# Patient Record
Sex: FEMALE | Race: ASIAN | NOT HISPANIC OR LATINO | URBAN - METROPOLITAN AREA
[De-identification: names, ages, dates, MRNs, and addresses within clinical notes are randomized per-mention and may not be internally consistent; named-entity substitution may affect disease eponyms.]

---

## 2022-05-19 ENCOUNTER — INPATIENT (INPATIENT)
Facility: HOSPITAL | Age: 62
LOS: 31 days | Discharge: NURSING, REHAB OR LONG-TERM CARE | DRG: 25 | End: 2022-06-20
Attending: NEUROLOGICAL SURGERY | Admitting: NEUROLOGICAL SURGERY
Payer: MEDICAID

## 2022-05-19 VITALS
HEIGHT: 61 IN | DIASTOLIC BLOOD PRESSURE: 94 MMHG | HEART RATE: 80 BPM | TEMPERATURE: 98 F | RESPIRATION RATE: 18 BRPM | OXYGEN SATURATION: 95 % | SYSTOLIC BLOOD PRESSURE: 142 MMHG | WEIGHT: 119.05 LBS

## 2022-05-19 DIAGNOSIS — Z98.890 OTHER SPECIFIED POSTPROCEDURAL STATES: Chronic | ICD-10-CM

## 2022-05-19 LAB
ALBUMIN SERPL ELPH-MCNC: 4.7 G/DL — SIGNIFICANT CHANGE UP (ref 3.3–5)
ALP SERPL-CCNC: 75 U/L — SIGNIFICANT CHANGE UP (ref 40–120)
ALT FLD-CCNC: 20 U/L — SIGNIFICANT CHANGE UP (ref 10–45)
ANION GAP SERPL CALC-SCNC: 15 MMOL/L — SIGNIFICANT CHANGE UP (ref 5–17)
AST SERPL-CCNC: 21 U/L — SIGNIFICANT CHANGE UP (ref 10–40)
BASE EXCESS BLDV CALC-SCNC: 1.3 MMOL/L — SIGNIFICANT CHANGE UP (ref -2–2)
BILIRUB SERPL-MCNC: 0.9 MG/DL — SIGNIFICANT CHANGE UP (ref 0.2–1.2)
BUN SERPL-MCNC: 12 MG/DL — SIGNIFICANT CHANGE UP (ref 7–23)
CA-I SERPL-SCNC: 1.25 MMOL/L — SIGNIFICANT CHANGE UP (ref 1.15–1.33)
CALCIUM SERPL-MCNC: 9.6 MG/DL — SIGNIFICANT CHANGE UP (ref 8.4–10.5)
CHLORIDE BLDV-SCNC: 100 MMOL/L — SIGNIFICANT CHANGE UP (ref 96–108)
CHLORIDE SERPL-SCNC: 102 MMOL/L — SIGNIFICANT CHANGE UP (ref 96–108)
CO2 BLDV-SCNC: 27 MMOL/L — HIGH (ref 22–26)
CO2 SERPL-SCNC: 19 MMOL/L — LOW (ref 22–31)
CREAT SERPL-MCNC: 0.49 MG/DL — LOW (ref 0.5–1.3)
EGFR: 108 ML/MIN/1.73M2 — SIGNIFICANT CHANGE UP
GAS PNL BLDV: 132 MMOL/L — LOW (ref 136–145)
GAS PNL BLDV: SIGNIFICANT CHANGE UP
GLUCOSE BLDV-MCNC: 96 MG/DL — SIGNIFICANT CHANGE UP (ref 70–99)
GLUCOSE SERPL-MCNC: 96 MG/DL — SIGNIFICANT CHANGE UP (ref 70–99)
HCO3 BLDV-SCNC: 26 MMOL/L — SIGNIFICANT CHANGE UP (ref 22–29)
HCT VFR BLD CALC: 41.2 % — SIGNIFICANT CHANGE UP (ref 34.5–45)
HCT VFR BLDA CALC: 44 % — SIGNIFICANT CHANGE UP (ref 34.5–46.5)
HGB BLD CALC-MCNC: 14.8 G/DL — SIGNIFICANT CHANGE UP (ref 11.7–16.1)
HGB BLD-MCNC: 14.4 G/DL — SIGNIFICANT CHANGE UP (ref 11.5–15.5)
LACTATE BLDV-MCNC: 1.8 MMOL/L — SIGNIFICANT CHANGE UP (ref 0.7–2)
MCHC RBC-ENTMCNC: 31.9 PG — SIGNIFICANT CHANGE UP (ref 27–34)
MCHC RBC-ENTMCNC: 35 GM/DL — SIGNIFICANT CHANGE UP (ref 32–36)
MCV RBC AUTO: 91.4 FL — SIGNIFICANT CHANGE UP (ref 80–100)
NRBC # BLD: 0 /100 WBCS — SIGNIFICANT CHANGE UP (ref 0–0)
PCO2 BLDV: 40 MMHG — SIGNIFICANT CHANGE UP (ref 39–42)
PH BLDV: 7.42 — SIGNIFICANT CHANGE UP (ref 7.32–7.43)
PLATELET # BLD AUTO: 222 K/UL — SIGNIFICANT CHANGE UP (ref 150–400)
PO2 BLDV: 63 MMHG — HIGH (ref 25–45)
POTASSIUM BLDV-SCNC: 3.8 MMOL/L — SIGNIFICANT CHANGE UP (ref 3.5–5.1)
POTASSIUM SERPL-MCNC: 3.9 MMOL/L — SIGNIFICANT CHANGE UP (ref 3.5–5.3)
POTASSIUM SERPL-SCNC: 3.9 MMOL/L — SIGNIFICANT CHANGE UP (ref 3.5–5.3)
PROT SERPL-MCNC: 7.6 G/DL — SIGNIFICANT CHANGE UP (ref 6–8.3)
RBC # BLD: 4.51 M/UL — SIGNIFICANT CHANGE UP (ref 3.8–5.2)
RBC # FLD: 12.1 % — SIGNIFICANT CHANGE UP (ref 10.3–14.5)
SAO2 % BLDV: 90.9 % — HIGH (ref 67–88)
SARS-COV-2 RNA SPEC QL NAA+PROBE: SIGNIFICANT CHANGE UP
SODIUM SERPL-SCNC: 136 MMOL/L — SIGNIFICANT CHANGE UP (ref 135–145)
WBC # BLD: 6.09 K/UL — SIGNIFICANT CHANGE UP (ref 3.8–10.5)
WBC # FLD AUTO: 6.09 K/UL — SIGNIFICANT CHANGE UP (ref 3.8–10.5)

## 2022-05-19 PROCEDURE — 99220: CPT

## 2022-05-19 PROCEDURE — 70450 CT HEAD/BRAIN W/O DYE: CPT | Mod: 26,MA

## 2022-05-19 RX ORDER — MECLIZINE HCL 12.5 MG
25 TABLET ORAL ONCE
Refills: 0 | Status: COMPLETED | OUTPATIENT
Start: 2022-05-19 | End: 2022-05-19

## 2022-05-19 RX ORDER — HYDROCHLOROTHIAZIDE 25 MG
12.5 TABLET ORAL DAILY
Refills: 0 | Status: DISCONTINUED | OUTPATIENT
Start: 2022-05-19 | End: 2022-05-21

## 2022-05-19 RX ORDER — SODIUM CHLORIDE 9 MG/ML
1000 INJECTION INTRAMUSCULAR; INTRAVENOUS; SUBCUTANEOUS ONCE
Refills: 0 | Status: COMPLETED | OUTPATIENT
Start: 2022-05-19 | End: 2022-05-19

## 2022-05-19 RX ORDER — LOSARTAN POTASSIUM 100 MG/1
100 TABLET, FILM COATED ORAL DAILY
Refills: 0 | Status: DISCONTINUED | OUTPATIENT
Start: 2022-05-19 | End: 2022-05-31

## 2022-05-19 RX ORDER — METOCLOPRAMIDE HCL 10 MG
10 TABLET ORAL ONCE
Refills: 0 | Status: COMPLETED | OUTPATIENT
Start: 2022-05-19 | End: 2022-05-19

## 2022-05-19 RX ORDER — MECLIZINE HCL 12.5 MG
25 TABLET ORAL EVERY 6 HOURS
Refills: 0 | Status: DISCONTINUED | OUTPATIENT
Start: 2022-05-19 | End: 2022-06-01

## 2022-05-19 RX ADMIN — Medication 10 MILLIGRAM(S): at 16:02

## 2022-05-19 RX ADMIN — Medication 25 MILLIGRAM(S): at 16:02

## 2022-05-19 RX ADMIN — SODIUM CHLORIDE 1000 MILLILITER(S): 9 INJECTION INTRAMUSCULAR; INTRAVENOUS; SUBCUTANEOUS at 16:03

## 2022-05-19 NOTE — ED CDU PROVIDER INITIAL DAY NOTE - OBJECTIVE STATEMENT
Daughter in law Shaina providing translation  61 year old female presenting with vomiting. Patient has been feeling dizzy since Tuesday. She vomited twice on tuesday. She feels unbalanced on her feet. She vomited once today. Denies bloody emesis, fevers, cp, sob, abdominal pain. Pshx - pt reports c-spine sx for "tumor" removal in [place] done 2/2018. Pt does not know details of dx or surgery.  In ED, labs non-actionable, CT w/ questionable "lesion in the region of the left pontomedullary junction." CT results d/w neurosx rec MRI/A head w/ thin cuts through ethel. CDU for neuro checks, MRI, symptom management. Daughter in law Shaina providing translation  61 year old female presenting with vomiting. Patient has been feeling dizzy since Tuesday. She vomited twice on tuesday. She feels unbalanced on her feet. She vomited once today. Denies bloody emesis, fevers, cp, sob, abdominal pain. Pshx - pt reports c-spine sx for "nerve tumor" removal in [place] done 2/2018. Pt does not know details of dx or surgery.  In ED, labs non-actionable, CT w/ questionable "lesion in the region of the left pontomedullary junction." CT results d/w neurosx rec MRI/A head w/ thin cuts through ethel. CDU for neuro checks, MRI, symptom management.

## 2022-05-19 NOTE — ED ADULT NURSE REASSESSMENT NOTE - NS ED NURSE REASSESS COMMENT FT1
Received pt from DEEPTI Webb, received pt alert and responsive, oriented x4, denies any respiratory distress, SOB, or difficulty breathing. Pt transferred to CDU for vomiting, dizziness. Pt denies, endorses headache, declined pain medication at this time. Neuro exam intact, pending MRI, pt aware of plan. IV in place, patent and free of signs of infiltration, pt denies chest pain or palpitations, V/S stable, pt afebrile,  Pt educated on unit and unit rules, instructed patient to notify RN of any needed assistance, Pt verbalizes understanding, Call bell placed within reach. Safety maintained. Will continue to monitor. Pt mandarin speaking, daughter-in-law at bedside to interpret. Received pt from DEEPTI Webb, received pt alert and responsive, oriented x4, denies any respiratory distress, SOB, or difficulty breathing. Pt transferred to CDU for vomiting, dizziness. Pt denies, endorses headache, declined pain medication at this time. Neuro exam intact, pending MRI, pt aware of plan. IV in place, patent and free of signs of infiltration, pt denies chest pain or palpitations, V/S stable, pt afebrile,  Pt educated on unit and unit rules, instructed patient to notify RN of any needed assistance, Pt verbalizes understanding, Call bell placed within reach. Safety maintained. Will continue to monitor. Pt mandarin speaking, daughter-in-law Shaina at bedside to interpret.

## 2022-05-19 NOTE — ED CDU PROVIDER INITIAL DAY NOTE - NS ED ATTENDING STATEMENT MOD
This was a shared visit with the RIOS. I reviewed and verified the documentation and independently performed the documented:

## 2022-05-19 NOTE — ED PROVIDER NOTE - OBJECTIVE STATEMENT
61 year old female presenting with vomiting. Patient has been feeling dizzy since Tuesday. She vomited twice on tuesday. She feels unbalanced on her feet. She vomited once today. Denies bloody emesis, fevers, cp, sob, abdominal pain.

## 2022-05-19 NOTE — ED ADULT TRIAGE NOTE - TEMPERATURE IN CELSIUS (DEGREES C)
Refill requested from pharmacy for miralax. Refill permitted per protocol. Script eprescribed to pharmacy.     36.4

## 2022-05-19 NOTE — ED CDU PROVIDER INITIAL DAY NOTE - PHYSICAL EXAMINATION
GEN: Pt well appearing, non-toxic in NAD, A&Ox3.  PSYCH: Affect and mood appropriate.  EYES: Sclera white w/o injection, EOMI, PERRLA.   ENT: Head NCAT. Neck supple FROM. Midline upper cervical incisional scar. Airway patent.  RESP: CTA b/l, no wheezes, rales, or rhonchi.   CARDIAC: RRR, clear distinct S1, S2, no appreciable murmurs.  ABD: Abdomen soft, non-tender.  MSK: Moving all extremities.  NEURO: No focal motor or sensory deficits. Strength 5/5 throughout. Pronator drift negative. Rapid and point to point movement intact.  VASC: Radial pulses 2+ b/l. No edema or calf tenderness.  SKIN: No rashes or lesions.

## 2022-05-19 NOTE — ED PROVIDER NOTE - ATTENDING CONTRIBUTION TO CARE
61 year old female used family from translation, doesn't want language line with pmhx htn high chol p/w induced dizziness, improving today, worse with movement with n/v. NEUROLOGICAL: Alert and oriented, no focal deficits, no motor or sensory deficits. CN II-XII intact, 5/5 strength. Gait intact, FTN intact, tandem gait with difficulty. [place] neg. No nystagmus. likely bppv,  labs, ct head, meds, reassess.

## 2022-05-19 NOTE — ED ADULT NURSE NOTE - OBJECTIVE STATEMENT
62 yo female with a PMH of HTN, HLD presents to the ED ambulatory with steady gait complaining of vomiting since yesterday. Daughter in law at bedside translating, states that she started vomiting since yesterday and was dizzy all day yesterday and today. Patient states that she feels "unsteady." Describing dizziness as "room spinning." Patient has no previous stroke history. Patient's neuro intact, +2 strength in all four extremities. PERRL. As per daughter in law, last vomited this morning. Abdomen is soft, non-distended, non-tender. Otherwise well appearing at this time. MD Weber at bedside evaluating patient. Denies vision changes, chest pain, shortness of breath, d/c'd by MAGGIE Krause prior to RN reassessment, RN not present during time of d/c, diarrhea, fevers, chills, dysuria, hematuria, recent illness travel or fall.

## 2022-05-19 NOTE — ED CDU PROVIDER INITIAL DAY NOTE - MEDICAL DECISION MAKING DETAILS
Mary Corral MD - Attending Physician: Pt here with vertigo and vomiting. CT inconclusive for possible posterior/pontine mass. Neuro intact. NSG aware. CDU for MRI and neuro checks

## 2022-05-19 NOTE — ED PROVIDER NOTE - PHYSICAL EXAMINATION
gen: Appears uncomfortable  Mentation: AAO x 3  psych: mood appropriate  HEENT: airway patent, conjunctivae clear bilaterally  Cardio: RRR, no m/r/g  Resp: normal BS b/l  GI: soft/nondistended/nontender  Neuro: sensation and motor function grossly intact, 5/5 muscle strength bilaterally in both UEX and ANYA, no dysmetria, negative romberg   Skin: No evidence of rash  MSK: normal movement of all extremities  Lymph/Vasc: no LE edema

## 2022-05-20 DIAGNOSIS — D49.6 NEOPLASM OF UNSPECIFIED BEHAVIOR OF BRAIN: ICD-10-CM

## 2022-05-20 DIAGNOSIS — R13.10 DYSPHAGIA, UNSPECIFIED: ICD-10-CM

## 2022-05-20 PROCEDURE — 99291 CRITICAL CARE FIRST HOUR: CPT

## 2022-05-20 PROCEDURE — 74177 CT ABD & PELVIS W/CONTRAST: CPT | Mod: 26,MA

## 2022-05-20 PROCEDURE — 99223 1ST HOSP IP/OBS HIGH 75: CPT

## 2022-05-20 PROCEDURE — 99217: CPT

## 2022-05-20 PROCEDURE — 71260 CT THORAX DX C+: CPT | Mod: 26,MA

## 2022-05-20 PROCEDURE — 70546 MR ANGIOGRAPH HEAD W/O&W/DYE: CPT | Mod: 26,MA,59

## 2022-05-20 PROCEDURE — 70553 MRI BRAIN STEM W/O & W/DYE: CPT | Mod: 26,MA

## 2022-05-20 RX ORDER — SODIUM CHLORIDE 9 MG/ML
1000 INJECTION INTRAMUSCULAR; INTRAVENOUS; SUBCUTANEOUS
Refills: 0 | Status: DISCONTINUED | OUTPATIENT
Start: 2022-05-20 | End: 2022-05-21

## 2022-05-20 RX ORDER — ACETAMINOPHEN 500 MG
650 TABLET ORAL EVERY 6 HOURS
Refills: 0 | Status: DISCONTINUED | OUTPATIENT
Start: 2022-05-20 | End: 2022-06-01

## 2022-05-20 RX ORDER — LEVETIRACETAM 250 MG/1
500 TABLET, FILM COATED ORAL EVERY 12 HOURS
Refills: 0 | Status: DISCONTINUED | OUTPATIENT
Start: 2022-05-20 | End: 2022-06-01

## 2022-05-20 RX ORDER — ENOXAPARIN SODIUM 100 MG/ML
40 INJECTION SUBCUTANEOUS
Refills: 0 | Status: DISCONTINUED | OUTPATIENT
Start: 2022-05-20 | End: 2022-05-30

## 2022-05-20 RX ORDER — ONDANSETRON 8 MG/1
4 TABLET, FILM COATED ORAL EVERY 6 HOURS
Refills: 0 | Status: DISCONTINUED | OUTPATIENT
Start: 2022-05-20 | End: 2022-06-01

## 2022-05-20 RX ORDER — CHLORHEXIDINE GLUCONATE 213 G/1000ML
1 SOLUTION TOPICAL DAILY
Refills: 0 | Status: DISCONTINUED | OUTPATIENT
Start: 2022-05-20 | End: 2022-05-21

## 2022-05-20 RX ORDER — PANTOPRAZOLE SODIUM 20 MG/1
40 TABLET, DELAYED RELEASE ORAL
Refills: 0 | Status: DISCONTINUED | OUTPATIENT
Start: 2022-05-20 | End: 2022-05-28

## 2022-05-20 RX ADMIN — CHLORHEXIDINE GLUCONATE 1 APPLICATION(S): 213 SOLUTION TOPICAL at 19:59

## 2022-05-20 RX ADMIN — LEVETIRACETAM 500 MILLIGRAM(S): 250 TABLET, FILM COATED ORAL at 17:02

## 2022-05-20 RX ADMIN — ENOXAPARIN SODIUM 40 MILLIGRAM(S): 100 INJECTION SUBCUTANEOUS at 19:59

## 2022-05-20 NOTE — ED CDU PROVIDER SUBSEQUENT DAY NOTE - PROGRESS NOTE DETAILS
Pt taken to MRI during CDU morning rounds, will eval upon return to CDU. Patient seen and evaluated at bedside with Dr. Finnegan, sitting up and eating, NAD. Patient's daughter-in-law, Sahina, assisting with translation per patient preference. Pt reports dizziness improved. + generalized weakness and feeling unsteady. Also reports change in taste, that her food now tastes bitter. VSS. Pt ambulatory without difficulty. Pending MRI results. Called by radiologist regarding MRI results. Discussed results with patient and Daughter-in-law at bedside. NSG called for consult, recommending CT c/a/p and medicine admission. Discussed admission with unattached medicine attending, Dr. Edwards, reports this patient should be neurosurgical, states "I do not take pt's with brain masses." Pt pending eval by NSG at this time, will await. Discussed admission with unattached medicine attending, Dr. Edwards, reports this patient should be neurosurgical. Pt pending eval by NSG at this time, will await. Pt seen by neurosurgery, plan for admission to NSICU under Dr. Liu. Pt seen by neurosurgery, plan for admission to NSICU under Dr. Liu. D/w Dr. Finnegan.

## 2022-05-20 NOTE — ED CDU PROVIDER DISPOSITION NOTE - CLINICAL COURSE
Daughter in law Shaina providing translation  	61 year old female presenting with vomiting. Patient has been feeling dizzy since Tuesday. She vomited twice on tuesday. She feels unbalanced on her feet. She vomited once today. Denies bloody emesis, fevers, cp, sob, abdominal pain. Pshx - pt reports c-spine sx for "nerve tumor" removal in [place] done 2/2018. Pt does not know details of dx or surgery.  In ED, labs non-actionable, CT w/ questionable "lesion in the region of the left pontomedullary junction." CT results d/w neurosx rec MRI/A head w/ thin cuts through ethel. CDU for neuro checks, MRI, symptom management.  In CDU, Daughter in law Shaina providing translation  	61 year old female presenting with vomiting. Patient has been feeling dizzy since Tuesday. She vomited twice on tuesday. She feels unbalanced on her feet. She vomited once today. Denies bloody emesis, fevers, cp, sob, abdominal pain. Pshx - pt reports c-spine sx for "nerve tumor" removal in [place] done 2/2018. Pt does not know details of dx or surgery.  In ED, labs non-actionable, CT w/ questionable "lesion in the region of the left pontomedullary junction." CT results d/w neurosx rec MRI/A head w/ thin cuts through ethel. CDU for neuro checks, MRI, symptom management.  Patient with improvement of nausea and dizziness in AM. Tolerating PO. Ambulatory but reporting feeling unsteady. Pt with abnormal MRI, seen by neurosurgery recommending admission to NSCU.

## 2022-05-20 NOTE — PATIENT PROFILE ADULT - FALL HARM RISK - HARM RISK INTERVENTIONS

## 2022-05-20 NOTE — H&P ADULT - ASSESSMENT
61F, PMH HTN and C-spine meningioma removal in 2018 in [place]. P/w dizziness, gait instability, and emesis x3d. MRI shows ethel-medullary, Right CPA, and Right temporal enhancing lesions c/f meningioma vs schwannoma, with some hydro. Exam: Mandarin-speaking, AOx3, EOMI no nystagmus, PERRL, no facial/drift, CONRAD 5/5, SILT. Slightly wide-based antalgic gait, positive Romberg’s  -Adm NSCU for hydrowatch  -CT CAP  -MRI neuraxis w/wo

## 2022-05-20 NOTE — ED CDU PROVIDER SUBSEQUENT DAY NOTE - HISTORY
Pt seen at bedside in NAD, resting comfortably w/o new or evolving complaints. VSS. Neuro intact. Plan for MRI/MRV brain in AM.

## 2022-05-20 NOTE — CONSULT NOTE ADULT - SUBJECTIVE AND OBJECTIVE BOX
CC: difficulty swallowing     HPI: 61F, PMH HTN and C-spine meningioma removal in 2018 in [place]. P/w dizziness, gait instability, and emesis x3d. MRI shows ethel-medullary, Right CPA, and Right temporal enhancing lesions c/f meningioma vs schwannoma, with some hydro. ENT consulted as pt c/o dysphagia. NSCU concerned for possible VC paralysis and HL secondary to location of lesion. Patient denies hearing loss at this time. Patient denies SOB     PAST MEDICAL & SURGICAL HISTORY:  HTN (hypertension)      HLD (hyperlipidemia)      H/O cervical spine surgery        Allergies    No Known Allergies    Intolerances      MEDICATIONS  (STANDING):  chlorhexidine 4% Liquid 1 Application(s) Topical daily  enoxaparin Injectable 40 milliGRAM(s) SubCutaneous <User Schedule>  hydrochlorothiazide 12.5 milliGRAM(s) Oral daily  levETIRAcetam 500 milliGRAM(s) Oral every 12 hours  losartan 100 milliGRAM(s) Oral daily  pantoprazole    Tablet 40 milliGRAM(s) Oral before breakfast  sodium chloride 0.9%. 1000 milliLiter(s) (60 mL/Hr) IV Continuous <Continuous>    MEDICATIONS  (PRN):  acetaminophen     Tablet .. 650 milliGRAM(s) Oral every 6 hours PRN Temp greater or equal to 38C (100.4F), Mild Pain (1 - 3)  meclizine 25 milliGRAM(s) Oral every 6 hours PRN Dizziness  ondansetron Injectable 4 milliGRAM(s) IV Push every 6 hours PRN Nausea and/or Vomiting      Social History: current nonsmoker     Family history: no pertinent family hx     ROS:   ENT: all negative except as noted in HPI   CV: denies palpitations  Pulm: denies SOB, cough, hemoptysis  GI: denies change in apetite, indigestion, n/v  : denies pertinent urinary symptoms, urgency  Neuro: denies numbness/tingling, loss of sensation  Psych: denies anxiety  MS: denies muscle weakness, instability  Heme: denies easy bruising or bleeding  Endo: denies heat/cold intolerance, excessive sweating  Vascular: denies LE edema    Vital Signs Last 24 Hrs  T(C): 36.7 (20 May 2022 19:00), Max: 36.9 (20 May 2022 00:00)  T(F): 98 (20 May 2022 19:00), Max: 98.4 (20 May 2022 00:00)  HR: 93 (20 May 2022 20:00) (71 - 93)  BP: 161/94 (20 May 2022 20:00) (114/77 - 161/94)  BP(mean): 112 (20 May 2022 20:00) (108 - 114)  RR: 20 (20 May 2022 20:00) (13 - 20)  SpO2: 98% (20 May 2022 20:00) (96% - 100%)                          14.4   6.09  )-----------( 222      ( 19 May 2022 16:02 )             41.2    05-19    136  |  102  |  12  ----------------------------<  96  3.9   |  19<L>  |  0.49<L>    Ca    9.6      19 May 2022 16:02    TPro  7.6  /  Alb  4.7  /  TBili  0.9  /  DBili  x   /  AST  21  /  ALT  20  /  AlkPhos  75  05-19       PHYSICAL EXAM:  Gen: NAD  Skin: No rashes, bruises, or lesions  Head: Normocephalic, Atraumatic  Face: no edema, erythema, or fluctuance. Parotid glands soft without mass  Eyes: no scleral injection  Nose: Nares bilaterally patent, no discharge  Mouth: No Stridor / Drooling / Trismus.  Mucosa moist, tongue/uvula midline, oropharynx clear  Neck: Flat, supple, no lymphadenopathy, trachea midline, no masses  Lymphatic: No lymphadenopathy  Resp: breathing easily, no stridor  CV: no peripheral edema/cyanosis  GI: nondistended   Peripheral vascular: no JVD or edema  Neuro: facial nerve intact, no facial droop        Fiberoptic Indirect laryngoscopy:  (Scope #2 used)    Reason for Laryngoscopy:    Nasopharynx, oropharynx, and hypopharynx clear, no bleeding. Tongue base, posterior pharyngeal wall, vallecula, epiglottis, and subglottis appear normal. No erythema, edema, pooling of secretions, masses or lesions. Airway patent, no foreign body visualized. No glottic/supraglottic edema. True vocal cords, arytenoids, vestibular folds, ventricles, pyriform sinuses, and aryepiglottic folds appear normal bilaterally. Vocal cords mobile with good contact b/l.

## 2022-05-20 NOTE — H&P ADULT - HISTORY OF PRESENT ILLNESS
61F, PMH HTN and C-spine meningioma removal in 2018 in [place]. P/w dizziness, gait instability, and emesis x3d. MRI shows ethel-medullary, Right CPA, and Right temporal enhancing lesions c/f meningioma vs schwannoma, with some hydro. Exam: Mandarin-speaking, AOx3, EOMI no nystagmus, PERRL, no facial/drift, CONRAD 5/5, SILT. Slightly wide-based antalgic gait, positive Romberg’s

## 2022-05-20 NOTE — ED ADULT NURSE REASSESSMENT NOTE - COMFORT CARE
ambulated to bathroom/darkened lights/plan of care explained/po fluids offered/repositioned/warm blanket provided
plan of care explained/side rails up/wait time explained

## 2022-05-20 NOTE — CONSULT NOTE ADULT - ASSESSMENT
61F, PMH HTN and C-spine meningioma removal in 2018 in [place]. P/w dizziness, gait instability, and emesis x3d. MRI shows ethel-medullary, Right CPA, and Right temporal enhancing lesions c/f meningioma vs schwannoma, with some hydro. ENT consulted as pt c/o dysphagia. NSCU concerned for possible VC paralysis and HL secondary to location of lesion. Patient denies hearing loss at this time. Laryngoscopy done at bedside, normal scope. Bilateral vocal cords mobile and intact.

## 2022-05-20 NOTE — ED CDU PROVIDER SUBSEQUENT DAY NOTE - PHYSICAL EXAMINATION
GEN: Pt well appearing, non-toxic in NAD, alert.  PSYCH: Affect and mood appropriate.  EYES: Sclera white w/o injection, EOMI, PERRLA.   ENT: Neck supple FROM. Midline upper cervical incisional scar. Airway patent.  RESP: CTA b/l, no wheezes, rales, or rhonchi.   CARDIAC: RRR, clear distinct S1, S2, no appreciable murmurs.  ABD: Abdomen soft, non-tender.  MSK: Moving all extremities.  NEURO: No focal motor or sensory deficits. Strength 5/5 throughout.  VASC: Radial pulses 2+ b/l. No edema or calf tenderness.  SKIN: No rashes or lesions.

## 2022-05-20 NOTE — PROGRESS NOTE ADULT - SUBJECTIVE AND OBJECTIVE BOX
HPI:  61F, PMH HTN and C-spine meningioma removal in 2018 in [place]. P/w dizziness, gait instability, and emesis x3d. MRI shows ethel-medullary, Right CPA, and Right temporal enhancing lesions c/f meningioma vs schwannoma, with some hydro. Exam: Mandarin-speaking, AOx3, EOMI no nystagmus, PERRL, no facial/drift, CONRAD 5/5, SILT. Slightly wide-based antalgic gait, positive Romberg’s    Admission scores   GCS 15/15    VITALS:  T(C): , Max: 36.9 (05-19-22 @ 19:05)  HR:  (71 - 86)  BP:  (114/77 - 145/88)  ABP: --  RR:  (13 - 20)  SpO2:  (96% - 99%)  Wt(kg): --      05-20-22 @ 07:01  -  05-20-22 @ 16:01  --------------------------------------------------------  IN: 60 mL / OUT: 400 mL / NET: -340 mL      LABS:  Na: 136 (05-19 @ 16:02)  K: 3.9 (05-19 @ 16:02)  Cl: 102 (05-19 @ 16:02)  CO2: 19 (05-19 @ 16:02)  BUN: 12 (05-19 @ 16:02)  Cr: 0.49 (05-19 @ 16:02)  Glu: 96(05-19 @ 16:02)    Hgb: 14.4 (05-19 @ 16:02)  Hct: 41.2 (05-19 @ 16:02)  WBC: 6.09 (05-19 @ 16:02)  Plt: 222 (05-19 @ 16:02)    IMAGING:   Recent imaging studies were reviewed.    MEDICATIONS:  acetaminophen     Tablet .. 650 milliGRAM(s) Oral every 6 hours PRN  hydrochlorothiazide 12.5 milliGRAM(s) Oral daily  levETIRAcetam 500 milliGRAM(s) Oral every 12 hours  losartan 100 milliGRAM(s) Oral daily  meclizine 25 milliGRAM(s) Oral every 6 hours PRN  ondansetron Injectable 4 milliGRAM(s) IV Push every 6 hours PRN  pantoprazole    Tablet 40 milliGRAM(s) Oral before breakfast  sodium chloride 0.9%. 1000 milliLiter(s) IV Continuous <Continuous>    EXAMINATION:  General:  calm  HEENT:  MMM  Neuro:  awake, alert, oriented x 3, follows commands, moves all extremities  Cards:  RRR  Respiratory:  no respiratory distress  [place]domen:  soft  Extremities:  no edema  Skin:  warm/dry

## 2022-05-20 NOTE — ED CDU PROVIDER DISPOSITION NOTE - NSFOLLOWUPINSTRUCTIONS_ED_ALL_ED_FT
1) Follow-up with your primary care provider in 1-2 days.    ***Neurosx and or neuro recs/fu***    2) Continue to take all medications as prescribed.    3) Rest and drink plenty of fluids. Pain can be managed with Acetaminophen (aka Tylenol) and Ibuprofen (aka Motrin or Advil) over the counter as directed. Take with food.    4) Return to the ER for any new or worsening symptoms.
 used

## 2022-05-20 NOTE — CHART NOTE - NSCHARTNOTEFT_GEN_A_CORE
CAPRINI SCORE [CLOT] Score on Admission for     AGE RELATED RISK FACTORS                                                       MOBILITY RELATED FACTORS  [x] Age 41-60 years                                            (1 Point)                  [ ] Bed rest                                                        (1 Point)  [ ] Age 61-74 years                                           (2 Points)                 [ ] Plaster cast                                                   (2 Points)  [ ] Age > 75 years                                              (3 Points)                 [ ] Bed bound for more than 72 hours         (2 Points)    DISEASE RELATED RISK FACTORS                                               GENDER SPECIFIC FACTORS  [ ] Edema in the lower extremities                       (1 Point)           [ ] Pregnancy                                                          (1 Point)  [ ] Varicose veins                                               (1 Point)                  [ ] Post-partum < 6 weeks                                   (1 Point)             [ ] BMI > 25 Kg/m2                                            (1 Point)                  [ ] Hormonal therapy  or oral contraception   (1 Point)                 [ ] Sepsis (in the previous month)                        (1 Point)            [ ] History of pregnancy complications             (1 point)  [ ] Pneumonia or serious lung disease                                            [ ] Unexplained or recurrent               (1 Point)           (in the previous month)                               (1 Point)  [ ] Abnormal pulmonary function test                     (1 Point)                 SURGERY RELATED RISK FACTORS (include planned surgeries)  [ ] Acute myocardial infarction                              (1 Point)                 [ ]  Section                                             (1 Point)  [ ] Congestive heart failure (in the previous month)  (1 Point)        [ ] Minor surgery                                                  (1 Point)   [ ] Inflammatory bowel disease                             (1 Point)                 [ ] Arthroscopic surgery                                        (2 Points)  [ ] Central venous access                                      (2 Points)  [ ] History / presence of malignancy                   (2 points)   [ ] General surgery lasting more than 45 minutes   (2 Points)       [ ] Stroke (in the previous month)                          (5 Points)               [ ] Elective arthroplasty                                         (5 Points)                                                                                                                                               HEMATOLOGY RELATED FACTORS                                                 TRAUMA RELATED RISK FACTORS  [ ] Prior episodes of VTE                                     (3 Points)                [ ] Fracture of the hip, pelvis, or leg                       (5 Points)  [ ] Positive family history for VTE                         (3 Points)             [ ] Acute spinal cord injury (in the previous month)  (5 Points)  [ ] Prothrombin 58630 A                                     (3 Points)                [ ] Paralysis  (less than 1 month)                             (5 Points)  [ ] Factor V Leiden                                             (3 Points)                   [ ] Multiple Trauma within 1 month                        (5 Points)  [ ] Lupus anticoagulants                                     (3 Points)                                                           [ ] Anticardiolipin antibodies                               (3 Points)                                                       [ ] High homocysteine in the blood                      (3 Points)                                             [ ] Other congenital or acquired thrombophilia      (3 Points)                                                [ ] Heparin induced thrombocytopenia                  (3 Points)                                          Total Score [1]    Risk:  Very low 0   Low 1 to 2   Moderate 3 to 4   High =5       VTE Prophylaxis Recommendations:  [x] mechanical pneumatic compression devices                                      [ ] contraindicated: _____________________  [ ] chemoprophylaxis                                                                                    [ ] contraindicated: _____________________    **** HIGH LIKELIHOOD DVT PRESENT ON ADMISSION  [ ] (please order LE dopplers within 24 hours of admission)

## 2022-05-20 NOTE — ED CDU PROVIDER SUBSEQUENT DAY NOTE - MEDICAL DECISION MAKING DETAILS
Dr. Finnegan (Attending Physician)  Pt. with brain mass on CT scan, nausea, vomiting, unsteady gait, improved today. Brain MRI showed multiple lesions. Will get CT CAP and admit.

## 2022-05-20 NOTE — ED ADULT NURSE REASSESSMENT NOTE - NS ED NURSE REASSESS COMMENT FT1
Mandarin : ID [id number] Delgado hermosillo. [employer] Mandarin : ID [id number] Delgado hermosillo.

## 2022-05-20 NOTE — PATIENT PROFILE ADULT - HOW PATIENT ADDRESSED, PROFILE
Verified Results  CHLAMYDIA / GC BY NUCLEIC ACID AMPLIFICATION [id number] 12:01AM DONTRELL PEREZ   [Sep 21, 2017 4:33PM DONTRELL PEREZ]  DAVIS negative, pnr     Test Name Result Flag Reference   SOURCE URINE     CHLAMYDIA TRACHOMATIS BY NUCLEIC ACID AMPLIFICATION NEGATIVE  NEGATIVE   NEISSERIA GONORRHOEAE BY NUCLEIC ACID AMPLIFICATION NEGATIVE  NEGATIVE       
Yixue

## 2022-05-20 NOTE — PROGRESS NOTE ADULT - SUBJECTIVE AND OBJECTIVE BOX
NEUROCRITICAL CARE ATTENDING EVENING NOTE    BRIEF SUMMARY:  61yFemale presented with Patient is a 61y old  Female who presents with a chief complaint of Multiple meningiomas (20 May 2022 15:58)      OVERNIGHT EVENTS:    VITALS/IMAGING/DATA/IVF FLUIDS/MEDICATIONS: [x] Reviewed    ALLERGIES: Allergies    No Known Allergies    Intolerances        EXAMINATION: daughter at bedside for lito translation  General: No acute distress  HEENT: Anicteric sclerae  Cardiac: [id number]  Lungs: Clear  Abdomen: Soft, non-tender, +BS  Extremities: No c/c/e  Skin/Incision Site: Clean, dry and intact  Neurologic: Awake, alert, fully oriented, follows commands, PERRL, VFFtc, EOMI, face symmetric, tongue midline, no drift, full strength

## 2022-05-20 NOTE — PROGRESS NOTE ADULT - ASSESSMENT
ASSESSMENT: 61F hx HTN, c-spine meningioma s/p removal in [place], admit for ethel-medullary R CPA & R temporal enhancing masses & mild hydrocephalus.     PLAN:  NC q4, VSq4   ENT consulted for vocal cord evaluation & hearing assessment prior to OR.   c/w antiHTN meds losartan, hctx  Feeding: [x] diet [] tube feeds  Analgesia/Sedation: keppra 500mg BID  Seizure prophylaxis: [] not indicated  Thromboprophylaxis: [] SCDs [] chemoprophylaxis lovenox  Head of Bed/Activity: [x] 30 degrees [] mobilize as tolerated [] PT [] OT [] PMNR  Ulcer prophylaxis: [x] not indicated [] PPI [] other:  Glucose Control: Goal -180 [] RISS [] other:

## 2022-05-20 NOTE — H&P ADULT - ATTENDING COMMENTS
Examined and agreed with above. 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

## 2022-05-20 NOTE — ED CDU PROVIDER DISPOSITION NOTE - ATTENDING APP SHARED VISIT CONTRIBUTION OF CARE
Dr. Finnegan (Attending Physician)  I performed a history and physical exam of the patient and discussed their management with the advanced care provider. I reviewed the advanced care provider's note and agree with the documented findings and plan of care. My medical decision making and objective findings are found above.

## 2022-05-20 NOTE — H&P ADULT - NSHPPHYSICALEXAM_GEN_ALL_CORE
Mandarin-speaking, AOx3, EOMI no nystagmus, PERRL, no facial/drift, CONRAD 5/5, SILT. Slightly wide-based antalgic gait, positive Romberg’s

## 2022-05-20 NOTE — PROGRESS NOTE ADULT - ASSESSMENT
Summary:   61F, PMH HTN and C-spine meningioma removal in 2018 in [place]. P/w dizziness, gait instability, and emesis x3d. MRI shows ethel-medullary, Right CPA, and Right temporal enhancing lesions c/f meningioma vs schwannoma, with some hydro. Exam: Mandarin-speaking, AOx3, EOMI no nystagmus, PERRL, no facial/drift, CONRAD 5/5, SILT. Slightly wide-based antalgic gait, positive Romberg’s    NEURO:    q1h neuro checks  Hydrocephalus watch   Tylenol  Keppra 500 mg BID  Meclizine 25 mg Q6 hrs    CARDS:    -160    PULM:    RA  sat > 92%    RENAL:    NS at 60 ml/hr  HCT 12.5 mg daily    GASTRO:    Regular diet  Stress ulcer prophylaxis:  PPI  Ondansetron     HEME:    monitor H/H    DVT prophylaxis: SCDs    ENDO:    euglycemia    ID:   Monitor for fever    Code status:  Full code  Disposition:  ICU

## 2022-05-20 NOTE — H&P ADULT - NSHPLABSRESULTS_GEN_ALL_CORE
MRI shows ethel-medullary, Right CPA, and Right temporal enhancing lesions c/f meningioma vs schwannoma, with some hydro.

## 2022-05-21 PROBLEM — I10 ESSENTIAL (PRIMARY) HYPERTENSION: Chronic | Status: ACTIVE | Noted: 2022-05-19

## 2022-05-21 PROBLEM — E78.5 HYPERLIPIDEMIA, UNSPECIFIED: Chronic | Status: ACTIVE | Noted: 2022-05-19

## 2022-05-21 LAB
ANION GAP SERPL CALC-SCNC: 11 MMOL/L — SIGNIFICANT CHANGE UP (ref 5–17)
APTT BLD: 30.4 SEC — SIGNIFICANT CHANGE UP (ref 27.5–35.5)
BUN SERPL-MCNC: 13 MG/DL — SIGNIFICANT CHANGE UP (ref 7–23)
CALCIUM SERPL-MCNC: 9.2 MG/DL — SIGNIFICANT CHANGE UP (ref 8.4–10.5)
CHLORIDE SERPL-SCNC: 105 MMOL/L — SIGNIFICANT CHANGE UP (ref 96–108)
CHOLEST SERPL-MCNC: 159 MG/DL — SIGNIFICANT CHANGE UP
CO2 SERPL-SCNC: 23 MMOL/L — SIGNIFICANT CHANGE UP (ref 22–31)
CREAT SERPL-MCNC: 0.49 MG/DL — LOW (ref 0.5–1.3)
EGFR: 108 ML/MIN/1.73M2 — SIGNIFICANT CHANGE UP
GLUCOSE SERPL-MCNC: 111 MG/DL — HIGH (ref 70–99)
HDLC SERPL-MCNC: 50 MG/DL — LOW
INR BLD: 1.01 RATIO — SIGNIFICANT CHANGE UP (ref 0.88–1.16)
LIPID PNL WITH DIRECT LDL SERPL: 89 MG/DL — SIGNIFICANT CHANGE UP
MAGNESIUM SERPL-MCNC: 2.3 MG/DL — SIGNIFICANT CHANGE UP (ref 1.6–2.6)
NON HDL CHOLESTEROL: 109 MG/DL — SIGNIFICANT CHANGE UP
PHOSPHATE SERPL-MCNC: 3.5 MG/DL — SIGNIFICANT CHANGE UP (ref 2.5–4.5)
POTASSIUM SERPL-MCNC: 3.4 MMOL/L — LOW (ref 3.5–5.3)
POTASSIUM SERPL-SCNC: 3.4 MMOL/L — LOW (ref 3.5–5.3)
PROTHROM AB SERPL-ACNC: 11.6 SEC — SIGNIFICANT CHANGE UP (ref 10.5–13.4)
SODIUM SERPL-SCNC: 139 MMOL/L — SIGNIFICANT CHANGE UP (ref 135–145)
T3 SERPL-MCNC: 104 NG/DL — SIGNIFICANT CHANGE UP (ref 80–200)
T4 AB SER-ACNC: 8.3 UG/DL — SIGNIFICANT CHANGE UP (ref 4.6–12)
TRIGL SERPL-MCNC: 99 MG/DL — SIGNIFICANT CHANGE UP
TSH SERPL-MCNC: 0.8 UIU/ML — SIGNIFICANT CHANGE UP (ref 0.27–4.2)

## 2022-05-21 PROCEDURE — 72157 MRI CHEST SPINE W/O & W/DYE: CPT | Mod: 26

## 2022-05-21 PROCEDURE — 70450 CT HEAD/BRAIN W/O DYE: CPT | Mod: 26

## 2022-05-21 PROCEDURE — 72156 MRI NECK SPINE W/O & W/DYE: CPT | Mod: 26

## 2022-05-21 PROCEDURE — 99233 SBSQ HOSP IP/OBS HIGH 50: CPT

## 2022-05-21 PROCEDURE — 72158 MRI LUMBAR SPINE W/O & W/DYE: CPT | Mod: 26

## 2022-05-21 RX ORDER — POTASSIUM CHLORIDE 20 MEQ
20 PACKET (EA) ORAL
Refills: 0 | Status: COMPLETED | OUTPATIENT
Start: 2022-05-21 | End: 2022-05-21

## 2022-05-21 RX ORDER — POLYETHYLENE GLYCOL 3350 17 G/17G
17 POWDER, FOR SOLUTION ORAL DAILY
Refills: 0 | Status: DISCONTINUED | OUTPATIENT
Start: 2022-05-21 | End: 2022-06-01

## 2022-05-21 RX ORDER — SENNA PLUS 8.6 MG/1
2 TABLET ORAL AT BEDTIME
Refills: 0 | Status: DISCONTINUED | OUTPATIENT
Start: 2022-05-21 | End: 2022-06-01

## 2022-05-21 RX ADMIN — PANTOPRAZOLE SODIUM 40 MILLIGRAM(S): 20 TABLET, DELAYED RELEASE ORAL at 06:53

## 2022-05-21 RX ADMIN — ONDANSETRON 4 MILLIGRAM(S): 8 TABLET, FILM COATED ORAL at 11:39

## 2022-05-21 RX ADMIN — POLYETHYLENE GLYCOL 3350 17 GRAM(S): 17 POWDER, FOR SOLUTION ORAL at 12:15

## 2022-05-21 RX ADMIN — Medication 20 MILLIEQUIVALENT(S): at 08:34

## 2022-05-21 RX ADMIN — ENOXAPARIN SODIUM 40 MILLIGRAM(S): 100 INJECTION SUBCUTANEOUS at 17:24

## 2022-05-21 RX ADMIN — LOSARTAN POTASSIUM 100 MILLIGRAM(S): 100 TABLET, FILM COATED ORAL at 05:52

## 2022-05-21 RX ADMIN — LEVETIRACETAM 500 MILLIGRAM(S): 250 TABLET, FILM COATED ORAL at 05:51

## 2022-05-21 RX ADMIN — Medication 20 MILLIEQUIVALENT(S): at 10:38

## 2022-05-21 RX ADMIN — LEVETIRACETAM 500 MILLIGRAM(S): 250 TABLET, FILM COATED ORAL at 17:25

## 2022-05-21 RX ADMIN — SENNA PLUS 2 TABLET(S): 8.6 TABLET ORAL at 22:11

## 2022-05-21 RX ADMIN — Medication 12.5 MILLIGRAM(S): at 05:51

## 2022-05-21 RX ADMIN — CHLORHEXIDINE GLUCONATE 1 APPLICATION(S): 213 SOLUTION TOPICAL at 12:14

## 2022-05-21 NOTE — PROGRESS NOTE ADULT - SUBJECTIVE AND OBJECTIVE BOX
HPI:  61F, PMH HTN and C-spine meningioma removal in 2018 in [place]. P/w dizziness, gait instability, and emesis x3d. MRI shows ethel-medullary, Right CPA, and Right temporal enhancing lesions c/f meningioma vs schwannoma, with some hydro. Exam: Mandarin-speaking, AOx3, EOMI no nystagmus, PERRL, no facial/drift, CONRAD 5/5, SILT. Slightly wide-based antalgic gait, positive Romberg’s    OVERNIGHT EVENTS:   No acute events overnight.    VITALS:  T(C): , Max: 36.8 (05-20-22 @ 08:13)  HR:  (78 - 93)  BP:  (112/83 - 161/94)  ABP: --  RR:  (13 - 21)  SpO2:  (96% - 100%)  Wt(kg): --      05-20-22 @ 07:01  -  05-21-22 @ 07:00  --------------------------------------------------------  IN: 1560 mL / OUT: 1600 mL / NET: -40 mL      LABS:  Na: 139 (05-21 @ 06:26), 136 (05-19 @ 16:02)  K: 3.4 (05-21 @ 06:26), 3.9 (05-19 @ 16:02)  Cl: 105 (05-21 @ 06:26), 102 (05-19 @ 16:02)  CO2: 23 (05-21 @ 06:26), 19 (05-19 @ 16:02)  BUN: 13 (05-21 @ 06:26), 12 (05-19 @ 16:02)  Cr: 0.49 (05-21 @ 06:26), 0.49 (05-19 @ 16:02)  Glu: 111(05-21 @ 06:26), 96(05-19 @ 16:02)    Hgb: 14.4 (05-19 @ 16:02)  Hct: 41.2 (05-19 @ 16:02)  WBC: 6.09 (05-19 @ 16:02)  Plt: 222 (05-19 @ 16:02)    INR: 1.01 05-21-22 @ 06:26  PTT: 30.4 05-21-22 @ 06:26    IMAGING:   Recent imaging studies were reviewed.    MEDICATIONS:  acetaminophen     Tablet .. 650 milliGRAM(s) Oral every 6 hours PRN  chlorhexidine 4% Liquid 1 Application(s) Topical daily  enoxaparin Injectable 40 milliGRAM(s) SubCutaneous <User Schedule>  hydrochlorothiazide 12.5 milliGRAM(s) Oral daily  levETIRAcetam 500 milliGRAM(s) Oral every 12 hours  losartan 100 milliGRAM(s) Oral daily  meclizine 25 milliGRAM(s) Oral every 6 hours PRN  ondansetron Injectable 4 milliGRAM(s) IV Push every 6 hours PRN  pantoprazole    Tablet 40 milliGRAM(s) Oral before breakfast  sodium chloride 0.9%. 1000 milliLiter(s) IV Continuous <Continuous>    EXAMINATION:  General:  calm  HEENT:  MMM  Neuro:  awake, alert, oriented x 3, follows commands, moves all extremities  Cards:  RRR  Respiratory:  no respiratory distress  Adomen:  soft  Extremities:  no edema  Skin:  warm/dry

## 2022-05-21 NOTE — PROGRESS NOTE ADULT - ASSESSMENT
ASSESSMENT: 61F hx HTN, c-spine meningioma s/p removal in [place], admit for ethel-medullary R CPA & R temporal enhancing masses & mild hydrocephalus.     PLAN:  NC q4, VSq4   c/w antiHTN meds losartan, hctx  meclizine 25mg q6 PRN   operative planning per nsgy   Feeding: [x] diet [] tube feeds  Analgesia/Sedation: none  Seizure prophylaxis: keppra 500mg bid   Thromboprophylaxis: [] SCDs [] chemoprophylaxis lovenox  Head of Bed/Activity: [x] 30 degrees [] mobilize as tolerated [x] PT [x] OT [] PMNR  Ulcer prophylaxis: [x] not indicated [] PPI [] other:  Glucose Control: Goal -180 [] RISS [] other:

## 2022-05-21 NOTE — PROGRESS NOTE ADULT - SUBJECTIVE AND OBJECTIVE BOX
NEUROCRITICAL CARE ATTENDING EVENING NOTE    BRIEF SUMMARY:  61yFemale presented with Patient is a 61y old  Female who presents with a chief complaint of Multiple meningiomas (20 May 2022 15:58)      VITALS/IMAGING/DATA/IVF FLUIDS/MEDICATIONS: [x] Reviewed    ALLERGIES: Allergies    No Known Allergies    Intolerances        EXAMINATION: daughter at bedside for lito translation  General: No acute distress  HEENT: Anicteric sclerae  Cardiac: [id number]  Lungs: Clear  Abdomen: Soft, non-tender, +BS  Extremities: No c/c/e  Skin/Incision Site: Clean, dry and intact  Neurologic: Awake, alert, fully oriented, follows commands, PERRL, VFFtc, EOMI, face symmetric, tongue midline, no drift, full strength

## 2022-05-21 NOTE — PROGRESS NOTE ADULT - ASSESSMENT
Summary:   61F, PMH HTN and C-spine meningioma removal in 2018 in [place]. P/w dizziness, gait instability, and emesis x3d. MRI shows ethel-medullary, Right CPA, and Right temporal enhancing lesions c/f meningioma vs schwannoma, with some hydro. Exam: Mandarin-speaking, AOx3, EOMI no nystagmus, PERRL, no facial/drift, CONRAD 5/5, SILT. Slightly wide-based antalgic gait, positive Romberg’s    NEURO:    q1h neuro checks  Hydrocephalus watch   Tylenol  Keppra 500 mg BID  Meclizine 25 mg Q6 hrs    CARDS:    -160    PULM:    RA  sat > 92%    RENAL:    NS at 60 ml/hr  HCT 12.5 mg daily    GASTRO:    Regular diet  Stress ulcer prophylaxis:  PPI  Ondansetron     HEME:    monitor H/H    DVT prophylaxis: SCDs    ENDO:    euglycemia    ID:   Monitor for fever    Code status:  Full code  Disposition:  ICU   Summary:   61F, PMH HTN and C-spine meningioma removal in 2018 in [place]. P/w dizziness, gait instability, and emesis x3d. MRI shows ethel-medullary, Right CPA, and Right temporal enhancing lesions c/f meningioma vs schwannoma, with some hydro. Exam: Mandarin-speaking, AOx3, EOMI no nystagmus, PERRL, no facial/drift, CONRAD 5/5, SILT. Slightly wide-based antalgic gait, positive Romberg’s    NEURO:    q4h neuro checks  Hydrocephalus watch   Tylenol  Keppra 500 mg BID  Meclizine 25 mg Q6 hrs    CARDS:    -160    PULM:    RA  sat > 92%    RENAL:    NS at 60 ml/hr  HCT 12.5 mg daily    GASTRO:    Regular diet  Stress ulcer prophylaxis:  PPI  Ondansetron     HEME:    monitor H/H    DVT prophylaxis: SCDs  Lovenox 40 mg daily     ENDO:    euglycemia    ID:   Monitor for fever    Code status:  Full code  Disposition:  Floor

## 2022-05-22 LAB
ANION GAP SERPL CALC-SCNC: 14 MMOL/L — SIGNIFICANT CHANGE UP (ref 5–17)
BUN SERPL-MCNC: 14 MG/DL — SIGNIFICANT CHANGE UP (ref 7–23)
CALCIUM SERPL-MCNC: 9.7 MG/DL — SIGNIFICANT CHANGE UP (ref 8.4–10.5)
CHLORIDE SERPL-SCNC: 102 MMOL/L — SIGNIFICANT CHANGE UP (ref 96–108)
CO2 SERPL-SCNC: 22 MMOL/L — SIGNIFICANT CHANGE UP (ref 22–31)
CREAT SERPL-MCNC: 0.63 MG/DL — SIGNIFICANT CHANGE UP (ref 0.5–1.3)
EGFR: 102 ML/MIN/1.73M2 — SIGNIFICANT CHANGE UP
GLUCOSE SERPL-MCNC: 99 MG/DL — SIGNIFICANT CHANGE UP (ref 70–99)
HCT VFR BLD CALC: 41.3 % — SIGNIFICANT CHANGE UP (ref 34.5–45)
HGB BLD-MCNC: 14.4 G/DL — SIGNIFICANT CHANGE UP (ref 11.5–15.5)
MAGNESIUM SERPL-MCNC: 2.4 MG/DL — SIGNIFICANT CHANGE UP (ref 1.6–2.6)
MCHC RBC-ENTMCNC: 31.6 PG — SIGNIFICANT CHANGE UP (ref 27–34)
MCHC RBC-ENTMCNC: 34.9 GM/DL — SIGNIFICANT CHANGE UP (ref 32–36)
MCV RBC AUTO: 90.8 FL — SIGNIFICANT CHANGE UP (ref 80–100)
NRBC # BLD: 0 /100 WBCS — SIGNIFICANT CHANGE UP (ref 0–0)
PHOSPHATE SERPL-MCNC: 4.5 MG/DL — SIGNIFICANT CHANGE UP (ref 2.5–4.5)
PLATELET # BLD AUTO: 238 K/UL — SIGNIFICANT CHANGE UP (ref 150–400)
POTASSIUM SERPL-MCNC: 3.5 MMOL/L — SIGNIFICANT CHANGE UP (ref 3.5–5.3)
POTASSIUM SERPL-SCNC: 3.5 MMOL/L — SIGNIFICANT CHANGE UP (ref 3.5–5.3)
RBC # BLD: 4.55 M/UL — SIGNIFICANT CHANGE UP (ref 3.8–5.2)
RBC # FLD: 12.1 % — SIGNIFICANT CHANGE UP (ref 10.3–14.5)
SODIUM SERPL-SCNC: 138 MMOL/L — SIGNIFICANT CHANGE UP (ref 135–145)
WBC # BLD: 5.27 K/UL — SIGNIFICANT CHANGE UP (ref 3.8–10.5)
WBC # FLD AUTO: 5.27 K/UL — SIGNIFICANT CHANGE UP (ref 3.8–10.5)

## 2022-05-22 PROCEDURE — 99233 SBSQ HOSP IP/OBS HIGH 50: CPT | Mod: 57

## 2022-05-22 RX ORDER — POTASSIUM CHLORIDE 20 MEQ
20 PACKET (EA) ORAL
Refills: 0 | Status: COMPLETED | OUTPATIENT
Start: 2022-05-22 | End: 2022-05-22

## 2022-05-22 RX ADMIN — ONDANSETRON 4 MILLIGRAM(S): 8 TABLET, FILM COATED ORAL at 11:58

## 2022-05-22 RX ADMIN — Medication 20 MILLIEQUIVALENT(S): at 11:58

## 2022-05-22 RX ADMIN — Medication 650 MILLIGRAM(S): at 22:41

## 2022-05-22 RX ADMIN — LEVETIRACETAM 500 MILLIGRAM(S): 250 TABLET, FILM COATED ORAL at 17:20

## 2022-05-22 RX ADMIN — Medication 20 MILLIEQUIVALENT(S): at 16:07

## 2022-05-22 RX ADMIN — PANTOPRAZOLE SODIUM 40 MILLIGRAM(S): 20 TABLET, DELAYED RELEASE ORAL at 05:55

## 2022-05-22 RX ADMIN — SENNA PLUS 2 TABLET(S): 8.6 TABLET ORAL at 22:04

## 2022-05-22 RX ADMIN — ENOXAPARIN SODIUM 40 MILLIGRAM(S): 100 INJECTION SUBCUTANEOUS at 17:20

## 2022-05-22 RX ADMIN — LEVETIRACETAM 500 MILLIGRAM(S): 250 TABLET, FILM COATED ORAL at 05:54

## 2022-05-22 RX ADMIN — Medication 20 MILLIEQUIVALENT(S): at 17:24

## 2022-05-22 RX ADMIN — LOSARTAN POTASSIUM 100 MILLIGRAM(S): 100 TABLET, FILM COATED ORAL at 05:55

## 2022-05-22 RX ADMIN — POLYETHYLENE GLYCOL 3350 17 GRAM(S): 17 POWDER, FOR SOLUTION ORAL at 11:58

## 2022-05-22 RX ADMIN — Medication 650 MILLIGRAM(S): at 22:04

## 2022-05-22 RX ADMIN — Medication 25 MILLIGRAM(S): at 17:36

## 2022-05-22 NOTE — PROGRESS NOTE ADULT - SUBJECTIVE AND OBJECTIVE BOX
HPI:  61F, PMH HTN and C-spine meningioma removal in 2018 in [place]. P/w dizziness, gait instability, and emesis x3d. MRI shows ethel-medullary, Right CPA, and Right temporal enhancing lesions c/f meningioma vs schwannoma, with some hydro. Exam: Mandarin-speaking, AOx3, EOMI no nystagmus, PERRL, no facial/drift, CONRAD 5/5, SILT. Slightly wide-based antalgic gait, positive Romberg’s   (20 May 2022 14:15)      PAST MEDICAL & SURGICAL HISTORY:  HTN (hypertension)      HLD (hyperlipidemia)      H/O cervical spine surgery        Vital Signs Last 24 Hrs  T(C): 36.6 (22 May 2022 05:00), Max: 36.9 (21 May 2022 21:00)  T(F): 97.8 (22 May 2022 05:00), Max: 98.4 (21 May 2022 21:00)  HR: 75 (22 May 2022 05:00) (72 - 84)  BP: 113/76 (22 May 2022 05:00) (104/69 - 137/106)  BP(mean): 113 (21 May 2022 19:00) (89 - 114)  RR: 18 (22 May 2022 05:00) (16 - 20)  SpO2: 97% (22 May 2022 05:00) (97% - 99%)                          14.4   5.27  )-----------( 238      ( 22 May 2022 06:28 )             41.3    05-22    138  |  102  |  14  ----------------------------<  99  3.5   |  22  |  0.63    Ca    9.7      22 May 2022 06:27  Phos  4.5     05-22  Mg     2.4     05-22    PT/INR - ( 21 May 2022 06:26 )   PT: 11.6 sec;   INR: 1.01 ratio         PTT - ( 21 May 2022 06:26 )  PTT:30.4 sec   Stroke Core Measures      DRAIN OUTPUT:     NEUROIMAGING:     PHYSICAL EXAM:    General: No Acute Distress     Neurological: Awake, alert oriented to person, place and time, Following Commands, PERRL, EOMI, Face Symmetrical, Speech Fluent, Moving all extremities, Muscle Strength normal in all four extremities, No Drift, Sensation to Light Touch Intact    Pulmonary: Clear to Auscultation, No Rales, No Rhonchi, No Wheezes     Cardiovascular: S1, S2, Regular Rate and Rhythm     Gastrointestinal: Soft, Nontender, Nondistended     Incision:     MEDICATIONS:   Antibiotics:    Neuro:  acetaminophen     Tablet .. 650 milliGRAM(s) Oral every 6 hours PRN Temp greater or equal to 38C (100.4F), Mild Pain (1 - 3)  levETIRAcetam 500 milliGRAM(s) Oral every 12 hours  meclizine 25 milliGRAM(s) Oral every 6 hours PRN Dizziness  ondansetron Injectable 4 milliGRAM(s) IV Push every 6 hours PRN Nausea and/or Vomiting    Anticoagulation:  enoxaparin Injectable 40 milliGRAM(s) SubCutaneous <User Schedule>    Cardiology:  losartan 100 milliGRAM(s) Oral daily    Endo:     Pulm:    GI/:  pantoprazole    Tablet 40 milliGRAM(s) Oral before breakfast  polyethylene glycol 3350 17 Gram(s) Oral daily  senna 2 Tablet(s) Oral at bedtime    Other:  potassium chloride    Tablet ER 20 milliEquivalent(s) Oral every 2 hours      ASSESSMENT:   pantoprazole    Tablet 40 milliGRAM(s) Oral before breakfast  polyethylene glycol 3350 17 Gram(s) Oral daily  senna 2 Tablet(s) Oral at bedtime   s/p    PLAN:  NEURO:  CARDIOVASCULAR:  PULMONARY:  RENAL:  GI:  HEME:  ID:  ENDO:    DVT PROPHYLAXIS:  [] Venodynes                                [] Heparin/Lovenox    FALL RISK:  [] Low Risk                                    [] Impulsive    61F, PMH HTN and C-spine meningioma removal in 2018 in [place]. P/w dizziness, gait instability, and emesis x3d. MRI shows ethel-medullary, Right CPA, and Right temporal enhancing lesions c/f meningioma vs schwannoma, with some hydro.     Overnight event: no acute event    Vital Signs Last 24 Hrs  T(C): 36.6 (22 May 2022 05:00), Max: 36.9 (21 May 2022 21:00)  T(F): 97.8 (22 May 2022 05:00), Max: 98.4 (21 May 2022 21:00)  HR: 75 (22 May 2022 05:00) (72 - 84)  BP: 113/76 (22 May 2022 05:00) (104/69 - 137/106)  BP(mean): 113 (21 May 2022 19:00) (89 - 114)  RR: 18 (22 May 2022 05:00) (16 - 20)  SpO2: 97% (22 May 2022 05:00) (97% - 99%)                          14.4   5.27  )-----------( 238      ( 22 May 2022 06:28 )             41.3    05-22    138  |  102  |  14  ----------------------------<  99  3.5   |  22  |  0.63    Ca    9.7      22 May 2022 06:27  Phos  4.5     05-22  Mg     2.4     05-22    PT/INR - ( 21 May 2022 06:26 )   PT: 11.6 sec;   INR: 1.01 ratio         PTT - ( 21 May 2022 06:26 )  PTT:30.4 sec   Stroke Core Measures      DRAIN OUTPUT:     NEUROIMAGING:     PHYSICAL EXAM:    General: No Acute Distress     Neurological: Mandarin-speaking, AOx3, EOMI no nystagmus, PERRL, no facial/drift, CONRAD 5/5, SILT. Slightly wide-based antalgic gait, positive Romberg’s   (20 May 2022 14:15)  Pulmonary: Clear to Auscultation, No Rales, No Rhonchi, No Wheezes     Cardiovascular: S1, S2, Regular Rate and Rhythm     Gastrointestinal: Soft, Nontender, Nondistended     Incision:     MEDICATIONS:   Antibiotics:    Neuro:  acetaminophen     Tablet .. 650 milliGRAM(s) Oral every 6 hours PRN Temp greater or equal to 38C (100.4F), Mild Pain (1 - 3)  levETIRAcetam 500 milliGRAM(s) Oral every 12 hours  meclizine 25 milliGRAM(s) Oral every 6 hours PRN Dizziness  ondansetron Injectable 4 milliGRAM(s) IV Push every 6 hours PRN Nausea and/or Vomiting    Anticoagulation:  enoxaparin Injectable 40 milliGRAM(s) SubCutaneous <User Schedule>    Cardiology:  losartan 100 milliGRAM(s) Oral daily    Endo:     Pulm:    GI/:  pantoprazole    Tablet 40 milliGRAM(s) Oral before breakfast  polyethylene glycol 3350 17 Gram(s) Oral daily  senna 2 Tablet(s) Oral at bedtime    Other:  potassium chloride    Tablet ER 20 milliEquivalent(s) Oral every 2 hours

## 2022-05-23 DIAGNOSIS — Z01.818 ENCOUNTER FOR OTHER PREPROCEDURAL EXAMINATION: ICD-10-CM

## 2022-05-23 DIAGNOSIS — E78.5 HYPERLIPIDEMIA, UNSPECIFIED: ICD-10-CM

## 2022-05-23 DIAGNOSIS — I10 ESSENTIAL (PRIMARY) HYPERTENSION: ICD-10-CM

## 2022-05-23 DIAGNOSIS — G93.89 OTHER SPECIFIED DISORDERS OF BRAIN: ICD-10-CM

## 2022-05-23 PROBLEM — Z00.00 ENCOUNTER FOR PREVENTIVE HEALTH EXAMINATION: Status: ACTIVE | Noted: 2022-05-23

## 2022-05-23 LAB
ANION GAP SERPL CALC-SCNC: 14 MMOL/L — SIGNIFICANT CHANGE UP (ref 5–17)
BUN SERPL-MCNC: 13 MG/DL — SIGNIFICANT CHANGE UP (ref 7–23)
CALCIUM SERPL-MCNC: 10 MG/DL — SIGNIFICANT CHANGE UP (ref 8.4–10.5)
CHLORIDE SERPL-SCNC: 103 MMOL/L — SIGNIFICANT CHANGE UP (ref 96–108)
CO2 SERPL-SCNC: 21 MMOL/L — LOW (ref 22–31)
CREAT SERPL-MCNC: 0.74 MG/DL — SIGNIFICANT CHANGE UP (ref 0.5–1.3)
EGFR: 93 ML/MIN/1.73M2 — SIGNIFICANT CHANGE UP
GLUCOSE SERPL-MCNC: 98 MG/DL — SIGNIFICANT CHANGE UP (ref 70–99)
POTASSIUM SERPL-MCNC: 3.7 MMOL/L — SIGNIFICANT CHANGE UP (ref 3.5–5.3)
POTASSIUM SERPL-SCNC: 3.7 MMOL/L — SIGNIFICANT CHANGE UP (ref 3.5–5.3)
SODIUM SERPL-SCNC: 138 MMOL/L — SIGNIFICANT CHANGE UP (ref 135–145)

## 2022-05-23 PROCEDURE — 99232 SBSQ HOSP IP/OBS MODERATE 35: CPT

## 2022-05-23 PROCEDURE — 99222 1ST HOSP IP/OBS MODERATE 55: CPT

## 2022-05-23 PROCEDURE — 93010 ELECTROCARDIOGRAM REPORT: CPT

## 2022-05-23 PROCEDURE — 93970 EXTREMITY STUDY: CPT | Mod: 26

## 2022-05-23 PROCEDURE — 99223 1ST HOSP IP/OBS HIGH 75: CPT

## 2022-05-23 RX ADMIN — LEVETIRACETAM 500 MILLIGRAM(S): 250 TABLET, FILM COATED ORAL at 05:40

## 2022-05-23 RX ADMIN — ENOXAPARIN SODIUM 40 MILLIGRAM(S): 100 INJECTION SUBCUTANEOUS at 17:36

## 2022-05-23 RX ADMIN — LEVETIRACETAM 500 MILLIGRAM(S): 250 TABLET, FILM COATED ORAL at 17:36

## 2022-05-23 RX ADMIN — PANTOPRAZOLE SODIUM 40 MILLIGRAM(S): 20 TABLET, DELAYED RELEASE ORAL at 05:40

## 2022-05-23 NOTE — CONSULT NOTE ADULT - PROBLEM SELECTOR RECOMMENDATION 2
NSCU concerned for HL d/t location of lesion  Will order audiogram
METS > 3   No cardiac history   RCRI of 0-1   please get an ECG ( I ordered )   will f/up ECG in Am

## 2022-05-23 NOTE — PROGRESS NOTE ADULT - ASSESSMENT
61F, PMH HTN and C-spine meningioma removal in 2018 in [place]. P/w dizziness, gait instability, and emesis x3d. MRI shows ethel-medullary, Right CPA, and Right temporal enhancing lesions c/f meningioma vs schwannoma, with some hydro. Exam: Mandarin-speaking, AOx3, EOMI no nystagmus, PERRL, no facial/drift, CONRAD 5/5, SILT. Slightly wide-based antalgic gait, positive Romberg’s   (20 May 2022 14:15)    PROCEDURE:  Adm 5/20 Mult Brain lesions  POD#NA    PLAN:  Neuro: 5/23 LED done FU Rpt. ENT came to do audio today but pt was getting LED done, will return to do baseline Audio FU. Plan for OR 6/1, but poss this weekend depending on how things evolved. Cont Keppra, SQL. Inc activity/OOB. Above d/w pt son and his wife at pt bedside this AM.     S&S eval-P diff swallowing reg food FU    Audiologist note of 5/23-Hearing within normal limits, with the exception of mild hearing loss noted at 6kHz, bilaterally. Recommendations:1) ENT follow-up re: hearing test results  2) Audiological monitoring as medically indicated Katharina Moreno CCC-A Audiologist     ENT note of 5/20-ENT consulted as pt c/o dysphagia. NSCU concerned for possible VC paralysis and HL secondary to location of lesion. Patient denies hearing loss at this time. Laryngoscopy done at bedside, normal scope. Bilateral vocal cords mobile and intact.  Problem/Recommendation - 1:·  Problem: Dysphagia. ·  Recommendation: Speech and Swallow Evaluation  Diet per Speech and Swallow Reconsult PRN. Problem/Recommendation - 2:·  Problem: R/O Hearing loss. ·  Recommendation: NSCU concerned for HL d/t location of lesion Will order audiogram.    Hosp/Med cons called 5/23 for OR clearance-P FU    Respiratory: Patient instructed to use incentive spirometer [ ] YES [ X] NO              DVT ppx: [X ] SQL [ ] SQH and Venodynes [ ] Left [ ] Right [ X] Bilateral    Discharge Planning:  The patient was evaluated by PT/OT and recommended Acute Rehab.   PMR Eval-P FU    More than 30 minutes spent on total encounter: more than 50% of the visit was spent on educating the patient and family regarding condition, medications, follow up plans, signs and symptoms to be concerned with, preparing paperwork, and questions answered regarding discharge.

## 2022-05-23 NOTE — AUDIOLOGICAL ASSESSMENT - COMMENTS
Hearing within normal limits, with the exception of mild hearing loss noted at 6kHz, bilaterally.     Recommendations:  1) ENT follow-up re: hearing test results  2) Audiological monitoring as medically indicated     Katharina Moreno CCC-A  Audiologist

## 2022-05-23 NOTE — SWALLOW BEDSIDE ASSESSMENT ADULT - PHARYNGEAL PHASE
Within functional limits c/o stasis worse on L side; no overt s/s of laryngeal penetration/aspiration/Complaints of pharyngeal stasis/Multiple swallows

## 2022-05-23 NOTE — PROGRESS NOTE ADULT - SUBJECTIVE AND OBJECTIVE BOX
SUBJECTIVE: Pt seen conversing on [employer] phone this AM. w/o complaints. NAD    OVERNIGHT EVENTS: None    Vital Signs Last 24 Hrs  T(C): 36.5 (23 May 2022 05:24), Max: 36.8 (22 May 2022 12:59)  T(F): 97.7 (23 May 2022 05:24), Max: 98.2 (22 May 2022 12:59)  HR: 71 (23 May 2022 06:22) (67 - 80)  BP: 100/65 (23 May 2022 06:22) (100/65 - 143/92)  BP(mean): --  RR: 18 (23 May 2022 05:24) (18 - 18)  SpO2: 97% (23 May 2022 05:24) (96% - 98%)  IVF: [ X] IVL [ ] NS+K@   DIET: [ X] Regular [ ] CCD [ ] Renal [ ] Puree [ ] Dysphagia [ ] Tube Feeds:   PCA: [ ] YES [X ] NO   BABIN: [ ] YES [X ] NO [ X VOID   BM: [ ] YES [ X] NO     DRAINS: NA    PHYSICAL EXAM:    General: No Acute Distress     Neurological: Fujinese speaking. Awake, alert oriented to person, place and time, Following Commands, PERRL, EOMI, Face Symmetrical, Speech Fluent, Moving all extremities, Muscle Strength normal in all four extremities, No Drift, Sensation to Light Touch Intact    Pulmonary: Clear to Auscultation, No Rales, No Rhonchi, No Wheezes     Cardiovascular: S1, S2, Regular Rate and Rhythm     Gastrointestinal: Soft, Nontender, Nondistended     Incision: NA    LABS:                        14.4   5.27  )-----------( 238      ( 22 May 2022 06:28 )             41.3    05-23    138  |  103  |  13  ----------------------------<  98  3.7   |  21<L>  |  0.74    Ca    10.0      23 May 2022 06:39  Phos  4.5     05-22  Mg     2.4     05-22    COVID-19 PCR: NotDetec (19 May 2022 16:02)      05-22 @ 07:01  -  05-23 @ 07:00  --------------------------------------------------------  IN: 220 mL / OUT: 0 mL / NET: 220 mL      IMAGING:   < from: MR Cervical Spine w/wo IV Cont (05.21.22 @ 16:52) >  IMPRESSION: Degenerative changes as described above.    Enhancing lesions involving the posterior fossa as described above.    < end of copied text >    < from: CT Head No Cont (05.21.22 @ 08:01) >  No acute intracranial hemorrhage, new mass effect, midline shift, or   herniation. Stable ventricular size and configuration.    Hyperdense, extra-axial lesion at the left pontomedullary junction,   compatible with meningioma, with adjacent parenchymal edema, better   evaluated on recent MRI.    Additional extra-axial lesions noted on MR are not well evaluated on this   study.    < end of copied text >    MEDICATIONS  (STANDING):  enoxaparin Injectable 40 milliGRAM(s) SubCutaneous <User Schedule>  levETIRAcetam 500 milliGRAM(s) Oral every 12 hours  losartan 100 milliGRAM(s) Oral daily  pantoprazole    Tablet 40 milliGRAM(s) Oral before breakfast  polyethylene glycol 3350 17 Gram(s) Oral daily  senna 2 Tablet(s) Oral at bedtime    MEDICATIONS  (PRN):  acetaminophen     Tablet .. 650 milliGRAM(s) Oral every 6 hours PRN Temp greater or equal to 38C (100.4F), Mild Pain (1 - 3)  meclizine 25 milliGRAM(s) Oral every 6 hours PRN Dizziness  ondansetron Injectable 4 milliGRAM(s) IV Push every 6 hours PRN Nausea and/or Vomiting

## 2022-05-23 NOTE — CONSULT NOTE ADULT - PROBLEM SELECTOR RECOMMENDATION 3
PT was seen by ENT team and had laryngoscopy done at bedside on 5/20 , normal scope. Bilateral vocal cords mobile and intact.   Speech and swallow eval was done on 5/23 where further testing like MBS is recommended , no obvious evidence of laryngeal penetration observed as per Speech therapist

## 2022-05-23 NOTE — SWALLOW BEDSIDE ASSESSMENT ADULT - COMMENTS
5/20: ENT consulted as pt c/o dysphagia. NSCU concerned for possible VC paralysis and HL secondary to location of lesion. Laryngoscopy done at bedside, normal scope. Bilateral vocal cords mobile and intact. Rec speech and swallow evaluation.  5/22: bleeding from mouth - pt reported need crown replaced; dental consulted.    **Swallow hx: pt unknown to this service

## 2022-05-23 NOTE — CHART NOTE - NSCHARTNOTEFT_GEN_A_CORE
Pt received audiogram:   pt found to have symmetric normal hearing with mild dec at 6 hertz   no further ent intervention at this time, call prn

## 2022-05-23 NOTE — CONSULT NOTE ADULT - SUBJECTIVE AND OBJECTIVE BOX
HPI:  61F, PMH HTN and C-spine meningioma removal in 2018 in [place]. P/w dizziness, gait instability, and emesis x3d. MRI shows ethel-medullary, Right CPA, and Right temporal enhancing lesions c/f meningioma vs schwannoma, with some hydro. Exam: Mandarin-speaking, AOx3, EOMI no nystagmus, PERRL, no facial/drift, CONRAD 5/5, SILT. Slightly wide-based antalgic gait, positive Romberg’s   (20 May 2022 14:15)    Patient was admitted on 5/20, seen today, daughter in law at bedside to interpret. Complains of dizziness, no weakness.     REVIEW OF SYSTEMS  Constitutional - No fever, No weight loss, No fatigue  HEENT - No eye pain, No visual disturbances, No difficulty hearing, No tinnitus, No vertigo, No neck pain  Respiratory - No cough, No wheezing, No shortness of breath  Cardiovascular - No chest pain, No palpitations  Gastrointestinal - No abdominal pain, No nausea, No vomiting, No diarrhea, No constipation  Genitourinary - No dysuria, No frequency, No hematuria, No incontinence  Psychiatric - No depression, No anxiety    VITALS  T(C): 36.3 (05-23-22 @ 12:39), Max: 36.8 (05-22-22 @ 17:30)  HR: 73 (05-23-22 @ 12:39) (67 - 80)  BP: 123/83 (05-23-22 @ 12:39) (100/65 - 143/92)  RR: 18 (05-23-22 @ 12:39) (18 - 18)  SpO2: 98% (05-23-22 @ 12:39) (96% - 98%)  Wt(kg): --    PAST MEDICAL & SURGICAL HISTORY    HTN (hypertension)    HLD (hyperlipidemia)    H/O cervical spine surgery        SOCIAL HISTORY  Smoking - Denied  EtOH - Denied   Drugs - Denied    FUNCTIONAL HISTORY  Lives with son, daughter in law, 2-4 SONALI  Independent AMB and ADLs PTA     CURRENT FUNCTIONAL STATUS  5/23 SLP  complains of pharyngeal dysphagia  Continue regular solids and thin liquids at this time    5/21 PT  bed mobility supervision  transfers contact guard  gait min assist x 50 feet    5/21 OT  bed mobility min assist/CG  transfers min assist/CG with RW    FAMILY HISTORY   No pertinent family history in first degree relatives        RECENT LABS/IMAGING  CBC Full  -  ( 22 May 2022 06:28 )  WBC Count : 5.27 K/uL  RBC Count : 4.55 M/uL  Hemoglobin : 14.4 g/dL  Hematocrit : 41.3 %  Platelet Count - Automated : 238 K/uL  Mean Cell Volume : 90.8 fl  Mean Cell Hemoglobin : 31.6 pg  Mean Cell Hemoglobin Concentration : 34.9 gm/dL  Auto Neutrophil # : x  Auto Lymphocyte # : x  Auto Monocyte # : x  Auto Eosinophil # : x  Auto Basophil # : x  Auto Neutrophil % : x  Auto Lymphocyte % : x  Auto Monocyte % : x  Auto Eosinophil % : x  Auto Basophil % : x    05-23    138  |  103  |  13  ----------------------------<  98  3.7   |  21<L>  |  0.74    Ca    10.0      23 May 2022 06:39  Phos  4.5     05-22  Mg     2.4     05-22    < from: MR Cervical Spine w/wo IV Cont (05.21.22 @ 16:52) >    The spinal cord demonstrates normal signal and caliber. Evaluation of the   rest paraspinal soft tissues appear normal. There is evidence of a   extra-axial enhancing mass seen involving the left posterior fossa   region. There is an additional enhancing lesion seen just below the   tentorium which is only partially demonstrated study. Abnormal T2   prolongation involving the brainstem is identified. These findings could   be compatible with underlying metastasis though the possibility of   meningiomas must be considered as well. Please refer to contrast enhanced   brain MRI performed on May 20, 2022.      < end of copied text >    < from: MR Head w/wo IV Cont (05.20.22 @ 10:39) >    MRI BRAIN:  2.2 x 1.8 x 1.6 cm (maximal AP x TV x CC dimensions) avidly enhancing   extra-axial mass approximating the left pontomedullary junction,   suspicious for the presence of meningioma.    The lesion encases the distal intradural vertebral artery, which is   patent.    Mass effect upon the medullaand inferior ethel. Vasogenic edema within   the medulla and basis pontis.    1.3 x 0.9 cm (axial plane) avidly enhancing extra-axial mass in the right   cerebellopontine angle extending into the right internal auditory canal.   Differential diagnosisincludes a schwannoma and meningioma.    0.8 x 0.6 cm (axial plane) avidly enhancing extra-axial lesion along the   inferior aspect of the right tentorium, suspicious for the presence of   meningioma.    The presence of neurofibromatosis 2 should be considered.    MRA BRAIN:  No flow-limiting stenosis or vascular aneurysm.    Large bilateral posterior communicating arteries with hypoplastic left   and small caliber right P1 segments.    < end of copied text >        ALLERGIES  No Known Allergies      MEDICATIONS   acetaminophen     Tablet .. 650 milliGRAM(s) Oral every 6 hours PRN  enoxaparin Injectable 40 milliGRAM(s) SubCutaneous <User Schedule>  levETIRAcetam 500 milliGRAM(s) Oral every 12 hours  losartan 100 milliGRAM(s) Oral daily  meclizine 25 milliGRAM(s) Oral every 6 hours PRN  ondansetron Injectable 4 milliGRAM(s) IV Push every 6 hours PRN  pantoprazole    Tablet 40 milliGRAM(s) Oral before breakfast  polyethylene glycol 3350 17 Gram(s) Oral daily  senna 2 Tablet(s) Oral at bedtime      ----------------------------------------------------------------------------------------  PHYSICAL EXAM  Constitutional - NAD, Comfortable, in bed   Chest - Breathing comfortably  Cardiovascular - S1S2   Abdomen - Soft   Extremities - No C/C/E, No calf tenderness   Neurologic Exam -                    Cognitive - Awake, Alert, AAO to self, place, date, year, situation     Communication - Fluent, No dysarthria     Cranial Nerves - CN 2-12 intact     Motor - No focal deficits                    LEFT    UE - ShAB 5/5, EF 5/5, EE 5/5, WE 5/5,  5/5                    RIGHT UE - ShAB 5/5, EF 5/5, EE 5/5, WE 5/5,  5/5                    LEFT    LE - HF 5/5, KE 5/5, DF 5/5, PF 5/5                    RIGHT LE - HF 5/5, KE 5/5, DF 5/5, PF 5/5        Sensory - Intact to LT     Psychiatric - Mood stable, Affect WNL  ----------------------------------------------------------------------------------------  ASSESSMENT/PLAN  61yFemale h/o HTN, c spine meningioma removed 2018 with functional deficits due to meningioma  MRI with enhancing lesions, meningioma vs schwannoma, some hydro   plan for OR 6/1 as per chart   on ppra for seizure prophylaxis   complains of dysphagia, ENT consulted   Pain - Tylenol  DVT PPX - SCDs, lovenox   Rehab -   continue bedside therapy  patient will need post op PT/OT evaluations   Will continue to follow for ongoing rehab needs and recommendations.

## 2022-05-23 NOTE — CONSULT NOTE ADULT - ASSESSMENT
61F with PMH HTN and C-spine meningioma removal in 2018 in [place]. P/w dizziness, gait instability, and emesis x3d. MRI shows ethel-medullary, Right CPA, and Right temporal enhancing lesions c/f meningioma vs schwannoma, with some hydro.   Patient was admitted under the Neurosurgery services for further evaluation.  Hospitalist team was called for preoperative medical evaluation and medical comanagement .

## 2022-05-23 NOTE — SWALLOW BEDSIDE ASSESSMENT ADULT - ASR SWALLOW ASPIRATION MONITOR
Monitor for s/s aspiration/laryngeal penetration. If noted:  D/C p.o. intake, provide non-oral nutrition/hydration/meds, and contact this service @ [phone]/change of breathing pattern/cough/gurgly voice/fever/pneumonia/throat clearing/upper respiratory infection

## 2022-05-23 NOTE — CONSULT NOTE ADULT - SUBJECTIVE AND OBJECTIVE BOX
Patient is a 61y old  Female who presents with a chief complaint of brain tumors (22 May 2022 08:43)      SUBJECTIVE / OVERNIGHT EVENTS:    61F with PMH HTN and C-spine meningioma removal in 2018 in [place]. P/w dizziness, gait instability, and emesis x3d. MRI shows ethel-medullary, Right CPA, and Right temporal enhancing lesions c/f meningioma vs schwannoma, with some hydro.   Patient was admitted under the Neurosurgery services for further evaluation.  Hospitalist team was called for preoperative medical evaluation and medical comanagement .      ADDITIONAL REVIEW OF SYSTEMS: Negative except for above   Past Medical History :    HLD (hyperlipidemia)    HTN (hypertension)    Past Surgical History    C-spine meningioma removal in 2018 in [place].   Family History     Tobacco Usage  Unknown if ever smoked    ALLERGIES   No Known Allergies:     Home Medications:           MEDICATIONS  (STANDING):  enoxaparin Injectable 40 milliGRAM(s) SubCutaneous <User Schedule>  levETIRAcetam 500 milliGRAM(s) Oral every 12 hours  losartan 100 milliGRAM(s) Oral daily  pantoprazole    Tablet 40 milliGRAM(s) Oral before breakfast  polyethylene glycol 3350 17 Gram(s) Oral daily  senna 2 Tablet(s) Oral at bedtime    MEDICATIONS  (PRN):  acetaminophen     Tablet .. 650 milliGRAM(s) Oral every 6 hours PRN Temp greater or equal to 38C (100.4F), Mild Pain (1 - 3)  meclizine 25 milliGRAM(s) Oral every 6 hours PRN Dizziness  ondansetron Injectable 4 milliGRAM(s) IV Push every 6 hours PRN Nausea and/or Vomiting      CAPILLARY BLOOD GLUCOSE        I&O's Summary    22 May 2022 07:01  -  23 May 2022 07:00  --------------------------------------------------------  IN: 220 mL / OUT: 0 mL / NET: 220 mL    23 May 2022 07:01  -  23 May 2022 15:46  --------------------------------------------------------  IN: 240 mL / OUT: 0 mL / NET: 240 mL        PHYSICAL EXAM:  Vital Signs Last 24 Hrs  T(C): 36.3 (23 May 2022 12:39), Max: 36.8 (22 May 2022 17:30)  T(F): 97.3 (23 May 2022 12:39), Max: 98.2 (22 May 2022 17:30)  HR: 73 (23 May 2022 12:39) (67 - 80)  BP: 123/83 (23 May 2022 12:39) (100/65 - 143/92)  BP(mean): --  RR: 18 (23 May 2022 12:39) (18 - 18)  SpO2: 98% (23 May 2022 12:39) (96% - 98%)    PHYSICAL EXAM:  GENERAL: NAD, well-developed  HEAD:  Atraumatic, Normocephalic  EYES:  conjunctiva and sclera clear  NECK: Supple, No JVD  CHEST/LUNG: Clear to auscultation bilaterally; No wheeze  HEART: Regular rate and rhythm; No murmurs, rubs, or gallops  ABDOMEN: Soft, Nontender, Nondistended; Bowel sounds present  EXTREMITIES:  2+ Peripheral Pulses, No clubbing, cyanosis, or edema  PSYCH: AAOx3  NEUROLOGY: ataxic gait   SKIN: No rashes or lesions      LABS:                        14.4   5.27  )-----------( 238      ( 22 May 2022 06:28 )             41.3     05-23    138  |  103  |  13  ----------------------------<  98  3.7   |  21<L>  |  0.74    Ca    10.0      23 May 2022 06:39  Phos  4.5     05-22  Mg     2.4     05-22                  RADIOLOGY & ADDITIONAL TESTS:    Imaging Personally Reviewed:    Electrocardiogram Personally Reviewed:    COORDINATION OF CARE:  Care Discussed with Consultants/Other Providers [Y/N]:  Prior or Outpatient Records Reviewed [Y/N]:     Patient is a 61y old  Female who presents with a chief complaint of brain tumors (22 May 2022 08:43)      SUBJECTIVE / OVERNIGHT EVENTS: English Translation was provided by patient's daughter in Law : Shaina who is at the bedside at patient's request    61F with PMH HTN and C-spine meningioma removal in 2018 in [place]. P/w dizziness, gait instability, and emesis x3d. MRI shows ethel-medullary, Right CPA, and Right temporal enhancing lesions c/f meningioma vs schwannoma, with some hydro.   Patient was admitted under the Neurosurgery services for further evaluation.  Hospitalist team was called for preoperative medical evaluation and medical comanagement .  When seen, patient offers no complaints.  NO h/o cardiac disease , never seen a cardiologist and had pretty good endurance until her current gait conditions.  Patient could climb many stairs, no CP , no palpitations, no dyspnea and no legs swelling     ADDITIONAL REVIEW OF SYSTEMS: Negative except for above   Past Medical History :    HLD (hyperlipidemia)    HTN (hypertension)    Past Surgical History    C-spine meningioma removal in 2018 in [place].   Family History    Denies premature cardiac conditions   Tobacco Usage  Unknown if ever smoked    ALLERGIES   No Known Allergies:     Home Medications:  daughter in law has picture of her meds    Omeprazole 20 mg oral daily    Losartan 100mg oral daily   RX  : [facility] in [place]      MEDICATIONS  (STANDING):  enoxaparin Injectable 40 milliGRAM(s) SubCutaneous <User Schedule>  levETIRAcetam 500 milliGRAM(s) Oral every 12 hours  losartan 100 milliGRAM(s) Oral daily  pantoprazole    Tablet 40 milliGRAM(s) Oral before breakfast  polyethylene glycol 3350 17 Gram(s) Oral daily  senna 2 Tablet(s) Oral at bedtime    MEDICATIONS  (PRN):  acetaminophen     Tablet .. 650 milliGRAM(s) Oral every 6 hours PRN Temp greater or equal to 38C (100.4F), Mild Pain (1 - 3)  meclizine 25 milliGRAM(s) Oral every 6 hours PRN Dizziness  ondansetron Injectable 4 milliGRAM(s) IV Push every 6 hours PRN Nausea and/or Vomiting      CAPILLARY BLOOD GLUCOSE        I&O's Summary    22 May 2022 07:01  -  23 May 2022 07:00  --------------------------------------------------------  IN: 220 mL / OUT: 0 mL / NET: 220 mL    23 May 2022 07:01  -  23 May 2022 15:46  --------------------------------------------------------  IN: 240 mL / OUT: 0 mL / NET: 240 mL        PHYSICAL EXAM:  Vital Signs Last 24 Hrs  T(C): 36.3 (23 May 2022 12:39), Max: 36.8 (22 May 2022 17:30)  T(F): 97.3 (23 May 2022 12:39), Max: 98.2 (22 May 2022 17:30)  HR: 73 (23 May 2022 12:39) (67 - 80)  BP: 123/83 (23 May 2022 12:39) (100/65 - 143/92)  BP(mean): --  RR: 18 (23 May 2022 12:39) (18 - 18)  SpO2: 98% (23 May 2022 12:39) (96% - 98%)    PHYSICAL EXAM:  GENERAL: NAD, well-developed  HEAD:  Atraumatic, Normocephalic  EYES:  conjunctiva and sclera clear  NECK: Supple, No JVD  CHEST/LUNG: Clear to auscultation bilaterally; No wheeze  HEART: Regular rate and rhythm; No murmurs, rubs, or gallops  ABDOMEN: Soft, Nontender, Nondistended; Bowel sounds present  EXTREMITIES:  2+ Peripheral Pulses, No clubbing, cyanosis, or edema  PSYCH: AAOx3  NEUROLOGY: ataxic gait   SKIN: No rashes or lesions      LABS:                        14.4   5.27  )-----------( 238      ( 22 May 2022 06:28 )             41.3     05-23    138  |  103  |  13  ----------------------------<  98  3.7   |  21<L>  |  0.74    Ca    10.0      23 May 2022 06:39  Phos  4.5     05-22  Mg     2.4     05-22                  RADIOLOGY & ADDITIONAL TESTS:    Imaging Personally Reviewed:    Electrocardiogram Personally Reviewed:    COORDINATION OF CARE:  Care Discussed with Consultants/Other Providers [Y/N]:  Prior or Outpatient Records Reviewed [Y/N]:

## 2022-05-24 LAB
HCG UR QL: NEGATIVE — SIGNIFICANT CHANGE UP
HCV AB S/CO SERPL IA: 0.09 S/CO — SIGNIFICANT CHANGE UP (ref 0–0.99)
HCV AB SERPL-IMP: SIGNIFICANT CHANGE UP

## 2022-05-24 PROCEDURE — 74230 X-RAY XM SWLNG FUNCJ C+: CPT | Mod: 26

## 2022-05-24 PROCEDURE — 99232 SBSQ HOSP IP/OBS MODERATE 35: CPT

## 2022-05-24 PROCEDURE — 99232 SBSQ HOSP IP/OBS MODERATE 35: CPT | Mod: 57

## 2022-05-24 RX ADMIN — LEVETIRACETAM 500 MILLIGRAM(S): 250 TABLET, FILM COATED ORAL at 17:23

## 2022-05-24 RX ADMIN — LEVETIRACETAM 500 MILLIGRAM(S): 250 TABLET, FILM COATED ORAL at 05:25

## 2022-05-24 RX ADMIN — PANTOPRAZOLE SODIUM 40 MILLIGRAM(S): 20 TABLET, DELAYED RELEASE ORAL at 05:25

## 2022-05-24 RX ADMIN — POLYETHYLENE GLYCOL 3350 17 GRAM(S): 17 POWDER, FOR SOLUTION ORAL at 17:23

## 2022-05-24 RX ADMIN — ENOXAPARIN SODIUM 40 MILLIGRAM(S): 100 INJECTION SUBCUTANEOUS at 17:23

## 2022-05-24 RX ADMIN — LOSARTAN POTASSIUM 100 MILLIGRAM(S): 100 TABLET, FILM COATED ORAL at 05:25

## 2022-05-24 RX ADMIN — Medication 25 MILLIGRAM(S): at 09:13

## 2022-05-24 RX ADMIN — SENNA PLUS 2 TABLET(S): 8.6 TABLET ORAL at 21:21

## 2022-05-24 NOTE — PROGRESS NOTE ADULT - ASSESSMENT
61F with PMH HTN and C-spine meningioma removal in 2018 in [place]. P/w dizziness, gait instability, and emesis x3d. MRI shows ethel-medullary, Right CPA, and Right temporal enhancing lesions c/f meningioma vs schwannoma, with some hydro.   Patient was admitted under the Neurosurgery services for further evaluation.  Hospitalist team was called for preoperative medical evaluation and medical comanagement . Admission Reconciliation is Completed  Discharge Reconciliation is Not Complete Admission Reconciliation is Completed  Discharge Reconciliation is Completed

## 2022-05-24 NOTE — PROGRESS NOTE ADULT - PROBLEM SELECTOR PLAN 1
surgical Intervention by the Neurosurgery team is pend   Cont with Keppra   cont with DVT prophylaxis

## 2022-05-24 NOTE — PROGRESS NOTE ADULT - SUBJECTIVE AND OBJECTIVE BOX
SUBJECTIVE: Appears comfortable, no complaints on rounds, but daughter-in-law Shaina at bedside and states more sleepy and dizzyness.    OVERNIGHT EVENTS: None    Vital Signs Last 24 Hrs  T(C): 36.7 (24 May 2022 10:51), Max: 36.9 (23 May 2022 18:09)  T(F): 98 (24 May 2022 10:51), Max: 98.4 (23 May 2022 18:09)  HR: 78 (24 May 2022 10:51) (61 - 80)  BP: 112/79 (24 May 2022 10:51) (112/79 - 128/84)  BP(mean): --  RR: 18 (24 May 2022 10:51) (18 - 18)  SpO2: 98% (24 May 2022 10:51) (95% - 98%)  IVF: [ X] IVL [ ] NS+K@   DIET: [ X] Regular [ ] CCD [ ] Renal [ ] Puree [ ] Dysphagia [ ] Tube Feeds:   PCA: [ ] YES [X ] NO   BABIN: [ ] YES [X ] NO [ X VOID   BM: [ ] YES [ X] NO     DRAINS: NA    PHYSICAL EXAM:    General: No Acute Distress     Neurological: Fujinese speaking. Awake, alert oriented to person, place and time, Following Commands, PERRL, EOMI, Face Symmetrical, Speech Fluent, Moving all extremities, Muscle Strength normal in all four extremities, No Drift, Sensation to Light Touch Intact    Pulmonary: Clear to Auscultation, No Rales, No Rhonchi, No Wheezes     Cardiovascular: S1, S2, Regular Rate and Rhythm     Gastrointestinal: Soft, Nontender, Nondistended     Incision: NA    LABS:                        14.4   5.27  )-----------( 238      ( 22 May 2022 06:28 )             41.3    05-23    138  |  103  |  13  ----------------------------<  98  3.7   |  21<L>  |  0.74    Ca    10.0      23 May 2022 06:39  Phos  4.5     05-22  Mg     2.4     05-22    Pregnancy Profile, Urine (05.23.22 @ 18:41)    Pregnancy Profile, Urine: Negative:     COVID-19 PCR: NotDetec (19 May 2022 16:02)    I&O's Summary    23 May 2022 07:01  -  24 May 2022 07:00  --------------------------------------------------------  IN: 820 mL / OUT: 0 mL / NET: 820 mL    IMAGING:   Hearing within normal limits, with the exception of mild hearing loss noted at 6kHz, bilaterally.     Recommendations:  1) ENT follow-up re: hearing test results  2) Audiological monitoring as medically indicated     Katharina Moreno CCC-A  Audiologist     < from: [facility] Duplex Lower Ext Vein Scan, Bilat (05.23.22 @ 09:34) >  IMPRESSION:  No evidence of deep venous thrombosis in either lower extremity.    < end of copied text >    < from: MR Cervical Spine w/wo IV Cont (05.21.22 @ 16:52) >  IMPRESSION: Degenerative changes as described above.    Enhancing lesions involving the posterior fossa as described above.    < end of copied text >    < from: CT Head No Cont (05.21.22 @ 08:01) >  No acute intracranial hemorrhage, new mass effect, midline shift, or   herniation. Stable ventricular size and configuration.    Hyperdense, extra-axial lesion at the left pontomedullary junction,   compatible with meningioma, with adjacent parenchymal edema, better   evaluated on recent MRI.    Additional extra-axial lesions noted on MR are not well evaluated on this   study.    < end of copied text >    MEDICATIONS  (STANDING):  enoxaparin Injectable 40 milliGRAM(s) SubCutaneous <User Schedule>  levETIRAcetam 500 milliGRAM(s) Oral every 12 hours  losartan 100 milliGRAM(s) Oral daily  pantoprazole    Tablet 40 milliGRAM(s) Oral before breakfast  polyethylene glycol 3350 17 Gram(s) Oral daily  senna 2 Tablet(s) Oral at bedtime    MEDICATIONS  (PRN):  acetaminophen     Tablet .. 650 milliGRAM(s) Oral every 6 hours PRN Temp greater or equal to 38C (100.4F), Mild Pain (1 - 3)  bisacodyl 5 milliGRAM(s) Oral every 12 hours PRN Constipation  meclizine 25 milliGRAM(s) Oral every 6 hours PRN Dizziness  ondansetron Injectable 4 milliGRAM(s) IV Push every 6 hours PRN Nausea and/or Vomiting

## 2022-05-24 NOTE — SWALLOW VFSS/MBS ASSESSMENT ADULT - ORAL PHASE
Piecemeal deglutition (x2) Uncontrolled bolus spillover to AE folds; Single instance of penetration before the swallow (of mild amount of material) from the laryngeal surface of the epiglottis with retrieval

## 2022-05-24 NOTE — PROGRESS NOTE ADULT - SUBJECTIVE AND OBJECTIVE BOX
Patient is a 61y old  Female who presents with a chief complaint of Brain tumor (23 May 2022 15:45)      SUBJECTIVE / OVERNIGHT EVENTS:  T Max: 98.4F  HR: 78 (61 - 80)  BP: 112/79 (112/79 - 128/84)  SpO2: 98%  (95% - 98%)  Patient's daughter in Law states that patient feels dizzier today         ADDITIONAL REVIEW OF SYSTEMS: Negative except for above    MEDICATIONS  (STANDING):  enoxaparin Injectable 40 milliGRAM(s) SubCutaneous <User Schedule>  levETIRAcetam 500 milliGRAM(s) Oral every 12 hours  losartan 100 milliGRAM(s) Oral daily  pantoprazole    Tablet 40 milliGRAM(s) Oral before breakfast  polyethylene glycol 3350 17 Gram(s) Oral daily  senna 2 Tablet(s) Oral at bedtime    MEDICATIONS  (PRN):  acetaminophen     Tablet .. 650 milliGRAM(s) Oral every 6 hours PRN Temp greater or equal to 38C (100.4F), Mild Pain (1 - 3)  bisacodyl 5 milliGRAM(s) Oral every 12 hours PRN Constipation  meclizine 25 milliGRAM(s) Oral every 6 hours PRN Dizziness  ondansetron Injectable 4 milliGRAM(s) IV Push every 6 hours PRN Nausea and/or Vomiting      CAPILLARY BLOOD GLUCOSE        I&O's Summary    23 May 2022 07:01  -  24 May 2022 07:00  --------------------------------------------------------  IN: 820 mL / OUT: 0 mL / NET: 820 mL    24 May 2022 07:01  -  24 May 2022 13:28  --------------------------------------------------------  IN: 240 mL / OUT: 0 mL / NET: 240 mL        PHYSICAL EXAM:  Vital Signs Last 24 Hrs  T(C): 36.7 (24 May 2022 10:51), Max: 36.9 (23 May 2022 18:09)  T(F): 98 (24 May 2022 10:51), Max: 98.4 (23 May 2022 18:09)  HR: 78 (24 May 2022 10:51) (61 - 80)  BP: 112/79 (24 May 2022 10:51) (112/79 - 128/84)  BP(mean): --  RR: 18 (24 May 2022 10:51) (18 - 18)  SpO2: 98% (24 May 2022 10:51) (95% - 98%)    PHYSICAL EXAM:  GENERAL: NAD, well-developed  HEAD:  Atraumatic, Normocephalic  EYES:  conjunctiva and sclera clear  NECK: Supple, No JVD  CHEST/LUNG: Clear to auscultation bilaterally; No wheeze  HEART: Regular rate and rhythm; No murmurs, rubs, or gallops  ABDOMEN: Soft, Nontender, Nondistended; Bowel sounds present  EXTREMITIES:  2+ Peripheral Pulses, No clubbing, cyanosis, or edema  PSYCH: AAOx3  NEUROLOGY: non-focal        LABS:    05-23    138  |  103  |  13  ----------------------------<  98  3.7   |  21<L>  |  0.74    Ca    10.0      23 May 2022 06:39                  RADIOLOGY & ADDITIONAL TESTS:    Imaging Personally Reviewed:    Electrocardiogram Personally Reviewed:    COORDINATION OF CARE:  Care Discussed with Consultants/Other Providers [Y/N]:  Prior or Outpatient Records Reviewed [Y/N]:

## 2022-05-24 NOTE — PROGRESS NOTE ADULT - PROBLEM SELECTOR PLAN 3
PT was seen by ENT team and had laryngoscopy done at bedside on 5/20 , normal scope. Bilateral vocal cords mobile and intact.   Speech and swallow eval was done on 5/23 where further testing like MBS is recommended , no obvious evidence of laryngeal penetration observed as per Speech therapist. PT was seen by ENT team and had laryngoscopy done at bedside on 5/20 , normal scope. Bilateral vocal cords mobile and intact.   Speech and swallow eval was done on 5/23 where further testing like MBS ( done on 5/24) , no obvious evidence of laryngeal penetration observed as per Speech therapist.

## 2022-05-24 NOTE — PROGRESS NOTE ADULT - NSPROGADDITIONALINFOA_GEN_ALL_CORE
Alessandra Rosales   Hospitalist   [id number]   Available via TEAMS Discussed with daughter in law ( Shaina)     Alessandra Rosales   Hospitalist   [id number]   Available via TEAMS

## 2022-05-24 NOTE — PROGRESS NOTE ADULT - ASSESSMENT
61F, PMH HTN and C-spine meningioma removal in 2018 in [place]. P/w dizziness, gait instability, and emesis x3d. MRI shows ethel-medullary, Right CPA, and Right temporal enhancing lesions c/f meningioma vs schwannoma, with some hydro. Exam: Mandarin-speaking, AOx3, EOMI no nystagmus, PERRL, no facial/drift, CONRAD 5/5, SILT. Slightly wide-based antalgic gait, positive Romberg’s   (20 May 2022 14:15)    PROCEDURE:  Adm 5/20 Mult Brain lesions  POD#NA    PLAN:  Neuro:  Plan for OR 6/1, but poss this weekend depending on how things evolved, ?will need Covid PCR prior to OR. Cont Keppra, SQL. Inc activity/OOB. Above d/w pt daughter-in-law-Shaina at pt bedside this AM and wants to discuss timing of surgery-Dr Liu to reach out to her.     S&S eval done 5/23-diff swallowing reg food, for MBS today FU    Audiologist note of 5/23-Hearing within normal limits, with the exception of mild hearing loss noted at 6kHz, bilaterally. Recommendations:1) ENT follow-up re: hearing test results  2) Audiological monitoring as medically indicated Katharina Moreno CCC-A Audiologist     ENT note of 5/20-ENT consulted as pt c/o dysphagia. NSCU concerned for possible VC paralysis and HL secondary to location of lesion. Patient denies hearing loss at this time. Laryngoscopy done at bedside, normal scope. Bilateral vocal cords mobile and intact.  Problem/Recommendation - 1:·  Problem: Dysphagia. ·  Recommendation: Speech and Swallow Evaluation  Diet per Speech and Swallow Reconsult PRN. Problem/Recommendation - 2:·  Problem: R/O Hearing loss. ·  Recommendation: NSCU concerned for HL d/t location of lesion Will order audiogram.    Hosp/Med note of 5/23-Hospitalist team was called for preoperative medical evaluation and medical comanagement,  Problem/Recommendation - 1:·  Problem: Brain mass.   ·  Recommendation: surgical Intervention by the Neurosurgery team is pend Cont with Keppra cont with DVT prophylaxis. Problem/Recommendation - 2:·  Problem: Preoperative clearance. ·  Recommendation: METS > 3 No cardiac history RCRI of 0-1 please get an ECG ( I ordered ) will f/up ECG in Am. Problem/Recommendation - 3:·  Problem: Dysphagia.   ·  Recommendation: PT was seen by ENT team and had laryngoscopy done at bedside on 5/20 , normal scope. Bilateral vocal cords mobile and intact. Speech and swallow eval was done on 5/23 where further testing like MBS is recommended , no obvious evidence of laryngeal penetration observed as per Speech therapist. Problem/Recommendation - 4:·  Problem: Benign hypertension. ·  Recommendation: BP is stable currently cont with Losartan Monitor BP.    Respiratory: Patient instructed to use incentive spirometer [ ] YES [ X] NO              DVT ppx: [X ] SQL [ ] SQH and Venodynes [ ] Left [ ] Right [ X] Bilateral    Discharge Planning:  The patient was evaluated by PT/OT and recommended Acute Rehab.   PMR following for ongoing needs/dispo FU.    More than 30 minutes spent on total encounter: more than 50% of the visit was spent on educating the patient and family regarding condition, medications, follow up plans, signs and symptoms to be concerned with, preparing paperwork, and questions answered regarding discharge.

## 2022-05-24 NOTE — SWALLOW VFSS/MBS ASSESSMENT ADULT - RECOMMENDED FEEDING/EATING TECHNIQUES
Meds whole in puree; SMALL SINGLE sips/allow for swallow between intakes/maintain upright posture during/after eating for 30 mins/no straws/oral hygiene

## 2022-05-24 NOTE — PROGRESS NOTE ADULT - PROBLEM SELECTOR PLAN 2
METS >4  No cardiac history or related symptoms    RCRI of 0-1    ECG   will f/up ECG in Am. METS >4  No cardiac history or related symptoms    RCRI of 0-1    ECG is reviewed with no acute SONALI changes   She is considered as low risk for post operative cardiac events and there is no current medical contraindication to the much needed surgical intervention

## 2022-05-25 PROCEDURE — 99232 SBSQ HOSP IP/OBS MODERATE 35: CPT

## 2022-05-25 RX ORDER — MULTIVIT WITH MIN/MFOLATE/K2 340-15/3 G
1 POWDER (GRAM) ORAL ONCE
Refills: 0 | Status: COMPLETED | OUTPATIENT
Start: 2022-05-25 | End: 2022-05-25

## 2022-05-25 RX ADMIN — LEVETIRACETAM 500 MILLIGRAM(S): 250 TABLET, FILM COATED ORAL at 18:09

## 2022-05-25 RX ADMIN — ENOXAPARIN SODIUM 40 MILLIGRAM(S): 100 INJECTION SUBCUTANEOUS at 18:09

## 2022-05-25 RX ADMIN — PANTOPRAZOLE SODIUM 40 MILLIGRAM(S): 20 TABLET, DELAYED RELEASE ORAL at 05:10

## 2022-05-25 RX ADMIN — LEVETIRACETAM 500 MILLIGRAM(S): 250 TABLET, FILM COATED ORAL at 05:10

## 2022-05-25 RX ADMIN — Medication 25 MILLIGRAM(S): at 18:09

## 2022-05-25 RX ADMIN — POLYETHYLENE GLYCOL 3350 17 GRAM(S): 17 POWDER, FOR SOLUTION ORAL at 12:55

## 2022-05-25 RX ADMIN — LOSARTAN POTASSIUM 100 MILLIGRAM(S): 100 TABLET, FILM COATED ORAL at 05:10

## 2022-05-25 NOTE — PROGRESS NOTE ADULT - ASSESSMENT
61F, PMH HTN and C-spine meningioma removal in 2018 in [place]. P/w dizziness, gait instability, and emesis x3d. MRI shows ethel-medullary, Right CPA, and Right temporal enhancing lesions c/f meningioma vs schwannoma, with some hydro. Exam: Mandarin-speaking, AOx3, EOMI no nystagmus, PERRL, no facial/drift, CONRAD 5/5, SILT. Slightly wide-based antalgic gait, positive Romberg’s   (20 May 2022 14:15)    PROCEDURE:  Adm 5/20 Mult Brain lesions  POD#NA    PLAN:  Neuro:  Plan for OR 6/1. Need Covid PCR prior to OR. Cont Keppra, SQL.  5/24 Daughter-in-law Shaina concern with long wait for surgery and request Imaging on CD to take to [facility] for 2nd opinion ([facility] but she discussed this with Dr Liu and also myself on 5/24 and was reassured and will wait for surgery on 6/1 or earlier depending on how things evolved. Inc activity/OOB. No BM-mag citrate ordered FU.    S&S eval done 5/24-cont reg diet, Recommend OP GI f/u given c/o globus sensation despite minimal residue.    Audiologist note of 5/23-Hearing within normal limits, with the exception of mild hearing loss noted at 6kHz, bilaterally. Recommendations:1) ENT follow-up re: hearing test results  2) Audiological monitoring as medically indicated Katharina Moreno CCC-A Audiologist     ENT note of 5/20-ENT consulted as pt c/o dysphagia. NSCU concerned for possible VC paralysis and HL secondary to location of lesion. Patient denies hearing loss at this time. Laryngoscopy done at bedside, normal scope. Bilateral vocal cords mobile and intact.  Problem/Recommendation - 1:·  Problem: Dysphagia. ·  Recommendation: Speech and Swallow Evaluation  Diet per Speech and Swallow Reconsult PRN. Problem/Recommendation - 2:·  Problem: R/O Hearing loss. ·  Recommendation: NSCU concerned for HL d/t location of lesion Will order audiogram.    Hosp/Med note of 5/24- Hospitalist team was called for preoperative medical evaluation and medical comanagement.  Problem/Plan - 1:·  Problem: Brain mass. ·  Plan: surgical Intervention by the Neurosurgery team is pend Cont with Keppra cont with DVT prophylaxis. Problem/Plan - 2:·  Problem: Preoperative clearance. ·  Plan: METS >4 No cardiac history or related symptoms  RCRI of 0-1  ECG is reviewed with no acute SONALI changes She is considered as low risk for post operative cardiac events and there is no current medical contraindication to the much needed surgical intervention.Problem/Plan - 3:·  Problem: Dysphagia. ·  Plan: PT was seen by ENT team and had laryngoscopy done at bedside on 5/20 , normal scope. Bilateral vocal cords mobile and intact.   Speech and swallow eval was done on 5/23 where further testing like MBS ( done on 5/24) , no obvious evidence of laryngeal penetration observed as per Speech therapist. Problem/Plan - 4:·  Problem: Benign hypertension. ·  Plan: BP is stable currently cont with Losartan Monitor BP.    Respiratory: Patient instructed to use incentive spirometer [ ] YES [ X] NO              DVT ppx: [X ] SQL [ ] SQH and Venodynes [ ] Left [ ] Right [ X] Bilateral    Discharge Planning:  The patient was evaluated by PT/OT and recommended Acute Rehab.   PMR following for ongoing needs/dispo FU.    More than 30 minutes spent on total encounter: more than 50% of the visit was spent on educating the patient and family regarding condition, medications, follow up plans, signs and symptoms to be concerned with, preparing paperwork, and questions answered regarding discharge.

## 2022-05-25 NOTE — PROGRESS NOTE ADULT - SUBJECTIVE AND OBJECTIVE BOX
SUBJECTIVE: Awake, HA, but comfortable, garfield diet well. NAD    OVERNIGHT EVENTS: None    Vital Signs Last 24 Hrs  T(C): 36.6 (25 May 2022 09:17), Max: 36.8 (25 May 2022 01:04)  T(F): 97.9 (25 May 2022 09:17), Max: 98.2 (25 May 2022 01:04)  HR: 73 (25 May 2022 09:17) (66 - 84)  BP: 113/75 (25 May 2022 09:17) (106/71 - 126/82)  BP(mean): --  RR: 18 (25 May 2022 09:17) (18 - 18)  SpO2: 95% (25 May 2022 09:17) (95% - 98%)  IVF: [ X] IVL [ ] NS+K@   DIET: [ X] Regular [ ] CCD [ ] Renal [ ] Puree [ ] Dysphagia [ ] Tube Feeds:   PCA: [ ] YES [X ] NO   BABIN: [ ] YES [X ] NO [ X VOID   BM: [ ] YES [ X] NO     DRAINS: NA    PHYSICAL EXAM:    General: No Acute Distress     Neurological: Fujinese speaking. No interval change. Awake, alert oriented to person, place and time, Following Commands, PERRL, EOMI, Face Symmetrical, Speech Fluent, Moving all extremities, Muscle Strength normal in all four extremities, No Drift, Sensation to Light Touch Intact    Pulmonary: Clear to Auscultation, No Rales, No Rhonchi, No Wheezes     Cardiovascular: S1, S2, Regular Rate and Rhythm     Gastrointestinal: Soft, Nontender, Nondistended     Incision: NA    LABS:                        14.4   5.27  )-----------( 238      ( 22 May 2022 06:28 )             41.3    05-23    138  |  103  |  13  ----------------------------<  98  3.7   |  21<L>  |  0.74    Ca    10.0      23 May 2022 06:39  Phos  4.5     05-22  Mg     2.4     05-22    Pregnancy Profile, Urine (05.23.22 @ 18:41)    Pregnancy Profile, Urine: Negative:     COVID-19 PCR: NotDetec (19 May 2022 16:02)    I&O's Summary    24 May 2022 07:01  -  25 May 2022 07:00  --------------------------------------------------------  IN: 480 mL / OUT: 0 mL / NET: 480 mL    IMAGING:   Audiogram: Hearing within normal limits, with the exception of mild hearing loss noted at 6kHz, bilaterally.     Recommendations:  1) ENT follow-up re: hearing test results  2) Audiological monitoring as medically indicated     Katharina Moreno CCC-A  Audiologist     < from: [facility] Duplex Lower Ext Vein Scan, Bilat (05.23.22 @ 09:34) >  IMPRESSION:  No evidence of deep venous thrombosis in either lower extremity.    < end of copied text >    < from: MR Cervical Spine w/wo IV Cont (05.21.22 @ 16:52) >  IMPRESSION: Degenerative changes as described above.    Enhancing lesions involving the posterior fossa as described above.    < end of copied text >    < from: CT Head No Cont (05.21.22 @ 08:01) >  No acute intracranial hemorrhage, new mass effect, midline shift, or   herniation. Stable ventricular size and configuration.    Hyperdense, extra-axial lesion at the left pontomedullary junction,   compatible with meningioma, with adjacent parenchymal edema, better   evaluated on recent MRI.    Additional extra-axial lesions noted on MR are not well evaluated on this   study.    < end of copied text >    MEDICATIONS  (STANDING):  enoxaparin Injectable 40 milliGRAM(s) SubCutaneous <User Schedule>  levETIRAcetam 500 milliGRAM(s) Oral every 12 hours  losartan 100 milliGRAM(s) Oral daily  magnesium citrate Oral Solution 1 Bottle Oral once  pantoprazole    Tablet 40 milliGRAM(s) Oral before breakfast  polyethylene glycol 3350 17 Gram(s) Oral daily  senna 2 Tablet(s) Oral at bedtime    MEDICATIONS  (PRN):  acetaminophen     Tablet .. 650 milliGRAM(s) Oral every 6 hours PRN Temp greater or equal to 38C (100.4F), Mild Pain (1 - 3)  bisacodyl 5 milliGRAM(s) Oral every 12 hours PRN Constipation  meclizine 25 milliGRAM(s) Oral every 6 hours PRN Dizziness  ondansetron Injectable 4 milliGRAM(s) IV Push every 6 hours PRN Nausea and/or Vomiting

## 2022-05-26 LAB — SARS-COV-2 RNA SPEC QL NAA+PROBE: SIGNIFICANT CHANGE UP

## 2022-05-26 PROCEDURE — 99232 SBSQ HOSP IP/OBS MODERATE 35: CPT

## 2022-05-26 RX ADMIN — ENOXAPARIN SODIUM 40 MILLIGRAM(S): 100 INJECTION SUBCUTANEOUS at 17:22

## 2022-05-26 RX ADMIN — LOSARTAN POTASSIUM 100 MILLIGRAM(S): 100 TABLET, FILM COATED ORAL at 05:51

## 2022-05-26 RX ADMIN — LEVETIRACETAM 500 MILLIGRAM(S): 250 TABLET, FILM COATED ORAL at 05:51

## 2022-05-26 RX ADMIN — PANTOPRAZOLE SODIUM 40 MILLIGRAM(S): 20 TABLET, DELAYED RELEASE ORAL at 08:44

## 2022-05-26 RX ADMIN — Medication 25 MILLIGRAM(S): at 12:14

## 2022-05-26 RX ADMIN — LEVETIRACETAM 500 MILLIGRAM(S): 250 TABLET, FILM COATED ORAL at 17:22

## 2022-05-26 NOTE — PROGRESS NOTE ADULT - SUBJECTIVE AND OBJECTIVE BOX
SUBJECTIVE: Patient seen and examined at bedside. No acute overnight events reported. +BM today    Vital Signs Last 24 Hrs  T(C): 36.4 (05-26-22 @ 13:24), Max: 37 (05-26-22 @ 00:31)  T(F): 97.5 (05-26-22 @ 13:24), Max: 98.6 (05-26-22 @ 00:31)  HR: 73 (05-26-22 @ 13:24) (69 - 77)  BP: 105/72 (05-26-22 @ 13:24) (105/72 - 130/82)  RR: 18 (05-26-22 @ 13:24) (18 - 18)  SpO2: 97% (05-26-22 @ 13:24) (96% - 98%)    PHYSICAL EXAM:    Constitutional: No Acute Distress     Neurological: Awake, alert, oriented to person, place and time, Fujinese speaking, speech clear and fluent, face equal, tongue midline, briskly following commands, no drift, moves all extremities with 5/5 strength, sensation intact to light touch throughout, pupils 4mm and reactive bilaterally, extraocular movements intact, no nystagmus    Pulmonary: Clear to Auscultation, No rales, No rhonchi, No wheezes     Cardiovascular: S1, S2, Regular rate and rhythm     Gastrointestinal: Soft, Non-tender, Non-distended, +bowel sounds x 4    Extremities: No calf tenderness bilaterally, no cyanosis, clubbing or edema        LABS:          05-25 @ 07:01  -  05-26 @ 07:00  --------------------------------------------------------  IN: 360 mL / OUT: 0 mL / NET: 360 mL    05-26 @ 07:01  -  05-26 @ 15:39  --------------------------------------------------------  IN: 600 mL / OUT: 0 mL / NET: 600 mL      IMAGING:     [facility] Duplex Lower Ext Vein Scan, Bilat (05.23.22 @ 09:34)  IMPRESSION:  No evidence of deep venous thrombosis in either lower extremity.    --- End of Report ---    DENNYS CHARLES MD; Attending Radiologist  This document has been electronically signed. May 23 2022  1:39PM      MR Cervical Spine w/wo IV Cont (05.21.22 @ 16:52)  IMPRESSION: Degenerative changes as described above.    Enhancing lesions involving the posterior fossa as described above.    --- End of Report ---    LORELEI STARR MD; Attending Radiologist  This document has been electronically signed. May 22 2022  2:19PM        MEDICATIONS:  Antibiotics:    Neuro:  acetaminophen     Tablet .. 650 milliGRAM(s) Oral every 6 hours PRN Temp greater or equal to 38C (100.4F), Mild Pain (1 - 3)  levETIRAcetam 500 milliGRAM(s) Oral every 12 hours  meclizine 25 milliGRAM(s) Oral every 6 hours PRN Dizziness  ondansetron Injectable 4 milliGRAM(s) IV Push every 6 hours PRN Nausea and/or Vomiting    Cardiac:  losartan 100 milliGRAM(s) Oral daily    Pulm:    GI/:  bisacodyl 5 milliGRAM(s) Oral every 12 hours PRN Constipation  pantoprazole    Tablet 40 milliGRAM(s) Oral before breakfast  polyethylene glycol 3350 17 Gram(s) Oral daily  senna 2 Tablet(s) Oral at bedtime    Other:   enoxaparin Injectable 40 milliGRAM(s) SubCutaneous <User Schedule>        DIET: regular

## 2022-05-26 NOTE — PROGRESS NOTE ADULT - PROBLEM SELECTOR PLAN 2
METS >4  No cardiac history or related symptoms    RCRI of 0-1    ECG is reviewed with no acute SONALI changes   She is considered as low risk for post operative cardiac events and there is no current medical contraindication to the much needed surgical intervention

## 2022-05-26 NOTE — PROGRESS NOTE ADULT - ASSESSMENT
ASSESSMENT: Patient is a 61 year old female PMH HTN and C-spine meningioma removal in 2018 in [place] who presented w/ dizziness, gait instability, and emesis x3d. MRI shows ethel-medullary, Right CPA, and Right temporal enhancing lesions c/f meningioma vs schwannoma, with some hydro.     PLAN:    NEURO:  - Neuro checks every 4 hours  - Pre op for OR on 6/1 for resection  - Keppra 500 q12 for seizure prophylaxis  - Meclizine as needed for dizziness  - Mobilization as tolerated  - Normal audiogram; call ENT back if needed    PULM:  - Encouraged incentive spirometry  - On room air    CV:  - Goal normotension  - Continue Losartan 100mg daily    GI:  - Tolerating regular diet  - GI prophylaxis [] not indicated [x] PPI [] other:  - Bowel regimen [] colace [x] senna [] other: miralax; +BM this morning    RENAL:  - IVL  - Voiding  - Replete lytes PRN    ENDO:   - Goal euglycemia (-180)    HEME/ONC:  - VTE prophylaxis: [x] SCDs [x] chemoprophylaxis [] hold chemoprophylaxis due to: [] high risk of DVT/PE on admission due to:    ID:  - Afebrile    DISPO:   - PT/OT - TBD  - Will discuss w/ Dr. Liu  - [facility] [id number]    MISC:    SOCIAL/FAMILY:  [] awaiting [] updated at bedside [] family meeting    CODE STATUS:  [] Full Code [] DNR [] DNI [] Palliative/Comfort Care    DISPOSITION:  [] ICU [] Stroke Unit [] Floor [] EMU [] RCU [] PCU    [] Patient is at high risk of neurologic deterioration/death due to:     Time seen:  Time spent: ___ [] critical care minutes    Contact: [phone]

## 2022-05-26 NOTE — PROGRESS NOTE ADULT - SUBJECTIVE AND OBJECTIVE BOX
Patient is a 61y old  Female who presents with a chief complaint of brain tumors (24 May 2022 13:28)      SUBJECTIVE / OVERNIGHT EVENTS:  no fever      ADDITIONAL REVIEW OF SYSTEMS: Negative except for above    MEDICATIONS  (STANDING):  enoxaparin Injectable 40 milliGRAM(s) SubCutaneous <User Schedule>  levETIRAcetam 500 milliGRAM(s) Oral every 12 hours  losartan 100 milliGRAM(s) Oral daily  pantoprazole    Tablet 40 milliGRAM(s) Oral before breakfast  polyethylene glycol 3350 17 Gram(s) Oral daily  senna 2 Tablet(s) Oral at bedtime    MEDICATIONS  (PRN):  acetaminophen     Tablet .. 650 milliGRAM(s) Oral every 6 hours PRN Temp greater or equal to 38C (100.4F), Mild Pain (1 - 3)  bisacodyl 5 milliGRAM(s) Oral every 12 hours PRN Constipation  meclizine 25 milliGRAM(s) Oral every 6 hours PRN Dizziness  ondansetron Injectable 4 milliGRAM(s) IV Push every 6 hours PRN Nausea and/or Vomiting      CAPILLARY BLOOD GLUCOSE        I&O's Summary    25 May 2022 07:01  -  26 May 2022 07:00  --------------------------------------------------------  IN: 360 mL / OUT: 0 mL / NET: 360 mL    26 May 2022 07:01  -  26 May 2022 14:52  --------------------------------------------------------  IN: 600 mL / OUT: 0 mL / NET: 600 mL        PHYSICAL EXAM:  Vital Signs Last 24 Hrs  T(C): 36.4 (26 May 2022 13:24), Max: 37 (26 May 2022 00:31)  T(F): 97.5 (26 May 2022 13:24), Max: 98.6 (26 May 2022 00:31)  HR: 73 (26 May 2022 13:24) (69 - 77)  BP: 105/72 (26 May 2022 13:24) (105/72 - 130/82)  BP(mean): --  RR: 18 (26 May 2022 13:24) (18 - 18)  SpO2: 97% (26 May 2022 13:24) (96% - 98%)    PHYSICAL EXAM:  GENERAL: NAD, well-developed  HEAD:  Atraumatic, Normocephalic  EYES:  conjunctiva and sclera clear  NECK: Supple, No JVD  CHEST/LUNG: Clear to auscultation bilaterally; No wheeze  HEART: Regular rate and rhythm; No murmurs, rubs, or gallops  ABDOMEN: Soft, Nontender, Nondistended; Bowel sounds present  EXTREMITIES:  2+ Peripheral Pulses, No clubbing, cyanosis, or edema  PSYCH: AAOx3  NEUROLOGY: non-focal  SKIN: No rashes or lesions      LABS:                      RADIOLOGY & ADDITIONAL TESTS:    Imaging Personally Reviewed:    Electrocardiogram Personally Reviewed:    COORDINATION OF CARE:  Care Discussed with Consultants/Other Providers [Y/N]:  Prior or Outpatient Records Reviewed [Y/N]:

## 2022-05-26 NOTE — PROGRESS NOTE ADULT - NSPROGADDITIONALINFOA_GEN_ALL_CORE
Discussed with daughter in law ( Shaina)     Alessandra Rosales   Hospitalist   [id number]   Available via TEAMS Alessandra Rosales   Hospitalist   [id number]   Available via TEAMS

## 2022-05-26 NOTE — PROGRESS NOTE ADULT - PROBLEM SELECTOR PLAN 3
PT was seen by ENT team and had laryngoscopy done at bedside on 5/20 , normal scope. Bilateral vocal cords mobile and intact.   Speech and swallow eval was done on 5/23 where further testing like MBS ( done on 5/24) , no obvious evidence of laryngeal penetration observed as per Speech therapist.

## 2022-05-27 PROCEDURE — 99232 SBSQ HOSP IP/OBS MODERATE 35: CPT

## 2022-05-27 RX ORDER — DEXTROSE MONOHYDRATE, SODIUM CHLORIDE, AND POTASSIUM CHLORIDE 50; .745; 4.5 G/1000ML; G/1000ML; G/1000ML
1000 INJECTION, SOLUTION INTRAVENOUS
Refills: 0 | Status: DISCONTINUED | OUTPATIENT
Start: 2022-05-27 | End: 2022-05-28

## 2022-05-27 RX ADMIN — LEVETIRACETAM 500 MILLIGRAM(S): 250 TABLET, FILM COATED ORAL at 17:45

## 2022-05-27 RX ADMIN — PANTOPRAZOLE SODIUM 40 MILLIGRAM(S): 20 TABLET, DELAYED RELEASE ORAL at 09:25

## 2022-05-27 RX ADMIN — LEVETIRACETAM 500 MILLIGRAM(S): 250 TABLET, FILM COATED ORAL at 05:48

## 2022-05-27 RX ADMIN — LOSARTAN POTASSIUM 100 MILLIGRAM(S): 100 TABLET, FILM COATED ORAL at 05:48

## 2022-05-27 NOTE — PROGRESS NOTE ADULT - SUBJECTIVE AND OBJECTIVE BOX
Patient is a 61y old  Female who presents with a chief complaint of Admitted 5/20 found with multiple enhancing brain tumors (27 May 2022 13:11)      SUBJECTIVE / OVERNIGHT EVENTS:   no overnight events    No fever       ADDITIONAL REVIEW OF SYSTEMS: Negative except for above    MEDICATIONS  (STANDING):  enoxaparin Injectable 40 milliGRAM(s) SubCutaneous <User Schedule>  levETIRAcetam 500 milliGRAM(s) Oral every 12 hours  losartan 100 milliGRAM(s) Oral daily  pantoprazole    Tablet 40 milliGRAM(s) Oral before breakfast  polyethylene glycol 3350 17 Gram(s) Oral daily  senna 2 Tablet(s) Oral at bedtime    MEDICATIONS  (PRN):  acetaminophen     Tablet .. 650 milliGRAM(s) Oral every 6 hours PRN Temp greater or equal to 38C (100.4F), Mild Pain (1 - 3)  bisacodyl 5 milliGRAM(s) Oral every 12 hours PRN Constipation  meclizine 25 milliGRAM(s) Oral every 6 hours PRN Dizziness  ondansetron Injectable 4 milliGRAM(s) IV Push every 6 hours PRN Nausea and/or Vomiting      CAPILLARY BLOOD GLUCOSE        I&O's Summary    26 May 2022 07:01  -  27 May 2022 07:00  --------------------------------------------------------  IN: 600 mL / OUT: 0 mL / NET: 600 mL        PHYSICAL EXAM:  Vital Signs Last 24 Hrs  T(C): 36.6 (27 May 2022 14:30), Max: 36.8 (26 May 2022 23:57)  T(F): 97.8 (27 May 2022 14:30), Max: 98.2 (26 May 2022 23:57)  HR: 68 (27 May 2022 14:30) (64 - 68)  BP: 121/66 (27 May 2022 14:30) (116/75 - 121/66)  BP(mean): --  RR: 18 (27 May 2022 14:30) (18 - 18)  SpO2: 98% (27 May 2022 14:30) (97% - 98%)    PHYSICAL EXAM:  GENERAL: NAD, well-developed  HEAD:  Atraumatic, Normocephalic  EYES:  conjunctiva and sclera clear  NECK: Supple, No JVD  CHEST/LUNG: Clear to auscultation bilaterally; No wheeze  HEART: Regular rate and rhythm; No murmurs, rubs, or gallops  ABDOMEN: Soft, Nontender, Nondistended; Bowel sounds present  EXTREMITIES:  2+ Peripheral Pulses, No clubbing, cyanosis, or edema  PSYCH: AAOx3  NEUROLOGY: non-focal  SKIN: No rashes or lesions      LABS:                      RADIOLOGY & ADDITIONAL TESTS:    Imaging Personally Reviewed:    Electrocardiogram Personally Reviewed:    COORDINATION OF CARE:  Care Discussed with Consultants/Other Providers [Y/N]:  Prior or Outpatient Records Reviewed [Y/N]:     Patient is a 61y old  Female who presents with a chief complaint of Admitted 5/20 found with multiple enhancing brain tumors (27 May 2022 13:11)      SUBJECTIVE / OVERNIGHT EVENTS:   no overnight events    No fever   daughter in law ( Shaina ) at the bedside , states that patient is not eating much because she has bad taste when eating and would like supplement to help her prior to the surgery       ADDITIONAL REVIEW OF SYSTEMS: Negative except for above    MEDICATIONS  (STANDING):  enoxaparin Injectable 40 milliGRAM(s) SubCutaneous <User Schedule>  levETIRAcetam 500 milliGRAM(s) Oral every 12 hours  losartan 100 milliGRAM(s) Oral daily  pantoprazole    Tablet 40 milliGRAM(s) Oral before breakfast  polyethylene glycol 3350 17 Gram(s) Oral daily  senna 2 Tablet(s) Oral at bedtime    MEDICATIONS  (PRN):  acetaminophen     Tablet .. 650 milliGRAM(s) Oral every 6 hours PRN Temp greater or equal to 38C (100.4F), Mild Pain (1 - 3)  bisacodyl 5 milliGRAM(s) Oral every 12 hours PRN Constipation  meclizine 25 milliGRAM(s) Oral every 6 hours PRN Dizziness  ondansetron Injectable 4 milliGRAM(s) IV Push every 6 hours PRN Nausea and/or Vomiting      CAPILLARY BLOOD GLUCOSE        I&O's Summary    26 May 2022 07:01  -  27 May 2022 07:00  --------------------------------------------------------  IN: 600 mL / OUT: 0 mL / NET: 600 mL        PHYSICAL EXAM:  Vital Signs Last 24 Hrs  T(C): 36.6 (27 May 2022 14:30), Max: 36.8 (26 May 2022 23:57)  T(F): 97.8 (27 May 2022 14:30), Max: 98.2 (26 May 2022 23:57)  HR: 68 (27 May 2022 14:30) (64 - 68)  BP: 121/66 (27 May 2022 14:30) (116/75 - 121/66)  BP(mean): --  RR: 18 (27 May 2022 14:30) (18 - 18)  SpO2: 98% (27 May 2022 14:30) (97% - 98%)    PHYSICAL EXAM:  GENERAL: NAD, well-developed  HEAD:  Atraumatic, Normocephalic  EYES:  conjunctiva and sclera clear  NECK: Supple, No JVD  CHEST/LUNG: Clear to auscultation bilaterally; No wheeze  HEART: Regular rate and rhythm; No murmurs, rubs, or gallops  ABDOMEN: Soft, Nontender, Nondistended; Bowel sounds present  EXTREMITIES:  2+ Peripheral Pulses, No clubbing, cyanosis, or edema  PSYCH: AAOx3  NEUROLOGY: non-focal  SKIN: No rashes or lesions      LABS:                      RADIOLOGY & ADDITIONAL TESTS:    Imaging Personally Reviewed:    Electrocardiogram Personally Reviewed:    COORDINATION OF CARE:  Care Discussed with Consultants/Other Providers [Y/N]:  Prior or Outpatient Records Reviewed [Y/N]:

## 2022-05-27 NOTE — PROGRESS NOTE ADULT - ASSESSMENT
ASSESSMENT: Patient is a 61 year old female PMH HTN and C-spine meningioma removal in 2018 in [place] who presented w/ dizziness, gait instability, and emesis x3d. MRI shows ethel-medullary, Right CPA, and Right temporal enhancing lesions c/f meningioma vs schwannoma, with some hydro.     PLAN:    NEURO:  - Neuro checks every 4 hours  - Pre op for OR on 6/1 for resection  - Keppra 500 q12 for seizure prophylaxis  - Meclizine as needed for dizziness  - Mobilization as tolerated  - Normal audiogram; call ENT back if needed    PULM:  - Encouraged incentive spirometry  - On room air    CV:  - Goal normotension  - Continue Losartan 100mg daily    GI:  - Tolerating regular diet  - GI prophylaxis [] not indicated [x] PPI [] other:  - Bowel regimen [] colace [x] senna [] other: miralax; +BM this morning    RENAL:  - IVL  - Voiding  - Replete lytes PRN    ENDO:   - Goal euglycemia (-180)    HEME/ONC:  - VTE prophylaxis: [x] SCDs [x] chemoprophylaxis [] hold chemoprophylaxis due to: [] high risk of DVT/PE on admission due to:    ID:  - Afebrile    DISPO:   - PT/OT - TBD  - Will discuss w/ Dr. Liu  - [facility] [id number]

## 2022-05-27 NOTE — PROGRESS NOTE ADULT - SUBJECTIVE AND OBJECTIVE BOX
SUBJECTIVE: Patient seen and examined at bedside. No acute overnight events reported. +BM yesterday. Mandarin  Mis ID # [id number] used for translation services ([facility] Patient complains of intermittent headache unchanged since admission.    Vital Signs Last 24 Hrs  T(C): 36.6 (05-27-22 @ 09:28), Max: 36.8 (05-26-22 @ 23:57)  T(F): 97.9 (05-27-22 @ 09:28), Max: 98.2 (05-26-22 @ 23:57)  HR: 64 (05-27-22 @ 09:28) (64 - 73)  BP: 119/68 (05-27-22 @ 09:28) (105/72 - 119/78)  RR: 18 (05-27-22 @ 09:28) (18 - 18)  SpO2: 98% (05-27-22 @ 09:28) (97% - 98%)    PHYSICAL EXAM:    Constitutional: No Acute Distress     Neurological: Awake, alert, oriented to person, place and time in Mandarin, primarily Fujianese speaking, speech clear and fluent, face equal, tongue midline, briskly following commands, no drift, moves all extremities with 5/5 strength, sensation intact to light touch throughout, pupils 4mm and reactive bilaterally, extraocular movements intact, no nystagmus    Pulmonary: Clear to Auscultation, No rales, No rhonchi, No wheezes     Cardiovascular: S1, S2, Regular rate and rhythm     Gastrointestinal: Soft, Non-tender, Non-distended, +bowel sounds x 4    Extremities: No calf tenderness bilaterally, no cyanosis, clubbing or edema        LABS:          05-25 @ 07:01  -  05-26 @ 07:00  --------------------------------------------------------  IN: 360 mL / OUT: 0 mL / NET: 360 mL    05-26 @ 07:01  -  05-26 @ 15:39  --------------------------------------------------------  IN: 600 mL / OUT: 0 mL / NET: 600 mL      IMAGING:     VA Duplex Lower Ext Vein Scan, Bilat (05.23.22 @ 09:34)  IMPRESSION:  No evidence of deep venous thrombosis in either lower extremity.    --- End of Report ---    DENNYS CHARLES MD; Attending Radiologist  This document has been electronically signed. May 23 2022  1:39PM      MR Cervical Spine w/wo IV Cont (05.21.22 @ 16:52)  IMPRESSION: Degenerative changes as described above.    Enhancing lesions involving the posterior fossa as described above.    --- End of Report ---    LORELEI STARR MD; Attending Radiologist  This document has been electronically signed. May 22 2022  2:19PM        MEDICATIONS:  Antibiotics:    Neuro:  acetaminophen     Tablet .. 650 milliGRAM(s) Oral every 6 hours PRN Temp greater or equal to 38C (100.4F), Mild Pain (1 - 3)  levETIRAcetam 500 milliGRAM(s) Oral every 12 hours  meclizine 25 milliGRAM(s) Oral every 6 hours PRN Dizziness  ondansetron Injectable 4 milliGRAM(s) IV Push every 6 hours PRN Nausea and/or Vomiting    Cardiac:  losartan 100 milliGRAM(s) Oral daily    Pulm:    GI/:  bisacodyl 5 milliGRAM(s) Oral every 12 hours PRN Constipation  pantoprazole    Tablet 40 milliGRAM(s) Oral before breakfast  polyethylene glycol 3350 17 Gram(s) Oral daily  senna 2 Tablet(s) Oral at bedtime    Other:   enoxaparin Injectable 40 milliGRAM(s) SubCutaneous <User Schedule>        DIET: regular

## 2022-05-27 NOTE — PROGRESS NOTE ADULT - NSPROGADDITIONALINFOA_GEN_ALL_CORE
Alessandra Rosales   Hospitalist   [id number]   Available via TEAMS Nutritionist consult for decrease oral intake as per family and supplement is added to diet       Alessandra Rosales   Hospitalist   [id number]   Available via TEAMS

## 2022-05-28 PROCEDURE — 99232 SBSQ HOSP IP/OBS MODERATE 35: CPT

## 2022-05-28 PROCEDURE — 99231 SBSQ HOSP IP/OBS SF/LOW 25: CPT | Mod: 57

## 2022-05-28 RX ORDER — SODIUM CHLORIDE 9 MG/ML
1000 INJECTION INTRAMUSCULAR; INTRAVENOUS; SUBCUTANEOUS
Refills: 0 | Status: DISCONTINUED | OUTPATIENT
Start: 2022-05-28 | End: 2022-05-29

## 2022-05-28 RX ORDER — DEXAMETHASONE 0.5 MG/5ML
10 ELIXIR ORAL ONCE
Refills: 0 | Status: COMPLETED | OUTPATIENT
Start: 2022-05-28 | End: 2022-05-28

## 2022-05-28 RX ORDER — PANTOPRAZOLE SODIUM 20 MG/1
40 TABLET, DELAYED RELEASE ORAL DAILY
Refills: 0 | Status: DISCONTINUED | OUTPATIENT
Start: 2022-05-28 | End: 2022-06-01

## 2022-05-28 RX ORDER — SODIUM CHLORIDE 9 MG/ML
1000 INJECTION INTRAMUSCULAR; INTRAVENOUS; SUBCUTANEOUS
Refills: 0 | Status: DISCONTINUED | OUTPATIENT
Start: 2022-05-28 | End: 2022-05-28

## 2022-05-28 RX ORDER — DEXAMETHASONE 0.5 MG/5ML
10 ELIXIR ORAL ONCE
Refills: 0 | Status: DISCONTINUED | OUTPATIENT
Start: 2022-05-28 | End: 2022-05-28

## 2022-05-28 RX ADMIN — ONDANSETRON 4 MILLIGRAM(S): 8 TABLET, FILM COATED ORAL at 16:34

## 2022-05-28 RX ADMIN — PANTOPRAZOLE SODIUM 40 MILLIGRAM(S): 20 TABLET, DELAYED RELEASE ORAL at 06:17

## 2022-05-28 RX ADMIN — LEVETIRACETAM 500 MILLIGRAM(S): 250 TABLET, FILM COATED ORAL at 18:55

## 2022-05-28 RX ADMIN — PANTOPRAZOLE SODIUM 40 MILLIGRAM(S): 20 TABLET, DELAYED RELEASE ORAL at 17:04

## 2022-05-28 RX ADMIN — SODIUM CHLORIDE 60 MILLILITER(S): 9 INJECTION INTRAMUSCULAR; INTRAVENOUS; SUBCUTANEOUS at 18:07

## 2022-05-28 RX ADMIN — SENNA PLUS 2 TABLET(S): 8.6 TABLET ORAL at 21:23

## 2022-05-28 RX ADMIN — Medication 1 TABLET(S): at 18:55

## 2022-05-28 RX ADMIN — ENOXAPARIN SODIUM 40 MILLIGRAM(S): 100 INJECTION SUBCUTANEOUS at 18:55

## 2022-05-28 RX ADMIN — DEXTROSE MONOHYDRATE, SODIUM CHLORIDE, AND POTASSIUM CHLORIDE 75 MILLILITER(S): 50; .745; 4.5 INJECTION, SOLUTION INTRAVENOUS at 11:22

## 2022-05-28 RX ADMIN — POLYETHYLENE GLYCOL 3350 17 GRAM(S): 17 POWDER, FOR SOLUTION ORAL at 11:24

## 2022-05-28 RX ADMIN — Medication 102 MILLIGRAM(S): at 18:07

## 2022-05-28 RX ADMIN — LEVETIRACETAM 500 MILLIGRAM(S): 250 TABLET, FILM COATED ORAL at 06:17

## 2022-05-28 RX ADMIN — LOSARTAN POTASSIUM 100 MILLIGRAM(S): 100 TABLET, FILM COATED ORAL at 06:16

## 2022-05-28 RX ADMIN — ONDANSETRON 4 MILLIGRAM(S): 8 TABLET, FILM COATED ORAL at 07:20

## 2022-05-28 NOTE — DIETITIAN INITIAL EVALUATION ADULT - REASON FOR ADMISSION
Neoplasm of brain    "61F, PMH HTN and C-spine meningioma removal in 2018 in [place]. P/w dizziness, gait instability, and emesis x3d. MRI shows ethel-medullary, Right CPA, and Right temporal enhancing lesions c/f meningioma vs schwannoma, with some hydro. Exam: Mandarin-speaking, AOx3, EOMI no nystagmus, PERRL, no facial/drift, CONRAD 5/5, SILT. Slightly wide-based antalgic gait, positive Romberg’s"

## 2022-05-28 NOTE — PROGRESS NOTE ADULT - ASSESSMENT
HPI:  61F, PMH HTN and C-spine meningioma removal in 2018 in [place]. P/w dizziness, gait instability, and emesis x3d. MRI shows ethel-medullary, Right CPA, and Right temporal enhancing lesions c/f meningioma vs schwannoma, with some hydro. Exam: Mandarin-speaking, AOx3, EOMI no nystagmus, PERRL, no facial/drift, CONRAD 5/5, SILT. Slightly wide-based antalgic gait, positive Romberg’s   (20 May 2022 14:15)    PROCEDURE:  pre op for craniotomy       PLAN:    1 pre op for craniotomy -likely next week   2 keppra 500 bid to prevent seizure   3 meclizine for dizziness   4 room air   5 bp stable -continue on losartan   6 regular diet   7 senna and miralax   8 dispo :p   9 dvt ppx sql and scds       -will discuss with Dr. Liu   -spectra [phone]

## 2022-05-28 NOTE — DIETITIAN INITIAL EVALUATION ADULT - NS FNS DIET ORDER
Diet, Regular:   Supplement Feeding Modality:  Oral  Ensure Enlive Cans or Servings Per Day:  1       Frequency:  Two Times a day (05-27-22 @ 17:22)

## 2022-05-28 NOTE — DIETITIAN INITIAL EVALUATION ADULT - ADD RECOMMEND
1) Continue Regular diet with Ensure Enlive 2x/day.   2) Consider a multivitamin, pending no medical contraindications, for the prevention of micronutrient deficiencies.   3) Monitor PO intake, GI tolerance, skin integrity, labs, weight, and bowel movement regularity.   4) Honor food preferences as feasible. Assist with meals PRN and encourage PO intake.  5) RD remains available upon request and will follow-up per protocol.  6) malnutrition alert placed in chart

## 2022-05-28 NOTE — DIETITIAN INITIAL EVALUATION ADULT - NSFNSNUTRCHEWSWALLOWFT_GEN_A_CORE
Pt denies chewing/swallowing difficulty. Pt s/p MBS 5/24 with recommendation for regular diet and thin liquids.

## 2022-05-28 NOTE — DIETITIAN INITIAL EVALUATION ADULT - OTHER INFO
Reports  lbs x 1 week ago, and lost weight to 119 lbs.   Dosing wt: 119 lbs (05-19 stated weight).   No additional weights noted per chart. Based on reported UBW, would indicate 3.3% wt loss x 1 week - clinically significant. RD to continue to monitor weight trends as able/available.     Pt and family made aware RD remains available.

## 2022-05-28 NOTE — DIETITIAN INITIAL EVALUATION ADULT - SIGNS/SYMPTOMS
</=50% estimated needs >5 days, moderate muscle loss, >2% wt loss x 1 week pt with neoplasm of the brain, plan for resection in OR 6/1

## 2022-05-28 NOTE — DIETITIAN NUTRITION RISK NOTIFICATION - TREATMENT: THE FOLLOWING DIET HAS BEEN RECOMMENDED
Diet, Regular:   Supplement Feeding Modality:  Oral  Ensure Enlive Cans or Servings Per Day:  1       Frequency:  Two Times a day (05-27-22 @ 17:22) [Active]

## 2022-05-28 NOTE — DIETITIAN INITIAL EVALUATION ADULT - NSFNSGIIOFT_GEN_A_CORE
No nausea, vomiting, constipation, diarrhea noted. Reports last BM 5/26. Pt currently on bowel regimen (dulcolax, miralax, senna).

## 2022-05-28 NOTE — DIETITIAN INITIAL EVALUATION ADULT - REASON INDICATOR FOR ASSESSMENT
RD consult received for education. Source: pt, pt's son at bedside, pt's daughter translating via phone call, medical record.

## 2022-05-28 NOTE — DIETITIAN INITIAL EVALUATION ADULT - ORAL INTAKE PTA/DIET HISTORY
Pt's daughter in law offered to translate for pt via cell phone. Pt and daughter in law report pt with reduced PO intake x a few weeks PTA secondary to altered sense of taste/food tastes bitter, has only been having plain rice and congee. Does not follow any therapeutic diet at baseline. Confirms NKFA.

## 2022-05-28 NOTE — PROGRESS NOTE ADULT - SUBJECTIVE AND OBJECTIVE BOX
SUBJECTIVE:   Patient was seen and evaluated at bedside. Patient is resting in bed and is in no new acute distress.   OVERNIGHT EVENTS:   none   Vital Signs Last 24 Hrs  T(C): 36.7 (28 May 2022 09:28), Max: 37.1 (28 May 2022 00:46)  T(F): 98.1 (28 May 2022 09:28), Max: 98.7 (28 May 2022 00:46)  HR: 80 (28 May 2022 09:28) (65 - 100)  BP: 122/84 (28 May 2022 09:28) (109/64 - 129/88)  BP(mean): --  RR: 18 (28 May 2022 09:28) (18 - 18)  SpO2: 97% (28 May 2022 09:28) (96% - 98%)    PHYSICAL EXAM:    General: No Acute Distress     Neurological: Awake, alert oriented to person, place and time, Following Commands, PERRL, EOMI, Face Symmetrical, Speech Fluent, Moving all extremities, Muscle Strength normal in all four extremities, No Drift, Sensation to Light Touch Intact    Pulmonary: Clear to Auscultation, No Rales, No Rhonchi, No Wheezes     Cardiovascular: S1, S2, Regular Rate and Rhythm     Gastrointestinal: Soft, Nontender, Nondistended     Incision:     LABS:             05-28 @ 07:01  -  05-28 @ 12:00  --------------------------------------------------------  IN: 300 mL / OUT: 0 mL / NET: 300 mL      DRAINS:     MEDICATIONS:  Antibiotics:    Neuro:  acetaminophen     Tablet .. 650 milliGRAM(s) Oral every 6 hours PRN Temp greater or equal to 38C (100.4F), Mild Pain (1 - 3)  levETIRAcetam 500 milliGRAM(s) Oral every 12 hours  meclizine 25 milliGRAM(s) Oral every 6 hours PRN Dizziness  ondansetron Injectable 4 milliGRAM(s) IV Push every 6 hours PRN Nausea and/or Vomiting    Cardiac:  losartan 100 milliGRAM(s) Oral daily    Pulm:    GI/:  bisacodyl 5 milliGRAM(s) Oral every 12 hours PRN Constipation  pantoprazole    Tablet 40 milliGRAM(s) Oral before breakfast  polyethylene glycol 3350 17 Gram(s) Oral daily  senna 2 Tablet(s) Oral at bedtime    Other:   enoxaparin Injectable 40 milliGRAM(s) SubCutaneous <User Schedule>  sodium chloride 0.9% with potassium chloride 20 mEq/L 1000 milliLiter(s) IV Continuous <Continuous>    DIET: [] Regular [] CCD [] Renal [] Puree [] Dysphagia [] Tube Feeds:   regular diet   IMAGING:   ACC: [id number] EXAM:  MR ANGIO BRAIN WAW IC                        ACC: [id number] EXAM:  MR BRAIN WAW IC                          PROCEDURE DATE:  05/20/2022          INTERPRETATION:  Contrast-enhanced MRI of the brain. Noncontrast MRA of   the brain    CLINICAL INDICATION: Dizziness and vomiting, possible   pontine/sergio-pontine lesion    TECHNIQUE:  Multiplanar, multisequence MR images of the brain were   obtained before and after the intravenous administration of 5.5 cc of   Gadavist. 2 cc werediscarded. Special attention was paid to the IACs.    Axially acquired 3-D time-of-flight images were obtained and post   processed utilizing an MIP protocol.    COMPARISON: CT brain 5/19/2022    FINDINGS:    MRI BRAIN:    2.2 x 1.8 x 1.6 cm (maximalAP x TV x CC dimensions) avidly enhancing   extra-axial mass approximating the left pontomedullary junction.    The lesion encases the distal intradural vertebral artery, which is   patent.    Mass effect upon the medulla and inferior ethel. Vasogenicedema within   the medulla and basis pontis.    1.3 x 0.9 cm (axial plane) avidly enhancing extra-axial mass in the right   cerebellopontine angle extending into the right internal auditory canal.    0.8 x 0.6 cm (axial plane) avidly enhancing extra-axial lesion along the   inferior aspect of the right tentorium.    No hydrocephalus, midline shift, or effacement of basal cisterns.    No acute intracranial hemorrhage. No acute infarction.    Signal voids are seen within the major intracranial vessels consistent   with their patency.    Small polyp/retention cyst in the right maxillary sinus, remaining   visualized paranasal sinuses and mastoid air cells are clear.    MRA BRAIN:    Normal flow-related signal within the skull base/intracranial internal   carotid arteries.    Normal flow-related signal within the bilateral anterior, middle, and   posterior cerebral arteries.    Anterior and large bilateral posterior communicating arteries. The left   P1 segment is hypoplastic and the right [id number] is small in caliber.    Normal flow-related signal within the intradural vertebral arteries and   the basilar artery.    No flow-limiting stenosis or vascular aneurysm.    IMPRESSION:    MRI BRAIN:  2.2 x 1.8 x 1.6 cm (maximal AP x TV x CC dimensions) avidly enhancing   extra-axial mass approximating the left pontomedullary junction,   suspicious for the presence of meningioma.    The lesion encases the distal intradural vertebral artery, which is   patent.    Mass effect upon the medullaand inferior ethel. Vasogenic edema within   the medulla and basis pontis.    1.3 x 0.9 cm (axial plane) avidly enhancing extra-axial mass in the right   cerebellopontine angle extending into the right internal auditory canal.   Differential diagnosisincludes a schwannoma and meningioma.    0.8 x 0.6 cm (axial plane) avidly enhancing extra-axial lesion along the   inferior aspect of the right tentorium, suspicious for the presence of   meningioma.    The presence of neurofibromatosis 2 should be considered.    MRA BRAIN:  No flow-limiting stenosis or vascular aneurysm.    Large bilateral posterior communicating arteries with hypoplastic left   and small caliber right P1 segments.    Dr. Diaz discussed these findings with physician assistant Neha on   5/20/2022 11:16 AM with read back.        --- End of Report ---            DUGLAS DIAZ MD; Attending Radiologist  This document has been electronically signed. May 20 2022 11:20AM

## 2022-05-28 NOTE — DIETITIAN INITIAL EVALUATION ADULT - PERTINENT MEDS FT
MEDICATIONS  (STANDING):  enoxaparin Injectable 40 milliGRAM(s) SubCutaneous <User Schedule>  levETIRAcetam 500 milliGRAM(s) Oral every 12 hours  losartan 100 milliGRAM(s) Oral daily  pantoprazole    Tablet 40 milliGRAM(s) Oral before breakfast  polyethylene glycol 3350 17 Gram(s) Oral daily  senna 2 Tablet(s) Oral at bedtime  sodium chloride 0.9% with potassium chloride 20 mEq/L 1000 milliLiter(s) (75 mL/Hr) IV Continuous <Continuous>    MEDICATIONS  (PRN):  acetaminophen     Tablet .. 650 milliGRAM(s) Oral every 6 hours PRN Temp greater or equal to 38C (100.4F), Mild Pain (1 - 3)  bisacodyl 5 milliGRAM(s) Oral every 12 hours PRN Constipation  meclizine 25 milliGRAM(s) Oral every 6 hours PRN Dizziness  ondansetron Injectable 4 milliGRAM(s) IV Push every 6 hours PRN Nausea and/or Vomiting

## 2022-05-28 NOTE — DIETITIAN INITIAL EVALUATION ADULT - ENERGY INTAKE
Poor (<50%) Pt reports poor PO intake in-house, unable to tolerate anything besides rice and congee due to altered taste

## 2022-05-28 NOTE — DIETITIAN INITIAL EVALUATION ADULT - PERSON TAUGHT/METHOD
Discussed importance of adequate protein-energy consumption to meet estimated nutrient needs. Encouraged intake of meals and oral nutrition supplements as tolerated. Encouraged intake of protein-rich foods first at mealtimes to help preserve lean muscle mass. Reviewed food choices that are high in protein. Pt and family noted with good comprehension and made aware RD remains available for further questions/concerns./verbal instruction/patient instructed/daughter instructed/son instructed

## 2022-05-29 LAB
ALBUMIN SERPL ELPH-MCNC: 4.3 G/DL — SIGNIFICANT CHANGE UP (ref 3.3–5)
ALP SERPL-CCNC: 70 U/L — SIGNIFICANT CHANGE UP (ref 40–120)
ALT FLD-CCNC: 18 U/L — SIGNIFICANT CHANGE UP (ref 10–45)
ANION GAP SERPL CALC-SCNC: 16 MMOL/L — SIGNIFICANT CHANGE UP (ref 5–17)
AST SERPL-CCNC: 20 U/L — SIGNIFICANT CHANGE UP (ref 10–40)
BILIRUB SERPL-MCNC: 0.7 MG/DL — SIGNIFICANT CHANGE UP (ref 0.2–1.2)
BUN SERPL-MCNC: 13 MG/DL — SIGNIFICANT CHANGE UP (ref 7–23)
CALCIUM SERPL-MCNC: 9.8 MG/DL — SIGNIFICANT CHANGE UP (ref 8.4–10.5)
CHLORIDE SERPL-SCNC: 104 MMOL/L — SIGNIFICANT CHANGE UP (ref 96–108)
CO2 SERPL-SCNC: 20 MMOL/L — LOW (ref 22–31)
CREAT SERPL-MCNC: 0.63 MG/DL — SIGNIFICANT CHANGE UP (ref 0.5–1.3)
EGFR: 102 ML/MIN/1.73M2 — SIGNIFICANT CHANGE UP
GLUCOSE SERPL-MCNC: 118 MG/DL — HIGH (ref 70–99)
HCT VFR BLD CALC: 37.4 % — SIGNIFICANT CHANGE UP (ref 34.5–45)
HGB BLD-MCNC: 12.9 G/DL — SIGNIFICANT CHANGE UP (ref 11.5–15.5)
MAGNESIUM SERPL-MCNC: 2.4 MG/DL — SIGNIFICANT CHANGE UP (ref 1.6–2.6)
MCHC RBC-ENTMCNC: 31 PG — SIGNIFICANT CHANGE UP (ref 27–34)
MCHC RBC-ENTMCNC: 34.5 GM/DL — SIGNIFICANT CHANGE UP (ref 32–36)
MCV RBC AUTO: 89.9 FL — SIGNIFICANT CHANGE UP (ref 80–100)
NRBC # BLD: 0 /100 WBCS — SIGNIFICANT CHANGE UP (ref 0–0)
PHOSPHATE SERPL-MCNC: 4.5 MG/DL — SIGNIFICANT CHANGE UP (ref 2.5–4.5)
PLATELET # BLD AUTO: 219 K/UL — SIGNIFICANT CHANGE UP (ref 150–400)
POTASSIUM SERPL-MCNC: 3.8 MMOL/L — SIGNIFICANT CHANGE UP (ref 3.5–5.3)
POTASSIUM SERPL-SCNC: 3.8 MMOL/L — SIGNIFICANT CHANGE UP (ref 3.5–5.3)
PROT SERPL-MCNC: 7.3 G/DL — SIGNIFICANT CHANGE UP (ref 6–8.3)
RBC # BLD: 4.16 M/UL — SIGNIFICANT CHANGE UP (ref 3.8–5.2)
RBC # FLD: 11.7 % — SIGNIFICANT CHANGE UP (ref 10.3–14.5)
SODIUM SERPL-SCNC: 140 MMOL/L — SIGNIFICANT CHANGE UP (ref 135–145)
WBC # BLD: 4.23 K/UL — SIGNIFICANT CHANGE UP (ref 3.8–10.5)
WBC # FLD AUTO: 4.23 K/UL — SIGNIFICANT CHANGE UP (ref 3.8–10.5)

## 2022-05-29 PROCEDURE — 99231 SBSQ HOSP IP/OBS SF/LOW 25: CPT | Mod: 57

## 2022-05-29 RX ADMIN — Medication 1 TABLET(S): at 11:42

## 2022-05-29 RX ADMIN — Medication 5 MILLIGRAM(S): at 11:46

## 2022-05-29 RX ADMIN — ENOXAPARIN SODIUM 40 MILLIGRAM(S): 100 INJECTION SUBCUTANEOUS at 18:13

## 2022-05-29 RX ADMIN — LEVETIRACETAM 500 MILLIGRAM(S): 250 TABLET, FILM COATED ORAL at 05:15

## 2022-05-29 RX ADMIN — Medication 25 MILLIGRAM(S): at 20:00

## 2022-05-29 RX ADMIN — LOSARTAN POTASSIUM 100 MILLIGRAM(S): 100 TABLET, FILM COATED ORAL at 05:16

## 2022-05-29 RX ADMIN — POLYETHYLENE GLYCOL 3350 17 GRAM(S): 17 POWDER, FOR SOLUTION ORAL at 11:42

## 2022-05-29 RX ADMIN — LEVETIRACETAM 500 MILLIGRAM(S): 250 TABLET, FILM COATED ORAL at 18:13

## 2022-05-29 RX ADMIN — PANTOPRAZOLE SODIUM 40 MILLIGRAM(S): 20 TABLET, DELAYED RELEASE ORAL at 11:43

## 2022-05-29 NOTE — PROGRESS NOTE ADULT - ASSESSMENT
HPI:  61F, PMH HTN and C-spine meningioma removal in 2018 in [place]. P/w dizziness, gait instability, and emesis x3d. MRI shows ethel-medullary, Right CPA, and Right temporal enhancing lesions c/f meningioma vs schwannoma, with some hydro. Exam: Mandarin-speaking, AOx3, EOMI no nystagmus, PERRL, no facial/drift, CONRAD 5/5, SILT. Slightly wide-based antalgic gait, positive Romberg’s   (20 May 2022 14:15)    PROCEDURE:  pre op for craniotomy       PLAN:    1 pre op for craniotomy -likely next week 6/1   2 keppra 500 bid to prevent seizure   3 meclizine for dizziness   4 room air   5 bp stable -continue on losartan   6 regular diet   7 senna and miralax   8 dispo :p   9 dvt ppx sql and scds       -will discuss with Dr. Liu   -spectra [id number]          Nutritional Assessment:  · Nutritional Assessment	This patient has been assessed with a concern for Malnutrition and has been determined to have a diagnosis/diagnoses of Severe protein-calorie malnutrition.    This patient is being managed with:   Diet Regular-  Supplement Feeding Modality:  Oral  Ensure Enlive Cans or Servings Per Day:  1       Frequency:  Two Times a day  Entered: May 27 2022  5:22PM     Problem/Plan - 1:  ·  Problem: Benign hypertension.   ·  Plan: continue on losartan.     Problem/Plan - 2:  ·  Problem: HLD (hyperlipidemia).   ·  Plan: stable -monitor.     Problem/Plan - 3:  ·  Problem: Brain mass.   ·  Plan: pre op planning   medical clearance done.

## 2022-05-29 NOTE — PROGRESS NOTE ADULT - SUBJECTIVE AND OBJECTIVE BOX
SUBJECTIVE:   Patient was seen and evaluated at bedside. Patient is resting in bed and is in no new acute distress.   OVERNIGHT EVENTS:   none   Vital Signs Last 24 Hrs  T(C): 36.7 (29 May 2022 09:47), Max: 36.8 (29 May 2022 04:53)  T(F): 98 (29 May 2022 09:47), Max: 98.2 (29 May 2022 04:53)  HR: 82 (29 May 2022 09:47) (66 - 82)  BP: 106/66 (29 May 2022 09:47) (106/66 - 148/93)  BP(mean): --  RR: 18 (29 May 2022 09:47) (18 - 20)  SpO2: 98% (29 May 2022 09:47) (94% - 98%)    PHYSICAL EXAM:    General: No Acute Distress     Neurological: Awake, alert oriented to person, place and time, Following Commands, PERRL, EOMI, Face Symmetrical, Speech Fluent, Moving all extremities, Muscle Strength normal in all four extremities, No Drift, Sensation to Light Touch Intact    Pulmonary: Clear to Auscultation, No Rales, No Rhonchi, No Wheezes     Cardiovascular: S1, S2, Regular Rate and Rhythm     Gastrointestinal: Soft, Nontender, Nondistended     Incision: none      LABS:                        12.9   4.23  )-----------( 219      ( 29 May 2022 07:30 )             37.4    05-29    140  |  104  |  13  ----------------------------<  118<H>  3.8   |  20<L>  |  0.63    Ca    9.8      29 May 2022 07:29  Phos  4.5     05-29  Mg     2.4     05-29    TPro  7.3  /  Alb  4.3  /  TBili  0.7  /  DBili  x   /  AST  20  /  ALT  18  /  AlkPhos  70  05-29 05-28 @ 07:01  -  05-29 @ 07:00  --------------------------------------------------------  IN: 900 mL / OUT: 0 mL / NET: 900 mL      DRAINS:     MEDICATIONS:  Antibiotics:    Neuro:  acetaminophen     Tablet .. 650 milliGRAM(s) Oral every 6 hours PRN Temp greater or equal to 38C (100.4F), Mild Pain (1 - 3)  levETIRAcetam 500 milliGRAM(s) Oral every 12 hours  meclizine 25 milliGRAM(s) Oral every 6 hours PRN Dizziness  ondansetron Injectable 4 milliGRAM(s) IV Push every 6 hours PRN Nausea and/or Vomiting    Cardiac:  losartan 100 milliGRAM(s) Oral daily    Pulm:    GI/:  bisacodyl 5 milliGRAM(s) Oral every 12 hours PRN Constipation  pantoprazole  Injectable 40 milliGRAM(s) IV Push daily  polyethylene glycol 3350 17 Gram(s) Oral daily  senna 2 Tablet(s) Oral at bedtime    Other:   enoxaparin Injectable 40 milliGRAM(s) SubCutaneous <User Schedule>  multivitamin 1 Tablet(s) Oral daily    DIET: [] Regular [] CCD [] Renal [] Puree [] Dysphagia [] Tube Feeds:   regular   IMAGING:   ACC: [id number] EXAM:  MR ANGIO BRAIN [facility] IC                        ACC: [id number] EXAM:  MR BRAIN WAW IC                          PROCEDURE DATE:  05/20/2022          INTERPRETATION:  Contrast-enhanced MRI of the brain. Noncontrast MRA of   the brain    CLINICAL INDICATION: Dizziness and vomiting, possible   pontine/sergio-pontine lesion    TECHNIQUE:  Multiplanar, multisequence MR images of the brain were   obtained before and after the intravenous administration of 5.5 cc of   Gadavist. 2 cc werediscarded. Special attention was paid to the IACs.    Axially acquired 3-D time-of-flight images were obtained and post   processed utilizing an MIP protocol.    COMPARISON: CT brain 5/19/2022    FINDINGS:    MRI BRAIN:    2.2 x 1.8 x 1.6 cm (maximalAP x TV x CC dimensions) avidly enhancing   extra-axial mass approximating the left pontomedullary junction.    The lesion encases the distal intradural vertebral artery, which is   patent.    Mass effect upon the medulla and inferior ethel. Vasogenicedema within   the medulla and basis pontis.    1.3 x 0.9 cm (axial plane) avidly enhancing extra-axial mass in the right   cerebellopontine angle extending into the right internal auditory canal.    0.8 x 0.6 cm (axial plane) avidly enhancing extra-axial lesion along the   inferior aspect of the right tentorium.    No hydrocephalus, midline shift, or effacement of basal cisterns.    No acute intracranial hemorrhage. No acute infarction.    Signal voids are seen within the major intracranial vessels consistent   with their patency.    Small polyp/retention cyst in the right maxillary sinus, remaining   visualized paranasal sinuses and mastoid air cells are clear.    MRA BRAIN:    Normal flow-related signal within the skull base/intracranial internal   carotid arteries.    Normal flow-related signal within the bilateral anterior, middle, and   posterior cerebral arteries.    Anterior and large bilateral posterior communicating arteries. The left   P1 segment is hypoplastic and the right [id number] is small in caliber.    Normal flow-related signal within the intradural vertebral arteries and   the basilar artery.    No flow-limiting stenosis or vascular aneurysm.    IMPRESSION:    MRI BRAIN:  2.2 x 1.8 x 1.6 cm (maximal AP x TV x CC dimensions) avidly enhancing   extra-axial mass approximating the left pontomedullary junction,   suspicious for the presence of meningioma.    The lesion encases the distal intradural vertebral artery, which is   patent.    Mass effect upon the medullaand inferior ethel. Vasogenic edema within   the medulla and basis pontis.    1.3 x 0.9 cm (axial plane) avidly enhancing extra-axial mass in the right   cerebellopontine angle extending into the right internal auditory canal.   Differential diagnosisincludes a schwannoma and meningioma.    0.8 x 0.6 cm (axial plane) avidly enhancing extra-axial lesion along the   inferior aspect of the right tentorium, suspicious for the presence of   meningioma.    The presence of neurofibromatosis 2 should be considered.    MRA BRAIN:  No flow-limiting stenosis or vascular aneurysm.    Large bilateral posterior communicating arteries with hypoplastic left   and small caliber right P1 segments.    Dr. Diaz discussed these findings with physician assistant Neha on   5/20/2022 11:16 AM with read back.        --- End of Report ---            DUGLAS DIAZ MD; Attending Radiologist  This document has been electronically signed. May 20 2022 11:20AM

## 2022-05-30 PROCEDURE — 99231 SBSQ HOSP IP/OBS SF/LOW 25: CPT | Mod: 57

## 2022-05-30 RX ORDER — ENOXAPARIN SODIUM 100 MG/ML
40 INJECTION SUBCUTANEOUS
Refills: 0 | Status: COMPLETED | OUTPATIENT
Start: 2022-05-30 | End: 2022-05-30

## 2022-05-30 RX ADMIN — PANTOPRAZOLE SODIUM 40 MILLIGRAM(S): 20 TABLET, DELAYED RELEASE ORAL at 11:34

## 2022-05-30 RX ADMIN — ENOXAPARIN SODIUM 40 MILLIGRAM(S): 100 INJECTION SUBCUTANEOUS at 17:31

## 2022-05-30 RX ADMIN — LEVETIRACETAM 500 MILLIGRAM(S): 250 TABLET, FILM COATED ORAL at 05:54

## 2022-05-30 RX ADMIN — Medication 1 TABLET(S): at 11:34

## 2022-05-30 RX ADMIN — LOSARTAN POTASSIUM 100 MILLIGRAM(S): 100 TABLET, FILM COATED ORAL at 05:54

## 2022-05-30 RX ADMIN — LEVETIRACETAM 500 MILLIGRAM(S): 250 TABLET, FILM COATED ORAL at 17:31

## 2022-05-30 NOTE — PROGRESS NOTE ADULT - THIS PATIENT HAS THE FOLLOWING CONDITION(S)/DIAGNOSES ON THIS ADMISSION:
Assessment/Plan:   Paulette is a 22 year old female here for physical.    1. Routine general medical examination at a health care facility  Physical completed today. No labs indicated. Up to date with vaccines. Contraception discussion as below.     2. Overweight (BMI 25.0-29.9)  BMI 28.02 today.     3. Anxiety  4. Moderate major depression (H)  Hx anxiety and depression, PHQ9 and GAD7 scores below suggest inadequately controlled symptoms. Pt reports feeling like depression is ok but anxiety has increased. No longer taking medication - has tried 3 SSRIs per review without benefit or with side effects. Recommend following up with psychiatrist at [facility] to determine which medication would be a good next step for her. Referral placed to therapy as well.   - AMB REFERRAL TO MENTAL HEALTH AND ADDICTION  - Adult (18+); Outpatient Treatment; Individual/Couples/Family/Group Therapy/Health Psychology; [facility]  - PHQ9 Depression Screen    5. General counseling and advice for contraceptive management  Discussed birth control options - pt interested in having nexplanon again, tolerated well, no irregular bleeding - will schedule when she is able.      I have had an Advance Directives discussion with the patient.  The following high BMI interventions were performed this visit: encouragement to exercise and lifestyle education regarding diet    Follow up: 1 year for physical, sooner as desired for nexplanon insertion    Rebecca Cardenas MD  [facility]    Subjective:     Paulette Harvey is a 22 y.o. female who presents for an annual exam.     Anxiety/depression  -depression feels ok, anxiety increasing a little bit  -not on medication currently - lexapro, prozac, sertraline   -previously with [facility] but due for follow up, counseling was separate  -GAD7 score 14 today  -PHQ9 score 15 today, no thoughts of self harm  -passing out when anxious, tends to forget to breathe or hold breath to calm  Brain Compression / Herniation down    Healthy Habits:   Healthy Diet: Yes  Regular Exercise: Yes  Sunscreen Use: Yes  Dental Visits Regularly: Yes  Seat Belt: Yes  Domestic abuse:   No    Health Maintenance reviewed:  Lipid Profile: no  Glucose Screen: no  Colonoscopy: no  Mammogram: no    Gynecologic History  Patient's last menstrual period was 05/20/2021.  Contraception: none - hx nexplanon [id number], thinking about nexplanon again  Start paps at age 23    Immunization History   Administered Date(s) Administered     DTaP, historic 2000, 01/22/2001, 04/25/2001, 10/08/2001, 09/19/2005     HPV Quadrivalent 08/14/2012, 08/14/2013, 11/06/2014     Hep A, historic 11/06/2014     Hep B, historic 2000, 08/14/2013, 11/06/2014     Hepatitis A, Ped/Adol 2 Dose IM (18yr & under) 05/19/2015, 11/13/2015     IPV 2000, 01/22/2001, 04/25/2001, 09/19/2005     MMR 10/08/2001, 09/19/2005     Meningococcal MCV4P 08/14/2012, 11/17/2016     Tdap 08/14/2012     Immunization status: up to date and documented.    No current outpatient medications on file.     No current facility-administered medications for this visit.      History reviewed. No pertinent past medical history.  History reviewed. No pertinent surgical history.  Patient has no known allergies.  Family History   Problem Relation Age of Onset     No Medical Problems Mother      No Medical Problems Father      Diabetes Maternal Grandmother      Tremor Maternal Grandfather      No Medical Problems Paternal Grandmother      No Medical Problems Paternal Grandfather      Social History     Socioeconomic History     Marital status: Single     Spouse name: Not on file     Number of children: Not on file     Years of education: Not on file     Highest education level: Not on file   Occupational History     Not on file   Social Needs     Financial resource strain: Not on file     Food insecurity     Worry: Not on file     Inability: Not on file     Transportation needs     Medical: Not on file      "Non-medical: Not on file   Tobacco Use     Smoking status: Never Smoker     Smokeless tobacco: Never Used     Tobacco comment: vapes nicotine   Substance and Sexual Activity     Alcohol use: Yes     Frequency: Monthly or less     Drinks per session: 1 or 2     Binge frequency: Never     Drug use: Yes     Types: Marijuana     Sexual activity: Not Currently     Birth control/protection: I.U.D.   Lifestyle     Physical activity     Days per week: Not on file     Minutes per session: Not on file     Stress: Not on file   Relationships     Social connections     Talks on phone: Not on file     Gets together: Not on file     Attends Islam service: Not on file     Active member of club or organization: Not on file     Attends meetings of clubs or organizations: Not on file     Relationship status: Not on file     Intimate partner violence     Fear of current or ex partner: Not on file     Emotionally abused: Not on file     Physically abused: Not on file     Forced sexual activity: Not on file   Other Topics Concern     Not on file   Social History Narrative     Not on file       Review of Systems negative except as noted above    Objective:        Vitals:    06/10/21 1616   BP: 110/70   Pulse: 85   Weight: 163 lb 4 oz (74 kg)   Height: 5' 4\" (1.626 m)     Body mass index is 28.02 kg/m .    Physical Exam:  General Appearance: Alert, pleasant, appears stated age  Head: Normocephalic, without obvious abnormality  Eyes: PERRL, conjunctiva/corneas clear, EOM's intact  Ears: Normal TM's and external ear canals, both ears  Neck: Supple,without lymphadenopathy, no thyromegaly or nodules noted  Lungs: Clear to auscultation bilaterally, respirations unlabored, no wheezing or crackles  Heart: Regular rate and rhythm, no murmur   Abdomen: Soft, non-tender, no masses, no organomegaly  Extremities: Extremities with strong and symmetric pulses, no cyanosis or edema  Skin: Skin color, texture normal, no rashes or lesions  Neurologic: " Grossly normal, no focal deficits

## 2022-05-30 NOTE — PROGRESS NOTE ADULT - SUBJECTIVE AND OBJECTIVE BOX
SUBJECTIVE:   Patient was seen and evaluated at bedside. Patient is resting in bed and is no new acute distress.   OVERNIGHT EVENTS:   none   Vital Signs Last 24 Hrs  T(C): 36.6 (30 May 2022 09:06), Max: 37.2 (29 May 2022 17:58)  T(F): 97.9 (30 May 2022 09:06), Max: 99 (29 May 2022 17:58)  HR: 65 (30 May 2022 09:06) (65 - 82)  BP: 121/83 (30 May 2022 09:06) (100/60 - 148/85)  BP(mean): --  RR: 18 (30 May 2022 09:06) (17 - 18)  SpO2: 96% (30 May 2022 09:06) (95% - 98%)    PHYSICAL EXAM:    General: No Acute Distress     Neurological: Awake, alert oriented to person, place and time, Following Commands, PERRL, EOMI, Face Symmetrical, Speech Fluent, Moving all extremities, Muscle Strength normal in all four extremities, No Drift, Sensation to Light Touch Intact    Pulmonary: Clear to Auscultation, No Rales, No Rhonchi, No Wheezes     Cardiovascular: S1, S2, Regular Rate and Rhythm     Gastrointestinal: Soft, Nontender, Nondistended     Incision:     LABS:                        12.9   4.23  )-----------( 219      ( 29 May 2022 07:30 )             37.4    05-29    140  |  104  |  13  ----------------------------<  118<H>  3.8   |  20<L>  |  0.63    Ca    9.8      29 May 2022 07:29  Phos  4.5     05-29  Mg     2.4     05-29    TPro  7.3  /  Alb  4.3  /  TBili  0.7  /  DBili  x   /  AST  20  /  ALT  18  /  AlkPhos  70  05-29 05-29 @ 07:01  -  05-30 @ 07:00  --------------------------------------------------------  IN: 360 mL / OUT: 0 mL / NET: 360 mL    05-30 @ 07:01 - 05-30 @ 09:45  --------------------------------------------------------  IN: 260 mL / OUT: 0 mL / NET: 260 mL      DRAINS:     MEDICATIONS:  Antibiotics:    Neuro:  acetaminophen     Tablet .. 650 milliGRAM(s) Oral every 6 hours PRN Temp greater or equal to 38C (100.4F), Mild Pain (1 - 3)  levETIRAcetam 500 milliGRAM(s) Oral every 12 hours  meclizine 25 milliGRAM(s) Oral every 6 hours PRN Dizziness  ondansetron Injectable 4 milliGRAM(s) IV Push every 6 hours PRN Nausea and/or Vomiting    Cardiac:  losartan 100 milliGRAM(s) Oral daily    Pulm:    GI/:  bisacodyl 5 milliGRAM(s) Oral every 12 hours PRN Constipation  pantoprazole  Injectable 40 milliGRAM(s) IV Push daily  polyethylene glycol 3350 17 Gram(s) Oral daily  senna 2 Tablet(s) Oral at bedtime    Other:   enoxaparin Injectable 40 milliGRAM(s) SubCutaneous <User Schedule>  multivitamin 1 Tablet(s) Oral daily    DIET: [] Regular [] CCD [] Renal [] Puree [] Dysphagia [] Tube Feeds:   regular   IMAGING:   ACC: [id number] EXAM:  CT BRAIN                          PROCEDURE DATE:  05/21/2022          INTERPRETATION:  CLINICAL INDICATIONS: Enhancing lesions.    TECHNIQUE: Portable CT of the head was performed.    Multiple contiguous axial images were acquired from the skullbase to the   vertex without the administration of intravenous contrast.    COMPARISON: Prior head CT dated 5/19/2022.    FINDINGS:  Hyperdense lesion at the left pontomedullary junction, correlating to   likely meningioma seen better seen on MRI, with edema in the adjacent   dorsal ethel and left middle cerebellar peduncle. The additional   extra-axial lesions noted on MR are not well seen on this study.    Ventricles are stable in size and configuration. No hydrocephalus. The   sulci are within normal limits.    The visualized paranasal sinuses are clear. The mastoid air cells and   middle ear cavities are clear. The intraorbital compartments are   unremarkable.    The soft tissues of the scalp are unremarkable. The calvarium is intact.      IMPRESSION:  No acute intracranial hemorrhage, new mass effect, midline shift, or   herniation. Stable ventricular size and configuration.    Hyperdense, extra-axial lesion at the left pontomedullary junction,   compatible with meningioma, with adjacent parenchymal edema, better   evaluated on recent MRI.    Additional extra-axial lesions noted on MR are not well evaluated on this   study.    --- End of Report ---          DALLAS LOGAN MD; Resident Radiology  This document has been electronically signed.  RAUL IRAHETA MD; Attending Radiologist  This document has been electronically signed. May 21 2022 11:14A

## 2022-05-30 NOTE — PROGRESS NOTE ADULT - ASSESSMENT
HPI:  61F, PMH HTN and C-spine meningioma removal in 2018 in [place]. P/w dizziness, gait instability, and emesis x3d. MRI shows ethel-medullary, Right CPA, and Right temporal enhancing lesions c/f meningioma vs schwannoma, with some hydro. Exam: Mandarin-speaking, AOx3, EOMI no nystagmus, PERRL, no facial/drift, CONRAD 5/5, SILT. Slightly wide-based antalgic gait, positive Romberg’s   (20 May 2022 14:15)    PROCEDURE:     pre op for craniotomy       PLAN:    1 pre op for craniotomy 6/1  2 keppra 500 bid to prevent seizure   3 meclizine for dizziness   4 room air   5 bp stable -continue on losartan   6 regular diet   7 senna and miralax   8 dispo :p   9 dvt ppx sql and scds   10 pre op labs in am   11 medical clearance done     -will discuss with Dr. Liu   -spectra [id number]          Nutritional Assessment:  · Nutritional Assessment	This patient has been assessed with a concern for Malnutrition and has been determined to have a diagnosis/diagnoses of Severe protein-calorie malnutrition.    This patient is being managed with:   Diet Regular-  Supplement Feeding Modality:  Oral  Ensure Enlive Cans or Servings Per Day:  1       Frequency:  Two Times a day  Entered: May 27 2022  5:22PM     Problem/Plan - 1:  ·  Problem: Benign hypertension.   ·  Plan: continue on losartan.     Problem/Plan - 2:  ·  Problem: HLD (hyperlipidemia).   ·  Plan: stable -monitor.     Problem/Plan - 3:  ·  Problem: Brain mass.   ·  Plan: pre op planning   medical clearance done.         Nutritional Assessment:  · Nutritional Assessment	This patient has been assessed with a concern for Malnutrition and has been determined to have a diagnosis/diagnoses of Severe protein-calorie malnutrition.    This patient is being managed with:   Diet Regular-  Supplement Feeding Modality:  Oral  Ensure Enlive Cans or Servings Per Day:  1       Frequency:  Two Times a day  Entered: May 27 2022  5:22PM

## 2022-05-31 ENCOUNTER — TRANSCRIPTION ENCOUNTER (OUTPATIENT)
Age: 62
End: 2022-05-31

## 2022-05-31 LAB
ANION GAP SERPL CALC-SCNC: 14 MMOL/L — SIGNIFICANT CHANGE UP (ref 5–17)
APTT BLD: 28.4 SEC — SIGNIFICANT CHANGE UP (ref 27.5–35.5)
BLD GP AB SCN SERPL QL: NEGATIVE — SIGNIFICANT CHANGE UP
BUN SERPL-MCNC: 16 MG/DL — SIGNIFICANT CHANGE UP (ref 7–23)
CALCIUM SERPL-MCNC: 9.7 MG/DL — SIGNIFICANT CHANGE UP (ref 8.4–10.5)
CHLORIDE SERPL-SCNC: 103 MMOL/L — SIGNIFICANT CHANGE UP (ref 96–108)
CO2 SERPL-SCNC: 22 MMOL/L — SIGNIFICANT CHANGE UP (ref 22–31)
CREAT SERPL-MCNC: 0.6 MG/DL — SIGNIFICANT CHANGE UP (ref 0.5–1.3)
EGFR: 103 ML/MIN/1.73M2 — SIGNIFICANT CHANGE UP
GLUCOSE SERPL-MCNC: 87 MG/DL — SIGNIFICANT CHANGE UP (ref 70–99)
HCG UR QL: NEGATIVE — SIGNIFICANT CHANGE UP
HCT VFR BLD CALC: 38.5 % — SIGNIFICANT CHANGE UP (ref 34.5–45)
HGB BLD-MCNC: 13.7 G/DL — SIGNIFICANT CHANGE UP (ref 11.5–15.5)
INR BLD: 0.98 RATIO — SIGNIFICANT CHANGE UP (ref 0.88–1.16)
MCHC RBC-ENTMCNC: 31.9 PG — SIGNIFICANT CHANGE UP (ref 27–34)
MCHC RBC-ENTMCNC: 35.6 GM/DL — SIGNIFICANT CHANGE UP (ref 32–36)
MCV RBC AUTO: 89.5 FL — SIGNIFICANT CHANGE UP (ref 80–100)
NRBC # BLD: 0 /100 WBCS — SIGNIFICANT CHANGE UP (ref 0–0)
PLATELET # BLD AUTO: 223 K/UL — SIGNIFICANT CHANGE UP (ref 150–400)
POTASSIUM SERPL-MCNC: 3.5 MMOL/L — SIGNIFICANT CHANGE UP (ref 3.5–5.3)
POTASSIUM SERPL-SCNC: 3.5 MMOL/L — SIGNIFICANT CHANGE UP (ref 3.5–5.3)
PROTHROM AB SERPL-ACNC: 11.4 SEC — SIGNIFICANT CHANGE UP (ref 10.5–13.4)
RBC # BLD: 4.3 M/UL — SIGNIFICANT CHANGE UP (ref 3.8–5.2)
RBC # FLD: 11.8 % — SIGNIFICANT CHANGE UP (ref 10.3–14.5)
RH IG SCN BLD-IMP: POSITIVE — SIGNIFICANT CHANGE UP
SARS-COV-2 RNA SPEC QL NAA+PROBE: SIGNIFICANT CHANGE UP
SODIUM SERPL-SCNC: 139 MMOL/L — SIGNIFICANT CHANGE UP (ref 135–145)
WBC # BLD: 4.86 K/UL — SIGNIFICANT CHANGE UP (ref 3.8–10.5)
WBC # FLD AUTO: 4.86 K/UL — SIGNIFICANT CHANGE UP (ref 3.8–10.5)

## 2022-05-31 PROCEDURE — 99233 SBSQ HOSP IP/OBS HIGH 50: CPT

## 2022-05-31 PROCEDURE — 99231 SBSQ HOSP IP/OBS SF/LOW 25: CPT

## 2022-05-31 RX ORDER — SODIUM CHLORIDE 9 MG/ML
1000 INJECTION INTRAMUSCULAR; INTRAVENOUS; SUBCUTANEOUS
Refills: 0 | Status: DISCONTINUED | OUTPATIENT
Start: 2022-06-01 | End: 2022-06-01

## 2022-05-31 RX ORDER — LOSARTAN POTASSIUM 100 MG/1
50 TABLET, FILM COATED ORAL DAILY
Refills: 0 | Status: DISCONTINUED | OUTPATIENT
Start: 2022-06-01 | End: 2022-06-01

## 2022-05-31 RX ADMIN — LEVETIRACETAM 500 MILLIGRAM(S): 250 TABLET, FILM COATED ORAL at 05:17

## 2022-05-31 RX ADMIN — Medication 1 TABLET(S): at 12:18

## 2022-05-31 RX ADMIN — LEVETIRACETAM 500 MILLIGRAM(S): 250 TABLET, FILM COATED ORAL at 17:45

## 2022-05-31 RX ADMIN — LOSARTAN POTASSIUM 100 MILLIGRAM(S): 100 TABLET, FILM COATED ORAL at 05:17

## 2022-05-31 RX ADMIN — PANTOPRAZOLE SODIUM 40 MILLIGRAM(S): 20 TABLET, DELAYED RELEASE ORAL at 12:18

## 2022-05-31 NOTE — PROGRESS NOTE ADULT - ASSESSMENT
61F with PMH HTN and C-spine meningioma removal in 2018 in [place] pw dizziness, gait instability, and emesis x3d. MRI showed left ethel-medullary, Right CPA, and Right extra axial temporal enhancing lesions c/f meningioma vs schwannoma, MR spine negative     Plan    Neuro- preop for OR tomorrow. On keppra 500 q12  Vitals stable  Bowel regimen   labs wnl     hard copy

## 2022-05-31 NOTE — PROGRESS NOTE ADULT - SUBJECTIVE AND OBJECTIVE BOX
SUBJECTIVE:     OVERNIGHT EVENTS: none    Vital Signs Last 24 Hrs  T(C): 36.7 (31 May 2022 12:17), Max: 37.1 (30 May 2022 21:36)  T(F): 98.1 (31 May 2022 12:17), Max: 98.8 (30 May 2022 21:36)  HR: 62 (31 May 2022 12:17) (62 - 85)  BP: 102/66 (31 May 2022 12:17) (102/66 - 138/79)  BP(mean): --  RR: 18 (31 May 2022 12:17) (18 - 18)  SpO2: 98% (31 May 2022 12:17) (96% - 98%)    PHYSICAL EXAM:    Constitutional: No Acute Distress     Neurological: Awake alert Ox3, Speech clear Following Commands, Moving all Extremities 5/5     Pulmonary: Clear to Auscultation,      Cardiovascular: S1, S2, Regular rate and rhythm     Gastrointestinal: Soft, Non-tender, Non-distended     Extremities: No calf tenderness         LABS:                        13.7   4.86  )-----------( 223      ( 31 May 2022 07:22 )             38.5    05-31    139  |  103  |  16  ----------------------------<  87  3.5   |  22  |  0.60    Ca    9.7      31 May 2022 07:21    PT/INR - ( 31 May 2022 07:22 )   PT: 11.4 sec;   INR: 0.98 ratio  )  PTT:28.4 sec          IMAGING:         MEDICATIONS:    acetaminophen     Tablet .. 650 milliGRAM(s) Oral every 6 hours PRN Temp greater or equal to 38C (100.4F), Mild Pain (1 - 3)  levETIRAcetam 500 milliGRAM(s) Oral every 12 hours  meclizine 25 milliGRAM(s) Oral every 6 hours PRN Dizziness  ondansetron Injectable 4 milliGRAM(s) IV Push every 6 hours PRN Nausea and/or Vomiting  bisacodyl 5 milliGRAM(s) Oral every 12 hours PRN Constipation  pantoprazole  Injectable 40 milliGRAM(s) IV Push daily  polyethylene glycol 3350 17 Gram(s) Oral daily  senna 2 Tablet(s) Oral at bedtime  multivitamin 1 Tablet(s) Oral daily      DIET:

## 2022-05-31 NOTE — PROGRESS NOTE ADULT - PROBLEM SELECTOR PLAN 3
PT was seen by ENT team and had laryngoscopy done at bedside on 5/20 , normal scope. Bilateral vocal cords mobile and intact.   Speech and swallow eval was done on 5/23 where further testing like MBS ( done on 5/24), no obvious evidence of laryngeal penetration observed as per Speech therapist.

## 2022-05-31 NOTE — PROGRESS NOTE ADULT - ASSESSMENT
61F with PMH HTN and C-spine meningioma removal in 2018 in [place] pw dizziness, gait instability, and emesis x3d. MRI showed ehtel-medullary, Right CPA, and Right temporal enhancing lesions c/f meningioma vs schwannoma, with some hydro.      Plan for craniotomy tomorrow.     Pt c/o dizziness and nausea. Vomited once today.  and daughter by bedside.

## 2022-05-31 NOTE — PROGRESS NOTE ADULT - PROBLEM SELECTOR PLAN 1
Plan for OR 6/1    Cont with Keppra    Vomiting- possibly sec to increased intracranial pressure. Prn Svetlana. Plan for OR 6/1    Cont with Keppra    Vomiting- possibly secondary to increased intracranial pressure. Prn Zofran.

## 2022-05-31 NOTE — PROGRESS NOTE ADULT - SUBJECTIVE AND OBJECTIVE BOX
Patient is a 61y old  Female who presents with a chief complaint of Patient was admitted with multiple enhancing brain lesions, presented with dizziness and vomiting. (30 May 2022 09:44)      SUBJECTIVE / OVERNIGHT EVENTS: Pt c/o dizziness and nausea. Vomited once today.  and daughter by bedside.     MEDICATIONS  (STANDING):  levETIRAcetam 500 milliGRAM(s) Oral every 12 hours  losartan 100 milliGRAM(s) Oral daily  multivitamin 1 Tablet(s) Oral daily  pantoprazole  Injectable 40 milliGRAM(s) IV Push daily  polyethylene glycol 3350 17 Gram(s) Oral daily  senna 2 Tablet(s) Oral at bedtime    MEDICATIONS  (PRN):  acetaminophen     Tablet .. 650 milliGRAM(s) Oral every 6 hours PRN Temp greater or equal to 38C (100.4F), Mild Pain (1 - 3)  bisacodyl 5 milliGRAM(s) Oral every 12 hours PRN Constipation  meclizine 25 milliGRAM(s) Oral every 6 hours PRN Dizziness  ondansetron Injectable 4 milliGRAM(s) IV Push every 6 hours PRN Nausea and/or Vomiting      CAPILLARY BLOOD GLUCOSE        I&O's Summary    30 May 2022 07:01  -  31 May 2022 07:00  --------------------------------------------------------  IN: 840 mL / OUT: 0 mL / NET: 840 mL    31 May 2022 07:01  -  31 May 2022 14:53  --------------------------------------------------------  IN: 640 mL / OUT: 0 mL / NET: 640 mL        PHYSICAL EXAM:  T(C): 36.7 (05-31-22 @ 12:17), Max: 37.1 (05-30-22 @ 21:36)  HR: 62 (05-31-22 @ 12:17) (62 - 85)  BP: 102/66 (05-31-22 @ 12:17) (102/66 - 138/79)  RR: 18 (05-31-22 @ 12:17) (18 - 18)  SpO2: 98% (05-31-22 @ 12:17) (96% - 98%)  CONSTITUTIONAL: NAD, well-developed, well-groomed  EYES: PERRLA; conjunctiva and sclera clear  ENMT: Moist oral mucosa, no pharyngeal injection or exudates; normal dentition  NECK: Supple, no palpable masses; no thyromegaly  RESPIRATORY: Normal respiratory effort; lungs are clear to auscultation bilaterally  CARDIOVASCULAR: Regular rate and rhythm, normal S1 and S2, no murmur/rub/gallop; No lower extremity edema; Peripheral pulses are 2+ bilaterally  ABDOMEN: Nontender to palpation, normoactive bowel sounds, no rebound/guarding; No hepatosplenomegaly  MUSCULOSKELETAL:  Normal gait; no clubbing or cyanosis of digits; no joint swelling or tenderness to palpation  PSYCH: A+O to person, place, and time; affect appropriate  NEUROLOGY: CN 2-12 are intact and symmetric; no gross sensory deficits   SKIN: No rashes; no palpable lesions    LABS:                        13.7   4.86  )-----------( 223      ( 31 May 2022 07:22 )             38.5     05-31    139  |  103  |  16  ----------------------------<  87  3.5   |  22  |  0.60    Ca    9.7      31 May 2022 07:21      PT/INR - ( 31 May 2022 07:22 )   PT: 11.4 sec;   INR: 0.98 ratio         PTT - ( 31 May 2022 07:22 )  PTT:28.4 sec          RADIOLOGY & ADDITIONAL TESTS:    Imaging Personally Reviewed:    Consultant(s) Notes Reviewed:      Care Discussed with Consultants/Other Providers: Wilmar

## 2022-06-01 ENCOUNTER — RESULT REVIEW (OUTPATIENT)
Age: 62
End: 2022-06-01

## 2022-06-01 ENCOUNTER — APPOINTMENT (OUTPATIENT)
Dept: NEUROSURGERY | Facility: HOSPITAL | Age: 62
End: 2022-06-01

## 2022-06-01 LAB
ANION GAP SERPL CALC-SCNC: 17 MMOL/L — SIGNIFICANT CHANGE UP (ref 5–17)
BUN SERPL-MCNC: 8 MG/DL — SIGNIFICANT CHANGE UP (ref 7–23)
CALCIUM SERPL-MCNC: 8.8 MG/DL — SIGNIFICANT CHANGE UP (ref 8.4–10.5)
CHLORIDE SERPL-SCNC: 105 MMOL/L — SIGNIFICANT CHANGE UP (ref 96–108)
CO2 SERPL-SCNC: 20 MMOL/L — LOW (ref 22–31)
CREAT SERPL-MCNC: 0.46 MG/DL — LOW (ref 0.5–1.3)
EGFR: 110 ML/MIN/1.73M2 — SIGNIFICANT CHANGE UP
GAS PNL BLDA: SIGNIFICANT CHANGE UP
GLUCOSE SERPL-MCNC: 219 MG/DL — HIGH (ref 70–99)
HCT VFR BLD CALC: 37 % — SIGNIFICANT CHANGE UP (ref 34.5–45)
HGB BLD-MCNC: 13.1 G/DL — SIGNIFICANT CHANGE UP (ref 11.5–15.5)
MAGNESIUM SERPL-MCNC: 2.6 MG/DL — SIGNIFICANT CHANGE UP (ref 1.6–2.6)
MCHC RBC-ENTMCNC: 31.6 PG — SIGNIFICANT CHANGE UP (ref 27–34)
MCHC RBC-ENTMCNC: 35.4 GM/DL — SIGNIFICANT CHANGE UP (ref 32–36)
MCV RBC AUTO: 89.4 FL — SIGNIFICANT CHANGE UP (ref 80–100)
NRBC # BLD: 0 /100 WBCS — SIGNIFICANT CHANGE UP (ref 0–0)
PHOSPHATE SERPL-MCNC: 3.5 MG/DL — SIGNIFICANT CHANGE UP (ref 2.5–4.5)
PLATELET # BLD AUTO: 222 K/UL — SIGNIFICANT CHANGE UP (ref 150–400)
POTASSIUM SERPL-MCNC: 3.6 MMOL/L — SIGNIFICANT CHANGE UP (ref 3.5–5.3)
POTASSIUM SERPL-SCNC: 3.6 MMOL/L — SIGNIFICANT CHANGE UP (ref 3.5–5.3)
RBC # BLD: 4.14 M/UL — SIGNIFICANT CHANGE UP (ref 3.8–5.2)
RBC # FLD: 11.6 % — SIGNIFICANT CHANGE UP (ref 10.3–14.5)
SODIUM SERPL-SCNC: 142 MMOL/L — SIGNIFICANT CHANGE UP (ref 135–145)
WBC # BLD: 12.71 K/UL — HIGH (ref 3.8–10.5)
WBC # FLD AUTO: 12.71 K/UL — HIGH (ref 3.8–10.5)

## 2022-06-01 PROCEDURE — 70496 CT ANGIOGRAPHY HEAD: CPT | Mod: 26

## 2022-06-01 PROCEDURE — 88307 TISSUE EXAM BY PATHOLOGIST: CPT | Mod: 26

## 2022-06-01 PROCEDURE — 69990 MICROSURGERY ADD-ON: CPT | Mod: 59

## 2022-06-01 PROCEDURE — 61597 TRANSCONDYLAR APPROACH/SKULL: CPT

## 2022-06-01 PROCEDURE — 93888 INTRACRANIAL LIMITED STUDY: CPT | Mod: 26

## 2022-06-01 PROCEDURE — 88342 IMHCHEM/IMCYTCHM 1ST ANTB: CPT | Mod: 26,59

## 2022-06-01 PROCEDURE — 88331 PATH CONSLTJ SURG 1 BLK 1SPC: CPT | Mod: 26,59

## 2022-06-01 PROCEDURE — 99291 CRITICAL CARE FIRST HOUR: CPT

## 2022-06-01 PROCEDURE — 71045 X-RAY EXAM CHEST 1 VIEW: CPT | Mod: 26

## 2022-06-01 PROCEDURE — 88341 IMHCHEM/IMCYTCHM EA ADD ANTB: CPT | Mod: 26,59

## 2022-06-01 PROCEDURE — 88360 TUMOR IMMUNOHISTOCHEM/MANUAL: CPT | Mod: 26,59

## 2022-06-01 PROCEDURE — 61616 RESECT/EXCISE LESION SKULL: CPT

## 2022-06-01 PROCEDURE — 88334 PATH CONSLTJ SURG CYTO XM EA: CPT | Mod: 26,59

## 2022-06-01 PROCEDURE — 61781 SCAN PROC CRANIAL INTRA: CPT

## 2022-06-01 DEVICE — CLIP ANEUR TII 8.5X7 STR: Type: IMPLANTABLE DEVICE | Site: LEFT | Status: FUNCTIONAL

## 2022-06-01 DEVICE — KIT A-LINE 1LUM 20G X 12CM SAFE KIT: Type: IMPLANTABLE DEVICE | Site: LEFT | Status: FUNCTIONAL

## 2022-06-01 DEVICE — PLATE COVER BURRHOLE UN3 W/ TAB 20MM: Type: IMPLANTABLE DEVICE | Site: LEFT | Status: FUNCTIONAL

## 2022-06-01 DEVICE — MAYFIELD SKULL PIN ADULT PLASTIC: Type: IMPLANTABLE DEVICE | Site: LEFT | Status: FUNCTIONAL

## 2022-06-01 DEVICE — IMPLANTABLE DEVICE: Type: IMPLANTABLE DEVICE | Site: LEFT | Status: FUNCTIONAL

## 2022-06-01 DEVICE — DURAL REPAIR PATCH DURA-GUARD 4CM X 4CM: Type: IMPLANTABLE DEVICE | Site: LEFT | Status: FUNCTIONAL

## 2022-06-01 DEVICE — SURGIFOAM PAD 8CM X 12.5CM X 10MM (100): Type: IMPLANTABLE DEVICE | Site: LEFT | Status: FUNCTIONAL

## 2022-06-01 DEVICE — SURGIFLO MATRIX WITH THROMBIN KIT: Type: IMPLANTABLE DEVICE | Site: LEFT | Status: FUNCTIONAL

## 2022-06-01 DEVICE — SCREW UN3 AXS SELF DRILL 1.5X4MM 5/PK: Type: IMPLANTABLE DEVICE | Site: LEFT | Status: FUNCTIONAL

## 2022-06-01 RX ORDER — PANTOPRAZOLE SODIUM 20 MG/1
40 TABLET, DELAYED RELEASE ORAL DAILY
Refills: 0 | Status: DISCONTINUED | OUTPATIENT
Start: 2022-06-01 | End: 2022-06-20

## 2022-06-01 RX ORDER — FENTANYL CITRATE 50 UG/ML
25 INJECTION INTRAVENOUS ONCE
Refills: 0 | Status: DISCONTINUED | OUTPATIENT
Start: 2022-06-01 | End: 2022-06-01

## 2022-06-01 RX ORDER — HYDROCHLOROTHIAZIDE 25 MG
12.5 TABLET ORAL DAILY
Refills: 0 | Status: DISCONTINUED | OUTPATIENT
Start: 2022-06-01 | End: 2022-06-01

## 2022-06-01 RX ORDER — ACETAMINOPHEN 500 MG
650 TABLET ORAL EVERY 6 HOURS
Refills: 0 | Status: DISCONTINUED | OUTPATIENT
Start: 2022-06-01 | End: 2022-06-20

## 2022-06-01 RX ORDER — DEXAMETHASONE 0.5 MG/5ML
4 ELIXIR ORAL EVERY 6 HOURS
Refills: 0 | Status: DISCONTINUED | OUTPATIENT
Start: 2022-06-01 | End: 2022-06-06

## 2022-06-01 RX ORDER — LOSARTAN POTASSIUM 100 MG/1
50 TABLET, FILM COATED ORAL DAILY
Refills: 0 | Status: DISCONTINUED | OUTPATIENT
Start: 2022-06-01 | End: 2022-06-02

## 2022-06-01 RX ORDER — DEXMEDETOMIDINE HYDROCHLORIDE IN 0.9% SODIUM CHLORIDE 4 UG/ML
0.2 INJECTION INTRAVENOUS
Qty: 200 | Refills: 0 | Status: DISCONTINUED | OUTPATIENT
Start: 2022-06-01 | End: 2022-06-06

## 2022-06-01 RX ORDER — ACETAMINOPHEN 500 MG
1000 TABLET ORAL ONCE
Refills: 0 | Status: COMPLETED | OUTPATIENT
Start: 2022-06-01 | End: 2022-06-01

## 2022-06-01 RX ORDER — OXYCODONE HYDROCHLORIDE 5 MG/1
5 TABLET ORAL EVERY 4 HOURS
Refills: 0 | Status: DISCONTINUED | OUTPATIENT
Start: 2022-06-01 | End: 2022-06-08

## 2022-06-01 RX ORDER — SODIUM CHLORIDE 9 MG/ML
1000 INJECTION INTRAMUSCULAR; INTRAVENOUS; SUBCUTANEOUS
Refills: 0 | Status: DISCONTINUED | OUTPATIENT
Start: 2022-06-01 | End: 2022-06-03

## 2022-06-01 RX ORDER — CHLORHEXIDINE GLUCONATE 213 G/1000ML
15 SOLUTION TOPICAL EVERY 12 HOURS
Refills: 0 | Status: DISCONTINUED | OUTPATIENT
Start: 2022-06-01 | End: 2022-06-06

## 2022-06-01 RX ORDER — SODIUM CHLORIDE 9 MG/ML
1000 INJECTION INTRAMUSCULAR; INTRAVENOUS; SUBCUTANEOUS ONCE
Refills: 0 | Status: COMPLETED | OUTPATIENT
Start: 2022-06-01 | End: 2022-06-01

## 2022-06-01 RX ORDER — NICARDIPINE HYDROCHLORIDE 30 MG/1
5 CAPSULE, EXTENDED RELEASE ORAL
Qty: 40 | Refills: 0 | Status: DISCONTINUED | OUTPATIENT
Start: 2022-06-01 | End: 2022-06-02

## 2022-06-01 RX ADMIN — PANTOPRAZOLE SODIUM 40 MILLIGRAM(S): 20 TABLET, DELAYED RELEASE ORAL at 23:53

## 2022-06-01 RX ADMIN — Medication 4 MILLIGRAM(S): at 23:31

## 2022-06-01 RX ADMIN — LEVETIRACETAM 500 MILLIGRAM(S): 250 TABLET, FILM COATED ORAL at 05:18

## 2022-06-01 RX ADMIN — SODIUM CHLORIDE 75 MILLILITER(S): 9 INJECTION INTRAMUSCULAR; INTRAVENOUS; SUBCUTANEOUS at 21:04

## 2022-06-01 RX ADMIN — FENTANYL CITRATE 25 MICROGRAM(S): 50 INJECTION INTRAVENOUS at 21:07

## 2022-06-01 RX ADMIN — FENTANYL CITRATE 25 MICROGRAM(S): 50 INJECTION INTRAVENOUS at 21:22

## 2022-06-01 RX ADMIN — Medication 400 MILLIGRAM(S): at 21:21

## 2022-06-01 RX ADMIN — Medication 1000 MILLIGRAM(S): at 21:51

## 2022-06-01 RX ADMIN — DEXMEDETOMIDINE HYDROCHLORIDE IN 0.9% SODIUM CHLORIDE 2.7 MICROGRAM(S)/KG/HR: 4 INJECTION INTRAVENOUS at 23:42

## 2022-06-01 RX ADMIN — LOSARTAN POTASSIUM 50 MILLIGRAM(S): 100 TABLET, FILM COATED ORAL at 05:18

## 2022-06-01 RX ADMIN — SODIUM CHLORIDE 1000 MILLILITER(S): 9 INJECTION INTRAMUSCULAR; INTRAVENOUS; SUBCUTANEOUS at 21:04

## 2022-06-01 RX ADMIN — NICARDIPINE HYDROCHLORIDE 25 MG/HR: 30 CAPSULE, EXTENDED RELEASE ORAL at 21:22

## 2022-06-01 NOTE — PROGRESS NOTE ADULT - ASSESSMENT
Assessment: L far lateral crani for meningioma resection w/ subtotal sacrifice of L verebral artery POD 1       NEURO:  -neuro check q1  -c/w dexamethasone 4q6 per nsgy  -pain management w/ tylenol  -CT head in AM, MRI brain +/- contrast in AM prior to d/c   -Monitor Drain output   -PT/OT evaluation     PULMONARY:  Currently requiring mechanical ventilation for air-way protection   Vent settings 450/15/5/40  Daily CXR  ET tube positioned __ @ lip    CARDIOVASCULAR:  monitor on telemetry   vitals q1  sbp goal 100-160  c/w home anti-HTN meds HTZ, losartan,  EKG: NSR QTC ___ PR__   TTE ordered and pending    GASTROENTEROLOGY:  bedside speech & swallow if pass can start advancing diet as tolerated.   ensure BMs w/ Miralax & senna  GI ppx : Protonix 40mg qd  Daily stool count, LBM prior to arrival    RENAL/:  -check BMP qd  -strict i/o's ; c/w Blackburn   -Na goal 135-145    ENDOCRINE:  A1c- pending  TSH/t3/t4- pending   HISS while on decadron     HEME/ONC:  DVT ppx: will hold chemical dvt ppx in setting of recent operation.  b/l SCDs  Obtain b/l LE screening dopplers    INFECTIOUS:   Monitor for fevers   post operative antibiotic w/ ancef    Assessment: L far lateral crani for meningioma resection w/ subtotal sacrifice of L verebral artery POD 0       NEURO:  -neuro check q1  -c/w dexamethasone 4q6 per nsgy  -pain management w/ Tylenol & fent pushes   -sedation w/ Precedex   -CT head in AM, MRI brain   -PT/OT evaluation     PULMONARY:  Currently requiring mechanical ventilation for air-way protection   Vent settings 450/15/5/40  Daily CXR  ET tube positioned 24@ lip will need advacement 2cm and repeat CXR   will need ENT evaluation for ?vocal cord paralysis     CARDIOVASCULAR:  monitor on telemetry   vitals q1  sbp goal 100-160  c/w home anti-HTN meds HTZ, losartan,  TTE ordered and pending    GASTROENTEROLOGY:  NGT placed and confirmed   ensure BMs w/ Miralax & senna  GI ppx : Protonix 40mg qd  Daily stool count, LBM prior to arrival    RENAL/:  -check BMP qd  -strict i/o's ; c/w Blackburn   -Na goal 135-145    ENDOCRINE:  A1c- pending  TSH/t3/t4- pending   HISS while on decadron     HEME/ONC:  DVT ppx: will hold chemical dvt ppx in setting of recent operation.  b/l SCDs  Obtain b/l LE screening dopplers    INFECTIOUS:   Monitor for fevers   post operative antibiotic w/ ancef    Assessment: L far lateral crani for meningioma resection w/ subtotal sacrifice of L verebral artery POD 0       NEURO:  -neuro check q1  -c/w dexamethasone 4q6 per nsgy  -pain management w/ Tylenol & fent pushes   -sedation w/ Precedex   -CT head in AM, MRI brain post op  -PT/OT evaluation     PULMONARY: post op extubated, stridor, required re intubation   Currently requiring mechanical ventilation for air-way protection   Vent settings 450/15/5/40  Daily CXR  ET tube positioned 24@ lip will need advacement 2cm and repeat CXR   will need ENT evaluation for ?vocal cord paralysis vs upper airway edema --on decadron     CARDIOVASCULAR:  monitor on telemetry   vitals q1  sbp goal 100-160  c/w home anti-HTN meds losartan, hold HCTZ  TTE ordered and pending    GASTROENTEROLOGY:  NGT placed and confirmed   ensure BMs w/ Miralax & senna  GI ppx : Protonix 40mg qd  Daily stool count, LBM prior to arrival    RENAL/:  -check BMP qd  -strict i/o's ; c/w Blackburn   -Na goal 135-145    ENDOCRINE:  A1c- pending  TSH/t3/t4- pending   HISS while on decadron     HEME/ONC:  DVT ppx: will hold chemical dvt ppx in setting of recent operation.  b/l SCDs  Obtain b/l LE screening dopplers    INFECTIOUS:   Monitor for fevers   post operative antibiotic w/ ancef     at risk for deterioration due to post op hemorrhage, cerebral edema, respiratory failure requiring intubation   ICU  full code

## 2022-06-01 NOTE — PRE-ANESTHESIA EVALUATION ADULT - NSANTHPMHFT_GEN_ALL_CORE
61F with PMH of HTN, C Spine meningioma and removal in 2018 (In [place]), who presented with dizziness, gait instability and emesis x 3 days, and was admitted on 05/19, MRI showing enhancement of the left ethel-medullary, R CPA and right temporal enhancing lesions, possible meningioma v. schwannoma. Course c/b dysphagia, concern for VC paralysis- therefore ENT scoped- normal fiberoptic exam. Speech and Swallow along with MBS does not show laryngeal penetration of mass.

## 2022-06-01 NOTE — PROGRESS NOTE ADULT - SUBJECTIVE AND OBJECTIVE BOX
INTERVAL HISTORY: HPI:  61F, PMH HTN and C-spine meningioma removal in 2018 in [place]. P/w dizziness, gait instability, and emesis x3d. MRI shows ethel-medullary, Right CPA, and Right temporal enhancing lesions c/f meningioma vs schwannoma, with some hydro. Exam: Mandarin-speaking, AOx3, EOMI no nystagmus, PERRL, no facial/drift, CONRAD 5/5, SILT. Slightly wide-based antalgic gait, positive Romberg’s   (20 May 2022 14:15)      MEDICATIONS  (STANDING):  dexAMETHasone  Injectable 4 milliGRAM(s) IV Push every 6 hours  hydrochlorothiazide 12.5 milliGRAM(s) Oral daily  losartan 50 milliGRAM(s) Oral daily  multivitamin 1 Tablet(s) Oral daily  pantoprazole  Injectable 40 milliGRAM(s) IV Push daily  sodium chloride 0.9%. 1000 milliLiter(s) (75 mL/Hr) IV Continuous <Continuous>    MEDICATIONS  (PRN):  acetaminophen     Tablet .. 650 milliGRAM(s) Oral every 6 hours PRN Temp greater or equal to 38C (100.4F), Mild Pain (1 - 3)  oxyCODONE    IR 5 milliGRAM(s) Oral every 4 hours PRN Severe Pain (7 - 10)      Drug Dosing Weight  Height (cm): 154.9 (01 Jun 2022 07:04)  Weight (kg): 54 (01 Jun 2022 07:04)  BMI (kg/m2): 22.5 (01 Jun 2022 07:04)  BSA (m2): 1.52 (01 Jun 2022 07:04)    PAST MEDICAL & SURGICAL HISTORY:  HTN (hypertension)  HLD (hyperlipidemia)  H/O cervical spine surgery          REVIEW OF SYSTEMS: [x] Unable to Assess due to neurologic exam   [ ] All ROS addressed below are non-contributory, except:  Neuro: [ ] Headache [ ] Back pain [ ] Numbness [ ] Weakness [ ] Ataxia [ ] Dizziness [ ] Aphasia [ ] Dysarthria [ ] Visual disturbance  Resp: [ ] Shortness of breath/dyspnea, [ ] Orthopnea [ ] Cough  CV: [ ] Chest pain [ ] Palpitation [ ] Lightheadedness [ ] Syncope  Renal: [ ] Thirst [ ] Edema  GI: [ ] Nausea [ ] Emesis [ ] Abdominal pain [ ] Constipation [ ] Diarrhea  Hem: [ ] Hematemesis [ ] bright red blood per rectum  ID: [ ] Fever [ ] Chills [ ] Dysuria  ENT: [ ] Rhinorrhea    PHYSICAL EXAM:    General: No Acute Distress   Neurological:  Pulmonary: Clear to Auscultation, No Rales, No Rhonchi, No Wheezes   Cardiovascular: S1, S2, Regular Rate and Rhythm   Gastrointestinal: Soft, Nontender, Nondistended   Extremities: No calf tenderness   Incision: CDI    ICU Vital Signs Last 24 Hrs  T(C): 36.7 (01 Jun 2022 07:04), Max: 36.7 (31 May 2022 21:34)  T(F): 98.1 (01 Jun 2022 05:39), Max: 98.1 (31 May 2022 21:34)  HR: 62 (01 Jun 2022 07:04) (62 - 65)  BP: 128/82 (01 Jun 2022 07:04) (128/82 - 131/88)  RR: 18 (01 Jun 2022 07:04) (18 - 18)  SpO2: 98% (01 Jun 2022 07:04) (97% - 98%)    ABG - ( 01 Jun 2022 16:14 )  pH, Arterial: 7.47  pH, Blood: x     /  pCO2: 33    /  pO2: 240   / HCO3: 24    / Base Excess: 0.8   /  SaO2: 100.0             I&O's Detail    31 May 2022 07:01  -  01 Jun 2022 07:00  --------------------------------------------------------  IN:    Oral Fluid: 640 mL  Total IN: 640 mL    OUT:  Total OUT: 0 mL    Total NET: 640 mL              LABS:  CBC Full  -  ( 31 May 2022 07:22 )  WBC Count : 4.86 K/uL  RBC Count : 4.30 M/uL  Hemoglobin : 13.7 g/dL  Hematocrit : 38.5 %  Platelet Count - Automated : 223 K/uL  Mean Cell Volume : 89.5 fl  Mean Cell Hemoglobin : 31.9 pg  Mean Cell Hemoglobin Concentration : 35.6 gm/dL  Auto Neutrophil # : x  Auto Lymphocyte # : x  Auto Monocyte # : x  Auto Eosinophil # : x  Auto Basophil # : x  Auto Neutrophil % : x  Auto Lymphocyte % : x  Auto Monocyte % : x  Auto Eosinophil % : x  Auto Basophil % : x    05-31    139  |  103  |  16  ----------------------------<  87  3.5   |  22  |  0.60    Ca    9.7      31 May 2022 07:21      PT/INR - ( 31 May 2022 07:22 )   PT: 11.4 sec;   INR: 0.98 ratio         PTT - ( 31 May 2022 07:22 )  PTT:28.4 sec      RADIOLOGY & ADDITIONAL STUDIES:   INTERVAL HISTORY: HPI:  61F, PMH HTN and C-spine meningioma removal in 2018 in [place]. P/w dizziness, gait instability, and emesis x3d. MRI shows ethel-medullary, Right CPA, and Right temporal enhancing lesions c/f meningioma vs schwannoma, with some hydro. Exam: Mandarin-speaking, AOx3, EOMI no nystagmus, PERRL, no facial/drift, CONRAD 5/5, SILT. Slightly wide-based antalgic gait, positive Romberg’s   (20 May 2022 14:15)      MEDICATIONS  (STANDING):  dexAMETHasone  Injectable 4 milliGRAM(s) IV Push every 6 hours  hydrochlorothiazide 12.5 milliGRAM(s) Oral daily  losartan 50 milliGRAM(s) Oral daily  multivitamin 1 Tablet(s) Oral daily  pantoprazole  Injectable 40 milliGRAM(s) IV Push daily  sodium chloride 0.9%. 1000 milliLiter(s) (75 mL/Hr) IV Continuous <Continuous>    MEDICATIONS  (PRN):  acetaminophen     Tablet .. 650 milliGRAM(s) Oral every 6 hours PRN Temp greater or equal to 38C (100.4F), Mild Pain (1 - 3)  oxyCODONE    IR 5 milliGRAM(s) Oral every 4 hours PRN Severe Pain (7 - 10)      Drug Dosing Weight  Height (cm): 154.9 (01 Jun 2022 07:04)  Weight (kg): 54 (01 Jun 2022 07:04)  BMI (kg/m2): 22.5 (01 Jun 2022 07:04)  BSA (m2): 1.52 (01 Jun 2022 07:04)    PAST MEDICAL & SURGICAL HISTORY:  HTN (hypertension)  HLD (hyperlipidemia)  H/O cervical spine surgery          REVIEW OF SYSTEMS: [x] Unable to Assess due to neurologic exam   [ ] All ROS addressed below are non-contributory, except:  Neuro: [ ] Headache [ ] Back pain [ ] Numbness [ ] Weakness [ ] Ataxia [ ] Dizziness [ ] Aphasia [ ] Dysarthria [ ] Visual disturbance  Resp: [ ] Shortness of breath/dyspnea, [ ] Orthopnea [ ] Cough  CV: [ ] Chest pain [ ] Palpitation [ ] Lightheadedness [ ] Syncope  Renal: [ ] Thirst [ ] Edema  GI: [ ] Nausea [ ] Emesis [ ] Abdominal pain [ ] Constipation [ ] Diarrhea  Hem: [ ] Hematemesis [ ] bright red blood per rectum  ID: [ ] Fever [ ] Chills [ ] Dysuria  ENT: [ ] Rhinorrhea    PHYSICAL EXAM: chinese   [id number] Delgado    General: No Acute Distress, ET tube in place   Neurological: AOx3, left eye unable to abduct, can stick out tongue, shoulder shrug b/l, RUE & RLE 0/5, LUE & LLE AG purposeful  Pulmonary: Clear to Auscultation, No Rales, No Rhonchi, No Wheezes   Cardiovascular: S1, S2, Regular Rate and Rhythm   Gastrointestinal: Soft, Nontender, Nondistended   Extremities: No calf tenderness   Incision: CDI    ICU Vital Signs Last 24 Hrs  T(C): 36.7 (01 Jun 2022 07:04), Max: 36.7 (31 May 2022 21:34)  T(F): 98.1 (01 Jun 2022 05:39), Max: 98.1 (31 May 2022 21:34)  HR: 62 (01 Jun 2022 07:04) (62 - 65)  BP: 128/82 (01 Jun 2022 07:04) (128/82 - 131/88)  RR: 18 (01 Jun 2022 07:04) (18 - 18)  SpO2: 98% (01 Jun 2022 07:04) (97% - 98%)    ABG - ( 01 Jun 2022 16:14 )  pH, Arterial: 7.47  pH, Blood: x     /  pCO2: 33    /  pO2: 240   / HCO3: 24    / Base Excess: 0.8   /  SaO2: 100.0             I&O's Detail    31 May 2022 07:01  -  01 Jun 2022 07:00  --------------------------------------------------------  IN:    Oral Fluid: 640 mL  Total IN: 640 mL    OUT:  Total OUT: 0 mL    Total NET: 640 mL              LABS:  CBC Full  -  ( 31 May 2022 07:22 )  WBC Count : 4.86 K/uL  RBC Count : 4.30 M/uL  Hemoglobin : 13.7 g/dL  Hematocrit : 38.5 %  Platelet Count - Automated : 223 K/uL  Mean Cell Volume : 89.5 fl  Mean Cell Hemoglobin : 31.9 pg  Mean Cell Hemoglobin Concentration : 35.6 gm/dL  Auto Neutrophil # : x  Auto Lymphocyte # : x  Auto Monocyte # : x  Auto Eosinophil # : x  Auto Basophil # : x  Auto Neutrophil % : x  Auto Lymphocyte % : x  Auto Monocyte % : x  Auto Eosinophil % : x  Auto Basophil % : x    05-31    139  |  103  |  16  ----------------------------<  87  3.5   |  22  |  0.60    Ca    9.7      31 May 2022 07:21      PT/INR - ( 31 May 2022 07:22 )   PT: 11.4 sec;   INR: 0.98 ratio         PTT - ( 31 May 2022 07:22 )  PTT:28.4 sec      RADIOLOGY & ADDITIONAL STUDIES:   INTERVAL HISTORY: HPI:  61F, PMH HTN and C-spine meningioma removal in 2018 in [place]. P/w dizziness, gait instability, and emesis x3d. MRI shows ethel-medullary, Right CPA, and Right temporal enhancing lesions c/f meningioma vs schwannoma, with some hydro. Exam: Mandarin-speaking, AOx3, EOMI no nystagmus, PERRL, no facial/drift, CONRAD 5/5, SILT. Slightly wide-based antalgic gait, positive Romberg’s   (20 May 2022 14:15)      MEDICATIONS  (STANDING):  dexAMETHasone  Injectable 4 milliGRAM(s) IV Push every 6 hours  hydrochlorothiazide 12.5 milliGRAM(s) Oral daily  losartan 50 milliGRAM(s) Oral daily  multivitamin 1 Tablet(s) Oral daily  pantoprazole  Injectable 40 milliGRAM(s) IV Push daily  sodium chloride 0.9%. 1000 milliLiter(s) (75 mL/Hr) IV Continuous <Continuous>    MEDICATIONS  (PRN):  acetaminophen     Tablet .. 650 milliGRAM(s) Oral every 6 hours PRN Temp greater or equal to 38C (100.4F), Mild Pain (1 - 3)  oxyCODONE    IR 5 milliGRAM(s) Oral every 4 hours PRN Severe Pain (7 - 10)      Drug Dosing Weight  Height (cm): 154.9 (01 Jun 2022 07:04)  Weight (kg): 54 (01 Jun 2022 07:04)  BMI (kg/m2): 22.5 (01 Jun 2022 07:04)  BSA (m2): 1.52 (01 Jun 2022 07:04)    PAST MEDICAL & SURGICAL HISTORY:  HTN (hypertension)  HLD (hyperlipidemia)  H/O cervical spine surgery          REVIEW OF SYSTEMS: [x] Unable to Assess due to neurologic exam   [ ] All ROS addressed below are non-contributory, except:  Neuro: [ ] Headache [ ] Back pain [ ] Numbness [ ] Weakness [ ] Ataxia [ ] Dizziness [ ] Aphasia [ ] Dysarthria [ ] Visual disturbance  Resp: [ ] Shortness of breath/dyspnea, [ ] Orthopnea [ ] Cough  CV: [ ] Chest pain [ ] Palpitation [ ] Lightheadedness [ ] Syncope  Renal: [ ] Thirst [ ] Edema  GI: [ ] Nausea [ ] Emesis [ ] Abdominal pain [ ] Constipation [ ] Diarrhea  Hem: [ ] Hematemesis [ ] bright red blood per rectum  ID: [ ] Fever [ ] Chills [ ] Dysuria  ENT: [ ] Rhinorrhea    PHYSICAL EXAM:  Mandarin  [id number] Delgado    General: No Acute Distress, ET tube in place   Neurological: AOx3, left eye unable to abduct, can stick out tongue, shoulder shrug b/l, RUE & RLE 0/5, LUE & LLE AG purposeful  Pulmonary: Clear to Auscultation, No Rales, No Rhonchi, No Wheezes   Cardiovascular: S1, S2, Regular Rate and Rhythm   Gastrointestinal: Soft, Nontender, Nondistended   Extremities: No calf tenderness   Incision: CDI    ICU Vital Signs Last 24 Hrs  T(C): 36.7 (01 Jun 2022 07:04), Max: 36.7 (31 May 2022 21:34)  T(F): 98.1 (01 Jun 2022 05:39), Max: 98.1 (31 May 2022 21:34)  HR: 62 (01 Jun 2022 07:04) (62 - 65)  BP: 128/82 (01 Jun 2022 07:04) (128/82 - 131/88)  RR: 18 (01 Jun 2022 07:04) (18 - 18)  SpO2: 98% (01 Jun 2022 07:04) (97% - 98%)    ABG - ( 01 Jun 2022 16:14 )  pH, Arterial: 7.47  pH, Blood: x     /  pCO2: 33    /  pO2: 240   / HCO3: 24    / Base Excess: 0.8   /  SaO2: 100.0             I&O's Detail    31 May 2022 07:01  -  01 Jun 2022 07:00  --------------------------------------------------------  IN:    Oral Fluid: 640 mL  Total IN: 640 mL    OUT:  Total OUT: 0 mL    Total NET: 640 mL              LABS:  CBC Full  -  ( 31 May 2022 07:22 )  WBC Count : 4.86 K/uL  RBC Count : 4.30 M/uL  Hemoglobin : 13.7 g/dL  Hematocrit : 38.5 %  Platelet Count - Automated : 223 K/uL  Mean Cell Volume : 89.5 fl  Mean Cell Hemoglobin : 31.9 pg  Mean Cell Hemoglobin Concentration : 35.6 gm/dL  Auto Neutrophil # : x  Auto Lymphocyte # : x  Auto Monocyte # : x  Auto Eosinophil # : x  Auto Basophil # : x  Auto Neutrophil % : x  Auto Lymphocyte % : x  Auto Monocyte % : x  Auto Eosinophil % : x  Auto Basophil % : x    05-31    139  |  103  |  16  ----------------------------<  87  3.5   |  22  |  0.60    Ca    9.7      31 May 2022 07:21      PT/INR - ( 31 May 2022 07:22 )   PT: 11.4 sec;   INR: 0.98 ratio         PTT - ( 31 May 2022 07:22 )  PTT:28.4 sec      RADIOLOGY & ADDITIONAL STUDIES:

## 2022-06-01 NOTE — PRE-ANESTHESIA EVALUATION ADULT - NSRADCARDRESULTSFT_GEN_ALL_CORE
< from: MR Angio Head w/wo IV Cont (05.20.22 @ 10:43) >    MRI BRAIN:  2.2 x 1.8 x 1.6 cm (maximal AP x TV x CC dimensions) avidly enhancing   extra-axial mass approximating the left pontomedullary junction,   suspicious for the presence of meningioma.    The lesion encases the distal intradural vertebral artery, which is   patent.    Mass effect upon the medullaand inferior ethel. Vasogenic edema within   the medulla and basis pontis.    1.3 x 0.9 cm (axial plane) avidly enhancing extra-axial mass in the right   cerebellopontine angle extending into the right internal auditory canal.   Differential diagnosis includes a schwannoma and meningioma.    0.8 x 0.6 cm (axial plane) avidly enhancing extra-axial lesion along the   inferior aspect of the right tentorium, suspicious for the presence of   meningioma.    The presence of neurofibromatosis 2 should be considered.    MRA BRAIN:  No flow-limiting stenosis or vascular aneurysm.    Large bilateral posterior communicating arteries with hypoplastic left   and small caliber right P1 segments.    < end of copied text >

## 2022-06-02 LAB
A1C WITH ESTIMATED AVERAGE GLUCOSE RESULT: 5.8 % — HIGH (ref 4–5.6)
ESTIMATED AVERAGE GLUCOSE: 120 MG/DL — HIGH (ref 68–114)
GLUCOSE BLDC GLUCOMTR-MCNC: 124 MG/DL — HIGH (ref 70–99)
GLUCOSE BLDC GLUCOMTR-MCNC: 129 MG/DL — HIGH (ref 70–99)
GLUCOSE BLDC GLUCOMTR-MCNC: 129 MG/DL — HIGH (ref 70–99)
GLUCOSE BLDC GLUCOMTR-MCNC: 147 MG/DL — HIGH (ref 70–99)
HCT VFR BLD CALC: 32.1 % — LOW (ref 34.5–45)
HGB BLD-MCNC: 11 G/DL — LOW (ref 11.5–15.5)
MCHC RBC-ENTMCNC: 31.6 PG — SIGNIFICANT CHANGE UP (ref 27–34)
MCHC RBC-ENTMCNC: 34.3 GM/DL — SIGNIFICANT CHANGE UP (ref 32–36)
MCV RBC AUTO: 92.2 FL — SIGNIFICANT CHANGE UP (ref 80–100)
NRBC # BLD: 0 /100 WBCS — SIGNIFICANT CHANGE UP (ref 0–0)
PLATELET # BLD AUTO: 157 K/UL — SIGNIFICANT CHANGE UP (ref 150–400)
RBC # BLD: 3.48 M/UL — LOW (ref 3.8–5.2)
RBC # FLD: 12.3 % — SIGNIFICANT CHANGE UP (ref 10.3–14.5)
WBC # BLD: 11.66 K/UL — HIGH (ref 3.8–10.5)
WBC # FLD AUTO: 11.66 K/UL — HIGH (ref 3.8–10.5)

## 2022-06-02 PROCEDURE — 71045 X-RAY EXAM CHEST 1 VIEW: CPT | Mod: 26

## 2022-06-02 PROCEDURE — 70450 CT HEAD/BRAIN W/O DYE: CPT | Mod: 26

## 2022-06-02 PROCEDURE — 99291 CRITICAL CARE FIRST HOUR: CPT

## 2022-06-02 RX ORDER — LOSARTAN POTASSIUM 100 MG/1
0.5 TABLET, FILM COATED ORAL
Qty: 0 | Refills: 0 | DISCHARGE

## 2022-06-02 RX ORDER — OMEPRAZOLE 10 MG/1
1 CAPSULE, DELAYED RELEASE ORAL
Qty: 0 | Refills: 0 | DISCHARGE

## 2022-06-02 RX ORDER — CHLORHEXIDINE GLUCONATE 213 G/1000ML
1 SOLUTION TOPICAL
Refills: 0 | Status: DISCONTINUED | OUTPATIENT
Start: 2022-06-02 | End: 2022-06-11

## 2022-06-02 RX ORDER — INSULIN LISPRO 100/ML
VIAL (ML) SUBCUTANEOUS EVERY 6 HOURS
Refills: 0 | Status: DISCONTINUED | OUTPATIENT
Start: 2022-06-02 | End: 2022-06-03

## 2022-06-02 RX ORDER — POLYETHYLENE GLYCOL 3350 17 G/17G
17 POWDER, FOR SOLUTION ORAL
Refills: 0 | Status: DISCONTINUED | OUTPATIENT
Start: 2022-06-02 | End: 2022-06-13

## 2022-06-02 RX ORDER — POTASSIUM CHLORIDE 20 MEQ
40 PACKET (EA) ORAL ONCE
Refills: 0 | Status: COMPLETED | OUTPATIENT
Start: 2022-06-02 | End: 2022-06-02

## 2022-06-02 RX ORDER — SENNA PLUS 8.6 MG/1
2 TABLET ORAL AT BEDTIME
Refills: 0 | Status: DISCONTINUED | OUTPATIENT
Start: 2022-06-02 | End: 2022-06-20

## 2022-06-02 RX ADMIN — PANTOPRAZOLE SODIUM 40 MILLIGRAM(S): 20 TABLET, DELAYED RELEASE ORAL at 12:10

## 2022-06-02 RX ADMIN — Medication 40 MILLIEQUIVALENT(S): at 03:56

## 2022-06-02 RX ADMIN — SODIUM CHLORIDE 75 MILLILITER(S): 9 INJECTION INTRAMUSCULAR; INTRAVENOUS; SUBCUTANEOUS at 12:11

## 2022-06-02 RX ADMIN — Medication 1 TABLET(S): at 12:10

## 2022-06-02 RX ADMIN — SODIUM CHLORIDE 75 MILLILITER(S): 9 INJECTION INTRAMUSCULAR; INTRAVENOUS; SUBCUTANEOUS at 21:12

## 2022-06-02 RX ADMIN — Medication 4 MILLIGRAM(S): at 17:26

## 2022-06-02 RX ADMIN — CHLORHEXIDINE GLUCONATE 15 MILLILITER(S): 213 SOLUTION TOPICAL at 17:26

## 2022-06-02 RX ADMIN — OXYCODONE HYDROCHLORIDE 5 MILLIGRAM(S): 5 TABLET ORAL at 01:44

## 2022-06-02 RX ADMIN — CHLORHEXIDINE GLUCONATE 1 APPLICATION(S): 213 SOLUTION TOPICAL at 21:12

## 2022-06-02 RX ADMIN — Medication 650 MILLIGRAM(S): at 14:49

## 2022-06-02 RX ADMIN — LOSARTAN POTASSIUM 50 MILLIGRAM(S): 100 TABLET, FILM COATED ORAL at 05:33

## 2022-06-02 RX ADMIN — Medication 4 MILLIGRAM(S): at 12:10

## 2022-06-02 RX ADMIN — OXYCODONE HYDROCHLORIDE 5 MILLIGRAM(S): 5 TABLET ORAL at 02:14

## 2022-06-02 RX ADMIN — Medication 4 MILLIGRAM(S): at 23:07

## 2022-06-02 RX ADMIN — Medication 650 MILLIGRAM(S): at 16:00

## 2022-06-02 RX ADMIN — DEXMEDETOMIDINE HYDROCHLORIDE IN 0.9% SODIUM CHLORIDE 2.7 MICROGRAM(S)/KG/HR: 4 INJECTION INTRAVENOUS at 07:11

## 2022-06-02 RX ADMIN — CHLORHEXIDINE GLUCONATE 15 MILLILITER(S): 213 SOLUTION TOPICAL at 05:29

## 2022-06-02 RX ADMIN — DEXMEDETOMIDINE HYDROCHLORIDE IN 0.9% SODIUM CHLORIDE 2.7 MICROGRAM(S)/KG/HR: 4 INJECTION INTRAVENOUS at 21:12

## 2022-06-02 RX ADMIN — Medication 4 MILLIGRAM(S): at 05:28

## 2022-06-02 NOTE — PROGRESS NOTE ADULT - ASSESSMENT
Assessment:   L far lateral crani for meningioma resection w/ subtotal sacrifice of L verebral artery POD 1    NEURO:  -neuro check q1  -c/w dexamethasone 4q6 per nsgy  -pain management w/ Tylenol & fent pushes   -sedation w/ Precedex   -CT head in AM, MRI brain post op  -PT/OT evaluation     PULMONARY: post op extubated, stridor, required re intubation   Currently requiring mechanical ventilation for air-way protection   Vent settings 450/15/5/40  Daily CXR  ET tube positioned 24@ lip will need advacement 2cm and repeat CXR   will need ENT evaluation for ?vocal cord paralysis vs upper airway edema --on decadron     CARDIOVASCULAR:  monitor on telemetry   vitals q1  sbp goal 100-160  c/w home anti-HTN meds losartan, hold HCTZ  TTE ordered and pending    GASTROENTEROLOGY:  NGT placed and confirmed   ensure BMs w/ Miralax & senna  GI ppx : Protonix 40mg qd  Daily stool count, LBM prior to arrival    RENAL/:  -check BMP qd  -strict i/o's ; c/w Blackburn   -Na goal 135-145    ENDOCRINE:  A1c- pending  TSH/t3/t4- pending   HISS while on decadron     HEME/ONC:  DVT ppx: will hold chemical dvt ppx in setting of recent operation.  b/l SCDs  Obtain b/l LE screening dopplers    INFECTIOUS:   Monitor for fevers   post operative antibiotic w/ ancef     at risk for deterioration due to post op hemorrhage, cerebral edema, respiratory failure requiring intubation   ICU  full code   Assessment:   L far lateral crani for meningioma resection w/ subtotal sacrifice of L verebral artery POD 1    NEURO:  -neuro check q1  -c/w dexamethasone 4q6 per nsgy  -pain management w/ Tylenol & fent pushes   -sedation w/ Precedex   -MRI brain post op  -PT/OT evaluation     PULMONARY:   Intubated  PRVC 14/400/5/30  CPAP as tolerated    CARDIOVASCULAR:  monitor on telemetry   vitals q1  sbp goal 100-160  Hold home anti-HTN meds   TTE     GASTROENTEROLOGY:  NGT feeding   ensure BMs w/ Miralax & senna  GI ppx : Protonix 40mg qd    RENAL/:  -NS at 75 ml/hr  -check BMP qd  -strict i/o's ; c/w Blackburn   -Na goal 135-145    ENDOCRINE:  A1c- pending  TSH/t3/t4- pending   HISS while on decadron     HEME/ONC:  DVT ppx: will hold chemical dvt ppx in setting of recent operation.  b/l SCDs  Obtain b/l LE screening dopplers    INFECTIOUS:   Monitor for fevers     at risk for deterioration due to post op hemorrhage, cerebral edema, respiratory failure requiring intubation   ICU  full code   no

## 2022-06-02 NOTE — CHART NOTE - NSCHARTNOTEFT_GEN_A_CORE
Nutrition Follow Up Note  Patient seen for: Nutrition Note    Chart reviewed, events noted. Pt     Source: [] Patient       [x] EMR        [] RN        [] Family at bedside       [] Other:    -If unable to interview patient: [] Trach/Vent/BiPAP  [] Disoriented/confused/inappropriate to interview    Nutrition-Related Events:   - Pressors:  [] Yes    [] No   - Propofol:  [] Yes    [] No         - Rate: __mL/hr. If maintained x 24 hours, propofol will provide:     Diet Order:       EN Order Provides:    - Total volume:    - Kcal:       ( ___kcal/kg based on __)    - Gm Protein  ( __ g/kg based on __)    - mL free water:  Protein Modular(s) Provides:  Current Pump Rate:  EN provision: ___% EN volume goal provided in past __ days    Is current diet order appropriate/adequate? [] Yes  []  No:     PO intake :   [] >75%  Adequate    [] 50-75%  Fair       [] <50%  Poor    Nutrition-related concerns:    GI:  Last BM ___.   Bowel Regimen? [] Yes   [] No  NGT Output:  IV Fluids:  Ostomy Output:  Fistula Output:     Weights:   Daily     MEDICATIONS  (STANDING):  dexAMETHasone  Injectable  insulin lispro (ADMELOG) corrective regimen sliding scale  losartan  multivitamin  niCARdipine Infusion  pantoprazole  Injectable  sodium chloride 0.9%.    Pertinent Labs: 06-01 @ 20:30: Na 142, BUN 8, Cr 0.46<L>, <H>, K+ 3.6, Phos 3.5, Mg 2.6, Alk Phos --, ALT/SGPT --, AST/SGOT --, HbA1c --    A1C with Estimated Average Glucose Result: 5.8 % (06-01-22 @ 20:30)    Finger Sticks:  POCT Blood Glucose.: 147 mg/dL (06-02 @ 05:26)    Triglycerides, Serum: 99 mg/dL (05-21-22 @ 06:26)      Skin per nursing documentation:   Edema:     Estimated Energy Needs:  Estimated Protein Needs:  Estimated Fluid Needs:   [place] State Equation:    Previous Nutrition Diagnosis:   Nutrition Diagnosis is: [] ongoing  [] resolved [] not applicable     New Nutrition Diagnosis: [] Not applicable    Nutrition Care Plan:  [] In Progress  [] Achieved  [] Not applicable    Nutrition Interventions:     Education Provided:       [] Yes:  [] No:        Recommendations:         [] Continue current diet order            [] Add oral nutrition supplement:     [] Discontinue current diet order. Recommend:      [] Add micronutrient supplementation:      [] Continue current micronutrient supplementation:      [] Other:     Monitoring and Evaluation:   Continue to monitor nutritional intake, tolerance to diet prescription, weights, labs, skin integrity      RD remains available upon request and will follow up per protocol Nutrition Follow Up Note  Patient seen for: Nutrition Note    Chart reviewed, events noted. Pt is a 60 yo F with PMH: HTN and C-spine meningioma removal in 2018 in [place]. Presented with dizziness, gait instability and emesis x 3 days. MRI showed ethel-medullary, R CPA and R temporal enhancing lesions c/f meningioma vs schwannoma with some hydro. Day 1 post left far lateral for meningioma resection subtotal and sacrifice of left vertebral artery. Extubated, however reintubated for airway protection. Continues on Decadron. Na goal 135-145.     Source: [] Patient       [x] EMR        [x] RN        [] Family at bedside       [x] Other: interdisciplinary medical team    -If unable to interview patient: [x] Trach/Vent/BiPAP  [x] Disoriented/confused/inappropriate to interview    Diet Order: No active diet order in place    Nutrition-related concerns:  -Pt previously prescribed a Regular diet + Ensure Enlive 2x daily. Per previous RD note 5/28, pt with hx of poor appetite in-house + PTA, with subsequent wt loss. Diagnosed with severe protein calorie malnutrition. Now NPO since 6/1 in setting of s/p neurosurgery (crani) with intubation. NGT in place. See below for EN recommendations if warranted. Continues on NaCl 0.9% for hydration. Na goal 135-145. On multivitamin.   -Prescribed Decadron as per neurosurgery. At risk for elevated FSBG. Continues on insulin lispro sliding scale to aid in management of BG. A1c 5.8%.   -Last BM: (5/29) x 4. Not currently on apparent bowel regimen.     Weights:   Daily     MEDICATIONS  (STANDING):  dexAMETHasone  Injectable  insulin lispro (ADMELOG) corrective regimen sliding scale  losartan  multivitamin  niCARdipine Infusion  pantoprazole  Injectable  sodium chloride 0.9%.    Pertinent Labs: 06-01 @ 20:30: Na 142, BUN 8, Cr 0.46<L>, <H>, K+ 3.6, Phos 3.5, Mg 2.6, Alk Phos --, ALT/SGPT --, AST/SGOT --, HbA1c --    A1C with Estimated Average Glucose Result: 5.8 % (06-01-22 @ 20:30)    Finger Sticks:  POCT Blood Glucose.: 147 mg/dL (06-02 @ 05:26)    Triglycerides, Serum: 99 mg/dL (05-21-22 @ 06:26)    Skin per nursing documentation: surgical incision L crani; R hip DTI  Edema: none noted     (based on dosing wt 119 lbs/54kg):   Estimated Energy Needs: (30-35kcal/kg): 1620-1890kcal   Estimated Protein Needs: (1.3-1.6g protein/kg): 70-86g protein  Estimated Fluid Needs: defer fluid needs to medical team    Previous Nutrition Diagnosis: acute severe protein calorie malnutrition and increased nutrient needs  Nutrition Diagnosis is: [x] ongoing  [] resolved [] not applicable     New Nutrition Diagnosis: [x] Not applicable    Nutrition Care Plan:  [x] In Progress  [] Achieved  [] Not applicable    Recommendations:      1. If EN recommended, consider Glucerna 1.2 at 60ml/hr x 24 hrs. To provide (based on 54kg): 1440ml, 1728kcal (32kcal/kg) and 86g protein (1.6g protein/kg).   2. If PO diet advanced, consider consistent carbohydrate diet if persistently elevated FSBG. Defer consistency to medical team.   3. Continue multivitamin as prescribed.   4. Monitor wt trends/labs/skin integrity/hydration status/bowel regularity.     Monitoring and Evaluation:   Continue to monitor nutritional intake, tolerance to diet prescription, weights, labs, skin integrity    RD remains available upon request and will follow up per protocol    Alison Kleiner, RD, [facility] Pager # [phone]

## 2022-06-02 NOTE — CONSULT NOTE ADULT - ASSESSMENT
Impression:    RIght trochanter stage 2 pressure injury    Recommend:  1.) topical therapy: Right trochanter injury - cleanse with incontinence cleanser, pat dry, apply Triad ointment BID and PRN for incontinent episodes  2.) Incontinence Management - incontinence cleanser, pads, pericare BID  3.) Maintain on an alternating air with low air loss surface  4.) Turn and reposition Q 2 hours  5.) Nutrition optimization  6.) Offload heels/feet with complete cair air fluidized boots; ensure that the soles of the feet are not resting on the foot board of the bed.    Care as per medicine. Will not actively follow but will remain available. Please recall for new issues or deterioration.  Upon discharge [facility] [address] [place] [address] [phone]  Seen and discussed with clinical nurse  Thank you for this consult  Sabina Browning, NP-C, [facility] [id number]

## 2022-06-02 NOTE — PROGRESS NOTE ADULT - ASSESSMENT
ASSESSMENT:  L far lateral crani for meningioma resection w/ subtotal sacrifice of L vertebral artery POD 1    PLAN:  c/w dexamethasone 4q6, wean per nsgy  MRI w/wo contrast tomorrow 8am  Feeding: [x] tube feeds, pause feeds at 4AM for MRI , c/w IVF   Analgesia/Sedation: tylenols/oxycodone PRN, Precedex ggt RASS goal 0 to -1   Seizure prophylaxis: [x] not indicated  Thromboprophylaxis: [x] SCDs [x] hold chemoprophylaxis due to: fresh post op   Head of Bed/Activity: [x] 30 degrees [] mobilize as tolerated [x] PT [x] OT  Ulcer prophylaxis: [] not indicated [x] PPI   Glucose Control: Goal -180 [x] RISS while on dexamethasone

## 2022-06-02 NOTE — CONSULT NOTE ADULT - SUBJECTIVE AND OBJECTIVE BOX
Wound Surgery Consult Note:    HPI:  61F, PMH HTN and C-spine meningioma removal in 2018 in [place]. P/w dizziness, gait instability, and emesis x3d. MRI shows ethel-medullary, Right CPA, and Right temporal enhancing lesions c/f meningioma vs schwannoma, with some hydro. Exam: Mandarin-speaking, AOx3, EOMI no nystagmus, PERRL, no facial/drift, CONRAD 5/5, SILT. Slightly wide-based antalgic gait, positive Romberg’s   (20 May 2022 14:15)    Request for wound care consult for right hip injury noted on transfer to NSCU from OR. Ms. Ramirez was encountered on alternating air with low air loss surface. She was awake but not verbally responsive. She was seen with her clinical nurse. Skin damage noted below likely related to positioning during long OR time.    PAST MEDICAL & SURGICAL HISTORY:  HTN (hypertension)  HLD (hyperlipidemia)  H/O cervical spine surgery    MEDICATIONS  (STANDING):  chlorhexidine 0.12% Liquid 15 milliLiter(s) Oral Mucosa every 12 hours  dexAMETHasone  Injectable 4 milliGRAM(s) IV Push every 6 hours  dexMEDEtomidine Infusion 0.2 MICROgram(s)/kG/Hr (2.7 mL/Hr) IV Continuous <Continuous>  insulin lispro (ADMELOG) corrective regimen sliding scale   SubCutaneous every 6 hours  multivitamin 1 Tablet(s) Oral daily  pantoprazole  Injectable 40 milliGRAM(s) IV Push daily  sodium chloride 0.9%. 1000 milliLiter(s) (75 mL/Hr) IV Continuous <Continuous>    MEDICATIONS  (PRN):  acetaminophen     Tablet .. 650 milliGRAM(s) Oral every 6 hours PRN Temp greater or equal to 38C (100.4F), Mild Pain (1 - 3)  oxyCODONE    IR 5 milliGRAM(s) Oral every 4 hours PRN Severe Pain (7 - 10)    Allergies    No Known Allergies    Intolerances    Vital Signs Last 24 Hrs  T(C): 36.4 (02 Jun 2022 07:00), Max: 37.1 (02 Jun 2022 03:00)  T(F): 97.5 (02 Jun 2022 07:00), Max: 98.7 (02 Jun 2022 03:00)  HR: 55 (02 Jun 2022 09:15) (55 - 125)  BP: 109/73 (02 Jun 2022 07:00) (109/73 - 142/111)  BP(mean): 86 (02 Jun 2022 07:00) (86 - 120)  RR: 15 (02 Jun 2022 09:00) (12 - 33)  SpO2: 100% (02 Jun 2022 09:15) (99% - 100%)    Physical Exam:  General: Awake, WN  Respiratory: no SOB on room air  Gastrointestinal: soft NT/ND  Neurology: nonverbal, not following commands  Musculoskeletal: no contractures  Vascular: BLE edema equal  Skin: RIght trochanter with serum filled intact blister over erythematous skin L 5cm X W 6cm xD none, no necrotic tissue, and no drainage  No odor, increased warmth, tenderness, induration, fluctuance    LABS:  06-01    142  |  105  |  8   ----------------------------<  219<H>  3.6   |  20<L>  |  0.46<L>    Ca    8.8      01 Jun 2022 20:30  Phos  3.5     06-01  Mg     2.6     06-01                            13.1   12.71 )-----------( 222      ( 01 Jun 2022 20:30 )             37.0

## 2022-06-02 NOTE — PHARMACOTHERAPY INTERVENTION NOTE - COMMENTS
Medication reconciliation completed. Please refer to specifics in home medication list (outpatient medication review). Medications verified with patient's daughter, patient's outpatient pharmacy ([facility]) and patient's PCP (Ashley Arias MD).     Home Medications:  amLODIPine 10 mg oral tablet: 1 tab(s) orally once a day (02 Jun 2022 15:20)  Aspirin Enteric Coated 81 mg oral delayed release tablet: 1 tab(s) orally once a day (Indication: Cartoid Stenosis) (02 Jun 2022 15:20)  atorvastatin 40 mg oral tablet: 1 tab(s) orally once a day (02 Jun 2022 15:20)  Fish Oil 1000 mg oral capsule: 1 cap(s) orally once a day (02 Jun 2022 15:20)  losartan 100 mg oral tablet: 1 tab(s) orally once a day (02 Jun 2022 15:20)  omeprazole 40 mg oral delayed release capsule: 1 cap(s) orally once a day (02 Jun 2022 15:20)  Vitamin D2 1.25 mg (50,000 intl units) oral capsule: 1 cap(s) orally once a week (02 Jun 2022 15:20)    Christ Mann, TonyD  PGY-1 Pharmacy Resident   Spectralink: [id number]

## 2022-06-02 NOTE — PROGRESS NOTE ADULT - SUBJECTIVE AND OBJECTIVE BOX
HPI:  61F, PMH HTN and C-spine meningioma removal in 2018 in [place]. P/w dizziness, gait instability, and emesis x3d. MRI shows ethel-medullary, Right CPA, and Right temporal enhancing lesions c/f meningioma vs schwannoma, with some hydro. Exam: Mandarin-speaking, AOx3, EOMI no nystagmus, PERRL, no facial/drift, CONRAD 5/5, SILT. Slightly wide-based antalgic gait, positive Romberg’s    Day 1 post left far lateral for meningioma resection subtotal and sacrifice of left vertebral artery    OVERNIGHT EVENTS:   No acute events overnight.    VITALS:  T(C): , Max: 37.1 (06-02-22 @ 03:00)  HR:  (62 - 125)  BP:  (128/82 - 142/111)  ABP:  (115/65 - 191/110)  RR:  (12 - 33)  SpO2:  (98% - 100%)  Wt(kg): --  Device: Avea, Mode: AC/ CMV (Assist Control/ Continuous Mandatory Ventilation), RR (machine): 14, TV (machine): 450, FiO2: 50, PEEP: 5, ITime: 1, MAP: 14, PIP: 20    05-31-22 @ 07:01  -  06-01-22 @ 07:00  --------------------------------------------------------  IN: 640 mL / OUT: 0 mL / NET: 640 mL    06-01-22 @ 07:01  -  06-02-22 @ 06:45  --------------------------------------------------------  IN: 1978 mL / OUT: 1850 mL / NET: 128 mL      LABS:  Na: 142 (06-01 @ 20:30), 139 (05-31 @ 07:21)  K: 3.6 (06-01 @ 20:30), 3.5 (05-31 @ 07:21)  Cl: 105 (06-01 @ 20:30), 103 (05-31 @ 07:21)  CO2: 20 (06-01 @ 20:30), 22 (05-31 @ 07:21)  BUN: 8 (06-01 @ 20:30), 16 (05-31 @ 07:21)  Cr: 0.46 (06-01 @ 20:30), 0.60 (05-31 @ 07:21)  Glu: 219(06-01 @ 20:30), 87(05-31 @ 07:21)    Hgb: 13.1 (06-01 @ 20:30), 13.7 (05-31 @ 07:22)  Hct: 37.0 (06-01 @ 20:30), 38.5 (05-31 @ 07:22)  WBC: 12.71 (06-01 @ 20:30), 4.86 (05-31 @ 07:22)  Plt: 222 (06-01 @ 20:30), 223 (05-31 @ 07:22)    INR: 0.98 05-31-22 @ 07:22  PTT: 28.4 05-31-22 @ 07:22    IMAGING:   Recent imaging studies were reviewed.    MEDICATIONS:  acetaminophen     Tablet .. 650 milliGRAM(s) Oral every 6 hours PRN  chlorhexidine 0.12% Liquid 15 milliLiter(s) Oral Mucosa every 12 hours  dexAMETHasone  Injectable 4 milliGRAM(s) IV Push every 6 hours  dexMEDEtomidine Infusion 0.2 MICROgram(s)/kG/Hr IV Continuous <Continuous>  insulin lispro (ADMELOG) corrective regimen sliding scale   SubCutaneous every 6 hours  losartan 50 milliGRAM(s) Oral daily  multivitamin 1 Tablet(s) Oral daily  niCARdipine Infusion 5 mG/Hr IV Continuous <Continuous>  oxyCODONE    IR 5 milliGRAM(s) Oral every 4 hours PRN  pantoprazole  Injectable 40 milliGRAM(s) IV Push daily  sodium chloride 0.9%. 1000 milliLiter(s) IV Continuous <Continuous>    PHYSICAL EXAM:  Mandarin  [id number] Delgado    General: No Acute Distress, ET tube in place   Neurological: AOx3, left eye unable to abduct, can stick out tongue, shoulder shrug b/l, RUE & RLE 0/5, LUE & LLE AG purposeful  Pulmonary: Clear to Auscultation, No Rales, No Rhonchi, No Wheezes   Cardiovascular: S1, S2, Regular Rate and Rhythm   Gastrointestinal: Soft, Nontender, Nondistended   Extremities: No calf tenderness   Incision: CDI   HPI:  61F, PMH HTN and C-spine meningioma removal in 2018 in [place]. P/w dizziness, gait instability, and emesis x3d. MRI shows ethel-medullary, Right CPA, and Right temporal enhancing lesions c/f meningioma vs schwannoma, with some hydro. Exam: Mandarin-speaking, AOx3, EOMI no nystagmus, PERRL, no facial/drift, CONRAD 5/5, SILT. Slightly wide-based antalgic gait, positive Romberg’s    Day 1 post left far lateral for meningioma resection subtotal and sacrifice of left vertebral artery    OVERNIGHT EVENTS:   No acute events overnight.    VITALS:  T(C): , Max: 37.1 (06-02-22 @ 03:00)  HR:  (62 - 125)  BP:  (128/82 - 142/111)  ABP:  (115/65 - 191/110)  RR:  (12 - 33)  SpO2:  (98% - 100%)  Wt(kg): --  Device: Avea, Mode: AC/ CMV (Assist Control/ Continuous Mandatory Ventilation), RR (machine): 14, TV (machine): 450, FiO2: 50, PEEP: 5, ITime: 1, MAP: 14, PIP: 20    05-31-22 @ 07:01  -  06-01-22 @ 07:00  --------------------------------------------------------  IN: 640 mL / OUT: 0 mL / NET: 640 mL    06-01-22 @ 07:01  -  06-02-22 @ 06:45  --------------------------------------------------------  IN: 1978 mL / OUT: 1850 mL / NET: 128 mL      LABS:  Na: 142 (06-01 @ 20:30), 139 (05-31 @ 07:21)  K: 3.6 (06-01 @ 20:30), 3.5 (05-31 @ 07:21)  Cl: 105 (06-01 @ 20:30), 103 (05-31 @ 07:21)  CO2: 20 (06-01 @ 20:30), 22 (05-31 @ 07:21)  BUN: 8 (06-01 @ 20:30), 16 (05-31 @ 07:21)  Cr: 0.46 (06-01 @ 20:30), 0.60 (05-31 @ 07:21)  Glu: 219(06-01 @ 20:30), 87(05-31 @ 07:21)    Hgb: 13.1 (06-01 @ 20:30), 13.7 (05-31 @ 07:22)  Hct: 37.0 (06-01 @ 20:30), 38.5 (05-31 @ 07:22)  WBC: 12.71 (06-01 @ 20:30), 4.86 (05-31 @ 07:22)  Plt: 222 (06-01 @ 20:30), 223 (05-31 @ 07:22)    INR: 0.98 05-31-22 @ 07:22  PTT: 28.4 05-31-22 @ 07:22    IMAGING:   Recent imaging studies were reviewed.    MEDICATIONS:  acetaminophen     Tablet .. 650 milliGRAM(s) Oral every 6 hours PRN  chlorhexidine 0.12% Liquid 15 milliLiter(s) Oral Mucosa every 12 hours  dexAMETHasone  Injectable 4 milliGRAM(s) IV Push every 6 hours  dexMEDEtomidine Infusion 0.2 MICROgram(s)/kG/Hr IV Continuous <Continuous>  insulin lispro (ADMELOG) corrective regimen sliding scale   SubCutaneous every 6 hours  losartan 50 milliGRAM(s) Oral daily  multivitamin 1 Tablet(s) Oral daily  niCARdipine Infusion 5 mG/Hr IV Continuous <Continuous>  oxyCODONE    IR 5 milliGRAM(s) Oral every 4 hours PRN  pantoprazole  Injectable 40 milliGRAM(s) IV Push daily  sodium chloride 0.9%. 1000 milliLiter(s) IV Continuous <Continuous>    PHYSICAL EXAM:  Mandarin  [id number] Delgado    General: No Acute Distress, ET tube in place   Neurological: AOx3, left eye unable to abduct, Left side intact power, right UE 3/5. Left LE 2/5  Pulmonary: Clear to Auscultation, No Rales, No Rhonchi, No Wheezes   Cardiovascular: S1, S2, Regular Rate and Rhythm   Gastrointestinal: Soft, Nontender, Nondistended   Extremities: No calf tenderness   Incision: CDI

## 2022-06-02 NOTE — PROGRESS NOTE ADULT - SUBJECTIVE AND OBJECTIVE BOX
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

## 2022-06-03 LAB
ANION GAP SERPL CALC-SCNC: 10 MMOL/L — SIGNIFICANT CHANGE UP (ref 5–17)
ANION GAP SERPL CALC-SCNC: 12 MMOL/L — SIGNIFICANT CHANGE UP (ref 5–17)
BUN SERPL-MCNC: 16 MG/DL — SIGNIFICANT CHANGE UP (ref 7–23)
BUN SERPL-MCNC: 20 MG/DL — SIGNIFICANT CHANGE UP (ref 7–23)
CALCIUM SERPL-MCNC: 8.7 MG/DL — SIGNIFICANT CHANGE UP (ref 8.4–10.5)
CALCIUM SERPL-MCNC: 8.7 MG/DL — SIGNIFICANT CHANGE UP (ref 8.4–10.5)
CHLORIDE SERPL-SCNC: 102 MMOL/L — SIGNIFICANT CHANGE UP (ref 96–108)
CHLORIDE SERPL-SCNC: 111 MMOL/L — HIGH (ref 96–108)
CO2 SERPL-SCNC: 20 MMOL/L — LOW (ref 22–31)
CO2 SERPL-SCNC: 25 MMOL/L — SIGNIFICANT CHANGE UP (ref 22–31)
CREAT SERPL-MCNC: 0.39 MG/DL — LOW (ref 0.5–1.3)
CREAT SERPL-MCNC: 0.49 MG/DL — LOW (ref 0.5–1.3)
EGFR: 108 ML/MIN/1.73M2 — SIGNIFICANT CHANGE UP
EGFR: 115 ML/MIN/1.73M2 — SIGNIFICANT CHANGE UP
GLUCOSE BLDC GLUCOMTR-MCNC: 129 MG/DL — HIGH (ref 70–99)
GLUCOSE SERPL-MCNC: 139 MG/DL — HIGH (ref 70–99)
GLUCOSE SERPL-MCNC: 175 MG/DL — HIGH (ref 70–99)
HCT VFR BLD CALC: 31.4 % — LOW (ref 34.5–45)
HGB BLD-MCNC: 11.1 G/DL — LOW (ref 11.5–15.5)
MAGNESIUM SERPL-MCNC: 2.5 MG/DL — SIGNIFICANT CHANGE UP (ref 1.6–2.6)
MCHC RBC-ENTMCNC: 32.4 PG — SIGNIFICANT CHANGE UP (ref 27–34)
MCHC RBC-ENTMCNC: 35.4 GM/DL — SIGNIFICANT CHANGE UP (ref 32–36)
MCV RBC AUTO: 91.5 FL — SIGNIFICANT CHANGE UP (ref 80–100)
NRBC # BLD: 0 /100 WBCS — SIGNIFICANT CHANGE UP (ref 0–0)
PHOSPHATE SERPL-MCNC: 2.7 MG/DL — SIGNIFICANT CHANGE UP (ref 2.5–4.5)
PLATELET # BLD AUTO: 196 K/UL — SIGNIFICANT CHANGE UP (ref 150–400)
POTASSIUM SERPL-MCNC: 3.9 MMOL/L — SIGNIFICANT CHANGE UP (ref 3.5–5.3)
POTASSIUM SERPL-MCNC: 4.7 MMOL/L — SIGNIFICANT CHANGE UP (ref 3.5–5.3)
POTASSIUM SERPL-SCNC: 3.9 MMOL/L — SIGNIFICANT CHANGE UP (ref 3.5–5.3)
POTASSIUM SERPL-SCNC: 4.7 MMOL/L — SIGNIFICANT CHANGE UP (ref 3.5–5.3)
RBC # BLD: 3.43 M/UL — LOW (ref 3.8–5.2)
RBC # FLD: 12.3 % — SIGNIFICANT CHANGE UP (ref 10.3–14.5)
SODIUM SERPL-SCNC: 139 MMOL/L — SIGNIFICANT CHANGE UP (ref 135–145)
SODIUM SERPL-SCNC: 141 MMOL/L — SIGNIFICANT CHANGE UP (ref 135–145)
WBC # BLD: 8.77 K/UL — SIGNIFICANT CHANGE UP (ref 3.8–10.5)
WBC # FLD AUTO: 8.77 K/UL — SIGNIFICANT CHANGE UP (ref 3.8–10.5)

## 2022-06-03 PROCEDURE — 71045 X-RAY EXAM CHEST 1 VIEW: CPT | Mod: 26

## 2022-06-03 PROCEDURE — 70553 MRI BRAIN STEM W/O & W/DYE: CPT | Mod: 26

## 2022-06-03 PROCEDURE — 99291 CRITICAL CARE FIRST HOUR: CPT

## 2022-06-03 RX ORDER — ENOXAPARIN SODIUM 100 MG/ML
40 INJECTION SUBCUTANEOUS
Refills: 0 | Status: DISCONTINUED | OUTPATIENT
Start: 2022-06-03 | End: 2022-06-03

## 2022-06-03 RX ORDER — POTASSIUM PHOSPHATE, MONOBASIC POTASSIUM PHOSPHATE, DIBASIC 236; 224 MG/ML; MG/ML
15 INJECTION, SOLUTION INTRAVENOUS ONCE
Refills: 0 | Status: COMPLETED | OUTPATIENT
Start: 2022-06-03 | End: 2022-06-03

## 2022-06-03 RX ORDER — ENOXAPARIN SODIUM 100 MG/ML
40 INJECTION SUBCUTANEOUS
Refills: 0 | Status: DISCONTINUED | OUTPATIENT
Start: 2022-06-03 | End: 2022-06-16

## 2022-06-03 RX ADMIN — CHLORHEXIDINE GLUCONATE 15 MILLILITER(S): 213 SOLUTION TOPICAL at 17:13

## 2022-06-03 RX ADMIN — OXYCODONE HYDROCHLORIDE 5 MILLIGRAM(S): 5 TABLET ORAL at 15:45

## 2022-06-03 RX ADMIN — Medication 650 MILLIGRAM(S): at 11:30

## 2022-06-03 RX ADMIN — SENNA PLUS 2 TABLET(S): 8.6 TABLET ORAL at 22:42

## 2022-06-03 RX ADMIN — POLYETHYLENE GLYCOL 3350 17 GRAM(S): 17 POWDER, FOR SOLUTION ORAL at 17:14

## 2022-06-03 RX ADMIN — DEXMEDETOMIDINE HYDROCHLORIDE IN 0.9% SODIUM CHLORIDE 2.7 MICROGRAM(S)/KG/HR: 4 INJECTION INTRAVENOUS at 07:33

## 2022-06-03 RX ADMIN — PANTOPRAZOLE SODIUM 40 MILLIGRAM(S): 20 TABLET, DELAYED RELEASE ORAL at 11:05

## 2022-06-03 RX ADMIN — OXYCODONE HYDROCHLORIDE 5 MILLIGRAM(S): 5 TABLET ORAL at 16:15

## 2022-06-03 RX ADMIN — Medication 4 MILLIGRAM(S): at 05:34

## 2022-06-03 RX ADMIN — POLYETHYLENE GLYCOL 3350 17 GRAM(S): 17 POWDER, FOR SOLUTION ORAL at 05:35

## 2022-06-03 RX ADMIN — Medication 650 MILLIGRAM(S): at 11:00

## 2022-06-03 RX ADMIN — OXYCODONE HYDROCHLORIDE 5 MILLIGRAM(S): 5 TABLET ORAL at 04:31

## 2022-06-03 RX ADMIN — CHLORHEXIDINE GLUCONATE 15 MILLILITER(S): 213 SOLUTION TOPICAL at 05:34

## 2022-06-03 RX ADMIN — ENOXAPARIN SODIUM 40 MILLIGRAM(S): 100 INJECTION SUBCUTANEOUS at 17:14

## 2022-06-03 RX ADMIN — CHLORHEXIDINE GLUCONATE 1 APPLICATION(S): 213 SOLUTION TOPICAL at 22:42

## 2022-06-03 RX ADMIN — Medication 1 TABLET(S): at 11:05

## 2022-06-03 RX ADMIN — OXYCODONE HYDROCHLORIDE 5 MILLIGRAM(S): 5 TABLET ORAL at 03:31

## 2022-06-03 RX ADMIN — Medication 4 MILLIGRAM(S): at 23:05

## 2022-06-03 RX ADMIN — DEXMEDETOMIDINE HYDROCHLORIDE IN 0.9% SODIUM CHLORIDE 2.7 MICROGRAM(S)/KG/HR: 4 INJECTION INTRAVENOUS at 22:42

## 2022-06-03 RX ADMIN — Medication 4 MILLIGRAM(S): at 17:14

## 2022-06-03 RX ADMIN — POTASSIUM PHOSPHATE, MONOBASIC POTASSIUM PHOSPHATE, DIBASIC 62.5 MILLIMOLE(S): 236; 224 INJECTION, SOLUTION INTRAVENOUS at 05:34

## 2022-06-03 RX ADMIN — Medication 4 MILLIGRAM(S): at 11:06

## 2022-06-03 NOTE — PROGRESS NOTE ADULT - ASSESSMENT
Assessment:   L far lateral crani for meningioma resection w/ subtotal sacrifice of L verebral artery POD 1    NEURO:  -neuro check q1  -c/w dexamethasone 4q6 per nsgy  -pain management w/ Tylenol & fent pushes   -sedation w/ Precedex   -MRI brain post op  -PT/OT evaluation     PULMONARY:   Intubated  PRVC 14/400/5/30  CPAP as tolerated    CARDIOVASCULAR:  monitor on telemetry   vitals q1  sbp goal 100-160  Hold home anti-HTN meds   TTE     GASTROENTEROLOGY:  NGT feeding   ensure BMs w/ Miralax & senna  GI ppx : Protonix 40mg qd    RENAL/:  -NS at 75 ml/hr  -check BMP qd  -strict i/o's ; c/w Blackburn   -Na goal 135-145    ENDOCRINE:  A1c- pending  TSH/t3/t4- pending   HISS while on decadron     HEME/ONC:  DVT ppx: will hold chemical dvt ppx in setting of recent operation.  b/l SCDs  Obtain b/l LE screening dopplers    INFECTIOUS:   Monitor for fevers     at risk for deterioration due to post op hemorrhage, cerebral edema, respiratory failure requiring intubation   ICU  full code   Assessment:   L far lateral crani for meningioma resection w/ subtotal sacrifice of L verebral artery POD 1    NEURO:  -neuro check q2  -c/w dexamethasone 4q6 per nsgy  -pain management w/ Tylenol & fent pushes   -sedation w/ Precedex   -MRI brain showed surgical site edema   -PT/OT evaluation     PULMONARY:   Intubated  PRVC 14/400/5/30  CPAP as tolerated    CARDIOVASCULAR:  monitor on telemetry   vitals q1  sbp goal 100-160  Hold home anti-HTN meds   TTE     GASTROENTEROLOGY:  NGT feeding   ensure BMs w/ Miralax & senna  GI ppx : Protonix 40mg qd    RENAL/:  -IVL  -check BMP qd  -dc Blackburn     ENDOCRINE:  A1c- 5.8  TSH/t3/t4-normal   HISS while on decadron     HEME/ONC:  b/l SCDs  Consider starting Lovenox  Obtain b/l LE screening dopplers    INFECTIOUS:   Monitor for fevers     at risk for deterioration due to post op hemorrhage, cerebral edema, respiratory failure requiring intubation   ICU  full code

## 2022-06-03 NOTE — PROGRESS NOTE ADULT - SUBJECTIVE AND OBJECTIVE BOX
Patient seen and examined at bedside.    --Anticoagulation--    T(C): 36.7 (06-03-22 @ 03:00), Max: 37 (06-02-22 @ 19:00)  HR: 63 (06-03-22 @ 05:00) (54 - 84)  BP: 132/84 (06-03-22 @ 05:00) (109/73 - 159/87)  RR: 17 (06-03-22 @ 05:00) (11 - 31)  SpO2: 96% (06-03-22 @ 05:00) (95% - 100%)  Wt(kg): --    Exam:  intubated, AOx2 (choices) 3mmR, L CN6 palsy,  FC on Lt, RUE WF/WE 3/5, Right HG 3/5, RLE moving distally AG, Lt side spont/AG, Able to shrug and stick out tongue    Labs:                        11.0   11.66 )-----------( 157      ( 02 Jun 2022 23:33 )             32.1

## 2022-06-03 NOTE — PROGRESS NOTE ADULT - ASSESSMENT
ASSESSMENT:  L far lateral crani for meningioma resection w/ subtotal sacrifice of L vertebral artery POD 1    PLAN:  q2 neurocheck  c/w dexamethasone 4q6, wean per nsgy  MRI w/ edema around medualla and ethel, likely will need early trach and peg will monitor over the weekend, if no improvement in cough/gag will c/s ENT & GI  Feeding: [x] tube feeds c/w IVF   Analgesia/Sedation: tylenols/oxycodone PRN, Precedex ggt RASS goal 0 to -1   Seizure prophylaxis: [x] not indicated  Thromboprophylaxis: [x] SCDs [x] hold chemoprophylaxis due to: fresh post op   Head of Bed/Activity: [x] 30 degrees [] mobilize as tolerated [x] PT [x] OT  Ulcer prophylaxis: [] not indicated [x] PPI   Glucose Control: Goal -180 [x] RISS while on dexamethasone

## 2022-06-03 NOTE — PROGRESS NOTE ADULT - SUBJECTIVE AND OBJECTIVE BOX
HPI:  61F, PMH HTN and C-spine meningioma removal in 2018 in [place]. P/w dizziness, gait instability, and emesis x3d. MRI shows ethel-medullary, Right CPA, and Right temporal enhancing lesions c/f meningioma vs schwannoma, with some hydro. Exam: Mandarin-speaking, AOx3, EOMI no nystagmus, PERRL, no facial/drift, CONRAD 5/5, SILT. Slightly wide-based antalgic gait, positive Romberg’s    Day 2 post left far lateral for meningioma resection subtotal and sacrifice of left vertebral artery    OVERNIGHT EVENTS:   No acute events overnight.    VITALS:  T(C): , Max: 37 (06-02-22 @ 19:00)  HR:  (54 - 84)  BP:  (109/73 - 159/87)  ABP:  (121/67 - 161/79)  RR:  (11 - 31)  SpO2:  (95% - 100%)  Wt(kg): --  Device: Avea, Mode: AC/ CMV (Assist Control/ Continuous Mandatory Ventilation), RR (machine): 14, TV (machine): 400, FiO2: 30, PEEP: 5, ITime: 0.1, MAP: 10, PIP: 21    06-01-22 @ 07:01  -  06-02-22 @ 07:00  --------------------------------------------------------  IN: 1978 mL / OUT: 1850 mL / NET: 128 mL    06-02-22 @ 07:01  -  06-03-22 @ 06:51  --------------------------------------------------------  IN: 2820 mL / OUT: 1250 mL / NET: 1570 mL      LABS:  Na: 141 (06-02 @ 23:33), 142 (06-01 @ 20:30), 139 (05-31 @ 07:21)  K: 3.9 (06-02 @ 23:33), 3.6 (06-01 @ 20:30), 3.5 (05-31 @ 07:21)  Cl: 111 (06-02 @ 23:33), 105 (06-01 @ 20:30), 103 (05-31 @ 07:21)  CO2: 20 (06-02 @ 23:33), 20 (06-01 @ 20:30), 22 (05-31 @ 07:21)  BUN: 20 (06-02 @ 23:33), 8 (06-01 @ 20:30), 16 (05-31 @ 07:21)  Cr: 0.49 (06-02 @ 23:33), 0.46 (06-01 @ 20:30), 0.60 (05-31 @ 07:21)  Glu: 139(06-02 @ 23:33), 219(06-01 @ 20:30), 87(05-31 @ 07:21)    Hgb: 11.0 (06-02 @ 23:33), 13.1 (06-01 @ 20:30), 13.7 (05-31 @ 07:22)  Hct: 32.1 (06-02 @ 23:33), 37.0 (06-01 @ 20:30), 38.5 (05-31 @ 07:22)  WBC: 11.66 (06-02 @ 23:33), 12.71 (06-01 @ 20:30), 4.86 (05-31 @ 07:22)  Plt: 157 (06-02 @ 23:33), 222 (06-01 @ 20:30), 223 (05-31 @ 07:22)    INR: 0.98 05-31-22 @ 07:22  PTT: 28.4 05-31-22 @ 07:22    IMAGING:   Recent imaging studies were reviewed.    MEDICATIONS:  acetaminophen     Tablet .. 650 milliGRAM(s) Oral every 6 hours PRN  chlorhexidine 0.12% Liquid 15 milliLiter(s) Oral Mucosa every 12 hours  chlorhexidine 4% Liquid 1 Application(s) Topical <User Schedule>  dexAMETHasone  Injectable 4 milliGRAM(s) IV Push every 6 hours  dexMEDEtomidine Infusion 0.2 MICROgram(s)/kG/Hr IV Continuous <Continuous>  insulin lispro (ADMELOG) corrective regimen sliding scale   SubCutaneous every 6 hours  multivitamin 1 Tablet(s) Oral daily  oxyCODONE    IR 5 milliGRAM(s) Oral every 4 hours PRN  pantoprazole  Injectable 40 milliGRAM(s) IV Push daily  polyethylene glycol 3350 17 Gram(s) Oral two times a day  senna 2 Tablet(s) Oral at bedtime  sodium chloride 0.9%. 1000 milliLiter(s) IV Continuous <Continuous>    PHYSICAL EXAM:      General: No Acute Distress, ET tube in place   Neurological: AOx3, left eye unable to abduct, Left side intact power, right UE 3/5. Left LE 2/5  Pulmonary: Clear to Auscultation, No Rales, No Rhonchi, No Wheezes   Cardiovascular: S1, S2, Regular Rate and Rhythm   Gastrointestinal: Soft, Nontender, Nondistended   Extremities: No calf tenderness   Incision: CDI   HPI:  61F, PMH HTN and C-spine meningioma removal in 2018 in [place]. P/w dizziness, gait instability, and emesis x3d. MRI shows ethel-medullary, Right CPA, and Right temporal enhancing lesions c/f meningioma vs schwannoma, with some hydro. Exam: Mandarin-speaking, AOx3, EOMI no nystagmus, PERRL, no facial/drift, CONRAD 5/5, SILT. Slightly wide-based antalgic gait, positive Romberg’s    Day 2 post left far lateral for meningioma resection subtotal and sacrifice of left vertebral artery    OVERNIGHT EVENTS:   No acute events overnight.    VITALS:  T(C): , Max: 37 (06-02-22 @ 19:00)  HR:  (54 - 84)  BP:  (109/73 - 159/87)  ABP:  (121/67 - 161/79)  RR:  (11 - 31)  SpO2:  (95% - 100%)  Wt(kg): --  Device: Avea, Mode: AC/ CMV (Assist Control/ Continuous Mandatory Ventilation), RR (machine): 14, TV (machine): 400, FiO2: 30, PEEP: 5, ITime: 0.1, MAP: 10, PIP: 21    06-01-22 @ 07:01  -  06-02-22 @ 07:00  --------------------------------------------------------  IN: 1978 mL / OUT: 1850 mL / NET: 128 mL    06-02-22 @ 07:01  -  06-03-22 @ 06:51  --------------------------------------------------------  IN: 2820 mL / OUT: 1250 mL / NET: 1570 mL      LABS:  Na: 141 (06-02 @ 23:33), 142 (06-01 @ 20:30), 139 (05-31 @ 07:21)  K: 3.9 (06-02 @ 23:33), 3.6 (06-01 @ 20:30), 3.5 (05-31 @ 07:21)  Cl: 111 (06-02 @ 23:33), 105 (06-01 @ 20:30), 103 (05-31 @ 07:21)  CO2: 20 (06-02 @ 23:33), 20 (06-01 @ 20:30), 22 (05-31 @ 07:21)  BUN: 20 (06-02 @ 23:33), 8 (06-01 @ 20:30), 16 (05-31 @ 07:21)  Cr: 0.49 (06-02 @ 23:33), 0.46 (06-01 @ 20:30), 0.60 (05-31 @ 07:21)  Glu: 139(06-02 @ 23:33), 219(06-01 @ 20:30), 87(05-31 @ 07:21)    Hgb: 11.0 (06-02 @ 23:33), 13.1 (06-01 @ 20:30), 13.7 (05-31 @ 07:22)  Hct: 32.1 (06-02 @ 23:33), 37.0 (06-01 @ 20:30), 38.5 (05-31 @ 07:22)  WBC: 11.66 (06-02 @ 23:33), 12.71 (06-01 @ 20:30), 4.86 (05-31 @ 07:22)  Plt: 157 (06-02 @ 23:33), 222 (06-01 @ 20:30), 223 (05-31 @ 07:22)    INR: 0.98 05-31-22 @ 07:22  PTT: 28.4 05-31-22 @ 07:22    IMAGING:   Recent imaging studies were reviewed.    MEDICATIONS:  acetaminophen     Tablet .. 650 milliGRAM(s) Oral every 6 hours PRN  chlorhexidine 0.12% Liquid 15 milliLiter(s) Oral Mucosa every 12 hours  chlorhexidine 4% Liquid 1 Application(s) Topical <User Schedule>  dexAMETHasone  Injectable 4 milliGRAM(s) IV Push every 6 hours  dexMEDEtomidine Infusion 0.2 MICROgram(s)/kG/Hr IV Continuous <Continuous>  insulin lispro (ADMELOG) corrective regimen sliding scale   SubCutaneous every 6 hours  multivitamin 1 Tablet(s) Oral daily  oxyCODONE    IR 5 milliGRAM(s) Oral every 4 hours PRN  pantoprazole  Injectable 40 milliGRAM(s) IV Push daily  polyethylene glycol 3350 17 Gram(s) Oral two times a day  senna 2 Tablet(s) Oral at bedtime  sodium chloride 0.9%. 1000 milliLiter(s) IV Continuous <Continuous>    PHYSICAL EXAM:      General: No Acute Distress, ET tube in place   Neurological: AOx3, left eye unable to abduct, Left side intact power, right UE 2/5. Left LE 1/5  Pulmonary: Clear to Auscultation, No Rales, No Rhonchi, No Wheezes   Cardiovascular: S1, S2, Regular Rate and Rhythm   Gastrointestinal: Soft, Nontender, Nondistended   Extremities: No calf tenderness   Incision: CDI

## 2022-06-03 NOTE — PROGRESS NOTE ADULT - ASSESSMENT
61F s/p L far lateral for brainstem mening partial rxn c/b intraop L vert sacrifice, postop with slowly improving exam still intubated.   - MRI brain today  - cont q2h neuro checks  - ENT eval for lower CN dysfunction  - SQL  - Keppra  - Dex

## 2022-06-03 NOTE — PROGRESS NOTE ADULT - SUBJECTIVE AND OBJECTIVE BOX
NEUROCRITICAL CARE  EVENING NOTE    BRIEF SUMMARY:  L far lateral crani for meningioma resection w/ subtotal sacrifice of L vertebral artery POD 2    VITALS/IMAGING/DATA/IVF FLUIDS/MEDICATIONS: [x] Reviewed    ALLERGIES: Allergies  No Known Allergies  Intolerances      REVIEW OF SYSTEMS: [] Unable to Assess due to neurologic exam   [ ] All ROS addressed below are non-contributory, except:  Neuro: [ ] Headache [ ] Back pain [ ] Numbness [ ] Weakness [ ] Ataxia [ ] Dizziness [ ] Aphasia [ ] Dysarthria [ ] Visual disturbance  Resp: [ ] Shortness of breath/dyspnea [ ] Orthopnea [ ] Cough  CV: [ ] Chest pain [ ] Palpitation [ ] Lightheadedness [ ] Syncope  Renal: [ ] Thirst [ ] Edema  GI: [ ] Nausea [ ] Emesis [ ] Abdominal pain [ ] Constipation [ ] Diarrhea  Hem: [ ] Hematemesis [ ] bBright red blood per rectum  ID: [ ] Fever [ ] Chills [ ] Dysuria  ENT: [ ] Rhinorrhea    EXAMINATION:  General: No acute distress  HEENT: Anicteric sclerae  Cardiac: [id number]  Lungs: Clear  Abdomen: Soft, non-tender, +BS  Extremities: No c/c/e  Skin/Incision Site: Clean, dry and intact  Neurologic: Aox3 to choice, follows commands, no cough, over breaths, 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 Residual edema in the ethel and medulla and mass effect on the fourth ventricle remains. Extra-axial small amount of postop hemorrhage identified in this region. Right frontal small amount of extra-axial fluid. 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    < end of copied text >

## 2022-06-04 PROCEDURE — 99291 CRITICAL CARE FIRST HOUR: CPT

## 2022-06-04 PROCEDURE — 71045 X-RAY EXAM CHEST 1 VIEW: CPT | Mod: 26

## 2022-06-04 RX ORDER — LOSARTAN POTASSIUM 100 MG/1
50 TABLET, FILM COATED ORAL DAILY
Refills: 0 | Status: DISCONTINUED | OUTPATIENT
Start: 2022-06-04 | End: 2022-06-06

## 2022-06-04 RX ORDER — HYDRALAZINE HCL 50 MG
5 TABLET ORAL ONCE
Refills: 0 | Status: COMPLETED | OUTPATIENT
Start: 2022-06-04 | End: 2022-06-04

## 2022-06-04 RX ADMIN — DEXMEDETOMIDINE HYDROCHLORIDE IN 0.9% SODIUM CHLORIDE 2.7 MICROGRAM(S)/KG/HR: 4 INJECTION INTRAVENOUS at 07:14

## 2022-06-04 RX ADMIN — CHLORHEXIDINE GLUCONATE 15 MILLILITER(S): 213 SOLUTION TOPICAL at 17:09

## 2022-06-04 RX ADMIN — Medication 1 TABLET(S): at 11:15

## 2022-06-04 RX ADMIN — POLYETHYLENE GLYCOL 3350 17 GRAM(S): 17 POWDER, FOR SOLUTION ORAL at 17:09

## 2022-06-04 RX ADMIN — CHLORHEXIDINE GLUCONATE 1 APPLICATION(S): 213 SOLUTION TOPICAL at 21:39

## 2022-06-04 RX ADMIN — Medication 4 MILLIGRAM(S): at 23:23

## 2022-06-04 RX ADMIN — Medication 4 MILLIGRAM(S): at 05:37

## 2022-06-04 RX ADMIN — LOSARTAN POTASSIUM 50 MILLIGRAM(S): 100 TABLET, FILM COATED ORAL at 05:37

## 2022-06-04 RX ADMIN — Medication 650 MILLIGRAM(S): at 15:18

## 2022-06-04 RX ADMIN — PANTOPRAZOLE SODIUM 40 MILLIGRAM(S): 20 TABLET, DELAYED RELEASE ORAL at 11:14

## 2022-06-04 RX ADMIN — Medication 4 MILLIGRAM(S): at 17:09

## 2022-06-04 RX ADMIN — CHLORHEXIDINE GLUCONATE 15 MILLILITER(S): 213 SOLUTION TOPICAL at 05:37

## 2022-06-04 RX ADMIN — Medication 4 MILLIGRAM(S): at 11:15

## 2022-06-04 RX ADMIN — Medication 5 MILLIGRAM(S): at 01:30

## 2022-06-04 RX ADMIN — DEXMEDETOMIDINE HYDROCHLORIDE IN 0.9% SODIUM CHLORIDE 2.7 MICROGRAM(S)/KG/HR: 4 INJECTION INTRAVENOUS at 21:39

## 2022-06-04 RX ADMIN — POLYETHYLENE GLYCOL 3350 17 GRAM(S): 17 POWDER, FOR SOLUTION ORAL at 05:37

## 2022-06-04 RX ADMIN — Medication 650 MILLIGRAM(S): at 16:00

## 2022-06-04 RX ADMIN — ENOXAPARIN SODIUM 40 MILLIGRAM(S): 100 INJECTION SUBCUTANEOUS at 17:10

## 2022-06-04 RX ADMIN — DEXMEDETOMIDINE HYDROCHLORIDE IN 0.9% SODIUM CHLORIDE 2.7 MICROGRAM(S)/KG/HR: 4 INJECTION INTRAVENOUS at 05:38

## 2022-06-04 NOTE — PROGRESS NOTE ADULT - ASSESSMENT
61F s/p L far lateral for brainstem mening partial rxn c/b intraop L vert sacrifice, postop with slowly improving exam still intubated.   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

## 2022-06-04 NOTE — PROGRESS NOTE ADULT - ASSESSMENT
ASSESSMENT:  L far lateral crani for meningioma resection w/ subtotal sacrifice of L vertebral artery POD 1    PLAN:  q4 neurocheck  c/w dexamethasone 4q6, wean per nsgy  MRI w/ edema around medualla and ethel, likely will need early trach and peg will monitor over the weekend, if no improvement in cough/gag will c/s ENT & GI  Feeding: [x] tube feeds c/w IVF   Analgesia/Sedation: tylenols/oxycodone PRN, Precedex ggt RASS goal 0 to -1   Seizure prophylaxis: [x] not indicated  Thromboprophylaxis: [x] SCDs [x] hold chemoprophylaxis due to: lovenox  Head of Bed/Activity: [x] 30 degrees [] mobilize as tolerated [x] PT [x] OT  Ulcer prophylaxis: [] not indicated [x] PPI   Glucose Control: Goal -180 [x] RISS while on dexamethasone

## 2022-06-04 NOTE — PROGRESS NOTE ADULT - SUBJECTIVE AND OBJECTIVE BOX
HPI:  61F, PMH HTN and C-spine meningioma removal in 2018 in [place]. P/w dizziness, gait instability, and emesis x3d. MRI shows ethel-medullary, Right CPA, and Right temporal enhancing lesions c/f meningioma vs schwannoma, with some hydro. Exam: Mandarin-speaking, AOx3, EOMI no nystagmus, PERRL, no facial/drift, CONRAD 5/5, SILT. Slightly wide-based antalgic gait, positive Romberg’s    Day 3 post left far lateral for meningioma resection subtotal and sacrifice of left vertebral artery    OVERNIGHT EVENTS:   No acute events overnight.    VITALS:  T(C): , Max: 37.5 (06-03-22 @ 15:00)  HR:  (59 - 87)  BP:  (139/87 - 163/97)  ABP: --  RR:  (14 - 34)  SpO2:  (95% - 100%)  Wt(kg): --  Device: Avea, Mode: AC/ CMV (Assist Control/ Continuous Mandatory Ventilation), RR (machine): 14, TV (machine): 400, FiO2: 30, PEEP: 5, ITime: 1, MAP: 9, PIP: 21    06-02-22 @ 07:01  -  06-03-22 @ 07:00  --------------------------------------------------------  IN: 2820 mL / OUT: 1250 mL / NET: 1570 mL    06-03-22 @ 07:01  -  06-04-22 @ 06:36  --------------------------------------------------------  IN: 1641.4 mL / OUT: 2500 mL / NET: -858.6 mL      LABS:  Na: 139 (06-03 @ 23:05), 141 (06-02 @ 23:33), 142 (06-01 @ 20:30)  K: 4.7 (06-03 @ 23:05), 3.9 (06-02 @ 23:33), 3.6 (06-01 @ 20:30)  Cl: 102 (06-03 @ 23:05), 111 (06-02 @ 23:33), 105 (06-01 @ 20:30)  CO2: 25 (06-03 @ 23:05), 20 (06-02 @ 23:33), 20 (06-01 @ 20:30)  BUN: 16 (06-03 @ 23:05), 20 (06-02 @ 23:33), 8 (06-01 @ 20:30)  Cr: 0.39 (06-03 @ 23:05), 0.49 (06-02 @ 23:33), 0.46 (06-01 @ 20:30)  Glu: 175(06-03 @ 23:05), 139(06-02 @ 23:33), 219(06-01 @ 20:30)    Hgb: 11.1 (06-03 @ 23:05), 11.0 (06-02 @ 23:33), 13.1 (06-01 @ 20:30)  Hct: 31.4 (06-03 @ 23:05), 32.1 (06-02 @ 23:33), 37.0 (06-01 @ 20:30)  WBC: 8.77 (06-03 @ 23:05), 11.66 (06-02 @ 23:33), 12.71 (06-01 @ 20:30)  Plt: 196 (06-03 @ 23:05), 157 (06-02 @ 23:33), 222 (06-01 @ 20:30)    INR:   PTT:     IMAGING:   Recent imaging studies were reviewed.    MEDICATIONS:  acetaminophen     Tablet .. 650 milliGRAM(s) Oral every 6 hours PRN  chlorhexidine 0.12% Liquid 15 milliLiter(s) Oral Mucosa every 12 hours  chlorhexidine 4% Liquid 1 Application(s) Topical <User Schedule>  dexAMETHasone  Injectable 4 milliGRAM(s) IV Push every 6 hours  dexMEDEtomidine Infusion 0.2 MICROgram(s)/kG/Hr IV Continuous <Continuous>  enoxaparin Injectable 40 milliGRAM(s) SubCutaneous <User Schedule>  losartan 50 milliGRAM(s) Oral daily  multivitamin 1 Tablet(s) Oral daily  oxyCODONE    IR 5 milliGRAM(s) Oral every 4 hours PRN  pantoprazole  Injectable 40 milliGRAM(s) IV Push daily  polyethylene glycol 3350 17 Gram(s) Oral two times a day  senna 2 Tablet(s) Oral at bedtime    PHYSICAL EXAM:    General: No Acute Distress, ET tube in place   Neurological: AOx3, left eye unable to abduct, Left side intact power, right UE 2/5. Left LE 1/5  Pulmonary: Clear to Auscultation, No Rales, No Rhonchi, No Wheezes   Cardiovascular: S1, S2, Regular Rate and Rhythm   Gastrointestinal: Soft, Nontender, Nondistended   Extremities: No calf tenderness   Incision: CDI   HPI:  61F, PMH HTN and C-spine meningioma removal in 2018 in [place]. P/w dizziness, gait instability, and emesis x3d. MRI shows ethel-medullary, Right CPA, and Right temporal enhancing lesions c/f meningioma vs schwannoma, with some hydro. Exam: Mandarin-speaking, AOx3, EOMI no nystagmus, PERRL, no facial/drift, CONRAD 5/5, SILT. Slightly wide-based antalgic gait, positive Romberg’s    Day 3 post left far lateral for meningioma resection subtotal and sacrifice of left vertebral artery    OVERNIGHT EVENTS:   No acute events overnight.    VITALS:  T(C): , Max: 37.5 (06-03-22 @ 15:00)  HR:  (59 - 87)  BP:  (139/87 - 163/97)  ABP: --  RR:  (14 - 34)  SpO2:  (95% - 100%)  Wt(kg): --  Device: Avea, Mode: AC/ CMV (Assist Control/ Continuous Mandatory Ventilation), RR (machine): 14, TV (machine): 400, FiO2: 30, PEEP: 5, ITime: 1, MAP: 9, PIP: 21    06-02-22 @ 07:01  -  06-03-22 @ 07:00  --------------------------------------------------------  IN: 2820 mL / OUT: 1250 mL / NET: 1570 mL    06-03-22 @ 07:01  -  06-04-22 @ 06:36  --------------------------------------------------------  IN: 1641.4 mL / OUT: 2500 mL / NET: -858.6 mL      LABS:  Na: 139 (06-03 @ 23:05), 141 (06-02 @ 23:33), 142 (06-01 @ 20:30)  K: 4.7 (06-03 @ 23:05), 3.9 (06-02 @ 23:33), 3.6 (06-01 @ 20:30)  Cl: 102 (06-03 @ 23:05), 111 (06-02 @ 23:33), 105 (06-01 @ 20:30)  CO2: 25 (06-03 @ 23:05), 20 (06-02 @ 23:33), 20 (06-01 @ 20:30)  BUN: 16 (06-03 @ 23:05), 20 (06-02 @ 23:33), 8 (06-01 @ 20:30)  Cr: 0.39 (06-03 @ 23:05), 0.49 (06-02 @ 23:33), 0.46 (06-01 @ 20:30)  Glu: 175(06-03 @ 23:05), 139(06-02 @ 23:33), 219(06-01 @ 20:30)    Hgb: 11.1 (06-03 @ 23:05), 11.0 (06-02 @ 23:33), 13.1 (06-01 @ 20:30)  Hct: 31.4 (06-03 @ 23:05), 32.1 (06-02 @ 23:33), 37.0 (06-01 @ 20:30)  WBC: 8.77 (06-03 @ 23:05), 11.66 (06-02 @ 23:33), 12.71 (06-01 @ 20:30)  Plt: 196 (06-03 @ 23:05), 157 (06-02 @ 23:33), 222 (06-01 @ 20:30)    INR:   PTT:     IMAGING:   Recent imaging studies were reviewed.    MEDICATIONS:  acetaminophen     Tablet .. 650 milliGRAM(s) Oral every 6 hours PRN  chlorhexidine 0.12% Liquid 15 milliLiter(s) Oral Mucosa every 12 hours  chlorhexidine 4% Liquid 1 Application(s) Topical <User Schedule>  dexAMETHasone  Injectable 4 milliGRAM(s) IV Push every 6 hours  dexMEDEtomidine Infusion 0.2 MICROgram(s)/kG/Hr IV Continuous <Continuous>  enoxaparin Injectable 40 milliGRAM(s) SubCutaneous <User Schedule>  losartan 50 milliGRAM(s) Oral daily  multivitamin 1 Tablet(s) Oral daily  oxyCODONE    IR 5 milliGRAM(s) Oral every 4 hours PRN  pantoprazole  Injectable 40 milliGRAM(s) IV Push daily  polyethylene glycol 3350 17 Gram(s) Oral two times a day  senna 2 Tablet(s) Oral at bedtime    PHYSICAL EXAM:    General: No Acute Distress, ET tube in place   Neurological: AOx3, left eye unable to abduct, Left side intact power, right UE 3/5. Left LE 2/5  Pulmonary: Clear to Auscultation, No Rales, No Rhonchi, No Wheezes   Cardiovascular: S1, S2, Regular Rate and Rhythm   Gastrointestinal: Soft, Nontender, Nondistended   Extremities: No calf tenderness   Incision: CDI

## 2022-06-04 NOTE — PROGRESS NOTE ADULT - SUBJECTIVE AND OBJECTIVE BOX
NEUROCRITICAL CARE  EVENING NOTE    BRIEF SUMMARY:  L far lateral crani for meningioma resection w/ subtotal sacrifice of L vertebral artery POD 3    VITALS/IMAGING/DATA/IVF FLUIDS/MEDICATIONS: [x] Reviewed    ALLERGIES: Allergies  No Known Allergies  Intolerances      REVIEW OF SYSTEMS: [] Unable to Assess due to neurologic exam   [ x] All ROS addressed below are non-contributory, except:  Neuro: [ ] Headache [ ] Back pain [ ] Numbness [x ] Weakness [ ] Ataxia [ ] Dizziness [ ] Aphasia [ ] Dysarthria [ ] Visual disturbance  Resp: [ ] Shortness of breath/dyspnea [ ] Orthopnea [ ] Cough  CV: [ ] Chest pain [ ] Palpitation [ ] Lightheadedness [ ] Syncope  Renal: [ ] Thirst [ ] Edema  GI: [ ] Nausea [ ] Emesis [ ] Abdominal pain [ ] Constipation [ ] Diarrhea  Hem: [ ] Hematemesis [ ] bBright red blood per rectum  ID: [ ] Fever [ ] Chills [ ] Dysuria  ENT: [ ] Rhinorrhea    EXAMINATION:  General: No acute distress  HEENT: Anicteric sclerae  Cardiac: [id number]  Lungs: Clear  Abdomen: Soft, non-tender, +BS  Extremities: No c/c/e  Skin/Incision Site: Clean, dry and intact  Neurologic: Aox3 to choice, follows commands, +cough, over breaths, 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 Residual edema in the ethel and medulla and mass effect on the fourth ventricle remains. Extra-axial small amount of postop hemorrhage identified in this region. Right frontal small amount of extra-axial fluid. 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    < end of copied text >

## 2022-06-04 NOTE — PROGRESS NOTE ADULT - SUBJECTIVE AND OBJECTIVE BOX
Patient seen and examined at bedside.    --Anticoagulation--  enoxaparin Injectable 40 milliGRAM(s) SubCutaneous <User Schedule>    T(C): 36.9 (06-03-22 @ 19:00), Max: 37.5 (06-03-22 @ 15:00)  HR: 59 (06-04-22 @ 03:17) (59 - 87)  BP: 163/97 (06-04-22 @ 01:00) (138/86 - 163/97)  RR: 22 (06-04-22 @ 02:00) (14 - 34)  SpO2: 99% (06-04-22 @ 03:17) (95% - 100%)  Wt(kg): --    Exam:  intubated, AOx2 (choices) 3mmR, L CN6 palsy,  FC on Lt, RUE B/T/WF/WE 2/5, Right HG 1/5, RLE 1/5  wiggles toes, Lt side spont/AG, Able to shrug and stick out tongue    Labs:                        11.1   8.77  )-----------( 196      ( 03 Jun 2022 23:05 )             31.4     06-03    139  |  102  |  16  ----------------------------<  175<H>  4.7   |  25  |  0.39<L>    Ca    8.7      03 Jun 2022 23:05  Phos  2.7     06-02  Mg     2.5     06-02

## 2022-06-04 NOTE — PROGRESS NOTE ADULT - ASSESSMENT
Assessment:   L far lateral crani for meningioma resection w/ subtotal sacrifice of L verebral artery POD 1    NEURO:  -neuro check q2  -c/w dexamethasone 4q6 per nsgy  -pain management w/ Tylenol & fent pushes   -sedation w/ Precedex   -MRI brain showed surgical site edema   -PT/OT evaluation     PULMONARY:   Intubated  PRVC 14/400/5/30  CPAP as tolerated    CARDIOVASCULAR:  monitor on telemetry   vitals q1  sbp goal 100-160  Hold home anti-HTN meds   TTE     GASTROENTEROLOGY:  NGT feeding   ensure BMs w/ Miralax & senna  GI ppx : Protonix 40mg qd    RENAL/:  -IVL  -check BMP qd  -dc [place]burn     ENDOCRINE:  A1c- 5.8  TSH/t3/t4-normal   HISS while on decadron     HEME/ONC:  b/l SCDs  Consider starting Lovenox  Obtain b/l LE screening dopplers    INFECTIOUS:   Monitor for fevers     at risk for deterioration due to post op hemorrhage, cerebral edema, respiratory failure requiring intubation   ICU  full code

## 2022-06-05 LAB
ANION GAP SERPL CALC-SCNC: 11 MMOL/L — SIGNIFICANT CHANGE UP (ref 5–17)
ANION GAP SERPL CALC-SCNC: 11 MMOL/L — SIGNIFICANT CHANGE UP (ref 5–17)
BUN SERPL-MCNC: 18 MG/DL — SIGNIFICANT CHANGE UP (ref 7–23)
BUN SERPL-MCNC: 20 MG/DL — SIGNIFICANT CHANGE UP (ref 7–23)
CALCIUM SERPL-MCNC: 8.9 MG/DL — SIGNIFICANT CHANGE UP (ref 8.4–10.5)
CALCIUM SERPL-MCNC: 9.1 MG/DL — SIGNIFICANT CHANGE UP (ref 8.4–10.5)
CHLORIDE SERPL-SCNC: 100 MMOL/L — SIGNIFICANT CHANGE UP (ref 96–108)
CHLORIDE SERPL-SCNC: 98 MMOL/L — SIGNIFICANT CHANGE UP (ref 96–108)
CO2 SERPL-SCNC: 25 MMOL/L — SIGNIFICANT CHANGE UP (ref 22–31)
CO2 SERPL-SCNC: 27 MMOL/L — SIGNIFICANT CHANGE UP (ref 22–31)
CREAT SERPL-MCNC: 0.31 MG/DL — LOW (ref 0.5–1.3)
CREAT SERPL-MCNC: 0.35 MG/DL — LOW (ref 0.5–1.3)
EGFR: 118 ML/MIN/1.73M2 — SIGNIFICANT CHANGE UP
EGFR: 121 ML/MIN/1.73M2 — SIGNIFICANT CHANGE UP
GLUCOSE SERPL-MCNC: 165 MG/DL — HIGH (ref 70–99)
GLUCOSE SERPL-MCNC: 169 MG/DL — HIGH (ref 70–99)
HCT VFR BLD CALC: 34.6 % — SIGNIFICANT CHANGE UP (ref 34.5–45)
HCT VFR BLD CALC: 35.2 % — SIGNIFICANT CHANGE UP (ref 34.5–45)
HGB BLD-MCNC: 11.4 G/DL — LOW (ref 11.5–15.5)
HGB BLD-MCNC: 12 G/DL — SIGNIFICANT CHANGE UP (ref 11.5–15.5)
MAGNESIUM SERPL-MCNC: 2.3 MG/DL — SIGNIFICANT CHANGE UP (ref 1.6–2.6)
MAGNESIUM SERPL-MCNC: 2.4 MG/DL — SIGNIFICANT CHANGE UP (ref 1.6–2.6)
MCHC RBC-ENTMCNC: 31.1 PG — SIGNIFICANT CHANGE UP (ref 27–34)
MCHC RBC-ENTMCNC: 31.4 PG — SIGNIFICANT CHANGE UP (ref 27–34)
MCHC RBC-ENTMCNC: 32.9 GM/DL — SIGNIFICANT CHANGE UP (ref 32–36)
MCHC RBC-ENTMCNC: 34.1 GM/DL — SIGNIFICANT CHANGE UP (ref 32–36)
MCV RBC AUTO: 92.1 FL — SIGNIFICANT CHANGE UP (ref 80–100)
MCV RBC AUTO: 94.3 FL — SIGNIFICANT CHANGE UP (ref 80–100)
NRBC # BLD: 0 /100 WBCS — SIGNIFICANT CHANGE UP (ref 0–0)
NRBC # BLD: 0 /100 WBCS — SIGNIFICANT CHANGE UP (ref 0–0)
PHOSPHATE SERPL-MCNC: 2.5 MG/DL — SIGNIFICANT CHANGE UP (ref 2.5–4.5)
PHOSPHATE SERPL-MCNC: 2.5 MG/DL — SIGNIFICANT CHANGE UP (ref 2.5–4.5)
PLATELET # BLD AUTO: 151 K/UL — SIGNIFICANT CHANGE UP (ref 150–400)
PLATELET # BLD AUTO: 180 K/UL — SIGNIFICANT CHANGE UP (ref 150–400)
POTASSIUM SERPL-MCNC: 4.5 MMOL/L — SIGNIFICANT CHANGE UP (ref 3.5–5.3)
POTASSIUM SERPL-MCNC: 4.8 MMOL/L — SIGNIFICANT CHANGE UP (ref 3.5–5.3)
POTASSIUM SERPL-SCNC: 4.5 MMOL/L — SIGNIFICANT CHANGE UP (ref 3.5–5.3)
POTASSIUM SERPL-SCNC: 4.8 MMOL/L — SIGNIFICANT CHANGE UP (ref 3.5–5.3)
RBC # BLD: 3.67 M/UL — LOW (ref 3.8–5.2)
RBC # BLD: 3.82 M/UL — SIGNIFICANT CHANGE UP (ref 3.8–5.2)
RBC # FLD: 11.9 % — SIGNIFICANT CHANGE UP (ref 10.3–14.5)
RBC # FLD: 12.1 % — SIGNIFICANT CHANGE UP (ref 10.3–14.5)
SODIUM SERPL-SCNC: 136 MMOL/L — SIGNIFICANT CHANGE UP (ref 135–145)
SODIUM SERPL-SCNC: 136 MMOL/L — SIGNIFICANT CHANGE UP (ref 135–145)
WBC # BLD: 7.69 K/UL — SIGNIFICANT CHANGE UP (ref 3.8–10.5)
WBC # BLD: 9.07 K/UL — SIGNIFICANT CHANGE UP (ref 3.8–10.5)
WBC # FLD AUTO: 7.69 K/UL — SIGNIFICANT CHANGE UP (ref 3.8–10.5)
WBC # FLD AUTO: 9.07 K/UL — SIGNIFICANT CHANGE UP (ref 3.8–10.5)

## 2022-06-05 PROCEDURE — 99291 CRITICAL CARE FIRST HOUR: CPT

## 2022-06-05 PROCEDURE — 71045 X-RAY EXAM CHEST 1 VIEW: CPT | Mod: 26

## 2022-06-05 RX ORDER — POTASSIUM PHOSPHATE, MONOBASIC POTASSIUM PHOSPHATE, DIBASIC 236; 224 MG/ML; MG/ML
15 INJECTION, SOLUTION INTRAVENOUS ONCE
Refills: 0 | Status: COMPLETED | OUTPATIENT
Start: 2022-06-05 | End: 2022-06-06

## 2022-06-05 RX ORDER — SODIUM CHLORIDE 9 MG/ML
1000 INJECTION, SOLUTION INTRAVENOUS
Refills: 0 | Status: DISCONTINUED | OUTPATIENT
Start: 2022-06-05 | End: 2022-06-07

## 2022-06-05 RX ADMIN — SODIUM CHLORIDE 50 MILLILITER(S): 9 INJECTION, SOLUTION INTRAVENOUS at 19:09

## 2022-06-05 RX ADMIN — LOSARTAN POTASSIUM 50 MILLIGRAM(S): 100 TABLET, FILM COATED ORAL at 05:12

## 2022-06-05 RX ADMIN — CHLORHEXIDINE GLUCONATE 15 MILLILITER(S): 213 SOLUTION TOPICAL at 05:12

## 2022-06-05 RX ADMIN — CHLORHEXIDINE GLUCONATE 15 MILLILITER(S): 213 SOLUTION TOPICAL at 17:16

## 2022-06-05 RX ADMIN — Medication 650 MILLIGRAM(S): at 12:50

## 2022-06-05 RX ADMIN — CHLORHEXIDINE GLUCONATE 1 APPLICATION(S): 213 SOLUTION TOPICAL at 22:11

## 2022-06-05 RX ADMIN — Medication 4 MILLIGRAM(S): at 11:55

## 2022-06-05 RX ADMIN — PANTOPRAZOLE SODIUM 40 MILLIGRAM(S): 20 TABLET, DELAYED RELEASE ORAL at 11:55

## 2022-06-05 RX ADMIN — Medication 1 TABLET(S): at 11:54

## 2022-06-05 RX ADMIN — Medication 650 MILLIGRAM(S): at 13:20

## 2022-06-05 RX ADMIN — Medication 4 MILLIGRAM(S): at 17:17

## 2022-06-05 RX ADMIN — DEXMEDETOMIDINE HYDROCHLORIDE IN 0.9% SODIUM CHLORIDE 2.7 MICROGRAM(S)/KG/HR: 4 INJECTION INTRAVENOUS at 19:09

## 2022-06-05 RX ADMIN — Medication 4 MILLIGRAM(S): at 23:58

## 2022-06-05 RX ADMIN — Medication 4 MILLIGRAM(S): at 05:12

## 2022-06-05 RX ADMIN — ENOXAPARIN SODIUM 40 MILLIGRAM(S): 100 INJECTION SUBCUTANEOUS at 17:16

## 2022-06-05 NOTE — PROGRESS NOTE ADULT - ASSESSMENT
Assessment:   L far lateral crani for meningioma resection w/ subtotal sacrifice of L verebral artery POD 1    NEURO:  -neuro check q2  -c/w dexamethasone 4q6 per nsgy  -pain management w/ Tylenol & fent pushes   -sedation w/ Precedex   -MRI brain showed surgical site edema   -PT/OT evaluation     PULMONARY:   Intubated  PRVC 14/400/5/30  CPAP as tolerated    CARDIOVASCULAR:  monitor on telemetry   vitals q1  sbp goal 100-160  Hold home anti-HTN meds   TTE     GASTROENTEROLOGY:  NGT feeding   ensure BMs w/ Miralax & senna  GI ppx : Protonix 40mg qd    RENAL/:  -IVL  -check BMP qd  -dc [place]     ENDOCRINE:  A1c- 5.8  TSH/t3/t4-normal   HISS while on decadron     HEME/ONC:  b/l SCDs  Consider starting Lovenox  Obtain b/l LE screening dopplers    INFECTIOUS:   Monitor for fevers     at risk for deterioration due to post op hemorrhage, cerebral edema, respiratory failure requiring intubation   ICU  full code   Assessment:   L far lateral crani for meningioma resection w/ subtotal sacrifice of L verebral artery POD 1    NEURO:  -neuro check q2  -c/w dexamethasone 4q6 per nsgy  -pain management w/ Tylenol & fent pushes   -sedation w/ Precedex   -MRI brain showed surgical site edema   -PT/OT evaluation     PULMONARY:   Intubated  PRVC 14/400/5/30  CPAP as tolerated  plan trial extubation tomorrow     CARDIOVASCULAR:  monitor on telemetry   vitals q4  sbp goal 100-160  Hold home anti-HTN meds   TTE     GASTROENTEROLOGY:  NGT feeding -npo after midnight for extubation   ensure BMs w/ Miralax & senna  GI ppx : Protonix 40mg qd    RENAL/:  -IV plasmalyte 50cc/hr   -check BMP qd    ENDOCRINE:  A1c- 5.8  TSH/t3/t4-normal       HEME/ONC:  b/l SCDs  Lovenox  Obtain b/l LE screening dopplers    INFECTIOUS:   Monitor for fevers     at risk for deterioration due to post op hemorrhage, cerebral edema, respiratory failure requiring intubation   ICU  full code

## 2022-06-05 NOTE — PROGRESS NOTE ADULT - SUBJECTIVE AND OBJECTIVE BOX
HPI:  61F, PMH HTN and C-spine meningioma removal in 2018 in [place]. P/w dizziness, gait instability, and emesis x3d. MRI shows ethel-medullary, Right CPA, and Right temporal enhancing lesions c/f meningioma vs schwannoma, with some hydro. Exam: Mandarin-speaking, AOx3, EOMI no nystagmus, PERRL, no facial/drift, CONRAD 5/5, SILT. Slightly wide-based antalgic gait, positive Romberg’s    Day 4 post left far lateral for meningioma resection subtotal and sacrifice of left vertebral artery    OVERNIGHT EVENTS:   No acute events overnight.    PHYSICAL EXAM:    General: No Acute Distress, ET tube in place   Neurological: AOx3, left eye unable to abduct, Left side intact power, right UE 3/5. Left LE 2/5  Pulmonary: Clear to Auscultation, No Rales, No Rhonchi, No Wheezes   Cardiovascular: S1, S2, Regular Rate and Rhythm   Gastrointestinal: Soft, Nontender, Nondistended   Extremities: No calf tenderness   Incision: CDI    IMAGING:   Recent imaging studies were reviewed      ICU Vital Signs Last 24 Hrs  T(C): 37.1 (04 Jun 2022 23:00), Max: 37.4 (04 Jun 2022 11:00)  T(F): 98.8 (04 Jun 2022 23:00), Max: 99.3 (04 Jun 2022 11:00)  HR: 81 (04 Jun 2022 23:00) (59 - 90)  BP: 138/87 (04 Jun 2022 23:00) (112/72 - 175/94)  BP(mean): 102 (04 Jun 2022 23:00) (85 - 118)  RR: 28 (04 Jun 2022 23:00) (18 - 28)  SpO2: 97% (04 Jun 2022 23:00) (97% - 100%)      06-03-22 @ 07:01  -  06-04-22 @ 07:00  --------------------------------------------------------  IN: 1641.4 mL / OUT: 2500 mL / NET: -858.6 mL    06-04-22 @ 07:01  -  06-05-22 @ 01:58  --------------------------------------------------------  IN: 1215 mL / OUT: 2100 mL / NET: -885 mL        Mode: CPAP with PS, FiO2: 30, PEEP: 5, PS: 10, MAP: 9, PIP: 16    acetaminophen     Tablet .. 650 milliGRAM(s) Oral every 6 hours PRN  chlorhexidine 0.12% Liquid 15 milliLiter(s) Oral Mucosa every 12 hours  chlorhexidine 4% Liquid 1 Application(s) Topical <User Schedule>  dexAMETHasone  Injectable 4 milliGRAM(s) IV Push every 6 hours  dexMEDEtomidine Infusion 0.2 MICROgram(s)/kG/Hr (2.7 mL/Hr) IV Continuous <Continuous>  enoxaparin Injectable 40 milliGRAM(s) SubCutaneous <User Schedule>  losartan 50 milliGRAM(s) Oral daily  multivitamin 1 Tablet(s) Oral daily  oxyCODONE    IR 5 milliGRAM(s) Oral every 4 hours PRN  pantoprazole  Injectable 40 milliGRAM(s) IV Push daily  polyethylene glycol 3350 17 Gram(s) Oral two times a day  senna 2 Tablet(s) Oral at bedtime      LABS:  Na: 139 (06-03 @ 23:05), 141 (06-02 @ 23:33)  K: 4.7 (06-03 @ 23:05), 3.9 (06-02 @ 23:33)  Cl: 102 (06-03 @ 23:05), 111 (06-02 @ 23:33)  CO2: 25 (06-03 @ 23:05), 20 (06-02 @ 23:33)  BUN: 16 (06-03 @ 23:05), 20 (06-02 @ 23:33)  Cr: 0.39 (06-03 @ 23:05), 0.49 (06-02 @ 23:33)  Glu: 175(06-03 @ 23:05), 139(06-02 @ 23:33)    Hgb: 11.1 (06-03 @ 23:05), 11.0 (06-02 @ 23:33)  Hct: 31.4 (06-03 @ 23:05), 32.1 (06-02 @ 23:33)  WBC: 8.77 (06-03 @ 23:05), 11.66 (06-02 @ 23:33)  Plt: 196 (06-03 @ 23:05), 157 (06-02 @ 23:33)

## 2022-06-05 NOTE — PROGRESS NOTE ADULT - SUBJECTIVE AND OBJECTIVE BOX
SUMMARY:   61 year-old woman status post left far lateral subtotal resection of foramen magnum meningioma with post-op course notable for bulbar palsy requiring re-intubation for airway protection.     24 HOUR EVENTS:  On steroids.     VITALS/DATA/ORDERS: [x] Reviewed    EXAMINATION:   Intubated, oriented x 2 with choices, left CN VI palsy, follows on the right, RUE 2/5 except HG 3/5, right leg 3-/5, left side spontaneous SUMMARY:   61 year-old woman status post left far lateral subtotal resection of foramen magnum meningioma with post-op course notable for bulbar palsy requiring re-intubation for airway protection.     24 HOUR EVENTS:  On steroids.     VITALS/DATA/ORDERS: [x] Reviewed    EXAMINATION:   Intubated, oriented x 2 with choices, left CN VI palsy, follows on the right, RUE 2/5 except HG 3/5, right leg 3-/5 at best, left side spontaneous

## 2022-06-05 NOTE — PROGRESS NOTE ADULT - ASSESSMENT
ASSESSMENT/PLAN: Foramen magnum meningioma, post-operative day 4 from resection; acute respiratory failure    - Steroids for cerebral and airway edema  - PS wean for trial extubation  - VAP bundle  - -160mmHg; continue BP meds  - IVF as will be NPO in early AM in anticipation for extubation    At risk of death/neuro decline due to: acute resp failure, cerebral edema/brain stem compression ASSESSMENT/PLAN: Foramen magnum meningioma, post-operative day 4 from resection; acute respiratory failure    - Precedex for sedation  - Steroids for cerebral and airway edema  - PS wean for trial extubation  - VAP bundle  - -160mmHg; continue BP meds  - IVF as will be NPO in early AM in anticipation for extubation    At risk of death/neuro decline due to: acute resp failure, cerebral edema/brain stem compression

## 2022-06-06 DIAGNOSIS — R49.0 DYSPHONIA: ICD-10-CM

## 2022-06-06 LAB
ANION GAP SERPL CALC-SCNC: 11 MMOL/L — SIGNIFICANT CHANGE UP (ref 5–17)
APPEARANCE UR: CLEAR — SIGNIFICANT CHANGE UP
BACTERIA # UR AUTO: NEGATIVE — SIGNIFICANT CHANGE UP
BILIRUB UR-MCNC: NEGATIVE — SIGNIFICANT CHANGE UP
BUN SERPL-MCNC: 15 MG/DL — SIGNIFICANT CHANGE UP (ref 7–23)
CALCIUM SERPL-MCNC: 9.3 MG/DL — SIGNIFICANT CHANGE UP (ref 8.4–10.5)
CHLORIDE SERPL-SCNC: 97 MMOL/L — SIGNIFICANT CHANGE UP (ref 96–108)
CO2 SERPL-SCNC: 27 MMOL/L — SIGNIFICANT CHANGE UP (ref 22–31)
COLOR SPEC: YELLOW — SIGNIFICANT CHANGE UP
CREAT SERPL-MCNC: <0.3 MG/DL — LOW (ref 0.5–1.3)
DIFF PNL FLD: NEGATIVE — SIGNIFICANT CHANGE UP
EGFR: 122 ML/MIN/1.73M2 — SIGNIFICANT CHANGE UP
EPI CELLS # UR: 1 /HPF — SIGNIFICANT CHANGE UP
GAS PNL BLDA: SIGNIFICANT CHANGE UP
GLUCOSE SERPL-MCNC: 143 MG/DL — HIGH (ref 70–99)
GLUCOSE UR QL: NEGATIVE — SIGNIFICANT CHANGE UP
GRAM STN FLD: SIGNIFICANT CHANGE UP
HCT VFR BLD CALC: 35.1 % — SIGNIFICANT CHANGE UP (ref 34.5–45)
HGB BLD-MCNC: 11.7 G/DL — SIGNIFICANT CHANGE UP (ref 11.5–15.5)
HYALINE CASTS # UR AUTO: 1 /LPF — SIGNIFICANT CHANGE UP (ref 0–2)
KETONES UR-MCNC: NEGATIVE — SIGNIFICANT CHANGE UP
LEUKOCYTE ESTERASE UR-ACNC: NEGATIVE — SIGNIFICANT CHANGE UP
MAGNESIUM SERPL-MCNC: 2.4 MG/DL — SIGNIFICANT CHANGE UP (ref 1.6–2.6)
MCHC RBC-ENTMCNC: 31 PG — SIGNIFICANT CHANGE UP (ref 27–34)
MCHC RBC-ENTMCNC: 33.3 GM/DL — SIGNIFICANT CHANGE UP (ref 32–36)
MCV RBC AUTO: 92.9 FL — SIGNIFICANT CHANGE UP (ref 80–100)
NITRITE UR-MCNC: NEGATIVE — SIGNIFICANT CHANGE UP
NRBC # BLD: 0 /100 WBCS — SIGNIFICANT CHANGE UP (ref 0–0)
PH UR: 7.5 — SIGNIFICANT CHANGE UP (ref 5–8)
PHOSPHATE SERPL-MCNC: 3.9 MG/DL — SIGNIFICANT CHANGE UP (ref 2.5–4.5)
PLATELET # BLD AUTO: 197 K/UL — SIGNIFICANT CHANGE UP (ref 150–400)
POTASSIUM SERPL-MCNC: 4.3 MMOL/L — SIGNIFICANT CHANGE UP (ref 3.5–5.3)
POTASSIUM SERPL-SCNC: 4.3 MMOL/L — SIGNIFICANT CHANGE UP (ref 3.5–5.3)
PROCALCITONIN SERPL-MCNC: 0.05 NG/ML — SIGNIFICANT CHANGE UP (ref 0.02–0.1)
PROT UR-MCNC: ABNORMAL
RBC # BLD: 3.78 M/UL — LOW (ref 3.8–5.2)
RBC # FLD: 12.3 % — SIGNIFICANT CHANGE UP (ref 10.3–14.5)
RBC CASTS # UR COMP ASSIST: 2 /HPF — SIGNIFICANT CHANGE UP (ref 0–4)
SODIUM SERPL-SCNC: 135 MMOL/L — SIGNIFICANT CHANGE UP (ref 135–145)
SP GR SPEC: 1.02 — SIGNIFICANT CHANGE UP (ref 1.01–1.02)
SPECIMEN SOURCE: SIGNIFICANT CHANGE UP
UROBILINOGEN FLD QL: ABNORMAL
WBC # BLD: 12.12 K/UL — HIGH (ref 3.8–10.5)
WBC # FLD AUTO: 12.12 K/UL — HIGH (ref 3.8–10.5)
WBC UR QL: 1 /HPF — SIGNIFICANT CHANGE UP (ref 0–5)

## 2022-06-06 PROCEDURE — 31575 DIAGNOSTIC LARYNGOSCOPY: CPT

## 2022-06-06 PROCEDURE — 99291 CRITICAL CARE FIRST HOUR: CPT

## 2022-06-06 PROCEDURE — 99292 CRITICAL CARE ADDL 30 MIN: CPT

## 2022-06-06 PROCEDURE — 99222 1ST HOSP IP/OBS MODERATE 55: CPT | Mod: 25

## 2022-06-06 PROCEDURE — 71045 X-RAY EXAM CHEST 1 VIEW: CPT | Mod: 26

## 2022-06-06 PROCEDURE — 93010 ELECTROCARDIOGRAM REPORT: CPT

## 2022-06-06 RX ORDER — LOSARTAN POTASSIUM 100 MG/1
100 TABLET, FILM COATED ORAL DAILY
Refills: 0 | Status: DISCONTINUED | OUTPATIENT
Start: 2022-06-06 | End: 2022-06-13

## 2022-06-06 RX ORDER — DEXAMETHASONE 0.5 MG/5ML
4 ELIXIR ORAL EVERY 8 HOURS
Refills: 0 | Status: DISCONTINUED | OUTPATIENT
Start: 2022-06-06 | End: 2022-06-09

## 2022-06-06 RX ORDER — ACETAMINOPHEN 500 MG
1000 TABLET ORAL ONCE
Refills: 0 | Status: COMPLETED | OUTPATIENT
Start: 2022-06-06 | End: 2022-06-06

## 2022-06-06 RX ORDER — ROBINUL 0.2 MG/ML
0.2 INJECTION INTRAMUSCULAR; INTRAVENOUS EVERY 6 HOURS
Refills: 0 | Status: DISCONTINUED | OUTPATIENT
Start: 2022-06-06 | End: 2022-06-06

## 2022-06-06 RX ORDER — HYDRALAZINE HCL 50 MG
10 TABLET ORAL ONCE
Refills: 0 | Status: COMPLETED | OUTPATIENT
Start: 2022-06-06 | End: 2022-06-06

## 2022-06-06 RX ORDER — ROBINUL 0.2 MG/ML
0.2 INJECTION INTRAMUSCULAR; INTRAVENOUS EVERY 6 HOURS
Refills: 0 | Status: DISCONTINUED | OUTPATIENT
Start: 2022-06-06 | End: 2022-06-08

## 2022-06-06 RX ADMIN — Medication 10 MILLIGRAM(S): at 11:10

## 2022-06-06 RX ADMIN — Medication 400 MILLIGRAM(S): at 20:26

## 2022-06-06 RX ADMIN — Medication 4 MILLIGRAM(S): at 05:55

## 2022-06-06 RX ADMIN — Medication 400 MILLIGRAM(S): at 04:05

## 2022-06-06 RX ADMIN — Medication 4 MILLIGRAM(S): at 13:33

## 2022-06-06 RX ADMIN — SENNA PLUS 2 TABLET(S): 8.6 TABLET ORAL at 21:35

## 2022-06-06 RX ADMIN — Medication 1 TABLET(S): at 13:33

## 2022-06-06 RX ADMIN — CHLORHEXIDINE GLUCONATE 15 MILLILITER(S): 213 SOLUTION TOPICAL at 05:55

## 2022-06-06 RX ADMIN — ROBINUL 0.2 MILLIGRAM(S): 0.2 INJECTION INTRAMUSCULAR; INTRAVENOUS at 19:47

## 2022-06-06 RX ADMIN — CHLORHEXIDINE GLUCONATE 1 APPLICATION(S): 213 SOLUTION TOPICAL at 21:36

## 2022-06-06 RX ADMIN — Medication 4 MILLIGRAM(S): at 21:35

## 2022-06-06 RX ADMIN — POTASSIUM PHOSPHATE, MONOBASIC POTASSIUM PHOSPHATE, DIBASIC 62.5 MILLIMOLE(S): 236; 224 INJECTION, SOLUTION INTRAVENOUS at 00:37

## 2022-06-06 RX ADMIN — SODIUM CHLORIDE 50 MILLILITER(S): 9 INJECTION, SOLUTION INTRAVENOUS at 07:16

## 2022-06-06 RX ADMIN — PANTOPRAZOLE SODIUM 40 MILLIGRAM(S): 20 TABLET, DELAYED RELEASE ORAL at 13:32

## 2022-06-06 RX ADMIN — DEXMEDETOMIDINE HYDROCHLORIDE IN 0.9% SODIUM CHLORIDE 2.7 MICROGRAM(S)/KG/HR: 4 INJECTION INTRAVENOUS at 07:16

## 2022-06-06 RX ADMIN — ENOXAPARIN SODIUM 40 MILLIGRAM(S): 100 INJECTION SUBCUTANEOUS at 17:13

## 2022-06-06 RX ADMIN — ROBINUL 0.2 MILLIGRAM(S): 0.2 INJECTION INTRAMUSCULAR; INTRAVENOUS at 13:32

## 2022-06-06 RX ADMIN — OXYCODONE HYDROCHLORIDE 5 MILLIGRAM(S): 5 TABLET ORAL at 21:35

## 2022-06-06 RX ADMIN — Medication 1000 MILLIGRAM(S): at 20:56

## 2022-06-06 RX ADMIN — LOSARTAN POTASSIUM 50 MILLIGRAM(S): 100 TABLET, FILM COATED ORAL at 05:55

## 2022-06-06 RX ADMIN — OXYCODONE HYDROCHLORIDE 5 MILLIGRAM(S): 5 TABLET ORAL at 22:05

## 2022-06-06 NOTE — CONSULT NOTE ADULT - PROBLEM SELECTOR RECOMMENDATION 9
surgical Intervention by the Neurosurgery team is pend   Cont with Keppra   cont with DVT prophylaxis
- Diet per SLP  - Can consider vocal cord injection once patient is stable   - Call with questions or concerns
Speech and Swallow Evaluation  Diet per Speech and Swallow   Reconsult PRN

## 2022-06-06 NOTE — PROGRESS NOTE ADULT - ASSESSMENT
Assessment:   L far lateral crani for meningioma resection w/ subtotal sacrifice of L verebral artery POD 1    NEURO:  -neuro check q2  -c/w dexamethasone 4q6 per nsgy  -pain management w/ Tylenol & fent pushes   -sedation w/ Precedex   -MRI brain showed surgical site edema   -PT/OT evaluation     PULMONARY:   Intubated  PRVC 14/400/5/30  CPAP as tolerated  plan trial extubation tomorrow     CARDIOVASCULAR:  monitor on telemetry   vitals q4  sbp goal 100-160  Hold home anti-HTN meds   TTE     GASTROENTEROLOGY:  NGT feeding -npo after midnight for extubation   ensure BMs w/ Miralax & senna  GI ppx : Protonix 40mg qd    RENAL/:  -IV plasmalyte 50cc/hr   -check BMP qd    ENDOCRINE:  A1c- 5.8  TSH/t3/t4-normal       HEME/ONC:  b/l SCDs  Lovenox  Obtain b/l LE screening dopplers    INFECTIOUS:   Monitor for fevers     at risk for deterioration due to post op hemorrhage, cerebral edema, respiratory failure requiring intubation   ICU  full code   Assessment:   L far lateral crani for meningioma resection w/ subtotal sacrifice of L verebral artery POD 5  remained intubated due to inability to protect airway    NEURO:  -neuro check q 4 hr   - taper dexamethasone to 4q4 per nsgy for brainstem edema   -pain management w/ Tylenol    -Precedex as needed for anxiety   -MRI brain showed surgical site edema   -PT/OT evaluation     PULMONARY:   was extubated today to BIPAP , will wean to FM  ENT consulted for ? vocal cord paralysis     CARDIOVASCULAR:  monitor on telemetry   vitals q4  sbp goal 100-160 losartan 50 mg daily , increase to 100 mg    GASTROENTEROLOGY:  NPO, keep NPO for possible need for reintubation   GI ppx : d/c  Protonix 40mg qd    RENAL/:  -IV plasmalyte 50cc/hr   -check BMP qd    ENDOCRINE:  A1c- 5.8  TSH/t3/t4-normal       HEME/ONC:  b/l SCDs  Lovenox    INFECTIOUS:   Monitor for fevers     at risk for deterioration due to post op hemorrhage, cerebral edema, respiratory failure requiring intubation   ICU  full code   Assessment:   L far lateral crani for meningioma resection w/ subtotal sacrifice of L verebral artery POD 5  remained intubated due to inability to protect airway    NEURO:  -neuro check q 4 hr   - taper dexamethasone to 4q4 per nsgy for brainstem edema   -pain management w/ Tylenol    -Precedex as needed for anxiety   -MRI brain showed surgical site edema   -PT/OT evaluation     PULMONARY:   was extubated today to BIPAP , will wean to FM  ENT consulted for ? vocal cord paralysis   nasal trumpet suction as needed     CARDIOVASCULAR:  monitor on telemetry   vitals q4  sbp goal 100-160 losartan 50 mg daily , increase to 100 mg    GASTROENTEROLOGY:  NPO, keep NPO for possible need for reintubation   GI ppx : d/c  Protonix 40mg qd    RENAL/:  -IV plasmalyte 50cc/hr   -check BMP qd    ENDOCRINE:  A1c- 5.8  TSH/t3/t4-normal       HEME/ONC:  b/l SCDs  Lovenox    INFECTIOUS:   febrile  procal neg   r/o aspiration pneumonitis     at risk for deterioration due to post op hemorrhage, cerebral edema, respiratory failure requiring intubation   ICU  full code

## 2022-06-06 NOTE — CONSULT NOTE ADULT - NS ATTEND AMEND GEN_ALL_CORE FT
tremendous pooling of secretions and left TVC immobile or poorly mobile on exam today   currently on BiPap.  Injection laryngoplasty is highly unlikely to prevent aspiration in this patient at current stage given the extent of her pooling and she would not be a candidate until she is off Bipap and stable from respiratory standpoint.   c/w current care, respiratory toilet, she may begin to compensate at least with her secretions, but would need time to do so.

## 2022-06-06 NOTE — PROGRESS NOTE ADULT - ASSESSMENT
ASSESSMENT/PLAN: Foramen magnum meningioma, post-operative day 4 from resection; acute respiratory failure    - Steroids for cerebral and airway edema  - Chest PT  - PRN glycopyrrolate for secretions; care not to over use and monitor for mucus plugging  - Hi-Bill  - Aspiration precautions  - Monitor for need for re-intubation  - VAP bundle  - -160mmHg; continue BP meds  - IVF as will be NPO for concern for need for re-intubation     At risk of death/neuro decline due to: acute resp failure, cerebral edema/brain stem compression

## 2022-06-06 NOTE — CONSULT NOTE ADULT - ASSESSMENT
61F PMH HTN now s/p meningioma resection subtotal and sacrifice of left vertebral artery. Team concerned for injury to CN 9/10. Now presenting with hoarseness. Currently NPO, on BiPAP, having difficulty with secretion management. Indirect laryngoscopy performed which was notable for left vocal cord paralysis and copious pooling of secretions with aspiration of secretions.

## 2022-06-06 NOTE — CHART NOTE - NSCHARTNOTEFT_GEN_A_CORE
Nutrition Follow Up Note  Patient seen for: Nutrition Follow Up    Chart reviewed, events noted. Pt is a 62 yo F with PMH: HTN and C-spine meningioma removal in 2018 in [place]. Presented with dizziness, gait instability, and emesis x 3 days. MRI shows ethel-medullary, R CPA and R temporal enhancing lesions c/f meningiomas vs schwannoma, with some hydro. S/P post left far lateral for meningoma resection subtotal and sacrifice of left vertebral artery. Extubated 6/6 in AM.     Source: [] Patient       [x] EMR        [x] RN        [] Family at bedside       [x] Other: interdisciplinary medical team    -If unable to interview patient: [x] Trach/Vent/BiPAP --> pt extubated at time of RD visit  [x] Disoriented/confused/inappropriate to interview    Diet Order:   Diet, NPO:   NPO for Procedure/Test     NPO Start Date: 06-Jun-2022,   NPO Start Time: 03:00 (06-05-22)  Diet, NPO with Tube Feed:   Tube Feeding Modality: Nasogastric  Glucerna 1.5 Ash (GLUCERNA1.5RTH)  Total Volume for 24 Hours (mL): 1440  Continuous  Starting Tube Feed Rate {mL per Hour}: 20  Increase Tube Feed Rate by (mL): 10     Every 4 hours  Until Goal Tube Feed Rate (mL per Hour): 60  Tube Feed Duration (in Hours): 24  Tube Feed Start Time: 11:00  Supplement Feeding Modality:  Nasogastric  Probiotic Yogurt/Smoothie Cans or Servings Per Day:  2       Frequency:  Daily (06-02-22)    EN Order Provides: 1440ml, 2160kcal and 119g protein   -currently held. EN providing > estimated energy needs when infusing at goal. See below for updated EN recommendations.     EN Provision (per nursing flow sheet):   (6/6): 240ml (thus far; feeds held for extubation)  (6/5): 1440ml (100% of goal)  (6/4): 1440ml (100% of goal)  (6/3): 1020ml (71% of goal)  (6/2): 330ml (23% of goal)    Nutrition-related concerns:   -Last BM: (6/5): x 5. On bowel regimen (senna, Miralax).   -Receiving Plasma-Lyte as prescribed + electrolyte repletion PRN.   -Continues on Decadron as prescribed. At risk for elevated FSBG. A1c 5.8%.     Weights:   Daily     MEDICATIONS  (STANDING):  dexAMETHasone  Injectable  losartan  multiple electrolytes Injection Type 1  multivitamin  pantoprazole  Injectable  polyethylene glycol 3350  senna    Pertinent Labs: 06-05 @ 22:00: Na 136, BUN 18, Cr 0.31<L>, <H>, K+ 4.8, Phos 2.5, Mg 2.4, Alk Phos --, ALT/SGPT --, AST/SGOT --, HbA1c --    A1C with Estimated Average Glucose Result: 5.8 % (06-01-22 @ 20:30)    Finger Sticks:    Triglycerides, Serum: 99 mg/dL (05-21-22 @ 06:26)    Skin per nursing documentation: right hip DTI  Edema: none noted    (based on dosing wt 119 lbs/54kg):   Estimated Energy Needs: (30-35kcal/kg): 1620-1890kcal   Estimated Protein Needs: (1.3-1.6g protein/kg): 70-86g protein  Estimated Fluid Needs: defer fluid needs to medical team    Previous Nutrition Diagnosis: acute severe protein calorie malnutrition and increased nutrient needs  Nutrition Diagnosis is: [x] ongoing  [] resolved [] not applicable     New Nutrition Diagnosis: [x] Not applicable    Nutrition Care Plan:  [x] In Progress       Recommendations:      1. If EN resumed, consider Glucerna 1.2 at 60ml/hr x 24 hrs. To provide (based on 54kg): 1440ml, 1728kcal (32kcal/kg) and 86g protein (1.6g protein/kg).   2. If PO diet advanced, consider consistent carbohydrate diet if persistently elevated FSBG. Defer consistency to medical team.   3. Continue multivitamin as prescribed. Recommend vitamin C to aid in wound healing.   4. Monitor wt trends/labs/skin integrity/hydration status/bowel regularity.     Monitoring and Evaluation:   Continue to monitor nutritional intake, tolerance to diet prescription, weights, labs, skin integrity      RD remains available upon request and will follow up per protocol

## 2022-06-06 NOTE — PROGRESS NOTE ADULT - SUBJECTIVE AND OBJECTIVE BOX
SUMMARY:   61 year-old woman status post left far lateral subtotal resection of foramen magnum meningioma with post-op course notable for bulbar palsy requiring re-intubation for airway protection.     24 HOUR EVENTS:  Extubated, but had immediate episode of respiratory distress managed initially with BiPAP then Hi-Bill. Noted to have significant secretions, improved with glycopyrrolate. Had another episode of desaturation to 80s after suctioning but self-resolved. Of note, seen by ENT who noted left complete vocal cord paralysis.     VITALS/DATA/ORDERS: [x] Reviewed    EXAMINATION:   Oriented x 2 with choices, left CN VI palsy, weak cough, follows on the right, RUE 2/5 except HG 3/5, right leg 3-/5 at best, left side spontaneous SUMMARY:   61 year-old woman status post left far lateral subtotal resection of foramen magnum meningioma with post-op course notable for bulbar palsy requiring re-intubation for airway protection.     24 HOUR EVENTS:  Extubated, but had immediate episode of respiratory distress managed initially with BiPAP then Hi-Bill. Noted to have significant secretions, improved with glycopyrrolate. Had another episode of desaturation to 80s after suctioning but self-resolved. Of note, seen by ENT who noted left complete vocal cord paralysis.     VITALS/DATA/ORDERS: [x] Reviewed    EXAMINATION:   Oriented x 2 with choices, left CN VI palsy, weak cough, hoarse/hypophonic voice, follows on the right, RUE 2/5 except HG 3/5, right leg 3-/5 at best, left side spontaneous

## 2022-06-06 NOTE — PROGRESS NOTE ADULT - SUBJECTIVE AND OBJECTIVE BOX
HPI:  61F, PMH HTN and C-spine meningioma removal in 2018 in [place]. P/w dizziness, gait instability, and emesis x3d. MRI shows ethel-medullary, Right CPA, and Right temporal enhancing lesions c/f meningioma vs schwannoma, with some hydro. Exam: Mandarin-speaking, AOx3, EOMI no nystagmus, PERRL, no facial/drift, CONRAD 5/5, SILT. Slightly wide-based antalgic gait, positive Romberg’s    Day 5 post left far lateral for meningioma resection subtotal and sacrifice of left vertebral artery    OVERNIGHT EVENTS:   No acute events overnight.    VITALS:  T(C): , Max: 38.6 (06-06-22 @ 03:00)  HR:  (77 - 113)  BP:  (116/89 - 169/100)  ABP: --  RR:  (15 - 29)  SpO2:  (93% - 98%)  Wt(kg): --  Device: Avea, Mode: CPAP with PS, FiO2: 30, PEEP: 5, PS: 10, MAP: 10, PIP: 16    06-04-22 @ 07:01  -  06-05-22 @ 07:00  --------------------------------------------------------  IN: 1626 mL / OUT: 2650 mL / NET: -1024 mL    06-05-22 @ 07:01  -  06-06-22 @ 06:47  --------------------------------------------------------  IN: 2708.5 mL / OUT: 2500 mL / NET: 208.5 mL      LABS:  Na: 136 (06-05 @ 22:00), 136 (06-05 @ 07:13), 139 (06-03 @ 23:05)  K: 4.8 (06-05 @ 22:00), 4.5 (06-05 @ 07:13), 4.7 (06-03 @ 23:05)  Cl: 98 (06-05 @ 22:00), 100 (06-05 @ 07:13), 102 (06-03 @ 23:05)  CO2: 27 (06-05 @ 22:00), 25 (06-05 @ 07:13), 25 (06-03 @ 23:05)  BUN: 18 (06-05 @ 22:00), 20 (06-05 @ 07:13), 16 (06-03 @ 23:05)  Cr: 0.31 (06-05 @ 22:00), 0.35 (06-05 @ 07:13), 0.39 (06-03 @ 23:05)  Glu: 169(06-05 @ 22:00), 165(06-05 @ 07:13), 175(06-03 @ 23:05)    Hgb: 12.0 (06-05 @ 22:00), 11.4 (06-05 @ 07:11), 11.1 (06-03 @ 23:05)  Hct: 35.2 (06-05 @ 22:00), 34.6 (06-05 @ 07:11), 31.4 (06-03 @ 23:05)  WBC: 9.07 (06-05 @ 22:00), 7.69 (06-05 @ 07:11), 8.77 (06-03 @ 23:05)  Plt: 180 (06-05 @ 22:00), 151 (06-05 @ 07:11), 196 (06-03 @ 23:05)    INR:   PTT:     IMAGING:   Recent imaging studies were reviewed.    MEDICATIONS:  acetaminophen     Tablet .. 650 milliGRAM(s) Oral every 6 hours PRN  chlorhexidine 0.12% Liquid 15 milliLiter(s) Oral Mucosa every 12 hours  chlorhexidine 4% Liquid 1 Application(s) Topical <User Schedule>  dexAMETHasone  Injectable 4 milliGRAM(s) IV Push every 8 hours  dexMEDEtomidine Infusion 0.2 MICROgram(s)/kG/Hr IV Continuous <Continuous>  enoxaparin Injectable 40 milliGRAM(s) SubCutaneous <User Schedule>  losartan 50 milliGRAM(s) Oral daily  multiple electrolytes Injection Type 1 1000 milliLiter(s) IV Continuous <Continuous>  multivitamin 1 Tablet(s) Oral daily  oxyCODONE    IR 5 milliGRAM(s) Oral every 4 hours PRN  pantoprazole  Injectable 40 milliGRAM(s) IV Push daily  polyethylene glycol 3350 17 Gram(s) Oral two times a day  senna 2 Tablet(s) Oral at bedtime    PHYSICAL EXAM:    General: No Acute Distress, ET tube in place   Neurological: AOx3, left eye unable to abduct, Left side intact power, right UE 3/5. Left LE 2/5  Pulmonary: Clear to Auscultation, No Rales, No Rhonchi, No Wheezes   Cardiovascular: S1, S2, Regular Rate and Rhythm   Gastrointestinal: Soft, Nontender, Nondistended   Extremities: No calf tenderness   Incision: CDI   HPI:  61F, PMH HTN and C-spine meningioma removal in 2018 in [place]. P/w dizziness, gait instability, and emesis x3d. MRI shows ethel-medullary, Right CPA, and Right temporal enhancing lesions c/f meningioma vs schwannoma, with some hydro. Exam: Mandarin-speaking, AOx3, EOMI no nystagmus, PERRL, no facial/drift, CONRAD 5/5, SILT. Slightly wide-based antalgic gait, positive Romberg’s    Day 5 post left far lateral for meningioma resection subtotal and sacrifice of left vertebral artery    OVERNIGHT EVENTS:   Extubated     VITALS:  T(C): , Max: 38.6 (06-06-22 @ 03:00)  HR:  (77 - 113)  BP:  (116/89 - 169/100)  ABP: --  RR:  (15 - 29)  SpO2:  (93% - 98%)  Wt(kg): --  Device: Avea, Mode: CPAP with PS, FiO2: 30, PEEP: 5, PS: 10, MAP: 10, PIP: 16    06-04-22 @ 07:01  -  06-05-22 @ 07:00  --------------------------------------------------------  IN: 1626 mL / OUT: 2650 mL / NET: -1024 mL    06-05-22 @ 07:01  -  06-06-22 @ 06:47  --------------------------------------------------------  IN: 2708.5 mL / OUT: 2500 mL / NET: 208.5 mL      LABS:  Na: 136 (06-05 @ 22:00), 136 (06-05 @ 07:13), 139 (06-03 @ 23:05)  K: 4.8 (06-05 @ 22:00), 4.5 (06-05 @ 07:13), 4.7 (06-03 @ 23:05)  Cl: 98 (06-05 @ 22:00), 100 (06-05 @ 07:13), 102 (06-03 @ 23:05)  CO2: 27 (06-05 @ 22:00), 25 (06-05 @ 07:13), 25 (06-03 @ 23:05)  BUN: 18 (06-05 @ 22:00), 20 (06-05 @ 07:13), 16 (06-03 @ 23:05)  Cr: 0.31 (06-05 @ 22:00), 0.35 (06-05 @ 07:13), 0.39 (06-03 @ 23:05)  Glu: 169(06-05 @ 22:00), 165(06-05 @ 07:13), 175(06-03 @ 23:05)    Hgb: 12.0 (06-05 @ 22:00), 11.4 (06-05 @ 07:11), 11.1 (06-03 @ 23:05)  Hct: 35.2 (06-05 @ 22:00), 34.6 (06-05 @ 07:11), 31.4 (06-03 @ 23:05)  WBC: 9.07 (06-05 @ 22:00), 7.69 (06-05 @ 07:11), 8.77 (06-03 @ 23:05)  Plt: 180 (06-05 @ 22:00), 151 (06-05 @ 07:11), 196 (06-03 @ 23:05)    INR:   PTT:     IMAGING:   Recent imaging studies were reviewed.    MEDICATIONS:  acetaminophen     Tablet .. 650 milliGRAM(s) Oral every 6 hours PRN  chlorhexidine 0.12% Liquid 15 milliLiter(s) Oral Mucosa every 12 hours  chlorhexidine 4% Liquid 1 Application(s) Topical <User Schedule>  dexAMETHasone  Injectable 4 milliGRAM(s) IV Push every 8 hours  dexMEDEtomidine Infusion 0.2 MICROgram(s)/kG/Hr IV Continuous <Continuous>  enoxaparin Injectable 40 milliGRAM(s) SubCutaneous <User Schedule>  losartan 50 milliGRAM(s) Oral daily  multiple electrolytes Injection Type 1 1000 milliLiter(s) IV Continuous <Continuous>  multivitamin 1 Tablet(s) Oral daily  oxyCODONE    IR 5 milliGRAM(s) Oral every 4 hours PRN  pantoprazole  Injectable 40 milliGRAM(s) IV Push daily  polyethylene glycol 3350 17 Gram(s) Oral two times a day  senna 2 Tablet(s) Oral at bedtime    PHYSICAL EXAM:    General: No Acute Distress, ET tube in place   Neurological: AOx3, left eye unable to abduct, Left side intact power, right UE 3/5. Left LE 2/5  Pulmonary: Clear to Auscultation, No Rales, No Rhonchi, No Wheezes   Cardiovascular: S1, S2, Regular Rate and Rhythm   Gastrointestinal: Soft, Nontender, Nondistended   Extremities: No calf tenderness   Incision: CDI         HPI:  61F, PMH HTN and C-spine meningioma removal in 2018 in [place]. P/w dizziness, gait instability, and emesis x3d. MRI shows ethel-medullary, Right CPA, and Right temporal enhancing lesions c/f meningioma vs schwannoma, with some hydro. Exam: Mandarin-speaking, AOx3, EOMI no nystagmus, PERRL, no facial/drift, CONRAD 5/5, SILT. Slightly wide-based antalgic gait, positive Romberg’s    Day 5 post left far lateral for meningioma resection subtotal and sacrifice of left vertebral artery    OVERNIGHT EVENTS:   Extubated today morning       T(C): 36.6 (06-06-22 @ 11:00), Max: 38.6 (06-06-22 @ 03:00)  HR: 87 (06-06-22 @ 11:20) (74 - 113)  BP: 142/75 (06-06-22 @ 11:20) (103/74 - 189/102)  RR: 15 (06-06-22 @ 11:20) (15 - 28)  SpO2: 98% (06-06-22 @ 11:20) (93% - 98%)  06-05-22 @ 07:01  -  06-06-22 @ 07:00  --------------------------------------------------------  IN: 2708.5 mL / OUT: 2500 mL / NET: 208.5 mL    06-06-22 @ 07:01  -  06-06-22 @ 12:03  --------------------------------------------------------  IN: 228 mL / OUT: 0 mL / NET: 228 mL    acetaminophen     Tablet .. 650 milliGRAM(s) Oral every 6 hours PRN  chlorhexidine 4% Liquid 1 Application(s) Topical <User Schedule>  dexAMETHasone  Injectable 4 milliGRAM(s) IV Push every 8 hours  dexMEDEtomidine Infusion 0.2 MICROgram(s)/kG/Hr IV Continuous <Continuous>  enoxaparin Injectable 40 milliGRAM(s) SubCutaneous <User Schedule>  losartan 50 milliGRAM(s) Oral daily  multiple electrolytes Injection Type 1 1000 milliLiter(s) IV Continuous <Continuous>  multivitamin 1 Tablet(s) Oral daily  oxyCODONE    IR 5 milliGRAM(s) Oral every 4 hours PRN  pantoprazole  Injectable 40 milliGRAM(s) IV Push daily  polyethylene glycol 3350 17 Gram(s) Oral two times a day  senna 2 Tablet(s) Oral at bedtime  Mode: CPAP with PS, FiO2: 30, PEEP: 5, PS: 10, MAP: 10, PIP: 16    PHYSICAL EXAM:    General: No Acute Distress,  Neurological: AOx3, left eye unable to abduct, Left side intact power, right UE 2/5. Left LE 2/5  Pulmonary: Clear to Auscultation, No Rales, No Rhonchi, No Wheezes   Cardiovascular: S1, S2, Regular Rate and Rhythm   Gastrointestinal: Soft, Nontender, Nondistended   Extremities: No calf tenderness   Incision: CDI      LABS:  Na: 136 (06-05 @ 22:00), 136 (06-05 @ 07:13), 139 (06-03 @ 23:05)  K: 4.8 (06-05 @ 22:00), 4.5 (06-05 @ 07:13), 4.7 (06-03 @ 23:05)  Cl: 98 (06-05 @ 22:00), 100 (06-05 @ 07:13), 102 (06-03 @ 23:05)  CO2: 27 (06-05 @ 22:00), 25 (06-05 @ 07:13), 25 (06-03 @ 23:05)  BUN: 18 (06-05 @ 22:00), 20 (06-05 @ 07:13), 16 (06-03 @ 23:05)  Cr: 0.31 (06-05 @ 22:00), 0.35 (06-05 @ 07:13), 0.39 (06-03 @ 23:05)  Glu: 169(06-05 @ 22:00), 165(06-05 @ 07:13), 175(06-03 @ 23:05)    Hgb: 12.0 (06-05 @ 22:00), 11.4 (06-05 @ 07:11), 11.1 (06-03 @ 23:05)  Hct: 35.2 (06-05 @ 22:00), 34.6 (06-05 @ 07:11), 31.4 (06-03 @ 23:05)  WBC: 9.07 (06-05 @ 22:00), 7.69 (06-05 @ 07:11), 8.77 (06-03 @ 23:05)  Plt: 180 (06-05 @ 22:00), 151 (06-05 @ 07:11), 196 (06-03 @ 23:05)    INR:   PTT:              HPI:  61F, PMH HTN and C-spine meningioma removal in 2018 in [place]. P/w dizziness, gait instability, and emesis x3d. MRI shows ethel-medullary, Right CPA, and Right temporal enhancing lesions c/f meningioma vs schwannoma, with some hydro. Exam: Mandarin-speaking, AOx3, EOMI no nystagmus, PERRL, no facial/drift, CONRAD 5/5, SILT. Slightly wide-based antalgic gait, positive Romberg’s    Day 5 post left far lateral for meningioma resection subtotal and sacrifice of left vertebral artery    OVERNIGHT EVENTS:   Extubated today morning       T(C): 36.6 (06-06-22 @ 11:00), Max: 38.6 (06-06-22 @ 03:00)  HR: 87 (06-06-22 @ 11:20) (74 - 113)  BP: 142/75 (06-06-22 @ 11:20) (103/74 - 189/102)  RR: 15 (06-06-22 @ 11:20) (15 - 28)  SpO2: 98% (06-06-22 @ 11:20) (93% - 98%)  06-05-22 @ 07:01  -  06-06-22 @ 07:00  --------------------------------------------------------  IN: 2708.5 mL / OUT: 2500 mL / NET: 208.5 mL    06-06-22 @ 07:01  -  06-06-22 @ 12:03  --------------------------------------------------------  IN: 228 mL / OUT: 0 mL / NET: 228 mL    acetaminophen     Tablet .. 650 milliGRAM(s) Oral every 6 hours PRN  chlorhexidine 4% Liquid 1 Application(s) Topical <User Schedule>  dexAMETHasone  Injectable 4 milliGRAM(s) IV Push every 8 hours  dexMEDEtomidine Infusion 0.2 MICROgram(s)/kG/Hr IV Continuous <Continuous>  enoxaparin Injectable 40 milliGRAM(s) SubCutaneous <User Schedule>  losartan 50 milliGRAM(s) Oral daily  multiple electrolytes Injection Type 1 1000 milliLiter(s) IV Continuous <Continuous>  multivitamin 1 Tablet(s) Oral daily  oxyCODONE    IR 5 milliGRAM(s) Oral every 4 hours PRN  pantoprazole  Injectable 40 milliGRAM(s) IV Push daily  polyethylene glycol 3350 17 Gram(s) Oral two times a day  senna 2 Tablet(s) Oral at bedtime  Mode: CPAP with PS, FiO2: 30, PEEP: 5, PS: 10, MAP: 10, PIP: 16    PHYSICAL EXAM:    General: No Acute Distress,  Neurological: AOx3, left eye unable to abduct, Left side intact power, right UE 2/5. Left LE 2/5  Pulmonary: Clear to Auscultation, No Rales, No Rhonchi, No Wheezes   Cardiovascular: S1, S2, Regular Rate and Rhythm   Gastrointestinal: Soft, Nontender, Nondistended   Extremities: No calf tenderness   Incision: CDI      LABS:  Na: 136 (06-05 @ 22:00), 136 (06-05 @ 07:13), 139 (06-03 @ 23:05)  K: 4.8 (06-05 @ 22:00), 4.5 (06-05 @ 07:13), 4.7 (06-03 @ 23:05)  Cl: 98 (06-05 @ 22:00), 100 (06-05 @ 07:13), 102 (06-03 @ 23:05)  CO2: 27 (06-05 @ 22:00), 25 (06-05 @ 07:13), 25 (06-03 @ 23:05)  BUN: 18 (06-05 @ 22:00), 20 (06-05 @ 07:13), 16 (06-03 @ 23:05)  Cr: 0.31 (06-05 @ 22:00), 0.35 (06-05 @ 07:13), 0.39 (06-03 @ 23:05)  Glu: 169(06-05 @ 22:00), 165(06-05 @ 07:13), 175(06-03 @ 23:05)    Hgb: 12.0 (06-05 @ 22:00), 11.4 (06-05 @ 07:11), 11.1 (06-03 @ 23:05)  Hct: 35.2 (06-05 @ 22:00), 34.6 (06-05 @ 07:11), 31.4 (06-03 @ 23:05)  WBC: 9.07 (06-05 @ 22:00), 7.69 (06-05 @ 07:11), 8.77 (06-03 @ 23:05)  Plt: 180 (06-05 @ 22:00), 151 (06-05 @ 07:11), 196 (06-03 @ 23:05)    INR:   PTT:

## 2022-06-06 NOTE — PROGRESS NOTE ADULT - ASSESSMENT
61F s/p L far lateral for brainstem mening partial rxn c/b intraop L vert sacrifice, postop with slowly improving exam still intubated.   - MRI brain done c/f L medullary edema vs infarct  - cont q2h neuro checks  - ENT eval for lower CN dysfunction  - SQL  - Keppra  - Dex taper 10d  - consider extubation in AM, has been Cpaping, would have ENT at bedside

## 2022-06-06 NOTE — AIRWAY REMOVAL NOTE  ADULT & PEDS - ARTIFICAL AIRWAY REMOVAL COMMENTS
Written order for extubation verified. The patient was identified by full name and birth date compared to the identification band. Present during the procedure was  Dr Luque

## 2022-06-06 NOTE — PROGRESS NOTE ADULT - SUBJECTIVE AND OBJECTIVE BOX
Patient seen and examined at bedside.    --Anticoagulation--  enoxaparin Injectable 40 milliGRAM(s) SubCutaneous <User Schedule>    T(C): 37.9 (06-05-22 @ 23:00), Max: 37.9 (06-05-22 @ 23:00)  HR: 89 (06-05-22 @ 23:00) (69 - 113)  BP: 121/77 (06-05-22 @ 23:00) (115/78 - 169/100)  RR: 19 (06-05-22 @ 23:00) (16 - 29)  SpO2: 96% (06-05-22 @ 23:00) (93% - 98%)  Wt(kg): --    Exam:  intubated, AOx2 (choices) 3mmR, L CN6 palsy,  FC on Lt, RUE B/T/WF/WE 2/5, Right HG 3/5, RLE 3-/5  wiggles toes, Lt side spont/AG, Able to shrug and stick out tongue    Labs:                        12.0   9.07  )-----------( 180      ( 05 Jun 2022 22:00 )             35.2   06-05    136  |  98  |  18  ----------------------------<  169<H>  4.8   |  27  |  0.31<L>    Ca    8.9      05 Jun 2022 22:00  Phos  2.5     06-05  Mg     2.4     06-05

## 2022-06-06 NOTE — CONSULT NOTE ADULT - SUBJECTIVE AND OBJECTIVE BOX
CC: hoarseness    HPI: 61F, PMH HTN and C-spine meningioma removal in 2018 in [place]. P/w dizziness, gait instability, and emesis x3d. MRI shows ethel-medullary, Right CPA, and Right temporal enhancing lesions c/f meningioma vs schwannoma, with some hydro. Now s/p meningioma resection subtotal and sacrifice of left vertebral artery. Team concerned for injury to CN 9/10. Now presenting with hoarseness. Currently NPO, on BiPAP, having difficulty with secretion management.       PAST MEDICAL & SURGICAL HISTORY:  HTN (hypertension)      HLD (hyperlipidemia)      H/O cervical spine surgery        Allergies    No Known Allergies    Intolerances      MEDICATIONS  (STANDING):  chlorhexidine 4% Liquid 1 Application(s) Topical <User Schedule>  dexAMETHasone  Injectable 4 milliGRAM(s) IV Push every 8 hours  dexMEDEtomidine Infusion 0.2 MICROgram(s)/kG/Hr (2.7 mL/Hr) IV Continuous <Continuous>  enoxaparin Injectable 40 milliGRAM(s) SubCutaneous <User Schedule>  losartan 100 milliGRAM(s) Oral daily  multiple electrolytes Injection Type 1 1000 milliLiter(s) (50 mL/Hr) IV Continuous <Continuous>  multivitamin 1 Tablet(s) Oral daily  pantoprazole  Injectable 40 milliGRAM(s) IV Push daily  polyethylene glycol 3350 17 Gram(s) Oral two times a day  senna 2 Tablet(s) Oral at bedtime    MEDICATIONS  (PRN):  acetaminophen     Tablet .. 650 milliGRAM(s) Oral every 6 hours PRN Temp greater or equal to 38C (100.4F), Mild Pain (1 - 3)  glycopyrrolate Injectable 0.2 milliGRAM(s) IV Push every 6 hours PRN oral secretions  oxyCODONE    IR 5 milliGRAM(s) Oral every 4 hours PRN Severe Pain (7 - 10)      Social History: no pertinent social history    Family history: no pertinent family history     ROS:   ENT: all negative except as noted in HPI   CV: denies palpitations  Pulm: see hpi  GI: denies change in appetite, indigestion, n/v  : denies pertinent urinary symptoms, urgency  Neuro: denies numbness/tingling, loss of sensation  Psych: denies anxiety  MS: denies muscle weakness, instability  Heme: denies easy bruising or bleeding  Endo: denies heat/cold intolerance, excessive sweating  Vascular: denies LE edema    Vital Signs Last 24 Hrs  T(C): 36.6 (06 Jun 2022 11:00), Max: 38.6 (06 Jun 2022 03:00)  T(F): 97.9 (06 Jun 2022 11:00), Max: 101.5 (06 Jun 2022 03:00)  HR: 112 (06 Jun 2022 13:00) (74 - 112)  BP: 136/80 (06 Jun 2022 13:00) (103/74 - 189/102)  BP(mean): 95 (06 Jun 2022 13:00) (84 - 129)  RR: 28 (06 Jun 2022 13:00) (15 - 28)  SpO2: 93% (06 Jun 2022 13:00) (93% - 98%)                          12.0   9.07  )-----------( 180      ( 05 Jun 2022 22:00 )             35.2    06-05    136  |  98  |  18  ----------------------------<  169<H>  4.8   |  27  |  0.31<L>    Ca    8.9      05 Jun 2022 22:00  Phos  2.5     06-05  Mg     2.4     06-05         PHYSICAL EXAM:  Gen: NAD  Skin: No rashes, bruises, or lesions  Head: Normocephalic, Atraumatic  Face: no edema, erythema, or fluctuance. Parotid glands soft without mass  Eyes: no scleral injection  Nose: NG tube in place. Nares bilaterally patent, no discharge  Mouth: copious secretions posterior pharynx. No Stridor / Drooling / Trismus.  Mucosa moist, tongue/uvula midline  Neck: Flat, supple, no lymphadenopathy, trachea midline, no masses  Lymphatic: No lymphadenopathy  Resp: on BiPAP  CV: no peripheral edema/cyanosis  GI: nondistended   Peripheral vascular: no JVD or edema  Neuro: facial nerve intact, no facial droop        Fiberoptic Indirect laryngoscopy:  (Scope #2 used)  Reason for Laryngoscopy: hoarseness     Patient was unable to cooperate with mirror.  +left vocal cord paralysis, copious pooling of secretions. Nasopharynx, oropharynx, and hypopharynx clear, no bleeding. Tongue base, posterior pharyngeal wall, vallecula, epiglottis, and subglottis appear normal. No erythema, edema, masses or lesions. Airway patent, no foreign body visualized. No glottic/supraglottic edema. Arytenoids, vestibular folds, ventricles, pyriform sinuses, and aryepiglottic folds appear normal bilaterally.

## 2022-06-07 LAB
ANION GAP SERPL CALC-SCNC: 15 MMOL/L — SIGNIFICANT CHANGE UP (ref 5–17)
BUN SERPL-MCNC: 13 MG/DL — SIGNIFICANT CHANGE UP (ref 7–23)
CALCIUM SERPL-MCNC: 9.1 MG/DL — SIGNIFICANT CHANGE UP (ref 8.4–10.5)
CHLORIDE SERPL-SCNC: 97 MMOL/L — SIGNIFICANT CHANGE UP (ref 96–108)
CO2 SERPL-SCNC: 25 MMOL/L — SIGNIFICANT CHANGE UP (ref 22–31)
CREAT SERPL-MCNC: <0.3 MG/DL — LOW (ref 0.5–1.3)
EGFR: 122 ML/MIN/1.73M2 — SIGNIFICANT CHANGE UP
GLUCOSE SERPL-MCNC: 127 MG/DL — HIGH (ref 70–99)
HCT VFR BLD CALC: 32.9 % — LOW (ref 34.5–45)
HGB BLD-MCNC: 11.4 G/DL — LOW (ref 11.5–15.5)
MAGNESIUM SERPL-MCNC: 2.3 MG/DL — SIGNIFICANT CHANGE UP (ref 1.6–2.6)
MCHC RBC-ENTMCNC: 31.9 PG — SIGNIFICANT CHANGE UP (ref 27–34)
MCHC RBC-ENTMCNC: 34.7 GM/DL — SIGNIFICANT CHANGE UP (ref 32–36)
MCV RBC AUTO: 92.2 FL — SIGNIFICANT CHANGE UP (ref 80–100)
NRBC # BLD: 0 /100 WBCS — SIGNIFICANT CHANGE UP (ref 0–0)
PHOSPHATE SERPL-MCNC: 3.3 MG/DL — SIGNIFICANT CHANGE UP (ref 2.5–4.5)
PLATELET # BLD AUTO: 221 K/UL — SIGNIFICANT CHANGE UP (ref 150–400)
POTASSIUM SERPL-MCNC: 4.1 MMOL/L — SIGNIFICANT CHANGE UP (ref 3.5–5.3)
POTASSIUM SERPL-SCNC: 4.1 MMOL/L — SIGNIFICANT CHANGE UP (ref 3.5–5.3)
RBC # BLD: 3.57 M/UL — LOW (ref 3.8–5.2)
RBC # FLD: 12.4 % — SIGNIFICANT CHANGE UP (ref 10.3–14.5)
SODIUM SERPL-SCNC: 137 MMOL/L — SIGNIFICANT CHANGE UP (ref 135–145)
WBC # BLD: 12.87 K/UL — HIGH (ref 3.8–10.5)
WBC # FLD AUTO: 12.87 K/UL — HIGH (ref 3.8–10.5)

## 2022-06-07 PROCEDURE — 99291 CRITICAL CARE FIRST HOUR: CPT

## 2022-06-07 PROCEDURE — 71045 X-RAY EXAM CHEST 1 VIEW: CPT | Mod: 26

## 2022-06-07 PROCEDURE — 71275 CT ANGIOGRAPHY CHEST: CPT | Mod: 26

## 2022-06-07 RX ORDER — AMLODIPINE BESYLATE 2.5 MG/1
5 TABLET ORAL DAILY
Refills: 0 | Status: DISCONTINUED | OUTPATIENT
Start: 2022-06-07 | End: 2022-06-09

## 2022-06-07 RX ORDER — HYDRALAZINE HCL 50 MG
5 TABLET ORAL ONCE
Refills: 0 | Status: COMPLETED | OUTPATIENT
Start: 2022-06-07 | End: 2022-06-07

## 2022-06-07 RX ORDER — SODIUM CHLORIDE 9 MG/ML
500 INJECTION, SOLUTION INTRAVENOUS ONCE
Refills: 0 | Status: COMPLETED | OUTPATIENT
Start: 2022-06-07 | End: 2022-06-07

## 2022-06-07 RX ADMIN — Medication 4 MILLIGRAM(S): at 13:32

## 2022-06-07 RX ADMIN — SODIUM CHLORIDE 50 MILLILITER(S): 9 INJECTION, SOLUTION INTRAVENOUS at 19:07

## 2022-06-07 RX ADMIN — OXYCODONE HYDROCHLORIDE 5 MILLIGRAM(S): 5 TABLET ORAL at 19:30

## 2022-06-07 RX ADMIN — Medication 4 MILLIGRAM(S): at 22:12

## 2022-06-07 RX ADMIN — CHLORHEXIDINE GLUCONATE 1 APPLICATION(S): 213 SOLUTION TOPICAL at 22:12

## 2022-06-07 RX ADMIN — OXYCODONE HYDROCHLORIDE 5 MILLIGRAM(S): 5 TABLET ORAL at 19:00

## 2022-06-07 RX ADMIN — SODIUM CHLORIDE 3000 MILLILITER(S): 9 INJECTION, SOLUTION INTRAVENOUS at 20:26

## 2022-06-07 RX ADMIN — PANTOPRAZOLE SODIUM 40 MILLIGRAM(S): 20 TABLET, DELAYED RELEASE ORAL at 12:21

## 2022-06-07 RX ADMIN — ENOXAPARIN SODIUM 40 MILLIGRAM(S): 100 INJECTION SUBCUTANEOUS at 17:48

## 2022-06-07 RX ADMIN — POLYETHYLENE GLYCOL 3350 17 GRAM(S): 17 POWDER, FOR SOLUTION ORAL at 05:05

## 2022-06-07 RX ADMIN — Medication 650 MILLIGRAM(S): at 02:17

## 2022-06-07 RX ADMIN — POLYETHYLENE GLYCOL 3350 17 GRAM(S): 17 POWDER, FOR SOLUTION ORAL at 17:48

## 2022-06-07 RX ADMIN — Medication 5 MILLIGRAM(S): at 09:20

## 2022-06-07 RX ADMIN — Medication 4 MILLIGRAM(S): at 05:05

## 2022-06-07 RX ADMIN — AMLODIPINE BESYLATE 5 MILLIGRAM(S): 2.5 TABLET ORAL at 12:21

## 2022-06-07 RX ADMIN — LOSARTAN POTASSIUM 100 MILLIGRAM(S): 100 TABLET, FILM COATED ORAL at 05:05

## 2022-06-07 RX ADMIN — Medication 650 MILLIGRAM(S): at 02:47

## 2022-06-07 RX ADMIN — Medication 5 MILLIGRAM(S): at 03:31

## 2022-06-07 RX ADMIN — Medication 1 TABLET(S): at 12:21

## 2022-06-07 NOTE — PHYSICAL THERAPY INITIAL EVALUATION ADULT - STRENGTHENING, PT EVAL
GOAL: In 2 weeks pt MMT will improve by 1/2 grade.
GOAL: In 2 weeks pt MMT will improve by 1/2 grade.

## 2022-06-07 NOTE — PROGRESS NOTE ADULT - ASSESSMENT
ASSESSMENT/PLAN: Foramen magnum meningioma, post-operative day 4 from resection; acute respiratory failure    - Steroids for cerebral and airway edema  - Chest PT  - PRN glycopyrrolate for secretions; care not to over use and monitor for mucus plugging  - Hi-Bill  - Aspiration precautions  - Monitor for need for re-intubation  - VAP bundle  - -160mmHg; continue BP meds  - IVF as will be NPO for concern for need for re-intubation     At risk of death/neuro decline due to: acute resp failure, cerebral edema/brain stem compression ASSESSMENT/PLAN: Foramen magnum meningioma, post-operative day 6 from resection; acute respiratory failure    CTA now to r/o PE--sinus tachycardia, hypoxia   - Steroids for cerebral and airway edema  - Chest PT  - PRN glycopyrrolate for secretions; care not to over use and monitor for mucus plugging  - Hi-Bill  - Aspiration precautions  - Monitor for need for re-intubation  - -160mmHg; HTNsive--amlodipine added today  - IVL, bolus prn  - keep NPO for now    At risk of death/neuro decline due to: acute resp failure, cerebral edema/brain stem compression ASSESSMENT/PLAN: Foramen magnum meningioma, post-operative day 6 from resection; acute respiratory failure    CTA now to r/o PE--sinus tachycardia, hypoxia   - Steroids for cerebral and airway edema  - Chest PT  - PRN glycopyrrolate for secretions; care not to over use and monitor for mucus plugging  - Hi-Bill  - Aspiration precautions  - Monitor for need for re-intubation  - -160mmHg; HTNsive--amlodipine added today  - IVL, bolus prn  - keep NPO for now  - fever, procal unremarkable, f/u cultures, monitor off antibiotics, will send nasal swab for MRSA    At risk of death/neuro decline due to: acute resp failure, cerebral edema/brain stem compression

## 2022-06-07 NOTE — PROGRESS NOTE ADULT - ASSESSMENT
Assessment:   L far lateral crani for meningioma resection w/ subtotal sacrifice of L verebral artery POD 5  remained intubated due to inability to protect airway    NEURO:  -neuro check q 4 hr   - taper dexamethasone to 4q4 per nsgy for brainstem edema   -pain management w/ Tylenol    -Precedex as needed for anxiety   -MRI brain showed surgical site edema   -PT/OT evaluation     PULMONARY:   was extubated today to BIPAP , will wean to FM  ENT consulted for ? vocal cord paralysis   nasal trumpet suction as needed     CARDIOVASCULAR:  monitor on telemetry   vitals q4  sbp goal 100-160 losartan 50 mg daily , increase to 100 mg    GASTROENTEROLOGY:  NPO, keep NPO for possible need for reintubation   GI ppx : d/c  Protonix 40mg qd    RENAL/:  -IV plasmalyte 50cc/hr   -check BMP qd    ENDOCRINE:  A1c- 5.8  TSH/t3/t4-normal       HEME/ONC:  b/l SCDs  Lovenox    INFECTIOUS:   febrile  procal neg   r/o aspiration pneumonitis     at risk for deterioration due to post op hemorrhage, cerebral edema, respiratory failure requiring intubation   ICU  full code   Assessment:   L far lateral crani for meningioma resection w/ subtotal sacrifice of L verebral artery POD 5  remained intubated due to inability to protect airway    NEURO:  -neuro check q 4 hr   - taper dexamethasone to 4q8 per nsgy for brainstem edema   -pain management w/ Tylenol    -MRI brain showed surgical site edema   -PT/OT evaluation     PULMONARY:   HFNC 50%/40  vocal cord paralysis   nasal trumpet suction as needed   Robinul 0.2 q 6 prn for secretions     CARDIOVASCULAR:  monitor on telemetry   vitals q4  sbp goal 100-160   Losartan 100 mg OD  Start Norvasc 5 mg OD    GASTROENTEROLOGY:  NPO, keep NPO for possible need for reintubation   GI ppx : Protonix 40mg qd  LBM yesterday     RENAL/:  -IV plasmalyte 50cc/hr   -check BMP qd  - Intermittent straight cath Q 6 hrs    ENDOCRINE:  A1c- 5.8  TSH/t3/t4-normal       HEME/ONC:  b/l SCDs  Lovenox 40    INFECTIOUS:   febrile  procal neg   Combi cath culture : staph   Monitor off antibiotics     at risk for deterioration due to post op hemorrhage, cerebral edema, respiratory failure requiring intubation   ICU  full code   Assessment:   L far lateral crani for meningioma resection w/ subtotal sacrifice of L verebral artery POD 5  now with vocal cord paralysis and weak cough, on hig flow due to hypoxemia     NEURO:  -neuro check q 4 hr   - taper dexamethasone to 4q8 per nsgy for brainstem edema   -pain management w/ Tylenol    -PT/OT evaluation     PULMONARY:   HFNC 50%/40 will attempt to wean   vocal cord paralysis , frequent suctioning   Robinul 0.2 q 6 prn for oral secretions     CARDIOVASCULAR:  monitor on telemetry   sbp goal 100-160   HTN on Losartan 100 mg OD and Norvasc 5 mg OD    GASTROENTEROLOGY:  NPO, keep NPO for possible need for reintubation   GI ppx : Protonix 40mg qd  LBM yesterday     RENAL/:  -IV plasmalyte 50cc/hr  as she is NPO   -check BMP qd  - Intermittent straight cath Q 6 hrs    ENDOCRINE:  A1c- 5.8  TSH/t3/t4-normal       HEME/ONC:  b/l SCDs  Lovenox 40    INFECTIOUS:   afebrile   procal neg   if spikes will start antibiotics   Monitor off antibiotics     at risk for deterioration due to post op hemorrhage, cerebral edema, respiratory failure requiring intubation   ICU  full code

## 2022-06-07 NOTE — OCCUPATIONAL THERAPY INITIAL EVALUATION ADULT - PHYSICAL ASSIST/NONPHYSICAL ASSIST: SUPINE/SIT, REHAB EVAL
supervision/verbal cues/nonverbal cues (demo/gestures)/1 person assist
verbal cues/nonverbal cues (demo/gestures)/1 person assist

## 2022-06-07 NOTE — PHYSICAL THERAPY INITIAL EVALUATION ADULT - IMPAIRMENTS CONTRIBUTING TO GAIT DEVIATIONS, PT EVAL
C/o dizziness throughout ambulation, RN Manny aware. VSS/impaired balance/decreased strength
impaired balance/impaired postural control/decreased strength

## 2022-06-07 NOTE — PHYSICAL THERAPY INITIAL EVALUATION ADULT - BALANCE TRAINING, PT EVAL
GOAL: In 2 weeks pt balance will improve by 1/2 grade.
GOAL: In 2 weeks pt balance will improve by 1/2 grade.

## 2022-06-07 NOTE — PHYSICAL THERAPY INITIAL EVALUATION ADULT - PERTINENT HX OF CURRENT PROBLEM, REHAB EVAL
61F, PMH HTN and C-spine meningioma removal in 2018 in [place]. P/w dizziness, gait instability, and emesis x3d. MRI shows ethel-medullary, Right CPA, and Right temporal enhancing lesions c/f meningioma vs schwannoma, with some hydro. Exam: Mandarin-speaking, AOx3, EOMI no nystagmus, PERRL, no facial/drift, CONRAD 5/5, SILT. Slightly wide-based antalgic gait, positive Romberg’s
61F, PMH HTN and C-spine meningioma removal in 2018 in [place]. P/w dizziness, gait instability, and emesis x3d. MRI shows ethel-medullary, Right CPA, and Right temporal enhancing lesions c/f meningioma vs schwannoma, with some hydro. Pt s/p left far lateral for meningioma resection subtotal and sacrifice of left vertebral artery 6/1. Pt now with vocal cord paralysis and weak cough, on high flow due to hypoxemia, requiring frequent suctioning of oral secretions.

## 2022-06-07 NOTE — OCCUPATIONAL THERAPY INITIAL EVALUATION ADULT - PLANNED THERAPY INTERVENTIONS, OT EVAL
ADL retraining/balance training/bed mobility training/cognitive, visual perceptual/fine motor coordination training/motor coordination training/neuromuscular re-education/ROM/strengthening/transfer training
ADL retraining/IADL retraining/balance training/transfer training

## 2022-06-07 NOTE — PHYSICAL THERAPY INITIAL EVALUATION ADULT - ORIENTATION, REHAB EVAL
person/place/situation
pt unable to verbalize but responds appropriately via nodding Y/N to questions/oriented to person, place, time and situation

## 2022-06-07 NOTE — PROVIDER CONTACT NOTE (OTHER) - BACKGROUND
Patient was admitted with multiple enhancing brain lesions, presented with dizziness and vomiting.
meningioma removal, Left Crani
brain lesion

## 2022-06-07 NOTE — OCCUPATIONAL THERAPY INITIAL EVALUATION ADULT - LIVES WITH, PROFILE
Pt lives with son & daughter-in-law in a private home with 4 steps to enter & 1 flight to basement. Pt was independent with all mobility & ADLs prior to admit.
Per pt report via daughter in law, pt lives with son daughter in law and grandchildren in 2 story private home, 2 steps to enter . bedroom/tub shower on main level./children/other relative

## 2022-06-07 NOTE — PROGRESS NOTE ADULT - SUBJECTIVE AND OBJECTIVE BOX
SUMMARY:   61 year-old woman status post left far lateral subtotal resection of foramen magnum meningioma with post-op course notable for bulbar palsy requiring re-intubation for airway protection.     24 HOUR EVENTS:  Extubated, but had immediate episode of respiratory distress managed initially with BiPAP then Hi-Bill. Noted to have significant secretions, improved with glycopyrrolate. Had another episode of desaturation to 80s after suctioning but self-resolved. Of note, seen by ENT who noted left complete vocal cord paralysis.     VITALS/DATA/ORDERS: [x] Reviewed    EXAMINATION:   Oriented x 2 with choices, left CN VI palsy, weak cough, hoarse/hypophonic voice, follows on the right, RUE 2/5 except HG 3/5, right leg 3-/5 at best, left side spontaneous SUMMARY:   61 year-old woman status post left far lateral subtotal resection of foramen magnum meningioma with post-op course notable for bulbar palsy requiring re-intubation for airway protection.     24 HOUR EVENTS:  Remains on high flow.  Sinus tachycardia.   ENT who noted left complete vocal cord paralysis.     VITALS/DATA/ORDERS: [x] Reviewed    EXAMINATION:   Oriented x 3 with choices, left CN VI palsy, weak cough, hoarse/hypophonic voice, follows on the right, RUE 2/5 except HG 3/5, right leg 3-/5 at best, left side spontaneous SUMMARY:   61 year-old woman status post left far lateral subtotal resection of foramen magnum meningioma with post-op course notable for bulbar palsy requiring re-intubation for airway protection.     24 HOUR EVENTS:  Remains on high flow.  Sinus tachycardia.   Febrile overnight.   ENT who noted left complete vocal cord paralysis.     VITALS/DATA/ORDERS: [x] Reviewed    EXAMINATION:   Oriented x 3 with choices, left CN VI palsy, weak cough, hoarse/hypophonic voice, follows on the right, RUE 2/5 except HG 3/5, right leg 3-/5 at best, left side spontaneous

## 2022-06-07 NOTE — OCCUPATIONAL THERAPY INITIAL EVALUATION ADULT - VISUAL ASSESSMENT: TRACKING
difficulty sustaining end range gaze, L eye unable to abduct, pursuits not smooth binoccularly or monocularly
normal

## 2022-06-07 NOTE — PROVIDER CONTACT NOTE (OTHER) - REASON
Patient bleeding from mouth
patients HR is in the 140s and O2 saturation is 84
Pt throwing up blood tinged spit

## 2022-06-07 NOTE — PHYSICAL THERAPY INITIAL EVALUATION ADULT - GENERAL OBSERVATIONS, REHAB EVAL
Pt received supine in bed, +IV, +tele, +NGT, +HFNC, +pulse ox
Chart reviewed events to date noted. Blood glucose reviewed. Pt tolerated 45min PT initial evaluation well. Pt rec'd in bed in NAD, +ICU monitoring, daughter-in-law bedside translating to Mandarin as needed.

## 2022-06-07 NOTE — PHYSICAL THERAPY INITIAL EVALUATION ADULT - IMPAIRMENTS FOUND, PT EVAL
aerobic capacity/endurance/gait, locomotion, and balance/muscle strength
gait, locomotion, and balance/muscle strength

## 2022-06-07 NOTE — PHYSICAL THERAPY INITIAL EVALUATION ADULT - TRANSFER SAFETY CONCERNS NOTED: SIT/STAND, REHAB EVAL
decreased balance during turns/losing balance/decreased step length/decreased weight-shifting ability
decreased balance during turns/decreased safety awareness/decreased proprioception/decreased sequencing ability

## 2022-06-07 NOTE — PHYSICAL THERAPY INITIAL EVALUATION ADULT - MANUAL MUSCLE TESTING RESULTS, REHAB EVAL
shoulder 3-/5, elbow 3-/5, wrist 3/5, hand 3/5, LUE 4/5, LLE 4/5, RLE 3/5
At least 3+/5 grossly throughout/grossly assessed due to

## 2022-06-07 NOTE — OCCUPATIONAL THERAPY INITIAL EVALUATION ADULT - IMPAIRED TRANSFERS: SIT/STAND, REHAB EVAL
impaired balance/impaired coordination/impaired postural control/decreased strength
impaired balance/impaired coordination/impaired motor control/impaired postural control/decreased ROM/decreased strength

## 2022-06-07 NOTE — OCCUPATIONAL THERAPY INITIAL EVALUATION ADULT - IMPAIRMENTS CONTRIBUTING IMPAIRED BED MOBILITY, REHAB EVAL
impaired balance/impaired coordination/impaired postural control/decreased strength
impaired balance/impaired postural control/decreased strength

## 2022-06-07 NOTE — OCCUPATIONAL THERAPY INITIAL EVALUATION ADULT - ADL RETRAINING, OT EVAL
Goal: Pt will perform bathing independently with appropriate DME within 2 weeks
Pt will complete upper body dressing with setup/supervision within 4 weeks.

## 2022-06-07 NOTE — PROVIDER CONTACT NOTE (OTHER) - ASSESSMENT
patients HR is in the 140s and O2 saturation is 84, patients neurologic exam remains unchanged, patients blood pressure remains WDL, pt on high flow at 60% FiO2
VS stable  IVP zofran given
Patient bleeding from mouth.  utilized with patient. Patient stated this has been going on for a month and that she needs to get her crown replaced but has not had the time. Patient remains neurologically stable. RN assessed patient at bedside. MD made aware

## 2022-06-07 NOTE — PHYSICAL THERAPY INITIAL EVALUATION ADULT - GAIT DEVIATIONS NOTED, PT EVAL
trendelenberg, ataxic, +LOB x2 requiring PT to correct./decreased francis/increased time in double stance/decreased step length/decreased stride length/decreased swing-to-stance ratio/decreased weight-shifting ability
decreased step length

## 2022-06-07 NOTE — OCCUPATIONAL THERAPY INITIAL EVALUATION ADULT - TRANSFER TRAINING, PT EVAL
Pt will complete functional toilet transfer with CGA and appropriate AD within 4 weeks.
Goal: Pt will perform sit to stand, bed <> chair, toilet, tub and shower transfers independently with appropriate AD within 2 weeks

## 2022-06-07 NOTE — PHYSICAL THERAPY INITIAL EVALUATION ADULT - DIAGNOSIS, PT EVAL
Pt presents with decreased strength, decreased balance, and decreased endurance limiting overall independence with mobility.
Pt presents with decreased strength, decreased balance, and decreased endurance limiting overall independence with mobility.

## 2022-06-07 NOTE — PHYSICAL THERAPY INITIAL EVALUATION ADULT - ACTIVE RANGE OF MOTION EXAMINATION, REHAB EVAL
Left UE Active ROM was WNL (within normal limits)/bilateral lower extremity Active ROM was WNL (within normal limits)

## 2022-06-07 NOTE — OCCUPATIONAL THERAPY INITIAL EVALUATION ADULT - RANGE OF MOTION EXAMINATION, UPPER EXTREMITY
bilateral UE Passive ROM was WFL  (within functional limits)
bilateral UE Active ROM was WFL  (within functional limits)

## 2022-06-07 NOTE — OCCUPATIONAL THERAPY INITIAL EVALUATION ADULT - BALANCE TRAINING, PT EVAL
Pt will improve static standing balance by 1/2 grade to improve functional performance with ADLs within 4 weeks.
Goal: Patient will increase static/dynamic standing balance by 1 grade to facilitate increased safety, ability to perform ADLs and functional mobility within 2 weeks.

## 2022-06-07 NOTE — PHYSICAL THERAPY INITIAL EVALUATION ADULT - BED MOBILITY TRAINING, PT EVAL
GOAL: In 4 weeks pt will be independent with bed mobility.
GOAL: In 4 weeks pt will be independent with bed mobility.

## 2022-06-07 NOTE — PHYSICAL THERAPY INITIAL EVALUATION ADULT - CRITERIA FOR SKILLED THERAPEUTIC INTERVENTIONS
impairments found/functional limitations in following categories/risk reduction/prevention/rehab potential/therapy frequency/predicted duration of therapy intervention/anticipated discharge recommendation
impairments found

## 2022-06-07 NOTE — PROVIDER CONTACT NOTE (OTHER) - SITUATION
Patient bleeding from mouth
Pt throwing up blood tinged spit
patients HR is in the 140s and O2 saturation is 84

## 2022-06-07 NOTE — PROVIDER CONTACT NOTE (OTHER) - ACTION/TREATMENT ORDERED:
No interventions taken at this time  will monitor VS
As per MD we can talk to the team about a dental consult
MD Baeza and NP Hayley notified and at bedside with ICU team, ABG drawn, CBC/BMP/Mag/Phos drawn, 12 lead ekg complete, pt suctioned, Tylenol given, no further interventions ordered

## 2022-06-07 NOTE — OCCUPATIONAL THERAPY INITIAL EVALUATION ADULT - MUSCLE TONE ASSESSMENT, REHAB EVAL
left-side extremities/right-side extremities/normal
bilateral upper extremities/bilateral lower extremities/normal

## 2022-06-07 NOTE — OCCUPATIONAL THERAPY INITIAL EVALUATION ADULT - BALANCE DISTURBANCE, IDENTIFIED IMPAIRMENT CONTRIBUTE, REHAB EVAL
impaired coordination/impaired postural control/decreased strength
impaired postural control/decreased strength

## 2022-06-07 NOTE — PHYSICAL THERAPY INITIAL EVALUATION ADULT - GAIT TRAINING, PT EVAL
GOAL: In 4 weeks pt will amb 150 ft independently with least restrictive device.
GOAL: In 4 weeks pt will amb 150 ft independently with least restrictive device.

## 2022-06-07 NOTE — PHYSICAL THERAPY INITIAL EVALUATION ADULT - PLANNED THERAPY INTERVENTIONS, PT EVAL
Stair training... GOAL: In 4 weeks pt will negotiate 1 flight of stairs independently with least restrictive device./balance training/bed mobility training/gait training/strengthening/transfer training
Stair training... GOAL: In 4 weeks pt will negotiate 1 flight of stairs independently with least restrictive device./balance training/bed mobility training/gait training/strengthening/transfer training

## 2022-06-07 NOTE — OCCUPATIONAL THERAPY INITIAL EVALUATION ADULT - RANGE OF MOTION EXAMINATION, LOWER EXTREMITY
bilateral LE Active ROM was WFL  (within functional limits)
bilateral LE Active ROM was WFL  (within functional limits)/bilateral LE Passive ROM was WFL  (within functional limits)

## 2022-06-07 NOTE — PHYSICAL THERAPY INITIAL EVALUATION ADULT - ADDITIONAL COMMENTS
Pt lives with son & daughter-in-law in a private home with 4 steps to enter & 1 flight to basement. Pt was independent with all mobility & ADLs prior to admit.
Pt lives with son & daughter-in-law in a private home with 4 steps to enter & 1 flight to basement. Pt was independent with all mobility & ADLs prior to admit.

## 2022-06-07 NOTE — OCCUPATIONAL THERAPY INITIAL EVALUATION ADULT - LEVEL OF INDEPENDENCE: SIT/STAND, REHAB EVAL
TBA on OT functional evaluation moderate assist (50% patients effort)/maximum assist (25% patients effort)

## 2022-06-07 NOTE — OCCUPATIONAL THERAPY INITIAL EVALUATION ADULT - DIAGNOSIS, OT EVAL
Pt p/w impaired balance, strength, AROM, endurance, coordination, motor control, vision impacting fxnl independence.
Pt currently presents with decreased endurance, balance and strength limiting independence with ADLs and functional mobility.

## 2022-06-07 NOTE — OCCUPATIONAL THERAPY INITIAL EVALUATION ADULT - PRECAUTIONS/LIMITATIONS, REHAB EVAL
Mandarin-speaking, AOx3, EOMI no nystagmus, PERRL, no facial/drift, CONRAD 5/5, SILT. Slightly wide-based antalgic gait, positive Romberg’s. Day 6 post left far lateral for meningioma resection subtotal and sacrifice of left vertebral artery/fall precautions
fall precautions

## 2022-06-07 NOTE — PHYSICAL THERAPY INITIAL EVALUATION ADULT - TRANSFER TRAINING, PT EVAL
GOAL: In 4 weeks pt will be independent with all transfers.
GOAL: In 4 weeks pt will be independent with all transfers.

## 2022-06-07 NOTE — OCCUPATIONAL THERAPY INITIAL EVALUATION ADULT - MD ORDER
OT Evaluate and Treat  activity increase as tolerated  ambulate at tolerated OOB
OT Evaluate and Treat

## 2022-06-07 NOTE — PHYSICAL THERAPY INITIAL EVALUATION ADULT - IMPAIRED TRANSFERS: SIT/STAND, REHAB EVAL
impaired balance/decreased strength
dec RLE strength, dem R knee buckling, dec coordination with foot placement/impaired balance/impaired coordination/impaired postural control/decreased strength

## 2022-06-07 NOTE — OCCUPATIONAL THERAPY INITIAL EVALUATION ADULT - GENERAL OBSERVATIONS, REHAB EVAL
Pt received semisupine in bed, ICU monitoring, Mandarin speaking with daughter in law at bedside to translate, PIV, cardiac monitor
Pt presents supine +HFNC, +NGT, +BP cuff, +straight cath, +pulse ox, +tele, +IV

## 2022-06-07 NOTE — PROGRESS NOTE ADULT - SUBJECTIVE AND OBJECTIVE BOX
HPI:  61F, PMH HTN and C-spine meningioma removal in 2018 in [place]. P/w dizziness, gait instability, and emesis x3d. MRI shows ethel-medullary, Right CPA, and Right temporal enhancing lesions c/f meningioma vs schwannoma, with some hydro. Exam: Mandarin-speaking, AOx3, EOMI no nystagmus, PERRL, no facial/drift, CONRAD 5/5, SILT. Slightly wide-based antalgic gait, positive Romberg’s    Day 6 post left far lateral for meningioma resection subtotal and sacrifice of left vertebral artery    OVERNIGHT EVENTS:   No acute events overnight.    VITALS:  T(C): , Max: 37.1 (06-06-22 @ 07:00)  HR:  (74 - 143)  BP:  (103/74 - 189/102)  ABP: --  RR:  (14 - 28)  SpO2:  (93% - 100%)  Wt(kg): --      06-05-22 @ 07:01  -  06-06-22 @ 07:00  --------------------------------------------------------  IN: 2708.5 mL / OUT: 2500 mL / NET: 208.5 mL    06-06-22 @ 07:01  -  06-07-22 @ 06:42  --------------------------------------------------------  IN: 1220 mL / OUT: 1750 mL / NET: -530 mL      LABS:  Na: 135 (06-06 @ 20:11), 136 (06-05 @ 22:00), 136 (06-05 @ 07:13)  K: 4.3 (06-06 @ 20:11), 4.8 (06-05 @ 22:00), 4.5 (06-05 @ 07:13)  Cl: 97 (06-06 @ 20:11), 98 (06-05 @ 22:00), 100 (06-05 @ 07:13)  CO2: 27 (06-06 @ 20:11), 27 (06-05 @ 22:00), 25 (06-05 @ 07:13)  BUN: 15 (06-06 @ 20:11), 18 (06-05 @ 22:00), 20 (06-05 @ 07:13)  Cr: <0.30 (06-06 @ 20:11), 0.31 (06-05 @ 22:00), 0.35 (06-05 @ 07:13)  Glu: 143(06-06 @ 20:11), 169(06-05 @ 22:00), 165(06-05 @ 07:13)    Hgb: 11.7 (06-06 @ 20:11), 12.0 (06-05 @ 22:00), 11.4 (06-05 @ 07:11)  Hct: 35.1 (06-06 @ 20:11), 35.2 (06-05 @ 22:00), 34.6 (06-05 @ 07:11)  WBC: 12.12 (06-06 @ 20:11), 9.07 (06-05 @ 22:00), 7.69 (06-05 @ 07:11)  Plt: 197 (06-06 @ 20:11), 180 (06-05 @ 22:00), 151 (06-05 @ 07:11)    INR:   PTT:     IMAGING:   Recent imaging studies were reviewed.    MEDICATIONS:  acetaminophen     Tablet .. 650 milliGRAM(s) Oral every 6 hours PRN  chlorhexidine 4% Liquid 1 Application(s) Topical <User Schedule>  dexAMETHasone  Injectable 4 milliGRAM(s) IV Push every 8 hours  enoxaparin Injectable 40 milliGRAM(s) SubCutaneous <User Schedule>  glycopyrrolate Injectable 0.2 milliGRAM(s) IV Push every 6 hours PRN  losartan 100 milliGRAM(s) Oral daily  multiple electrolytes Injection Type 1 1000 milliLiter(s) IV Continuous <Continuous>  multivitamin 1 Tablet(s) Oral daily  oxyCODONE    IR 5 milliGRAM(s) Oral every 4 hours PRN  pantoprazole  Injectable 40 milliGRAM(s) IV Push daily  polyethylene glycol 3350 17 Gram(s) Oral two times a day  senna 2 Tablet(s) Oral at bedtime    PHYSICAL EXAM:    General: No Acute Distress,  Neurological: AOx3, left eye unable to abduct, Left side intact power, right UE 2/5. Left LE 2/5  Pulmonary: Clear to Auscultation, No Rales, No Rhonchi, No Wheezes   Cardiovascular: S1, S2, Regular Rate and Rhythm   Gastrointestinal: Soft, Nontender, Nondistended   Extremities: No calf tenderness   Incision: CDI   HPI:  61F, PMH HTN and C-spine meningioma removal in 2018 in [place]. P/w dizziness, gait instability, and emesis x3d. MRI shows ethel-medullary, Right CPA, and Right temporal enhancing lesions c/f meningioma vs schwannoma, with some hydro. Exam: Mandarin-speaking, AOx3, EOMI no nystagmus, PERRL, no facial/drift, CONRAD 5/5, SILT. Slightly wide-based antalgic gait, positive Romberg’s    Day 6 post left far lateral for meningioma resection subtotal and sacrifice of left vertebral artery    OVERNIGHT EVENTS:   remains on high flow     T(C): 37.1 (06-07-22 @ 07:00), Max: 37.1 (06-07-22 @ 03:00)  HR: 115 (06-07-22 @ 13:00) (80 - 143)  BP: 157/99 (06-07-22 @ 13:00) (132/109 - 172/90)  RR: 19 (06-07-22 @ 13:00) (14 - 26)  SpO2: 99% (06-07-22 @ 13:00) (95% - 100%)  06-06-22 @ 07:01  -  06-07-22 @ 07:00  --------------------------------------------------------  IN: 1270 mL / OUT: 1750 mL / NET: -480 mL    06-07-22 @ 07:01  -  06-07-22 @ 14:10  --------------------------------------------------------  IN: 200 mL / OUT: 800 mL / NET: -600 mL    acetaminophen     Tablet .. 650 milliGRAM(s) Oral every 6 hours PRN  amLODIPine   Tablet 5 milliGRAM(s) Oral daily  chlorhexidine 4% Liquid 1 Application(s) Topical <User Schedule>  dexAMETHasone  Injectable 4 milliGRAM(s) IV Push every 8 hours  enoxaparin Injectable 40 milliGRAM(s) SubCutaneous <User Schedule>  glycopyrrolate Injectable 0.2 milliGRAM(s) IV Push every 6 hours PRN  losartan 100 milliGRAM(s) Oral daily  multiple electrolytes Injection Type 1 1000 milliLiter(s) IV Continuous <Continuous>  multivitamin 1 Tablet(s) Oral daily  oxyCODONE    IR 5 milliGRAM(s) Oral every 4 hours PRN  pantoprazole  Injectable 40 milliGRAM(s) IV Push daily  polyethylene glycol 3350 17 Gram(s) Oral two times a day  senna 2 Tablet(s) Oral at bedtime      PHYSICAL EXAM:    General: No Acute Distress,  Neurological: AOx3, left eye unable to abduct, tought deviated to the left, palate doesn't elevate Left side intact power, right UE 4/5. Left LE 4/5  Pulmonary: Clear to Auscultation, No Rales, No Rhonchi, No Wheezes   Cardiovascular: S1, S2, Regular Rate and Rhythm   Gastrointestinal: Soft, Nontender, Nondistended   Extremities: No calf tenderness   Incision: CDI      LABS:  Na: 135 (06-06 @ 20:11), 136 (06-05 @ 22:00), 136 (06-05 @ 07:13)  K: 4.3 (06-06 @ 20:11), 4.8 (06-05 @ 22:00), 4.5 (06-05 @ 07:13)  Cl: 97 (06-06 @ 20:11), 98 (06-05 @ 22:00), 100 (06-05 @ 07:13)  CO2: 27 (06-06 @ 20:11), 27 (06-05 @ 22:00), 25 (06-05 @ 07:13)  BUN: 15 (06-06 @ 20:11), 18 (06-05 @ 22:00), 20 (06-05 @ 07:13)  Cr: <0.30 (06-06 @ 20:11), 0.31 (06-05 @ 22:00), 0.35 (06-05 @ 07:13)  Glu: 143(06-06 @ 20:11), 169(06-05 @ 22:00), 165(06-05 @ 07:13)    Hgb: 11.7 (06-06 @ 20:11), 12.0 (06-05 @ 22:00), 11.4 (06-05 @ 07:11)  Hct: 35.1 (06-06 @ 20:11), 35.2 (06-05 @ 22:00), 34.6 (06-05 @ 07:11)  WBC: 12.12 (06-06 @ 20:11), 9.07 (06-05 @ 22:00), 7.69 (06-05 @ 07:11)  Plt: 197 (06-06 @ 20:11), 180 (06-05 @ 22:00), 151 (06-05 @ 07:11)    INR:   PTT:

## 2022-06-07 NOTE — OCCUPATIONAL THERAPY INITIAL EVALUATION ADULT - PERTINENT HX OF CURRENT PROBLEM, REHAB EVAL
62yo F PMH HTN and C-spine meningioma removal in 2018 in [place]. P/w dizziness, gait instability and emesis x3days. MRI shows ethel-medullary, Right CPA, and Right temporal enhancing lesions c/f meningioma vs schwannoma, with some hydro. Exam: Mandarin-speaking, AOx3, EOMI no nystagmus, PERRL, no facial/drift, CONRAD 5/5, SILT. Slightly wide-based antalgic gait, positive Romberg’s
61F, PMH HTN and C-spine meningioma removal in 2018 in [place]. P/w dizziness, gait instability, and emesis x3d. MRI shows ethel-medullary, Right CPA, and Right temporal enhancing lesions c/f meningioma vs schwannoma, with some hydro. Exam:

## 2022-06-08 LAB
-  AMOXICILLIN/CLAVULANIC ACID: SIGNIFICANT CHANGE UP
-  AMPICILLIN/SULBACTAM: SIGNIFICANT CHANGE UP
-  CEFAZOLIN: SIGNIFICANT CHANGE UP
-  CEFTRIAXONE: SIGNIFICANT CHANGE UP
-  CIPROFLOXACIN: SIGNIFICANT CHANGE UP
-  CLINDAMYCIN: SIGNIFICANT CHANGE UP
-  DAPTOMYCIN: SIGNIFICANT CHANGE UP
-  ERYTHROMYCIN: SIGNIFICANT CHANGE UP
-  GENTAMICIN: SIGNIFICANT CHANGE UP
-  LEVOFLOXACIN: SIGNIFICANT CHANGE UP
-  LINEZOLID: SIGNIFICANT CHANGE UP
-  MEROPENEM: SIGNIFICANT CHANGE UP
-  MOXIFLOXACIN: SIGNIFICANT CHANGE UP
-  OXACILLIN: SIGNIFICANT CHANGE UP
-  RIFAMPIN: SIGNIFICANT CHANGE UP
-  TETRACYCLINE: SIGNIFICANT CHANGE UP
-  TRIMETHOPRIM/SULFAMETHOXAZOLE: SIGNIFICANT CHANGE UP
-  VANCOMYCIN: SIGNIFICANT CHANGE UP
CULTURE RESULTS: SIGNIFICANT CHANGE UP
METHOD TYPE: SIGNIFICANT CHANGE UP
MRSA PCR RESULT.: DETECTED
ORGANISM # SPEC MICROSCOPIC CNT: SIGNIFICANT CHANGE UP
ORGANISM # SPEC MICROSCOPIC CNT: SIGNIFICANT CHANGE UP
S AUREUS DNA NOSE QL NAA+PROBE: DETECTED
SPECIMEN SOURCE: SIGNIFICANT CHANGE UP

## 2022-06-08 PROCEDURE — 93970 EXTREMITY STUDY: CPT | Mod: 26

## 2022-06-08 PROCEDURE — 99233 SBSQ HOSP IP/OBS HIGH 50: CPT

## 2022-06-08 PROCEDURE — 99291 CRITICAL CARE FIRST HOUR: CPT

## 2022-06-08 PROCEDURE — 71045 X-RAY EXAM CHEST 1 VIEW: CPT | Mod: 26

## 2022-06-08 PROCEDURE — 93306 TTE W/DOPPLER COMPLETE: CPT | Mod: 26

## 2022-06-08 RX ORDER — PIPERACILLIN AND TAZOBACTAM 4; .5 G/20ML; G/20ML
3.38 INJECTION, POWDER, LYOPHILIZED, FOR SOLUTION INTRAVENOUS EVERY 8 HOURS
Refills: 0 | Status: DISCONTINUED | OUTPATIENT
Start: 2022-06-08 | End: 2022-06-08

## 2022-06-08 RX ORDER — VANCOMYCIN HCL 1 G
1000 VIAL (EA) INTRAVENOUS EVERY 12 HOURS
Refills: 0 | Status: DISCONTINUED | OUTPATIENT
Start: 2022-06-08 | End: 2022-06-08

## 2022-06-08 RX ORDER — ONDANSETRON 8 MG/1
4 TABLET, FILM COATED ORAL EVERY 12 HOURS
Refills: 0 | Status: DISCONTINUED | OUTPATIENT
Start: 2022-06-08 | End: 2022-06-20

## 2022-06-08 RX ORDER — SODIUM CHLORIDE 9 MG/ML
500 INJECTION, SOLUTION INTRAVENOUS ONCE
Refills: 0 | Status: COMPLETED | OUTPATIENT
Start: 2022-06-08 | End: 2022-06-08

## 2022-06-08 RX ORDER — CIPROFLOXACIN LACTATE 400MG/40ML
400 VIAL (ML) INTRAVENOUS EVERY 8 HOURS
Refills: 0 | Status: DISCONTINUED | OUTPATIENT
Start: 2022-06-08 | End: 2022-06-08

## 2022-06-08 RX ORDER — SODIUM CHLORIDE 9 MG/ML
1000 INJECTION INTRAMUSCULAR; INTRAVENOUS; SUBCUTANEOUS
Refills: 0 | Status: DISCONTINUED | OUTPATIENT
Start: 2022-06-08 | End: 2022-06-09

## 2022-06-08 RX ORDER — PIPERACILLIN AND TAZOBACTAM 4; .5 G/20ML; G/20ML
3.38 INJECTION, POWDER, LYOPHILIZED, FOR SOLUTION INTRAVENOUS ONCE
Refills: 0 | Status: COMPLETED | OUTPATIENT
Start: 2022-06-08 | End: 2022-06-08

## 2022-06-08 RX ADMIN — PIPERACILLIN AND TAZOBACTAM 200 GRAM(S): 4; .5 INJECTION, POWDER, LYOPHILIZED, FOR SOLUTION INTRAVENOUS at 01:07

## 2022-06-08 RX ADMIN — AMLODIPINE BESYLATE 5 MILLIGRAM(S): 2.5 TABLET ORAL at 06:05

## 2022-06-08 RX ADMIN — Medication 4 MILLIGRAM(S): at 23:00

## 2022-06-08 RX ADMIN — Medication 1 TABLET(S): at 11:16

## 2022-06-08 RX ADMIN — LOSARTAN POTASSIUM 100 MILLIGRAM(S): 100 TABLET, FILM COATED ORAL at 05:25

## 2022-06-08 RX ADMIN — OXYCODONE HYDROCHLORIDE 5 MILLIGRAM(S): 5 TABLET ORAL at 09:00

## 2022-06-08 RX ADMIN — PANTOPRAZOLE SODIUM 40 MILLIGRAM(S): 20 TABLET, DELAYED RELEASE ORAL at 11:16

## 2022-06-08 RX ADMIN — SODIUM CHLORIDE 500 MILLILITER(S): 9 INJECTION, SOLUTION INTRAVENOUS at 11:17

## 2022-06-08 RX ADMIN — Medication 250 MILLIGRAM(S): at 18:43

## 2022-06-08 RX ADMIN — Medication 4 MILLIGRAM(S): at 05:25

## 2022-06-08 RX ADMIN — SODIUM CHLORIDE 75 MILLILITER(S): 9 INJECTION INTRAMUSCULAR; INTRAVENOUS; SUBCUTANEOUS at 23:08

## 2022-06-08 RX ADMIN — Medication 4 MILLIGRAM(S): at 13:01

## 2022-06-08 RX ADMIN — ENOXAPARIN SODIUM 40 MILLIGRAM(S): 100 INJECTION SUBCUTANEOUS at 17:22

## 2022-06-08 RX ADMIN — SODIUM CHLORIDE 75 MILLILITER(S): 9 INJECTION INTRAMUSCULAR; INTRAVENOUS; SUBCUTANEOUS at 11:17

## 2022-06-08 RX ADMIN — PIPERACILLIN AND TAZOBACTAM 25 GRAM(S): 4; .5 INJECTION, POWDER, LYOPHILIZED, FOR SOLUTION INTRAVENOUS at 13:01

## 2022-06-08 RX ADMIN — POLYETHYLENE GLYCOL 3350 17 GRAM(S): 17 POWDER, FOR SOLUTION ORAL at 17:22

## 2022-06-08 RX ADMIN — OXYCODONE HYDROCHLORIDE 5 MILLIGRAM(S): 5 TABLET ORAL at 08:31

## 2022-06-08 RX ADMIN — CHLORHEXIDINE GLUCONATE 1 APPLICATION(S): 213 SOLUTION TOPICAL at 22:59

## 2022-06-08 RX ADMIN — PIPERACILLIN AND TAZOBACTAM 25 GRAM(S): 4; .5 INJECTION, POWDER, LYOPHILIZED, FOR SOLUTION INTRAVENOUS at 05:08

## 2022-06-08 NOTE — PROGRESS NOTE ADULT - SUBJECTIVE AND OBJECTIVE BOX
NEUROCRITICAL CARE  EVENING NOTE    BRIEF SUMMARY:  L far lateral crani for meningioma resection w/ subtotal sacrifice of L verebral artery POD 5  now with vocal cord paralysis and weak cough, on hig flow due to hypoxemia       VITALS/IMAGING/DATA/IVF FLUIDS/MEDICATIONS: [x] Reviewed    ALLERGIES: Allergies    No Known Allergies    Intolerances      PHYSICAL EXAM:    General: No Acute Distress,  Neurological: AOx3, left eye unable to abduct, tongue deviated to the left, palate doesn't elevate Left side intact power, ght UE 4/5. right LE 4/5  Pulmonary: decreased breath sounds b/l Lower lungs posterior   Cardiovascular: S1, S2, Regular Rate and Rhythm   Gastrointestinal: Soft, Nontender, Nondistended   Extremities: No calf tenderness   Incision: CDI   NEUROCRITICAL CARE  EVENING NOTE    BRIEF SUMMARY:  L far lateral crani for meningioma resection w/ subtotal sacrifice of L verebral artery POD 7  now with vocal cord paralysis and weak cough, on hig flow due to hypoxemia       VITALS/IMAGING/DATA/IVF FLUIDS/MEDICATIONS: [x] Reviewed    ALLERGIES: Allergies    No Known Allergies    Intolerances      PHYSICAL EXAM:    General: No Acute Distress,  Neurological: AOx3, left eye unable to abduct, tongue deviated to the left, palate doesn't elevate Left side intact power, ght UE 4/5. right LE 4/5  Pulmonary: decreased breath sounds b/l Lower lungs posterior   Cardiovascular: S1, S2, Regular Rate and Rhythm   Gastrointestinal: Soft, Nontender, Nondistended   Extremities: No calf tenderness   Incision: CDI

## 2022-06-08 NOTE — PROGRESS NOTE ADULT - SUBJECTIVE AND OBJECTIVE BOX
HPI:  61F, PMH HTN and C-spine meningioma removal in 2018 in [place]. P/w dizziness, gait instability, and emesis x3d. MRI shows ethel-medullary, Right CPA, and Right temporal enhancing lesions c/f meningioma vs schwannoma, with some hydro. Exam: Mandarin-speaking, AOx3, EOMI no nystagmus, PERRL, no facial/drift, CONRAD 5/5, SILT. Slightly wide-based antalgic gait, positive Romberg’s    Day 7 post left far lateral for meningioma resection subtotal and sacrifice of left vertebral artery    OVERNIGHT EVENTS:   No acute events overnight.    VITALS:  T(C): , Max: 37.2 (06-07-22 @ 15:00)  HR:  (92 - 132)  BP:  (91/67 - 172/90)  ABP: --  RR:  (11 - 29)  SpO2:  (91% - 100%)  Wt(kg): --      06-06-22 @ 07:01  -  06-07-22 @ 07:00  --------------------------------------------------------  IN: 1270 mL / OUT: 1750 mL / NET: -480 mL    06-07-22 @ 07:01  -  06-08-22 @ 06:42  --------------------------------------------------------  IN: 1300 mL / OUT: 2500 mL / NET: -1200 mL      LABS:  Na: 137 (06-07 @ 21:37), 135 (06-06 @ 20:11), 136 (06-05 @ 22:00), 136 (06-05 @ 07:13)  K: 4.1 (06-07 @ 21:37), 4.3 (06-06 @ 20:11), 4.8 (06-05 @ 22:00), 4.5 (06-05 @ 07:13)  Cl: 97 (06-07 @ 21:37), 97 (06-06 @ 20:11), 98 (06-05 @ 22:00), 100 (06-05 @ 07:13)  CO2: 25 (06-07 @ 21:37), 27 (06-06 @ 20:11), 27 (06-05 @ 22:00), 25 (06-05 @ 07:13)  BUN: 13 (06-07 @ 21:37), 15 (06-06 @ 20:11), 18 (06-05 @ 22:00), 20 (06-05 @ 07:13)  Cr: <0.30 (06-07 @ 21:37), <0.30 (06-06 @ 20:11), 0.31 (06-05 @ 22:00), 0.35 (06-05 @ 07:13)  Glu: 127(06-07 @ 21:37), 143(06-06 @ 20:11), 169(06-05 @ 22:00), 165(06-05 @ 07:13)    Hgb: 11.4 (06-07 @ 21:37), 11.7 (06-06 @ 20:11), 12.0 (06-05 @ 22:00), 11.4 (06-05 @ 07:11)  Hct: 32.9 (06-07 @ 21:37), 35.1 (06-06 @ 20:11), 35.2 (06-05 @ 22:00), 34.6 (06-05 @ 07:11)  WBC: 12.87 (06-07 @ 21:37), 12.12 (06-06 @ 20:11), 9.07 (06-05 @ 22:00), 7.69 (06-05 @ 07:11)  Plt: 221 (06-07 @ 21:37), 197 (06-06 @ 20:11), 180 (06-05 @ 22:00), 151 (06-05 @ 07:11)    INR:   PTT:     IMAGING:   Recent imaging studies were reviewed.    MEDICATIONS:  acetaminophen     Tablet .. 650 milliGRAM(s) Oral every 6 hours PRN  amLODIPine   Tablet 5 milliGRAM(s) Oral daily  chlorhexidine 4% Liquid 1 Application(s) Topical <User Schedule>  dexAMETHasone  Injectable 4 milliGRAM(s) IV Push every 8 hours  enoxaparin Injectable 40 milliGRAM(s) SubCutaneous <User Schedule>  glycopyrrolate Injectable 0.2 milliGRAM(s) IV Push every 6 hours PRN  losartan 100 milliGRAM(s) Oral daily  multivitamin 1 Tablet(s) Oral daily  oxyCODONE    IR 5 milliGRAM(s) Oral every 4 hours PRN  pantoprazole  Injectable 40 milliGRAM(s) IV Push daily  piperacillin/tazobactam IVPB.. 3.375 Gram(s) IV Intermittent every 8 hours  polyethylene glycol 3350 17 Gram(s) Oral two times a day  senna 2 Tablet(s) Oral at bedtime    PHYSICAL EXAM:    General: No Acute Distress,  Neurological: AOx3, left eye unable to abduct, tought deviated to the left, palate doesn't elevate Left side intact power, right UE 4/5. Left LE 4/5  Pulmonary: Clear to Auscultation, No Rales, No Rhonchi, No Wheezes   Cardiovascular: S1, S2, Regular Rate and Rhythm   Gastrointestinal: Soft, Nontender, Nondistended   Extremities: No calf tenderness   Incision: CDI   HPI:  61F, PMH HTN and C-spine meningioma removal in 2018 in [place]. P/w dizziness, gait instability, and emesis x3d. MRI shows ethel-medullary, Right CPA, and Right temporal enhancing lesions c/f meningioma vs schwannoma, with some hydro. Exam: Mandarin-speaking, AOx3, EOMI no nystagmus, PERRL, no facial/drift, CONRAD 5/5, SILT. Slightly wide-based antalgic gait, positive Romberg’s    Day 7 post left far lateral for meningioma resection subtotal and sacrifice of left vertebral artery    OVERNIGHT EVENTS:   remains on high flow   tachycardic overnight, CTPE protocol done no PE       T(C): 36.8 (06-08-22 @ 07:00), Max: 37.2 (06-07-22 @ 15:00)  HR: 101 (06-08-22 @ 08:00) (99 - 132)  BP: 160/89 (06-08-22 @ 08:00) (91/67 - 163/99)  RR: 21 (06-08-22 @ 08:00) (11 - 29)  SpO2: 98% (06-08-22 @ 08:00) (91% - 100%)  06-07-22 @ 07:01  -  06-08-22 @ 07:00  --------------------------------------------------------  IN: 1300 mL / OUT: 2500 mL / NET: -1200 mL    acetaminophen     Tablet .. 650 milliGRAM(s) Oral every 6 hours PRN  amLODIPine   Tablet 5 milliGRAM(s) Oral daily  chlorhexidine 4% Liquid 1 Application(s) Topical <User Schedule>  dexAMETHasone  Injectable 4 milliGRAM(s) IV Push every 8 hours  enoxaparin Injectable 40 milliGRAM(s) SubCutaneous <User Schedule>  glycopyrrolate Injectable 0.2 milliGRAM(s) IV Push every 6 hours PRN  losartan 100 milliGRAM(s) Oral daily  multivitamin 1 Tablet(s) Oral daily  oxyCODONE    IR 5 milliGRAM(s) Oral every 4 hours PRN  pantoprazole  Injectable 40 milliGRAM(s) IV Push daily  piperacillin/tazobactam IVPB.. 3.375 Gram(s) IV Intermittent every 8 hours  polyethylene glycol 3350 17 Gram(s) Oral two times a day  senna 2 Tablet(s) Oral at bedtime  sodium chloride 0.9%. 1000 milliLiter(s) IV Continuous <Continuous>        PHYSICAL EXAM:    General: No Acute Distress,  Neurological: AOx3, left eye unable to abduct, tongue deviated to the left, palate doesn't elevate Left side intact power, right UE 4/5. Left LE 4/5  Pulmonary: Clear to Auscultation, No Rales, No Rhonchi, No Wheezes   Cardiovascular: S1, S2, Regular Rate and Rhythm   Gastrointestinal: Soft, Nontender, Nondistended   Extremities: No calf tenderness   Incision: CDI          LABS:  Na: 137 (06-07 @ 21:37), 135 (06-06 @ 20:11), 136 (06-05 @ 22:00)  K: 4.1 (06-07 @ 21:37), 4.3 (06-06 @ 20:11), 4.8 (06-05 @ 22:00)  Cl: 97 (06-07 @ 21:37), 97 (06-06 @ 20:11), 98 (06-05 @ 22:00)  CO2: 25 (06-07 @ 21:37), 27 (06-06 @ 20:11), 27 (06-05 @ 22:00)  BUN: 13 (06-07 @ 21:37), 15 (06-06 @ 20:11), 18 (06-05 @ 22:00)  Cr: <0.30 (06-07 @ 21:37), <0.30 (06-06 @ 20:11), 0.31 (06-05 @ 22:00)  Glu: 127(06-07 @ 21:37), 143(06-06 @ 20:11), 169(06-05 @ 22:00)    Hgb: 11.4 (06-07 @ 21:37), 11.7 (06-06 @ 20:11), 12.0 (06-05 @ 22:00)  Hct: 32.9 (06-07 @ 21:37), 35.1 (06-06 @ 20:11), 35.2 (06-05 @ 22:00)  WBC: 12.87 (06-07 @ 21:37), 12.12 (06-06 @ 20:11), 9.07 (06-05 @ 22:00)  Plt: 221 (06-07 @ 21:37), 197 (06-06 @ 20:11), 180 (06-05 @ 22:00)    INR:   PTT:           ******PRELIMINARY REPORT******      ******PRELIMINARY REPORT******       ACC: [id number] EXAM:  CT ANGIO CHEST PULM [facility]                          PROCEDURE DATE:  06/07/2022    ******PRELIMINARY REPORT******      ******PRELIMINARY REPORT******           INTERPRETATION:  no pulmonary embolism in the main pulmonary artery.   Limited evaluation of more distal branches due to significant motion   artifact.  bilateral patchy airspace opacities, new since 5/20/22. Multifocal   pneumonia is on the differential.  bibasilar atelectassis    follow up final report in the AM.

## 2022-06-08 NOTE — PROGRESS NOTE ADULT - ASSESSMENT
Assessment:   L far lateral crani for meningioma resection w/ subtotal sacrifice of L verebral artery POD 5  now with vocal cord paralysis and weak cough, on hig flow due to hypoxemia     NEURO:  -neuro check q 4 hr   - taper dexamethasone to 4q8 per nsgy for brainstem edema   -pain management w/ Tylenol    -PT/OT evaluation     PULMONARY:   HFNC 50%/40 will attempt to wean   vocal cord paralysis , frequent suctioning   Robinul 0.2 q 6 prn for oral secretions     CARDIOVASCULAR:  monitor on telemetry   sbp goal 100-160   HTN on Losartan 100 mg OD and Norvasc 5 mg OD    GASTROENTEROLOGY:  NPO, keep NPO for possible need for reintubation   GI ppx : Protonix 40mg qd  LBM yesterday     RENAL/:  -IV plasmalyte 50cc/hr  as she is NPO   -check BMP qd  - Intermittent straight cath Q 6 hrs    ENDOCRINE:  A1c- 5.8  TSH/t3/t4-normal       HEME/ONC:  b/l SCDs  Lovenox 40    INFECTIOUS:   afebrile   procal neg   if spikes will start antibiotics   Monitor off antibiotics     at risk for deterioration due to post op hemorrhage, cerebral edema, respiratory failure requiring intubation   ICU  full code   Assessment:   L far lateral crani for meningioma resection w/ subtotal sacrifice of L verebral artery POD 5  now with vocal cord paralysis and weak cough, on hig flow due to hypoxemia     NEURO:  -neuro check q 4 hr   - dexamethasone to 4 mg q 8 hr  for brainstem edema  tomorrow will decrease to 3 mg q 8 hr   -pain management w/ Tylenol  , oxycodone   -PT/OT evaluation     PULMONARY:   HFNC 50%/40  will wean down   CTpE protocol, no PE, has infiltrates and atelectasis   vocal cord paralysis , frequent suctioning   Robinul 0.2 q 6 prn for oral secretions     CARDIOVASCULAR:  monitor on telemetry   sbp goal 100-160 mmhg   HTN on Losartan 100 mg OD and Norvasc 5 mg OD  sinus tachycardia, plasmalyte 500 ml bolus once   TTE     GASTROENTEROLOGY:  NG , TF , likely will need a PEG   GI ppx:  Protonix 40mg qd while on decadron   LBM 6/5/2022   miralax and senna     RENAL/:  -NS 75 ml/hr   -check BMP qd  - Intermittent straight cath Q 6 hrs    ENDOCRINE:  target sugar 140-180       HEME/ONC:  Lovenox 40    INFECTIOUS:   afebrile   zosyn for pneumonia for 5 days    cx staph in bronch     at risk for deterioration due to post op hemorrhage, cerebral edema, respiratory failure requiring intubation   ICU  full code

## 2022-06-08 NOTE — PROGRESS NOTE ADULT - SUBJECTIVE AND OBJECTIVE BOX
patient in nscu    REVIEW OF SYSTEMS  unable to obtain    FUNCTIONAL PROGRESS  6/8 PT  bed mobility min assist   follows 75% commands  dizziness when sitting    6/8 OT  bed mobility min to mod assist  transfers mod assist x 2  sitting balance poor plus  follows 100% commands      VITALS  T(C): 37 (06-08-22 @ 11:00), Max: 37 (06-08-22 @ 11:00)  HR: 111 (06-08-22 @ 15:08) (101 - 132)  BP: 141/86 (06-08-22 @ 15:00) (91/67 - 163/99)  RR: 20 (06-08-22 @ 15:08) (13 - 29)  SpO2: 96% (06-08-22 @ 15:08) (91% - 100%)  Wt(kg): --    MEDICATIONS   acetaminophen     Tablet .. 650 milliGRAM(s) every 6 hours PRN  amLODIPine   Tablet 5 milliGRAM(s) daily  chlorhexidine 4% Liquid 1 Application(s) <User Schedule>  dexAMETHasone  Injectable 4 milliGRAM(s) every 8 hours  enoxaparin Injectable 40 milliGRAM(s) <User Schedule>  glycopyrrolate Injectable 0.2 milliGRAM(s) every 6 hours PRN  losartan 100 milliGRAM(s) daily  multivitamin 1 Tablet(s) daily  oxyCODONE    IR 5 milliGRAM(s) every 4 hours PRN  pantoprazole  Injectable 40 milliGRAM(s) daily  piperacillin/tazobactam IVPB.. 3.375 Gram(s) every 8 hours  polyethylene glycol 3350 17 Gram(s) two times a day  senna 2 Tablet(s) at bedtime  sodium chloride 0.9%. 1000 milliLiter(s) <Continuous>      RECENT LABS - Reviewed                        11.4   12.87 )-----------( 221      ( 07 Jun 2022 21:37 )             32.9     06-07    137  |  97  |  13  ----------------------------<  127<H>  4.1   |  25  |  <0.30<L>    Ca    9.1      07 Jun 2022 21:37  Phos  3.3     06-07  Mg     2.3     06-07      -----------------------------------------------------------------------------------  PHYSICAL EXAM  Constitutional - NAD, in bed   Chest - Breathing comfortably, HFNC  Cardiovascular - S1S2   Abdomen - Soft   Extremities - No C/C/E, No calf tenderness   Neurologic Exam -                    Cognitive - Awake, Alert, AAO x3     Communication - hypophonic, follows commands        Motor - right sided weakness      Sensory - Intact to LT     Psychiatric - Mood stable, Affect WNL  ----------------------------------------------------------------------------------------  ASSESSMENT/PLAN  61yFemale h/o HTN, c spine meningioma removed 2018 with functional deficits due to meningioma  s/p L far lateral crani for meningioma resection w/ subtotal sacrifice of L vertebral artery   dexamethasone taper  left vocal cord paralysis, NGT, plan for PEG   hypoxemia, on high flow, plan to wean    pneumonia, zosyn   urinary retention, bowel regimen  Pain - Tylenol  DVT PPX - SCDs, lovenox   Rehab -   continue bedside therapy  patient would benefit from acute inpatient rehabilitation when medically stable, intensive PT/OT/SLP to restore function while being closely monitored and managed for ongoing medical issues which can destabilize

## 2022-06-08 NOTE — PROGRESS NOTE ADULT - ASSESSMENT
ASSESSMENT: L far lateral crani for meningioma resection w/ subtotal sacrifice of L verebral artery POD 5  now with vocal cord paralysis and weak cough, on hi- flow due to hypoxemia     PLAN:  -neuro check q 4 hr   - dexamethasone to 4 mg q 8 hr  for brainstem edema  tomorrow will decrease to 3 mg q 8 hr   HFNC 50%/40  will wean down, vocal cord paralysis , frequent suctioning Robinul 0.2 q 6 prn for oral secretions  zosyn & vancomycin for pneumonia for 5 days -cx staph in bronch, nasal swab MRSA(+)  Feeding: [] diet [x] tube feeds  Analgesia/Sedation: w/ Tylenol  , oxycodone   Seizure prophylaxis: [x] not indicated  Thromboprophylaxis: [] SCDs [] chemoprophylaxis Lovenox  Head of Bed/Activity: [] 30 degrees [] mobilize as tolerated [x] PT [x] OT [x] PMNR  Ulcer prophylaxis: [] not indicated [x] PPI [] other:  Glucose Control: Goal -180     ASSESSMENT: L far lateral crani for meningioma resection w/ subtotal sacrifice of L verebral artery POD 5  now with vocal cord paralysis and weak cough, on hi- flow due to hypoxemia     PLAN:  -neuro check q 4 hr   - dexamethasone to 4 mg q 8 hr  for brainstem edema  tomorrow will decrease to 3 mg q 8 hr   NC 4L, vocal cord paralysis , frequent suctioning d/c Robinul as treating for PNA  zosyn & vancomycin for pneumonia for 5 days -cx staph in bronch, nasal swab MRSA(+); obtain vancomycin level before 4thd dose   Feeding: [] diet [x] tube feeds  Analgesia/Sedation: w/ Tylenol  , oxycodone   Seizure prophylaxis: [x] not indicated  Thromboprophylaxis: [x] SCDs [x] chemoprophylaxis Lovenox  Head of Bed/Activity: [] 30 degrees [] mobilize as tolerated [x] PT [x] OT [x] PMNR  Ulcer prophylaxis: [] not indicated [x] PPI [] other:  Glucose Control: Goal -180     ASSESSMENT: L far lateral crani for meningioma resection w/ subtotal sacrifice of L verebral artery POD 7  now with vocal cord paralysis and weak cough, on hi- flow due to hypoxemia     PLAN:  -neuro check q 4 hr   - dexamethasone to 4 mg q 8 hr  for brainstem edema  tomorrow will decrease to 3 mg q 8 hr   NC 4L, vocal cord paralysis , frequent suctioning d/c Robinul as treating for PNA  zosyn & vancomycin for pneumonia for 5 days -cx staph in bronch, nasal swab MRSA(+); obtain vancomycin level before 4thd dose   Feeding: [] diet [x] tube feeds  Analgesia/Sedation: w/ Tylenol  , oxycodone   Seizure prophylaxis: [x] not indicated  Thromboprophylaxis: [x] SCDs [x] chemoprophylaxis Lovenox  Head of Bed/Activity: [] 30 degrees [] mobilize as tolerated [x] PT [x] OT [x] PMNR  Ulcer prophylaxis: [] not indicated [x] PPI [] other:  Glucose Control: Goal -180

## 2022-06-09 PROCEDURE — 71045 X-RAY EXAM CHEST 1 VIEW: CPT | Mod: 26

## 2022-06-09 PROCEDURE — 99291 CRITICAL CARE FIRST HOUR: CPT

## 2022-06-09 RX ORDER — IPRATROPIUM/ALBUTEROL SULFATE 18-103MCG
3 AEROSOL WITH ADAPTER (GRAM) INHALATION EVERY 6 HOURS
Refills: 0 | Status: DISCONTINUED | OUTPATIENT
Start: 2022-06-09 | End: 2022-06-14

## 2022-06-09 RX ORDER — DOXAZOSIN MESYLATE 4 MG
4 TABLET ORAL DAILY
Refills: 0 | Status: DISCONTINUED | OUTPATIENT
Start: 2022-06-09 | End: 2022-06-16

## 2022-06-09 RX ORDER — ACETYLCYSTEINE 200 MG/ML
3 VIAL (ML) MISCELLANEOUS EVERY 6 HOURS
Refills: 0 | Status: DISCONTINUED | OUTPATIENT
Start: 2022-06-09 | End: 2022-06-20

## 2022-06-09 RX ORDER — TRAMADOL HYDROCHLORIDE 50 MG/1
50 TABLET ORAL EVERY 4 HOURS
Refills: 0 | Status: DISCONTINUED | OUTPATIENT
Start: 2022-06-09 | End: 2022-06-16

## 2022-06-09 RX ORDER — DEXAMETHASONE 0.5 MG/5ML
3 ELIXIR ORAL EVERY 8 HOURS
Refills: 0 | Status: DISCONTINUED | OUTPATIENT
Start: 2022-06-09 | End: 2022-06-13

## 2022-06-09 RX ORDER — ACETAMINOPHEN 500 MG
1000 TABLET ORAL ONCE
Refills: 0 | Status: COMPLETED | OUTPATIENT
Start: 2022-06-09 | End: 2022-06-09

## 2022-06-09 RX ORDER — AMLODIPINE BESYLATE 2.5 MG/1
10 TABLET ORAL DAILY
Refills: 0 | Status: DISCONTINUED | OUTPATIENT
Start: 2022-06-10 | End: 2022-06-14

## 2022-06-09 RX ORDER — TRAMADOL HYDROCHLORIDE 50 MG/1
25 TABLET ORAL EVERY 4 HOURS
Refills: 0 | Status: DISCONTINUED | OUTPATIENT
Start: 2022-06-09 | End: 2022-06-16

## 2022-06-09 RX ORDER — AMLODIPINE BESYLATE 2.5 MG/1
5 TABLET ORAL ONCE
Refills: 0 | Status: COMPLETED | OUTPATIENT
Start: 2022-06-09 | End: 2022-06-09

## 2022-06-09 RX ORDER — LABETALOL HCL 100 MG
10 TABLET ORAL ONCE
Refills: 0 | Status: COMPLETED | OUTPATIENT
Start: 2022-06-09 | End: 2022-06-09

## 2022-06-09 RX ADMIN — TRAMADOL HYDROCHLORIDE 50 MILLIGRAM(S): 50 TABLET ORAL at 12:30

## 2022-06-09 RX ADMIN — Medication 3 MILLILITER(S): at 17:05

## 2022-06-09 RX ADMIN — AMLODIPINE BESYLATE 5 MILLIGRAM(S): 2.5 TABLET ORAL at 09:22

## 2022-06-09 RX ADMIN — Medication 1000 MILLIGRAM(S): at 07:30

## 2022-06-09 RX ADMIN — PANTOPRAZOLE SODIUM 40 MILLIGRAM(S): 20 TABLET, DELAYED RELEASE ORAL at 11:47

## 2022-06-09 RX ADMIN — CHLORHEXIDINE GLUCONATE 1 APPLICATION(S): 213 SOLUTION TOPICAL at 22:13

## 2022-06-09 RX ADMIN — Medication 3 MILLILITER(S): at 23:35

## 2022-06-09 RX ADMIN — SENNA PLUS 2 TABLET(S): 8.6 TABLET ORAL at 22:13

## 2022-06-09 RX ADMIN — TRAMADOL HYDROCHLORIDE 50 MILLIGRAM(S): 50 TABLET ORAL at 13:00

## 2022-06-09 RX ADMIN — Medication 3 MILLILITER(S): at 11:14

## 2022-06-09 RX ADMIN — Medication 400 MILLIGRAM(S): at 07:20

## 2022-06-09 RX ADMIN — Medication 4 MILLIGRAM(S): at 11:47

## 2022-06-09 RX ADMIN — AMLODIPINE BESYLATE 5 MILLIGRAM(S): 2.5 TABLET ORAL at 05:48

## 2022-06-09 RX ADMIN — Medication 10 MILLIGRAM(S): at 10:45

## 2022-06-09 RX ADMIN — TRAMADOL HYDROCHLORIDE 25 MILLIGRAM(S): 50 TABLET ORAL at 09:50

## 2022-06-09 RX ADMIN — ENOXAPARIN SODIUM 40 MILLIGRAM(S): 100 INJECTION SUBCUTANEOUS at 17:08

## 2022-06-09 RX ADMIN — Medication 1 TABLET(S): at 11:47

## 2022-06-09 RX ADMIN — Medication 3 MILLIGRAM(S): at 13:06

## 2022-06-09 RX ADMIN — SODIUM CHLORIDE 75 MILLILITER(S): 9 INJECTION INTRAMUSCULAR; INTRAVENOUS; SUBCUTANEOUS at 22:06

## 2022-06-09 RX ADMIN — Medication 4 MILLIGRAM(S): at 05:48

## 2022-06-09 RX ADMIN — Medication 3 MILLILITER(S): at 17:07

## 2022-06-09 RX ADMIN — Medication 3 MILLIGRAM(S): at 22:06

## 2022-06-09 RX ADMIN — SODIUM CHLORIDE 75 MILLILITER(S): 9 INJECTION INTRAMUSCULAR; INTRAVENOUS; SUBCUTANEOUS at 10:44

## 2022-06-09 RX ADMIN — LOSARTAN POTASSIUM 100 MILLIGRAM(S): 100 TABLET, FILM COATED ORAL at 05:49

## 2022-06-09 RX ADMIN — Medication 1 TABLET(S): at 17:07

## 2022-06-09 RX ADMIN — TRAMADOL HYDROCHLORIDE 25 MILLIGRAM(S): 50 TABLET ORAL at 09:22

## 2022-06-09 NOTE — PROGRESS NOTE ADULT - ASSESSMENT
ASSESSMENT: L far lateral crani for meningioma resection w/ subtotal sacrifice of L verebral artery POD 7  now with vocal cord paralysis and weak cough, on hi- flow due to hypoxemia     PLAN:  -neuro check q 4 hr   - dexamethasone to 4 mg q 8 hr  for brainstem edema  tomorrow will decrease to 3 mg q 8 hr   NC 4L, vocal cord paralysis , frequent suctioning(self suctiong)   tapering steroids   abx changed to augmentin in AM  - c/w mucomyst & duoneb  doxazosin 4mg qd for urinary retention   Feeding: [] diet [x] tube feeds  Analgesia/Sedation: w/ Tylenol , tramadol   Seizure prophylaxis: [x] not indicated  Thromboprophylaxis: [x] SCDs [x] chemoprophylaxis Lovenox  Head of Bed/Activity: [] 30 degrees [] mobilize as tolerated [x] PT [x] OT [x] PMNR  Ulcer prophylaxis: [] not indicated [x] PPI [] other:  Glucose Control: Goal -180     ASSESSMENT: L far lateral crani for meningioma resection w/ subtotal sacrifice of L verebral artery POD 7  now with vocal cord paralysis and weak cough, on hi- flow due to hypoxemia     PLAN:  -neuro check q 4 hr   - dexamethasone to 3 mg q 8 hr  for brainstem edema further weaning per nsgy  NC 4L, vocal cord paralysis , frequent suctioning(self suctiong)   abx changed to augmentin in AM  - c/w mucomyst & duoneb  doxazosin 4mg qd for urinary retention   Feeding: [] diet [x] tube feeds  Analgesia/Sedation: w/ Tylenol , tramadol   Seizure prophylaxis: [x] not indicated  Thromboprophylaxis: [x] SCDs [x] chemoprophylaxis Lovenox  Head of Bed/Activity: [] 30 degrees [] mobilize as tolerated [x] PT [x] OT [x] PMNR  Ulcer prophylaxis: [] not indicated [x] PPI [] other:  Glucose Control: Goal -180

## 2022-06-09 NOTE — PROGRESS NOTE ADULT - SUBJECTIVE AND OBJECTIVE BOX
NEUROCRITICAL CARE  EVENING NOTE    BRIEF SUMMARY:  L far lateral crani for meningioma resection w/ subtotal sacrifice of L verebral artery POD 7  now with vocal cord paralysis and weak cough, on hig flow due to hypoxemia       VITALS/IMAGING/DATA/IVF FLUIDS/MEDICATIONS: [x] Reviewed    ALLERGIES: Allergies    No Known Allergies    Intolerances      PHYSICAL EXAM:    General: No Acute Distress,  Neurological: AOx3, left eye unable to abduct, tongue deviated to the left, palate doesn't elevate Left side intact power, ght UE 4/5. right LE 4/5  Pulmonary: decreased breath sounds b/l Lower lungs posterior   Cardiovascular: S1, S2, Regular Rate and Rhythm   Gastrointestinal: Soft, Nontender, Nondistended   Extremities: No calf tenderness   Incision: CDI

## 2022-06-09 NOTE — PROGRESS NOTE ADULT - SUBJECTIVE AND OBJECTIVE BOX
HPI:  61F, PMH HTN and C-spine meningioma removal in 2018 in [place]. P/w dizziness, gait instability, and emesis x3d. MRI shows ethel-medullary, Right CPA, and Right temporal enhancing lesions c/f meningioma vs schwannoma, with some hydro. Exam: Mandarin-speaking, AOx3, EOMI no nystagmus, PERRL, no facial/drift, CONRAD 5/5, SILT. Slightly wide-based antalgic gait, positive Romberg’s    Day 8 post left far lateral for meningioma resection subtotal and sacrifice of left vertebral artery    OVERNIGHT EVENTS:   No acute events overnight.    VITALS:  T(C): , Max: 37 (06-08-22 @ 11:00)  HR:  (95 - 122)  BP:  (125/89 - 167/92)  ABP: --  RR:  (13 - 26)  SpO2:  (94% - 100%)  Wt(kg): --      06-07-22 @ 07:01  -  06-08-22 @ 07:00  --------------------------------------------------------  IN: 1300 mL / OUT: 2500 mL / NET: -1200 mL    06-08-22 @ 07:01  -  06-09-22 @ 06:50  --------------------------------------------------------  IN: 3200 mL / OUT: 3350 mL / NET: -150 mL      LABS:  Na: 137 (06-07 @ 21:37), 135 (06-06 @ 20:11)  K: 4.1 (06-07 @ 21:37), 4.3 (06-06 @ 20:11)  Cl: 97 (06-07 @ 21:37), 97 (06-06 @ 20:11)  CO2: 25 (06-07 @ 21:37), 27 (06-06 @ 20:11)  BUN: 13 (06-07 @ 21:37), 15 (06-06 @ 20:11)  Cr: <0.30 (06-07 @ 21:37), <0.30 (06-06 @ 20:11)  Glu: 127(06-07 @ 21:37), 143(06-06 @ 20:11)    Hgb: 11.4 (06-07 @ 21:37), 11.7 (06-06 @ 20:11)  Hct: 32.9 (06-07 @ 21:37), 35.1 (06-06 @ 20:11)  WBC: 12.87 (06-07 @ 21:37), 12.12 (06-06 @ 20:11)  Plt: 221 (06-07 @ 21:37), 197 (06-06 @ 20:11)    INR:   PTT:     IMAGING:   Recent imaging studies were reviewed.    MEDICATIONS:  acetaminophen     Tablet .. 650 milliGRAM(s) Oral every 6 hours PRN  amLODIPine   Tablet 5 milliGRAM(s) Oral daily  chlorhexidine 4% Liquid 1 Application(s) Topical <User Schedule>  dexAMETHasone  Injectable 3 milliGRAM(s) IV Push every 8 hours  enoxaparin Injectable 40 milliGRAM(s) SubCutaneous <User Schedule>  levoFLOXacin IVPB      levoFLOXacin IVPB 750 milliGRAM(s) IV Intermittent every 24 hours  losartan 100 milliGRAM(s) Oral daily  multivitamin 1 Tablet(s) Oral daily  ondansetron    Tablet 4 milliGRAM(s) Oral every 12 hours PRN  pantoprazole  Injectable 40 milliGRAM(s) IV Push daily  polyethylene glycol 3350 17 Gram(s) Oral two times a day  senna 2 Tablet(s) Oral at bedtime  sodium chloride 0.9%. 1000 milliLiter(s) IV Continuous <Continuous>    PHYSICAL EXAM:    General: No Acute Distress,  Neurological: AOx3, left eye unable to abduct, tongue deviated to the left, palate doesn't elevate Left side intact power, right UE 4/5. Left LE 4/5  Pulmonary: Clear to Auscultation, No Rales, No Rhonchi, No Wheezes   Cardiovascular: S1, S2, Regular Rate and Rhythm   Gastrointestinal: Soft, Nontender, Nondistended   Extremities: No calf tenderness   Incision: CDI   HPI:  61F, PMH HTN and C-spine meningioma removal in 2018 in [place]. P/w dizziness, gait instability, and emesis x3d. MRI shows ethle-medullary, Right CPA, and Right temporal enhancing lesions c/f meningioma vs schwannoma, with some hydro. Exam: Mandarin-speaking, AOx3, EOMI no nystagmus, PERRL, no facial/drift, CONRAD 5/5, SILT. Slightly wide-based antalgic gait, positive Romberg’s    Day 8 post left far lateral for meningioma resection subtotal and sacrifice of left vertebral artery    OVERNIGHT EVENTS:   weaned from high flow however still requiring frequent suctioning due to inability to clear her secretions     T(C): 36.8 (06-09-22 @ 07:00), Max: 37 (06-08-22 @ 11:00)  HR: 111 (06-09-22 @ 07:00) (95 - 122)  BP: 167/96 (06-09-22 @ 07:00) (125/89 - 167/96)  RR: 21 (06-09-22 @ 07:00) (13 - 26)  SpO2: 95% (06-09-22 @ 07:00) (94% - 100%)  06-08-22 @ 07:01  -  06-09-22 @ 07:00  --------------------------------------------------------  IN: 3275 mL / OUT: 3350 mL / NET: -75 mL    06-09-22 @ 07:01  -  06-09-22 @ 09:03  --------------------------------------------------------  IN: 75 mL / OUT: 0 mL / NET: 75 mL    acetaminophen     Tablet .. 650 milliGRAM(s) Oral every 6 hours PRN  acetaminophen   IVPB .. 1000 milliGRAM(s) IV Intermittent once  amLODIPine   Tablet 5 milliGRAM(s) Oral daily  chlorhexidine 4% Liquid 1 Application(s) Topical <User Schedule>  dexAMETHasone  Injectable 3 milliGRAM(s) IV Push every 8 hours  enoxaparin Injectable 40 milliGRAM(s) SubCutaneous <User Schedule>  levoFLOXacin IVPB      levoFLOXacin IVPB 750 milliGRAM(s) IV Intermittent every 24 hours  losartan 100 milliGRAM(s) Oral daily  multivitamin 1 Tablet(s) Oral daily  ondansetron    Tablet 4 milliGRAM(s) Oral every 12 hours PRN  pantoprazole  Injectable 40 milliGRAM(s) IV Push daily  polyethylene glycol 3350 17 Gram(s) Oral two times a day  senna 2 Tablet(s) Oral at bedtime  sodium chloride 0.9%. 1000 milliLiter(s) IV Continuous <Continuous>      PHYSICAL EXAM:    General: No Acute Distress,  Neurological: AOx3, left eye unable to abduct, tongue deviated to the left, palate doesn't elevate Left side intact power, right UE 4/5. Left LE 4/5  Pulmonary: Clear to Auscultation, No Rales, No Rhonchi, No Wheezes   Cardiovascular: S1, S2, Regular Rate and Rhythm   Gastrointestinal: Soft, Nontender, Nondistended   Extremities: No calf tenderness   Incision: CDI        LABS:  Na: 137 (06-07 @ 21:37), 135 (06-06 @ 20:11)  K: 4.1 (06-07 @ 21:37), 4.3 (06-06 @ 20:11)  Cl: 97 (06-07 @ 21:37), 97 (06-06 @ 20:11)  CO2: 25 (06-07 @ 21:37), 27 (06-06 @ 20:11)  BUN: 13 (06-07 @ 21:37), 15 (06-06 @ 20:11)  Cr: <0.30 (06-07 @ 21:37), <0.30 (06-06 @ 20:11)  Glu: 127(06-07 @ 21:37), 143(06-06 @ 20:11)    Hgb: 11.4 (06-07 @ 21:37), 11.7 (06-06 @ 20:11)  Hct: 32.9 (06-07 @ 21:37), 35.1 (06-06 @ 20:11)  WBC: 12.87 (06-07 @ 21:37), 12.12 (06-06 @ 20:11)  Plt: 221 (06-07 @ 21:37), 197 (06-06 @ 20:11)    INR:   PTT:

## 2022-06-09 NOTE — PROGRESS NOTE ADULT - ASSESSMENT
Assessment:   L far lateral crani for meningioma resection w/ subtotal sacrifice of L verebral artery POD 5  now with vocal cord paralysis and weak cough, on hig flow due to hypoxemia     NEURO:  -neuro check q 4 hr   - dexamethasone to 4 mg q 8 hr  for brainstem edema  tomorrow will decrease to 3 mg q 8 hr   -pain management w/ Tylenol  , oxycodone   -PT/OT evaluation     PULMONARY:   HFNC 50%/40  will wean down   CTpE protocol, no PE, has infiltrates and atelectasis   vocal cord paralysis , frequent suctioning   Robinul 0.2 q 6 prn for oral secretions     CARDIOVASCULAR:  monitor on telemetry   sbp goal 100-160 mmhg   HTN on Losartan 100 mg OD and Norvasc 5 mg OD  sinus tachycardia, plasmalyte 500 ml bolus once   TTE     GASTROENTEROLOGY:  NG , TF , likely will need a PEG   GI ppx:  Protonix 40mg qd while on decadron   LBM 6/5/2022   miralax and senna     RENAL/:  -NS 75 ml/hr   -check BMP qd  - Intermittent straight cath Q 6 hrs    ENDOCRINE:  target sugar 140-180       HEME/ONC:  Lovenox 40    INFECTIOUS:   afebrile   zosyn for pneumonia for 5 days    cx staph in bronch     at risk for deterioration due to post op hemorrhage, cerebral edema, respiratory failure requiring intubation   ICU  full code   Assessment:   L far lateral crani for meningioma resection w/ subtotal sacrifice of L verebral artery POD 8  now with vocal cord paralysis and weak cough     NEURO:  -neuro check q 4 hr   - dexamethasone decreased to  3 mg q 8 hr from brainstem edema   -pain management w/ Tylenol , tramadol 25 mg 4 PRN   -PT/OT evaluation     PULMONARY:   now on NC  chest xray has infiltrates   CTpE protocol, no PE, has infiltrates and atelectasis   unilateral vocal cord paralysis ,  suctioned every 1 hr   duonebs and mucomyst      CARDIOVASCULAR:  monitor on telemetry   sbp goal 100-160 mmhg   HTN on Losartan 100 mg OD and Norvasc 5 mg OD, increase amlodipine to 10 mg   sinus tachycardia, plasmalyte 500 ml bolus once   TTE nl EF     GASTROENTEROLOGY:  NG , TF at goal  , likely will need a PEG   GI ppx:  Protonix 40mg qd while on decadron   [facility] 6/9/2022   miralax and senna     RENAL/:  -NS 75 ml/hr , IVL   -check BMP qd  - add cardura   - urinary retention, bladder scan q 6 hr, and Intermittent straight cath as needed     ENDOCRINE:  target sugar 140-180       HEME/ONC:  Lovenox 40   venous LE negative     INFECTIOUS:   afebrile   zosyn  changed to Levaquin 750 mg daily for pneumonia for 5 days    cx staph in bronch     at risk for deterioration due to post op hemorrhage, cerebral edema, respiratory failure requiring intubation   ICU  full code    35 critical care time

## 2022-06-10 LAB
ANION GAP SERPL CALC-SCNC: 13 MMOL/L — SIGNIFICANT CHANGE UP (ref 5–17)
BUN SERPL-MCNC: 20 MG/DL — SIGNIFICANT CHANGE UP (ref 7–23)
CALCIUM SERPL-MCNC: 8.7 MG/DL — SIGNIFICANT CHANGE UP (ref 8.4–10.5)
CHLORIDE SERPL-SCNC: 103 MMOL/L — SIGNIFICANT CHANGE UP (ref 96–108)
CO2 SERPL-SCNC: 27 MMOL/L — SIGNIFICANT CHANGE UP (ref 22–31)
CREAT SERPL-MCNC: <0.3 MG/DL — LOW (ref 0.5–1.3)
EGFR: 122 ML/MIN/1.73M2 — SIGNIFICANT CHANGE UP
GLUCOSE SERPL-MCNC: 193 MG/DL — HIGH (ref 70–99)
HCT VFR BLD CALC: 31.6 % — LOW (ref 34.5–45)
HGB BLD-MCNC: 10.3 G/DL — LOW (ref 11.5–15.5)
MAGNESIUM SERPL-MCNC: 2.4 MG/DL — SIGNIFICANT CHANGE UP (ref 1.6–2.6)
MCHC RBC-ENTMCNC: 31.3 PG — SIGNIFICANT CHANGE UP (ref 27–34)
MCHC RBC-ENTMCNC: 32.6 GM/DL — SIGNIFICANT CHANGE UP (ref 32–36)
MCV RBC AUTO: 96 FL — SIGNIFICANT CHANGE UP (ref 80–100)
NRBC # BLD: 0 /100 WBCS — SIGNIFICANT CHANGE UP (ref 0–0)
PHOSPHATE SERPL-MCNC: 2.5 MG/DL — SIGNIFICANT CHANGE UP (ref 2.5–4.5)
PLATELET # BLD AUTO: 245 K/UL — SIGNIFICANT CHANGE UP (ref 150–400)
POTASSIUM SERPL-MCNC: 4.2 MMOL/L — SIGNIFICANT CHANGE UP (ref 3.5–5.3)
POTASSIUM SERPL-SCNC: 4.2 MMOL/L — SIGNIFICANT CHANGE UP (ref 3.5–5.3)
RBC # BLD: 3.29 M/UL — LOW (ref 3.8–5.2)
RBC # FLD: 12.4 % — SIGNIFICANT CHANGE UP (ref 10.3–14.5)
SODIUM SERPL-SCNC: 143 MMOL/L — SIGNIFICANT CHANGE UP (ref 135–145)
WBC # BLD: 14.88 K/UL — HIGH (ref 3.8–10.5)
WBC # FLD AUTO: 14.88 K/UL — HIGH (ref 3.8–10.5)

## 2022-06-10 PROCEDURE — 99233 SBSQ HOSP IP/OBS HIGH 50: CPT

## 2022-06-10 RX ORDER — METOPROLOL TARTRATE 50 MG
12.5 TABLET ORAL EVERY 12 HOURS
Refills: 0 | Status: DISCONTINUED | OUTPATIENT
Start: 2022-06-10 | End: 2022-06-13

## 2022-06-10 RX ADMIN — TRAMADOL HYDROCHLORIDE 50 MILLIGRAM(S): 50 TABLET ORAL at 04:35

## 2022-06-10 RX ADMIN — AMLODIPINE BESYLATE 10 MILLIGRAM(S): 2.5 TABLET ORAL at 05:47

## 2022-06-10 RX ADMIN — Medication 3 MILLILITER(S): at 05:03

## 2022-06-10 RX ADMIN — Medication 1 TABLET(S): at 18:08

## 2022-06-10 RX ADMIN — Medication 12.5 MILLIGRAM(S): at 08:30

## 2022-06-10 RX ADMIN — CHLORHEXIDINE GLUCONATE 1 APPLICATION(S): 213 SOLUTION TOPICAL at 21:06

## 2022-06-10 RX ADMIN — Medication 3 MILLILITER(S): at 17:25

## 2022-06-10 RX ADMIN — Medication 3 MILLILITER(S): at 11:17

## 2022-06-10 RX ADMIN — TRAMADOL HYDROCHLORIDE 50 MILLIGRAM(S): 50 TABLET ORAL at 04:25

## 2022-06-10 RX ADMIN — Medication 12.5 MILLIGRAM(S): at 20:54

## 2022-06-10 RX ADMIN — Medication 1 TABLET(S): at 05:46

## 2022-06-10 RX ADMIN — Medication 3 MILLIGRAM(S): at 21:06

## 2022-06-10 RX ADMIN — ENOXAPARIN SODIUM 40 MILLIGRAM(S): 100 INJECTION SUBCUTANEOUS at 18:08

## 2022-06-10 RX ADMIN — Medication 1 TABLET(S): at 12:25

## 2022-06-10 RX ADMIN — PANTOPRAZOLE SODIUM 40 MILLIGRAM(S): 20 TABLET, DELAYED RELEASE ORAL at 12:25

## 2022-06-10 RX ADMIN — Medication 4 MILLIGRAM(S): at 05:46

## 2022-06-10 RX ADMIN — Medication 3 MILLIGRAM(S): at 05:47

## 2022-06-10 RX ADMIN — Medication 3 MILLIGRAM(S): at 13:08

## 2022-06-10 RX ADMIN — LOSARTAN POTASSIUM 100 MILLIGRAM(S): 100 TABLET, FILM COATED ORAL at 05:46

## 2022-06-10 NOTE — SWALLOW BEDSIDE ASSESSMENT ADULT - COMMENTS
pt given warm water to swish/spit - noted with anterior loss of trace amounts from L labial margin  pt able to control bolus enough to not swallow any liquid and adequately spit into empty cup 5/20: ENT consulted as pt c/o dysphagia. NSCU concerned for possible VC paralysis and HL secondary to location of lesion. Laryngoscopy done at bedside, normal scope. Bilateral vocal cords mobile and intact. Rec speech and swallow evaluation.  5/22: bleeding from mouth - pt reported need crown replaced; dental consulted.  Seen for MBS on 5/24 with recommendations for a regular diet.  S/p left far lateral for meningioma resection subtotal and sacrifice of left vertebral artery on 6/1  post op extubated, stridor, required re intubation same day  extubated on 6/6  ENT consulted post extubation on 6/6 and indirect laryngoscopy performed was notable for left vocal cord paralysis and copious pooling of secretions with aspiration of secretions.  Initially required BiPAP and HFNC post extubation (until 6/8) - now on 3L/NC

## 2022-06-10 NOTE — SWALLOW BEDSIDE ASSESSMENT ADULT - SWALLOW EVAL: PATIENT/FAMILY GOALS STATEMENT
Pt is Sinhala speaking. Daughter-in-law and son at bedside. Daughter-in-law provided translation. Pt/family reported slight improvement in vocal quality since extubation stating they were unable to hear her previously, but now can. Also stated pt presents with improvement in ability to self expectorate secretions after coughing them up. Pt endorsed xerostomia and requesting to swish/spit warm water.
Pt is Fujianese speaking - daughter in law at bedside declined  phone and translated. Endorsed solids foods feeling stuck and difficult to swallow. Stated at times she has to spit out the solids and other times can swallow with force. Denied difficulty swallowing liquids and pureeds. Daughter in law stated pt is mostly consuming [place].

## 2022-06-10 NOTE — PROGRESS NOTE ADULT - SUBJECTIVE AND OBJECTIVE BOX
HPI:  61F, PMH HTN and C-spine meningioma removal in 2018 in [place]. P/w dizziness, gait instability, and emesis x3d. MRI shows ethel-medullary, Right CPA, and Right temporal enhancing lesions c/f meningioma vs schwannoma, with some hydro. Exam: Mandarin-speaking, AOx3, EOMI no nystagmus, PERRL, no facial/drift, CONRAD 5/5, SILT. Slightly wide-based antalgic gait, positive Romberg’s    Day 9 post left far lateral for meningioma resection subtotal and sacrifice of left vertebral artery    OVERNIGHT EVENTS:   No acute events overnight.    VITALS:  T(C): , Max: 37 (06-09-22 @ 23:00)  HR:  (84 - 113)  BP:  (96/63 - 175/90)  ABP: --  RR:  (14 - 21)  SpO2:  (91% - 100%)  Wt(kg): --      06-08-22 @ 07:01  -  06-09-22 @ 07:00  --------------------------------------------------------  IN: 3335 mL / OUT: 3350 mL / NET: -15 mL    06-09-22 @ 07:01  -  06-10-22 @ 06:49  --------------------------------------------------------  IN: 2860 mL / OUT: 2350 mL / NET: 510 mL      LABS:  Na: 143 (06-10 @ 02:53), 137 (06-07 @ 21:37)  K: 4.2 (06-10 @ 02:53), 4.1 (06-07 @ 21:37)  Cl: 103 (06-10 @ 02:53), 97 (06-07 @ 21:37)  CO2: 27 (06-10 @ 02:53), 25 (06-07 @ 21:37)  BUN: 20 (06-10 @ 02:53), 13 (06-07 @ 21:37)  Cr: <0.30 (06-10 @ 02:53), <0.30 (06-07 @ 21:37)  Glu: 193(06-10 @ 02:53), 127(06-07 @ 21:37)    Hgb: 10.3 (06-10 @ 02:53), 11.4 (06-07 @ 21:37)  Hct: 31.6 (06-10 @ 02:53), 32.9 (06-07 @ 21:37)  WBC: 14.88 (06-10 @ 02:53), 12.87 (06-07 @ 21:37)  Plt: 245 (06-10 @ 02:53), 221 (06-07 @ 21:37)    INR:   PTT:     IMAGING:   Recent imaging studies were reviewed.    MEDICATIONS:  acetaminophen     Tablet .. 650 milliGRAM(s) Oral every 6 hours PRN  acetylcysteine 20%  Inhalation 3 milliLiter(s) Inhalation every 6 hours  albuterol/ipratropium for Nebulization 3 milliLiter(s) Nebulizer every 6 hours  amLODIPine   Tablet 10 milliGRAM(s) Oral daily  amoxicillin  875 milliGRAM(s)/clavulanate 1 Tablet(s) Oral two times a day  chlorhexidine 4% Liquid 1 Application(s) Topical <User Schedule>  dexAMETHasone  Injectable 3 milliGRAM(s) IV Push every 8 hours  doxazosin 4 milliGRAM(s) Oral daily  enoxaparin Injectable 40 milliGRAM(s) SubCutaneous <User Schedule>  losartan 100 milliGRAM(s) Oral daily  multivitamin 1 Tablet(s) Oral daily  ondansetron    Tablet 4 milliGRAM(s) Oral every 12 hours PRN  pantoprazole  Injectable 40 milliGRAM(s) IV Push daily  polyethylene glycol 3350 17 Gram(s) Oral two times a day  senna 2 Tablet(s) Oral at bedtime  traMADol 25 milliGRAM(s) Oral every 4 hours PRN  traMADol 50 milliGRAM(s) Oral every 4 hours PRN    PHYSICAL EXAM:    General: No Acute Distress,  Neurological: AOx3, left eye unable to abduct, tongue deviated to the left, palate doesn't elevate Left side intact power, right UE 4/5. Left LE 4/5  Pulmonary: Clear to Auscultation, No Rales, No Rhonchi, No Wheezes   Cardiovascular: S1, S2, Regular Rate and Rhythm   Gastrointestinal: Soft, Nontender, Nondistended   Extremities: No calf tenderness   Incision: CDI HPI:  61F, PMH HTN and C-spine meningioma removal in 2018 in [place]. P/w dizziness, gait instability, and emesis x3d. MRI shows ethel-medullary, Right CPA, and Right temporal enhancing lesions c/f meningioma vs schwannoma, with some hydro. Exam: Mandarin-speaking, AOx3, EOMI no nystagmus, PERRL, no facial/drift, CONRAD 5/5, SILT. Slightly wide-based antalgic gait, positive Romberg’s    Day 10 post left far lateral for meningioma resection subtotal and sacrifice of left vertebral artery    PAST MEDICAL & SURGICAL HISTORY:  HTN (hypertension)      HLD (hyperlipidemia)      H/O cervical spine surgery      Allergies    No Known Allergies    Intolerances        OVERNIGHT EVENTS:   stable exam, afebrile         REVIEW OF SYSTEMS: [ ] Unable to Assess due to neurologic exam   [x ] All ROS addressed below are non-contributory, except:  Neuro: [ ] Headache [ ] Back pain [ ] Numbness [ ] Weakness [ ] Ataxia [ ] Dizziness [ ] Aphasia [ ] Dysarthria [ ] Visual disturbance  Resp: [ ] Shortness of breath/dyspnea, [ ] Orthopnea [ ] Cough  CV: [ ] Chest pain [ ] Palpitation [ ] Lightheadedness [ ] Syncope  Renal: [ ] Thirst [ ] Edema  GI: [ ] Nausea [ ] Emesis [ ] Abdominal pain [ ] Constipation [ ] Diarrhea  Hem: [ ] Hematemesis [ ] bright red blood per rectum  ID: [ ] Fever [ ] Chills [ ] Dysuria  ENT: [ ] Rhinorrhea      No acute events overnight.    VIT    T(C): 37.1 (06-10-22 @ 07:00), Max: 37.1 (06-10-22 @ 07:00)  HR: 107 (06-10-22 @ 09:00) (84 - 113)  BP: 118/74 (06-10-22 @ 09:00) (96/63 - 174/89)  RR: 21 (06-10-22 @ 09:00) (14 - 24)  SpO2: 97% (06-10-22 @ 09:00) (91% - 100%)  06-09-22 @ 07:01  -  06-10-22 @ 07:00  --------------------------------------------------------  IN: 2860 mL / OUT: 2350 mL / NET: 510 mL    06-10-22 @ 07:01  -  06-10-22 @ 09:10  --------------------------------------------------------  IN: 180 mL / OUT: 0 mL / NET: 180 mL    acetaminophen     Tablet .. 650 milliGRAM(s) Oral every 6 hours PRN  acetylcysteine 20%  Inhalation 3 milliLiter(s) Inhalation every 6 hours  albuterol/ipratropium for Nebulization 3 milliLiter(s) Nebulizer every 6 hours  amLODIPine   Tablet 10 milliGRAM(s) Oral daily  amoxicillin  875 milliGRAM(s)/clavulanate 1 Tablet(s) Oral two times a day  chlorhexidine 4% Liquid 1 Application(s) Topical <User Schedule>  dexAMETHasone  Injectable 3 milliGRAM(s) IV Push every 8 hours  doxazosin 4 milliGRAM(s) Oral daily  enoxaparin Injectable 40 milliGRAM(s) SubCutaneous <User Schedule>  losartan 100 milliGRAM(s) Oral daily  metoprolol tartrate 12.5 milliGRAM(s) Oral every 12 hours  multivitamin 1 Tablet(s) Oral daily  ondansetron    Tablet 4 milliGRAM(s) Oral every 12 hours PRN  pantoprazole  Injectable 40 milliGRAM(s) IV Push daily  polyethylene glycol 3350 17 Gram(s) Oral two times a day  senna 2 Tablet(s) Oral at bedtime  traMADol 25 milliGRAM(s) Oral every 4 hours PRN  traMADol 50 milliGRAM(s) Oral every 4 hours PRN        PHYSICAL EXAM:    General: No Acute Distress,  Neurological: AOx3, left eye unable to abduct, tongue deviated to the left, palate doesn't elevate Left side intact power, right UE 4/5. Left LE 4/5  Pulmonary: Clear to Auscultation, No Rales, No Rhonchi, No Wheezes   Cardiovascular: S1, S2, Regular Rate and Rhythm   Gastrointestinal: Soft, Nontender, Nondistended   Extremities: No calf tenderness   Incision: CDI      LABS:  Na: 143 (06-10 @ 02:53), 137 (06-07 @ 21:37)  K: 4.2 (06-10 @ 02:53), 4.1 (06-07 @ 21:37)  Cl: 103 (06-10 @ 02:53), 97 (06-07 @ 21:37)  CO2: 27 (06-10 @ 02:53), 25 (06-07 @ 21:37)  BUN: 20 (06-10 @ 02:53), 13 (06-07 @ 21:37)  Cr: <0.30 (06-10 @ 02:53), <0.30 (06-07 @ 21:37)  Glu: 193(06-10 @ 02:53), 127(06-07 @ 21:37)    Hgb: 10.3 (06-10 @ 02:53), 11.4 (06-07 @ 21:37)  Hct: 31.6 (06-10 @ 02:53), 32.9 (06-07 @ 21:37)  WBC: 14.88 (06-10 @ 02:53), 12.87 (06-07 @ 21:37)  Plt: 245 (06-10 @ 02:53), 221 (06-07 @ 21:37)    INR:   PTT:

## 2022-06-10 NOTE — PROGRESS NOTE ADULT - SUBJECTIVE AND OBJECTIVE BOX
[facility]-- WOUND TEAM -- FOLLOW UP NOTE  --------------------------------------------------------------------------------    24 hour events/subjective:          Diet:  Diet, NPO with Tube Feed:   Tube Feeding Modality: Nasogastric  Glucerna 1.5 Ash (GLUCERNA1.5RTH)  Total Volume for 24 Hours (mL): 1440  Continuous  Starting Tube Feed Rate mL per Hour: 20  Increase Tube Feed Rate by (mL): 10     Every 4 hours  Until Goal Tube Feed Rate (mL per Hour): 60  Tube Feed Duration (in Hours): 24  Tube Feed Start Time: 11:00  Supplement Feeding Modality:  Nasogastric  Probiotic Yogurt/Smoothie Cans or Servings Per Day:  2       Frequency:  Daily (06-08-22 @ 09:10)      ROS: General/ SKIN/ MSK/ Neuro/ GI see HPI  all other systems negative  pt unable to offer    ALLERGIES & MEDICATIONS  --------------------------------------------------------------------------------  Allergies    No Known Allergies    Intolerances          STANDING INPATIENT MEDICATIONS    acetylcysteine 20%  Inhalation 3 milliLiter(s) Inhalation every 6 hours  albuterol/ipratropium for Nebulization 3 milliLiter(s) Nebulizer every 6 hours  amLODIPine   Tablet 10 milliGRAM(s) Oral daily  amoxicillin  875 milliGRAM(s)/clavulanate 1 Tablet(s) Oral two times a day  chlorhexidine 4% Liquid 1 Application(s) Topical <User Schedule>  dexAMETHasone  Injectable 3 milliGRAM(s) IV Push every 8 hours  doxazosin 4 milliGRAM(s) Oral daily  enoxaparin Injectable 40 milliGRAM(s) SubCutaneous <User Schedule>  losartan 100 milliGRAM(s) Oral daily  metoprolol tartrate 12.5 milliGRAM(s) Oral every 12 hours  multivitamin 1 Tablet(s) Oral daily  pantoprazole  Injectable 40 milliGRAM(s) IV Push daily  polyethylene glycol 3350 17 Gram(s) Oral two times a day  senna 2 Tablet(s) Oral at bedtime      PRN INPATIENT MEDICATION  acetaminophen     Tablet .. 650 milliGRAM(s) Oral every 6 hours PRN  ondansetron    Tablet 4 milliGRAM(s) Oral every 12 hours PRN  traMADol 25 milliGRAM(s) Oral every 4 hours PRN  traMADol 50 milliGRAM(s) Oral every 4 hours PRN        VITALS/PHYSICAL EXAM  --------------------------------------------------------------------------------  T(C): 36.8 (06-10-22 @ 15:00), Max: 37.1 (06-10-22 @ 07:00)  HR: 101 (06-10-22 @ 15:00) (85 - 113)  BP: 131/79 (06-10-22 @ 15:00) (92/67 - 136/84)  RR: 19 (06-10-22 @ 15:00) (14 - 24)  SpO2: 98% (06-10-22 @ 15:00) (91% - 100%)  Wt(kg): --        06-09-22 @ 07:01  -  06-10-22 @ 07:00  --------------------------------------------------------  IN: 2860 mL / OUT: 2350 mL / NET: 510 mL    06-10-22 @ 07:01  -  06-10-22 @ 17:04  --------------------------------------------------------  IN: 420 mL / OUT: 0 mL / NET: 420 mL            LABS/ CULTURES/ RADIOLOGY:              10.3   14.88 >-----------<  245      [06-10-22 @ 02:53]              31.6     143  |  103  |  20  ----------------------------<  193      [06-10-22 @ 02:53]  4.2   |  27  |  <0.30        Ca     8.7     [06-10-22 @ 02:53]      Mg     2.4     [06-10-22 @ 02:53]      Phos  2.5     [06-10-22 @ 02:53]                CAPILLARY BLOOD GLUCOSE            Triglycerides, Serum: 99 mg/dL (05-21-22 @ 06:26)            Culture - Blood (collected 06-06-22 @ 03:30)  Source: .Blood Blood-Peripheral  Preliminary Report (06-07-22 @ 09:02):    No growth to date.    Culture - Blood (collected 06-06-22 @ 03:30)  Source: .Blood Blood-Peripheral  Preliminary Report (06-07-22 @ 09:01):    No growth to date.          A1C with Estimated Average Glucose Result: 5.8 % (06-01-22 @ 20:30)   [facility]-- WOUND TEAM -- FOLLOW UP NOTE  --------------------------------------------------------------------------------    24 hour events/subjective:    afebrile  alert  urinary retention  tolerating TF  vocal cord paralysis      Diet:  Diet, NPO with Tube Feed:   Tube Feeding Modality: Nasogastric  Glucerna 1.5 Ash (GLUCERNA1.5RTH)  Total Volume for 24 Hours (mL): 1440  Continuous  Starting Tube Feed Rate mL per Hour: 20  Increase Tube Feed Rate by (mL): 10     Every 4 hours  Until Goal Tube Feed Rate (mL per Hour): 60  Tube Feed Duration (in Hours): 24  Tube Feed Start Time: 11:00  Supplement Feeding Modality:  Nasogastric  Probiotic Yogurt/Smoothie Cans or Servings Per Day:  2       Frequency:  Daily (06-08-22 @ 09:10)      ROS: General/ SKIN/ Neuro see HPI  all other systems negative      ALLERGIES & MEDICATIONS  --------------------------------------------------------------------------------  No Known Allergies    STANDING INPATIENT MEDICATIONS  acetylcysteine 20%  Inhalation 3 milliLiter(s) Inhalation every 6 hours  albuterol/ipratropium for Nebulization 3 milliLiter(s) Nebulizer every 6 hours  amLODIPine   Tablet 10 milliGRAM(s) Oral daily  amoxicillin  875 milliGRAM(s)/clavulanate 1 Tablet(s) Oral two times a day  chlorhexidine 4% Liquid 1 Application(s) Topical <User Schedule>  dexAMETHasone  Injectable 3 milliGRAM(s) IV Push every 8 hours  doxazosin 4 milliGRAM(s) Oral daily  enoxaparin Injectable 40 milliGRAM(s) SubCutaneous <User Schedule>  losartan 100 milliGRAM(s) Oral daily  metoprolol tartrate 12.5 milliGRAM(s) Oral every 12 hours  multivitamin 1 Tablet(s) Oral daily  pantoprazole  Injectable 40 milliGRAM(s) IV Push daily  polyethylene glycol 3350 17 Gram(s) Oral two times a day  senna 2 Tablet(s) Oral at bedtime      PRN INPATIENT MEDICATION  acetaminophen     Tablet .. 650 milliGRAM(s) Oral every 6 hours PRN  ondansetron    Tablet 4 milliGRAM(s) Oral every 12 hours PRN  traMADol 25 milliGRAM(s) Oral every 4 hours PRN  traMADol 50 milliGRAM(s) Oral every 4 hours PRN        VITALS/PHYSICAL EXAM  --------------------------------------------------------------------------------  T(C): 36.8 (06-10-22 @ 15:00), Max: 37.1 (06-10-22 @ 07:00)  HR: 101 (06-10-22 @ 15:00) (85 - 113)  BP: 131/79 (06-10-22 @ 15:00) (92/67 - 136/84)  RR: 19 (06-10-22 @ 15:00) (14 - 24)  SpO2: 98% (06-10-22 @ 15:00) (91% - 100%)  Wt(kg): --        06-09-22 @ 07:01  -  06-10-22 @ 07:00  --------------------------------------------------------  IN: 2860 mL / OUT: 2350 mL / NET: 510 mL    06-10-22 @ 07:01  -  06-10-22 @ 17:04  --------------------------------------------------------  IN: 420 mL / OUT: 0 mL / NET: 420 mL    Physical Exam:  General: guarded but stable, Awake, WN/ wd  Total Care Sport  HEENT: NC/AT, EOMI, sclera clear, mucosa moist, throat clear neck supple  Neurology: mild weakness, sensation grossly intact. vocal cord paralysis  Musculoskeletal: no contractures FROM  Vascular: BLE edema equal, and warm  Skin: moist w/ good turgor   RIght trochanter healing stage 2     hyperpigmented nearly healed wound w/ epithelization       4cm X 5cm x 0cm, no drainage nor blistering  No odor, increased warmth, tenderness, induration, fluctuance, nor crepitus        LABS/ CULTURES/ RADIOLOGY:              10.3   14.88 >-----------<  245      [06-10-22 @ 02:53]              31.6     143  |  103  |  20  ----------------------------<  193      [06-10-22 @ 02:53]  4.2   |  27  |  <0.30        Ca     8.7     [06-10-22 @ 02:53]      Mg     2.4     [06-10-22 @ 02:53]      Phos  2.5     [06-10-22 @ 02:53]

## 2022-06-10 NOTE — SWALLOW BEDSIDE ASSESSMENT ADULT - SWALLOW EVAL: RECOMMENDED DIET
Continue NPO, with non-oral nutrition/hydration/medications.
Continue regular solids and thin liquids at this time given no overt s/s of laryngeal penetration/aspiration.

## 2022-06-10 NOTE — PROGRESS NOTE ADULT - ASSESSMENT
A/P 61F, PMH HTN and C-spine meningioma removal in 2018 in [place]. P/w dizziness, gait instability, and emesis x3d. s/p Lt far lateral crani for meningioma resection w/ subtotal sacrifice of L verebral artery    Wound Consult requested to assist w/ management of RIght trochanter stage 2 pressure injury    1.) Right trochanter - CAVILON QD  2.) Incontinence Management - incontinence cleanser, pads, pericare BID  3.) Maintain on an alternating air with low air loss surface  4.) Turn and reposition Q 2 hours w/ assistive devices  5.) Nutrition optimization w/ high quality protein TF, mvi, vitc to assist w/ healing  6.) Offload heels/feet with complete cair air fluidized boots; ensure that the soles of the feet are not resting on the foot board of the bed.  Care as per nsg. remain available as requuested  Upon discharge if pt has needs. can f/u as outpatient [facility] [address] [facility] [phone]  d/w RN and team  Jimena Keller PA-C, CWS [id number]  I spent 25minutes face to face w/ this pt of which more than 50% of the time was spent counseling & coordinating care of this pt.

## 2022-06-10 NOTE — SWALLOW BEDSIDE ASSESSMENT ADULT - SWALLOW EVAL: DIAGNOSIS
Patient with multiple brain lesions. MRI shows ethel-medullary, Right CPA, and Right temporal enhancing lesions c/f meningioma vs schwannoma, with some hydro. Pending OR. Patient presents with c/o pharyngeal stasis post intake of solid foods with multiple swallows per bolus. No overt s/s of laryngeal penetration/aspiration observed, however, given site of ethel-medullary lesion and pt's subjective complaints would benefit from instrumental swallow study.
Patient admitted with ethel-medullary, Right CPA, and Right temporal enhancing lesions. S/p left far lateral for meningioma resection subtotal and sacrifice of left vertebral artery on 6/1. Re-intubated post op for stridor and extubated 6/6. Found to have L vf paralysis and aspiration of secretions when scoped by ENT on 6/6. Patient presents with an oropharyngeal dysphagia and dysphonia. The swallow is marked by prolonged bolus manipulation with intermittent anterior loss from L labial margin, delayed oral transit time, delayed trigger of the swallow, suspected reduced laryngeal movement on palpation, and increased wetness when cued to cough/throat clear post swallow. Vocal quality is characterized as hoarse, breathy, and weak.

## 2022-06-10 NOTE — SWALLOW BEDSIDE ASSESSMENT ADULT - SPECIFY REASON(S)
to assess the swallow mechanism; r/o dysphagia
to assess the swallow mechanism; determine candidacy for PO diet

## 2022-06-10 NOTE — SWALLOW BEDSIDE ASSESSMENT ADULT - SLP GENERAL OBSERVATIONS
Patient encountered awake and alert, positioned upright in bed, +3L/NC, A&Ox4. Able to follow commands and make wants/needs known in Turkish. +Dysphonia.
Patient encountered awake and alert, upright in bed, on RA, A&Ox4. Able to follow commands and make wants/needs known. Vocal quality WNL.

## 2022-06-10 NOTE — CHART NOTE - NSCHARTNOTEFT_GEN_A_CORE
Nutrition Follow Up Note  Patient seen for: Nutrition Follow Up    Chart reviewed, events noted. Pt is a 60 yo F with PMH: HTN, C-spine meningioma removal in 2018 in [place]. Presented with dizziness, gait instability, and emesis x 3 days. MRI shows ethel-medullary, right CPA and R temporal enhancing lesions c/f meningioma vs schwannoma with some hydro. S/P left far lateral for meningioma resection subtotal and sacrifice of left vertebral artery. Now with vocal cord paralysis and weak cough. Continues on Decadron as prescribed. Goals of care re: long term nutrition ongoing. Continues on antibiotics as ordered in setting of pneumonia.     Source: [] Patient       [x] EMR        [x] RN        [] Family at bedside       [x] Other: interdisciplinary medical team    -If unable to interview patient: [x] Trach/Vent/BiPAP  [x] Disoriented/confused/inappropriate to interview    Diet Order:   Diet, NPO with Tube Feed:   Tube Feeding Modality: Nasogastric  Glucerna 1.5 Ash (GLUCERNA1.5RTH)  Total Volume for 24 Hours (mL): 1440  Continuous  Starting Tube Feed Rate {mL per Hour}: 20  Increase Tube Feed Rate by (mL): 10     Every 4 hours  Until Goal Tube Feed Rate (mL per Hour): 60  Tube Feed Duration (in Hours): 24  Tube Feed Start Time: 11:00  Supplement Feeding Modality:  Nasogastric  Probiotic Yogurt/Smoothie Cans or Servings Per Day:  2       Frequency:  Daily (22)    EN Order Provides: 1440ml, 2160kcal and 119g protein     EN Provision (per nursing flow sheet):   (6/10):  ():  ():  ():  ():     Nutrition-related concerns:    GI:  Last BM ___.   Bowel Regimen? [] Yes   [] No  NGT Output:  IV Fluids:  Ostomy Output:  Fistula Output:     Weights:   Daily Weight in k ()    MEDICATIONS  (STANDING):  amLODIPine   Tablet  amoxicillin  875 milliGRAM(s)/clavulanate  dexAMETHasone  Injectable  doxazosin  losartan  multivitamin  pantoprazole  Injectable  polyethylene glycol 3350  senna    Pertinent Labs: 06-10 @ 02:53: Na 143, BUN 20, Cr <0.30<L>, <H>, K+ 4.2, Phos 2.5, Mg 2.4, Alk Phos --, ALT/SGPT --, AST/SGOT --, HbA1c --    A1C with Estimated Average Glucose Result: 5.8 % (22 @ 20:30)    Finger Sticks:    Triglycerides, Serum: 99 mg/dL (22 @ 06:26)      Skin per nursing documentation:   Edema:     Estimated Energy Needs:  Estimated Protein Needs:  Estimated Fluid Needs:   Bakari State Equation:    Previous Nutrition Diagnosis:   Nutrition Diagnosis is: [] ongoing  [] resolved [] not applicable     New Nutrition Diagnosis: [] Not applicable    Nutrition Care Plan:  [] In Progress  [] Achieved  [] Not applicable    Nutrition Interventions:     Education Provided:       [] Yes:  [] No:        Recommendations:         [] Continue current diet order            [] Add oral nutrition supplement:     [] Discontinue current diet order. Recommend:      [] Add micronutrient supplementation:      [] Continue current micronutrient supplementation:      [] Other:     Monitoring and Evaluation:   Continue to monitor nutritional intake, tolerance to diet prescription, weights, labs, skin integrity      RD remains available upon request and will follow up per protocol Nutrition Follow Up Note  Patient seen for: Nutrition Follow Up    Chart reviewed, events noted. Pt is a 60 yo F with PMH: HTN, C-spine meningioma removal in 2018 in [place]. Presented with dizziness, gait instability, and emesis x 3 days. MRI shows ethel-medullary, right CPA and R temporal enhancing lesions c/f meningioma vs schwannoma with some hydro. S/P left far lateral for meningioma resection subtotal and sacrifice of left vertebral artery. Now with vocal cord paralysis and weak cough. Continues on Decadron as prescribed. Goals of care re: long term nutrition ongoing. Continues on antibiotics as ordered in setting of pneumonia.     Source: [] Patient       [x] EMR        [x] RN        [] Family at bedside       [x] Other: interdisciplinary medical team    -If unable to interview patient: [x] Trach/Vent/BiPAP  [x] Disoriented/confused/inappropriate to interview    Diet Order:   Diet, NPO with Tube Feed:   Tube Feeding Modality: Nasogastric  Glucerna 1.5 Ash (GLUCERNA1.5RTH)  Total Volume for 24 Hours (mL): 1440  Continuous  Starting Tube Feed Rate {mL per Hour}: 20  Increase Tube Feed Rate by (mL): 10     Every 4 hours  Until Goal Tube Feed Rate (mL per Hour): 60  Tube Feed Duration (in Hours): 24  Tube Feed Start Time: 11:00  Supplement Feeding Modality:  Nasogastric  Probiotic Yogurt/Smoothie Cans or Servings Per Day:  2       Frequency:  Daily (22)    EN Order Provides: 1440ml, 2160kcal and 119g protein     EN Provision (per nursing flow sheet):   (6/10): Feeds infusing at 60ml/hr (goal rate) at time of RD visit  (): 1410ml (98% of goal)  (): 410ml (28% of goal; resumed s/p extubation)  (): Feeds held for extubation with c/f possible need for reintubation  (): 240ml (16.7% of goal; feeds held for extubation)  (): 1440ml (100% of goal)  (): 1440ml (100% of goal)  (6/3): 1020ml (71% of goal)  (): 330ml (23% of goal)    Nutrition-related concerns:  -Pt continues on antibiotics as ordered. On Frank Active 2x daily to aid in gut lauren.   -Last BM: (6/10) x 1; (): x 2. On bowel regimen (senna, Miralax).   -Continues on Decadron; at risk for elevated FSBG. A1c 5.8%.   -Continues on daily multivitamin. Team continues to trend electrolytes and replete PRN.     Weights:   Daily Weight in k ()    MEDICATIONS  (STANDING):  amLODIPine   Tablet  amoxicillin  875 milliGRAM(s)/clavulanate  dexAMETHasone  Injectable  doxazosin  losartan  multivitamin  pantoprazole  Injectable  polyethylene glycol 3350  senna    Pertinent Labs: 06-10 @ 02:53: Na 143, BUN 20, Cr <0.30<L>, <H>, K+ 4.2, Phos 2.5, Mg 2.4, Alk Phos --, ALT/SGPT --, AST/SGOT --, HbA1c --    A1C with Estimated Average Glucose Result: 5.8 % (22 @ 20:30)    Finger Sticks:    Triglycerides, Serum: 99 mg/dL (22 @ 06:26)    Skin per nursing documentation: right hip DTI; surgical incision L crani  Edema: 1+ generalized     (based on dosing wt 119 lbs/54kg):   Estimated Energy Needs: (30-35kcal/kg): 1620-1890kcal   Estimated Protein Needs: (1.3-1.6g protein/kg): 70-86g protein  Estimated Fluid Needs: defer fluid needs to medical team    Previous Nutrition Diagnosis: acute severe protein calorie malnutrition and increased nutrient needs  Nutrition Diagnosis is: [x] ongoing  [] resolved [] not applicable     New Nutrition Diagnosis: [x] Not applicable    Nutrition Care Plan:  [x] In Progress       Recommendations:      1. Consider Glucerna 1.2 at 60ml/hr x 24 hrs. To provide (based on 54kg): 1440ml, 1728kcal (32kcal/kg) and 86g protein (1.6g protein/kg).   2. If PO diet advanced, consider consistent carbohydrate diet if persistently elevated FSBG. Defer consistency to medical team.   3. Continue multivitamin as prescribed. Recommend vitamin C to aid in wound healing.   4. Monitor wt trends/labs/skin integrity/hydration status/bowel regularity.     Monitoring and Evaluation:   Continue to monitor nutritional intake, tolerance to diet prescription, weights, labs, skin integrity    RD remains available upon request and will follow up per protocol    Alison Kleiner, RD, [facility] Pager # [phone]

## 2022-06-10 NOTE — PROGRESS NOTE ADULT - ASSESSMENT
Assessment:   L far lateral crani for meningioma resection w/ subtotal sacrifice of L verebral artery POD 8  now with vocal cord paralysis and weak cough     NEURO:  -neuro check q 4 hr   - dexamethasone decreased to  3 mg q 8 hr from brainstem edema   -pain management w/ Tylenol , tramadol 25 mg 4 PRN   -PT/OT evaluation     PULMONARY:   now on NC  chest xray has infiltrates   CTpE protocol, no PE, has infiltrates and atelectasis   unilateral vocal cord paralysis ,  suctioned every 1 hr   duonebs and mucomyst      CARDIOVASCULAR:  monitor on telemetry   sbp goal 100-160 mmhg   HTN on Losartan 100 mg OD and Norvasc 5 mg OD, increase amlodipine to 10 mg   sinus tachycardia, plasmalyte 500 ml bolus once   TTE nl EF     GASTROENTEROLOGY:  NG , TF at goal  , likely will need a PEG   GI ppx:  Protonix 40mg qd while on decadron   LB 6/9/2022   miralax and senna     RENAL/:  -NS 75 ml/hr , IVL   -check BMP qd  - add cardura   - urinary retention, bladder scan q 6 hr, and Intermittent straight cath as needed     ENDOCRINE:  target sugar 140-180       HEME/ONC:  Lovenox 40   venous LE negative     INFECTIOUS:   afebrile   zosyn  changed to Levaquin 750 mg daily for pneumonia for 5 days    cx staph in bronch     at risk for deterioration due to post op hemorrhage, cerebral edema, respiratory failure requiring intubation   ICU  full code   Assessment:   L far lateral crani for meningioma resection w/ subtotal sacrifice of L verebral artery POD 9  now with vocal cord paralysis and weak cough     NEURO:  -neuro check q 4 hr   - dexamethasone   3 mg q 8 hr from brainstem edema to  be weaned by NS   -pain management w/ Tylenol , tramadol 25 mg 4 PRN   -PT/OT evaluation     PULMONARY:   now on RA   chest xray has infiltrates , treated for pneumonia with Augmentin has MSSA in combi cath   unilateral vocal cord paralysis , patient is self suctioning  spirometer   duonebs and mucomyst      CARDIOVASCULAR:  monitor on telemetry   sbp goal 100-160 mmhg   HTN on Losartan 100 mg OD and Norvasc 5 mg OD,  amlodipine to 10 mg   Hypertensive sinus tachycardia, add metoprolol 12.5 mg BID    TTE nl EF     GASTROENTEROLOGY:  NG , TF at goal  , likely will need a PEG , will need swallow eval   GI ppx:  Protonix 40mg qd while on decadron   LBM 6/10/2022   miralax and senna     RENAL/:  -IVL   -check BMP qd  - urinary retention,  cardura , bladder scan q 6 hr, and Intermittent straight cath as needed     ENDOCRINE:  target sugar 140-180       HEME/ONC:  Lovenox 40   venous LE negative     INFECTIOUS:   afebrile   MSSA pneumonia on augmentin   cx staph in bronch     full code  not critical

## 2022-06-10 NOTE — SWALLOW BEDSIDE ASSESSMENT ADULT - SLP PERTINENT HISTORY OF CURRENT PROBLEM
61F, PMH HTN and C-spine meningioma removal in 2018 in [place]. P/w dizziness, gait instability, and emesis x3d. MRI shows ethel-medullary, Right CPA, and Right temporal enhancing lesions c/f meningioma vs schwannoma, with some hydro. Exam: Mandarin-speaking, AOx3, EOMI no nystagmus, PERRL, no facial/drift, CONRAD 5/5, SILT. Slightly wide-based antalgic gait, positive Romberg’s. Plan for OR 6/1 depending on evolution of symptoms.
61F, PMH HTN and C-spine meningioma removal in 2018 in [place]. P/w dizziness, gait instability, and emesis x3d. MRI shows ethel-medullary, Right CPA, and Right temporal enhancing lesions c/f meningioma vs schwannoma, with some hydro. Exam: Mandarin-speaking, AOx3, EOMI no nystagmus, PERRL, no facial/drift, CONRAD 5/5, SILT. Slightly wide-based antalgic gait, positive Romberg’s. Plan for OR 6/1 depending on evolution of symptoms.

## 2022-06-10 NOTE — SWALLOW BEDSIDE ASSESSMENT ADULT - ADDITIONAL RECOMMENDATIONS
Diligent oral care.  Suggest allowing pt to swish and spit as pt is cognitively intact/able to understand that she cannot swallow anything safely at this time.
Maintain good oral hygiene.

## 2022-06-10 NOTE — SWALLOW BEDSIDE ASSESSMENT ADULT - ASR SWALLOW RECOMMEND DIAG
Not yet appropriate. Recommend bedside f/u in a few days to re-assess swallow function and determine candidacy for instrumental swallow study.
Recommend MBS to further assess the swallow mechanism/VFSS/MBS

## 2022-06-11 LAB
CULTURE RESULTS: SIGNIFICANT CHANGE UP
CULTURE RESULTS: SIGNIFICANT CHANGE UP
SPECIMEN SOURCE: SIGNIFICANT CHANGE UP
SPECIMEN SOURCE: SIGNIFICANT CHANGE UP

## 2022-06-11 PROCEDURE — 99233 SBSQ HOSP IP/OBS HIGH 50: CPT

## 2022-06-11 RX ORDER — SODIUM,POTASSIUM PHOSPHATES 278-250MG
1 POWDER IN PACKET (EA) ORAL ONCE
Refills: 0 | Status: COMPLETED | OUTPATIENT
Start: 2022-06-11 | End: 2022-06-11

## 2022-06-11 RX ADMIN — Medication 3 MILLILITER(S): at 05:53

## 2022-06-11 RX ADMIN — Medication 650 MILLIGRAM(S): at 21:29

## 2022-06-11 RX ADMIN — Medication 3 MILLIGRAM(S): at 05:28

## 2022-06-11 RX ADMIN — Medication 1 TABLET(S): at 19:13

## 2022-06-11 RX ADMIN — Medication 3 MILLILITER(S): at 23:33

## 2022-06-11 RX ADMIN — Medication 3 MILLILITER(S): at 00:46

## 2022-06-11 RX ADMIN — PANTOPRAZOLE SODIUM 40 MILLIGRAM(S): 20 TABLET, DELAYED RELEASE ORAL at 11:46

## 2022-06-11 RX ADMIN — Medication 3 MILLILITER(S): at 13:55

## 2022-06-11 RX ADMIN — SENNA PLUS 2 TABLET(S): 8.6 TABLET ORAL at 21:29

## 2022-06-11 RX ADMIN — Medication 3 MILLIGRAM(S): at 13:55

## 2022-06-11 RX ADMIN — Medication 4 MILLIGRAM(S): at 05:28

## 2022-06-11 RX ADMIN — Medication 12.5 MILLIGRAM(S): at 18:35

## 2022-06-11 RX ADMIN — Medication 3 MILLILITER(S): at 18:34

## 2022-06-11 RX ADMIN — AMLODIPINE BESYLATE 10 MILLIGRAM(S): 2.5 TABLET ORAL at 06:00

## 2022-06-11 RX ADMIN — LOSARTAN POTASSIUM 100 MILLIGRAM(S): 100 TABLET, FILM COATED ORAL at 06:00

## 2022-06-11 RX ADMIN — Medication 3 MILLIGRAM(S): at 21:38

## 2022-06-11 RX ADMIN — Medication 3 MILLILITER(S): at 05:52

## 2022-06-11 RX ADMIN — Medication 1 PACKET(S): at 09:17

## 2022-06-11 RX ADMIN — Medication 1 TABLET(S): at 05:28

## 2022-06-11 RX ADMIN — ENOXAPARIN SODIUM 40 MILLIGRAM(S): 100 INJECTION SUBCUTANEOUS at 18:35

## 2022-06-11 RX ADMIN — Medication 12.5 MILLIGRAM(S): at 05:29

## 2022-06-11 RX ADMIN — Medication 3 MILLILITER(S): at 00:47

## 2022-06-11 RX ADMIN — Medication 1 TABLET(S): at 11:47

## 2022-06-11 NOTE — PROGRESS NOTE ADULT - SUBJECTIVE AND OBJECTIVE BOX
HPI:  61F, PMH HTN and C-spine meningioma removal in 2018 in [place]. P/w dizziness, gait instability, and emesis x3d. MRI shows ethel-medullary, Right CPA, and Right temporal enhancing lesions c/f meningioma vs schwannoma, with some hydro. Exam: Mandarin-speaking, AOx3, EOMI no nystagmus, PERRL, no facial/drift, CONRAD 5/5, SILT. Slightly wide-based antalgic gait, positive Romberg’s    Day 11 post left far lateral for meningioma resection subtotal and sacrifice of left vertebral artery    OVERNIGHT EVENTS:   No acute events overnight.    VITALS:  T(C): , Max: 37.1 (06-10-22 @ 07:00)  HR:  (85 - 118)  BP:  (92/67 - 134/75)  ABP: --  RR:  (15 - 24)  SpO2:  (91% - 100%)  Wt(kg): --      06-09-22 @ 07:01  -  06-10-22 @ 07:00  --------------------------------------------------------  IN: 2860 mL / OUT: 2350 mL / NET: 510 mL    06-10-22 @ 07:01  -  06-11-22 @ 06:21  --------------------------------------------------------  IN: 1480 mL / OUT: 1100 mL / NET: 380 mL      LABS:  Na: 143 (06-10 @ 02:53)  K: 4.2 (06-10 @ 02:53)  Cl: 103 (06-10 @ 02:53)  CO2: 27 (06-10 @ 02:53)  BUN: 20 (06-10 @ 02:53)  Cr: <0.30 (06-10 @ 02:53)  Glu: 193(06-10 @ 02:53)    Hgb: 10.3 (06-10 @ 02:53)  Hct: 31.6 (06-10 @ 02:53)  WBC: 14.88 (06-10 @ 02:53)  Plt: 245 (06-10 @ 02:53)    INR:   PTT:     IMAGING:   Recent imaging studies were reviewed.    MEDICATIONS:  acetaminophen     Tablet .. 650 milliGRAM(s) Oral every 6 hours PRN  acetylcysteine 20%  Inhalation 3 milliLiter(s) Inhalation every 6 hours  albuterol/ipratropium for Nebulization 3 milliLiter(s) Nebulizer every 6 hours  amLODIPine   Tablet 10 milliGRAM(s) Oral daily  amoxicillin  875 milliGRAM(s)/clavulanate 1 Tablet(s) Oral two times a day  chlorhexidine 4% Liquid 1 Application(s) Topical <User Schedule>  dexAMETHasone  Injectable 3 milliGRAM(s) IV Push every 8 hours  doxazosin 4 milliGRAM(s) Oral daily  enoxaparin Injectable 40 milliGRAM(s) SubCutaneous <User Schedule>  losartan 100 milliGRAM(s) Oral daily  metoprolol tartrate 12.5 milliGRAM(s) Oral every 12 hours  multivitamin 1 Tablet(s) Oral daily  ondansetron    Tablet 4 milliGRAM(s) Oral every 12 hours PRN  pantoprazole  Injectable 40 milliGRAM(s) IV Push daily  polyethylene glycol 3350 17 Gram(s) Oral two times a day  senna 2 Tablet(s) Oral at bedtime  traMADol 25 milliGRAM(s) Oral every 4 hours PRN  traMADol 50 milliGRAM(s) Oral every 4 hours PRN    PHYSICAL EXAM:    General: No Acute Distress,  Neurological: AOx3, left eye unable to abduct, tongue deviated to the left, palate doesn't elevate Left side intact power, right UE 4/5. Left LE 4/5  Pulmonary: Clear to Auscultation, No Rales, No Rhonchi, No Wheezes   Cardiovascular: S1, S2, Regular Rate and Rhythm   Gastrointestinal: Soft, Nontender, Nondistended   Extremities: No calf tenderness   Incision: CDI   HPI:  61F, PMH HTN and C-spine meningioma removal in 2018 in [place]. P/w dizziness, gait instability, and emesis x3d. MRI shows ethel-medullary, Right CPA, and Right temporal enhancing lesions c/f meningioma vs schwannoma, with some hydro. Exam: Mandarin-speaking, AOx3, EOMI no nystagmus, PERRL, no facial/drift, CONRAD 5/5, SILT. Slightly wide-based antalgic gait, positive Romberg’s    Day 11 post left far lateral for meningioma resection subtotal and sacrifice of left vertebral artery    OVERNIGHT EVENTS:   No acute events overnight.    REVIEW OF SYSTEMS: [] Unable to Assess due to neurologic exam   [x ] All ROS addressed below are non-contributory, except:  Neuro: [ ] Headache [ ] Back pain [ ] Numbness [ ] Weakness [ ] Ataxia [ ] Dizziness [ ] Aphasia [ ] Dysarthria [ ] Visual disturbance  Resp: [ ] Shortness of breath/dyspnea [ ] Orthopnea [ ] Cough  CV: [ ] Chest pain [ ] Palpitation [ ] Lightheadedness [ ] Syncope  Renal: [ ] Thirst [ ] Edema  GI: [ ] Nausea [ ] Emesis [ ] Abdominal pain [ ] Constipation [ ] Diarrhea  Hem: [ ] Hematemesis [ ] bBright red blood per rectum  ID: [ ] Fever [ ] Chills [ ] Dysuria  ENT: [ ] Rhinorrhea    VITALS:  T(C): , Max: 37.1 (06-10-22 @ 07:00)  HR:  (85 - 118)  BP:  (92/67 - 134/75)  ABP: --  RR:  (15 - 24)  SpO2:  (91% - 100%)  Wt(kg): --      06-09-22 @ 07:01  -  06-10-22 @ 07:00  --------------------------------------------------------  IN: 2860 mL / OUT: 2350 mL / NET: 510 mL    06-10-22 @ 07:01  -  06-11-22 @ 06:21  --------------------------------------------------------  IN: 1480 mL / OUT: 1100 mL / NET: 380 mL      LABS:  Na: 143 (06-10 @ 02:53)  K: 4.2 (06-10 @ 02:53)  Cl: 103 (06-10 @ 02:53)  CO2: 27 (06-10 @ 02:53)  BUN: 20 (06-10 @ 02:53)  Cr: <0.30 (06-10 @ 02:53)  Glu: 193(06-10 @ 02:53)    Hgb: 10.3 (06-10 @ 02:53)  Hct: 31.6 (06-10 @ 02:53)  WBC: 14.88 (06-10 @ 02:53)  Plt: 245 (06-10 @ 02:53)    INR:   PTT:     IMAGING:   Recent imaging studies were reviewed.    MEDICATIONS:  acetaminophen     Tablet .. 650 milliGRAM(s) Oral every 6 hours PRN  acetylcysteine 20%  Inhalation 3 milliLiter(s) Inhalation every 6 hours  albuterol/ipratropium for Nebulization 3 milliLiter(s) Nebulizer every 6 hours  amLODIPine   Tablet 10 milliGRAM(s) Oral daily  amoxicillin  875 milliGRAM(s)/clavulanate 1 Tablet(s) Oral two times a day  chlorhexidine 4% Liquid 1 Application(s) Topical <User Schedule>  dexAMETHasone  Injectable 3 milliGRAM(s) IV Push every 8 hours  doxazosin 4 milliGRAM(s) Oral daily  enoxaparin Injectable 40 milliGRAM(s) SubCutaneous <User Schedule>  losartan 100 milliGRAM(s) Oral daily  metoprolol tartrate 12.5 milliGRAM(s) Oral every 12 hours  multivitamin 1 Tablet(s) Oral daily  ondansetron    Tablet 4 milliGRAM(s) Oral every 12 hours PRN  pantoprazole  Injectable 40 milliGRAM(s) IV Push daily  polyethylene glycol 3350 17 Gram(s) Oral two times a day  senna 2 Tablet(s) Oral at bedtime  traMADol 25 milliGRAM(s) Oral every 4 hours PRN  traMADol 50 milliGRAM(s) Oral every 4 hours PRN    PHYSICAL EXAM:    General: No Acute Distress,  Neurological: AOx3, left eye unable to abduct, tongue deviated to the left, palate doesn't elevate Left side intact power, right UE 4/5. Left LE 4/5  Pulmonary: Clear to Auscultation, No Rales, No Rhonchi, No Wheezes   Cardiovascular: S1, S2, Regular Rate and Rhythm   Gastrointestinal: Soft, Nontender, Nondistended   Extremities: No calf tenderness   Incision: CDI

## 2022-06-11 NOTE — PROGRESS NOTE ADULT - ASSESSMENT
Assessment:   L far lateral crani for meningioma resection w/ subtotal sacrifice of L verebral artery POD 9  now with vocal cord paralysis and weak cough     NEURO:  -neuro check q 4 hr   - dexamethasone   3 mg q 8 hr from brainstem edema to  be weaned by NS   -pain management w/ Tylenol , tramadol 25 mg 4 PRN   -PT/OT evaluation     PULMONARY:   now on RA   chest xray has infiltrates , treated for pneumonia with Augmentin has MSSA in combi cath   unilateral vocal cord paralysis , patient is self suctioning  spirometer   duonebs and mucomyst      CARDIOVASCULAR:  monitor on telemetry   sbp goal 100-160 mmhg   HTN on Losartan 100 mg OD and Norvasc 5 mg OD,  amlodipine to 10 mg   Hypertensive sinus tachycardia, add metoprolol 12.5 mg BID    TTE nl EF     GASTROENTEROLOGY:  NG , TF at goal  , likely will need a PEG , will need swallow eval   GI ppx:  Protonix 40mg qd while on decadron   LBM 6/10/2022   miralax and senna     RENAL/:  -IVL   -check BMP qd  - urinary retention,  cardura , bladder scan q 6 hr, and Intermittent straight cath as needed     ENDOCRINE:  target sugar 140-180       HEME/ONC:  Lovenox 40   venous LE negative     INFECTIOUS:   afebrile   MSSA pneumonia on augmentin   cx staph in bronch     full code  not critical    Assessment:   L far lateral crani for meningioma resection w/ subtotal sacrifice of L verebral artery POD 9  now with vocal cord paralysis and weak cough     NEURO:  -neuro check q 4 hr   - dexamethasone   3 mg q 8 hr from brainstem edema to  be weaned by NS   -pain management w/ Tylenol , tramadol 25 mg 4 PRN   -PT/OT evaluation     PULMONARY:   Nasal canula    unilateral vocal cord paralysis , patient is self suctioning  spirometer   duonebs and mucomyst      CARDIOVASCULAR:  sbp goal 100-160 mmhg   HTN on Losartan 100 mg OD,  amlodipine 10 mg   metoprolol 12.5 mg BID    TTE nl EF     GASTROENTEROLOGY:  NG , TF at goal  , likely will need a PEG ,   Repeat swallow eval on Monday   GI ppx:  Protonix 40mg qd while on decadron   LBM 6/10/2022   miralax and senna     RENAL/:  -IVL   -check BMP qd  - urinary retention,  cardura , bladder scan q 6 hr, and Intermittent straight cath as needed     ENDOCRINE:  target sugar 140-180       HEME/ONC:  Lovenox 40   venous LE negative     INFECTIOUS:   afebrile   MSSA pneumonia on augmentin   cx staph in bronch     full code  not critical    Assessment:   L far lateral crani for meningioma resection w/ subtotal sacrifice of L verebral artery POD 11  now with vocal cord paralysis and weak cough     NEURO:  -neuro check q 4 hr   -dexamethasone   3 mg q 8 hr from brainstem edema to  be weaned by NS   -pain management w/ Tylenol , tramadol 25 mg 4 PRN   -PT/OT evaluation     PULMONARY:   Nasal canula    unilateral vocal cord paralysis , patient is self suctioning  spirometer   duonebs and mucomyst      CARDIOVASCULAR:  sbp goal 100-160 mmhg   HTN on Losartan 100 mg OD,  amlodipine 10 mg   metoprolol 12.5 mg BID    TTE nl EF     GASTROENTEROLOGY:  NG , TF at goal  , likely will need a PEG ,   Repeat swallow eval on Monday   GI ppx:  Protonix 40mg qd while on decadron   LBM 6/10/2022   miralax and senna     RENAL/:  -IVL   -check BMP qd  - urinary retention,  cardura , bladder scan q 6 hr, and Intermittent straight cath as needed     ENDOCRINE:  target sugar 140-180       HEME/ONC:  Lovenox 40   venous LE negative     INFECTIOUS:   afebrile   MSSA pneumonia on augmentin   cx staph in bronch     full code  not critical

## 2022-06-11 NOTE — PROGRESS NOTE ADULT - ATTENDING COMMENTS
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
Agree with above.
Agree with/edited as appropriate above
L far lateral crani for meningioma resection w/ subtotal sacrifice of L verebral artery POD 9  now with vocal cord paralysis and weak cough   neuro check q 4 hr , dexamethasone   3 mg q 8 hr from brainstem edema to  be weaned by NS , PT/OT evaluation, augmentin for MSSA pneumonia, lovenox 40 mg sc qhs, might require a PEG tube,  continue chest PT/ duonebs and mucomyst , self suctioning , HTN add metorpolol 12.5 mg BID
remains critically ill.  still intubated, on steroids, will attempt to wean to extubate tomorrow, NPO after midnight, CPAP.  further imaging as per NSG.
remains critically ill.  s/p crani for lesion rsxn with emergent reintubation for failure to protect airway.  steroid taper.  attempt another trial of extubation in ~48 hours.
62 yo woman with HTN and C-spine meningioma removal in 2018, who presented with dizziness and gait instability, found to have a large meningioma at the L pontomedulary junction with mass effect on the brainstem and encasing the L vertebral artery, now s/p craniotomy with resection of meningioma with intentional sacrifice of the proximal L vert distal to the PICA. Post-surgery noted to have R sided weakness. CT/CTA post surgery with expected post-op changes and open intracranial vasculature except the sacrificed segment of the L vert.     On exam, patient with alert, briskly follows commands, with L CN 6 palsy, intact cough, R arm AG with drift, R leg 2/5 proximally, able to wiggle R toes    precedex for sedation   dex 4mg q6h  SBP goal 100-160, c/w losartan  will keep patient intubated in the acute post-op injury 2/2 tumor involvement of the lower cranial nerves making her high risk for inability to protect her airway   NS at 75cc/hr  PPI  ISS    Patient is critically ill due to craniotomy for resection of a large meningioma at the L pontomedulary junction and at high risk for neurological deterioration or death due to: brainstem edema, requires mechanical ventilation 2/2 inability to protect airway
Agree with above.
Agree with/edited as appropriate above  bronch cx pan sensitive staph aureus, tailor abx to levaquin x7d
62 yo woman with HTN and C-spine meningioma removal in 2018, who presented with dizziness and gait instability, found to have a large meningioma at the L pontomedullary junction with mass effect on the brainstem and encasing the L vertebral artery, now s/p craniotomy 6/1 with resection of meningioma with intentional sacrifice of the proximal L vert distal to the PICA. Post-surgery noted to have R sided weakness. CT/CTA post surgery with expected post-op changes and open intracranial vasculature except the sacrificed segment of the L vert.     MRI today with edema in the L ethel and medulla, no stroke.     On exam, patient with alert, briskly follows commands, with L CN 6 palsy, intact cough, R arm 2/5, R leg 1/5 proximally, able to wiggle R toes, L side AG    Assessment: Patient with expected edema in the L ethel and medulla in the setting of meningioma resection, with worsening R sided weakness today 2/2 edema.     precedex for sedation   dex 4mg q6h  SBP goal 100-160, c/w losartan  will keep patient intubated in the acute post-op period 2/2 tumor involvement of the lower cranial nerves making her high risk for inability to protect her airway and known L pontomedullary edema   NS at 75cc/hr  PPI, tube feeds  ISS    Patient is critically ill due to craniotomy for resection of a large meningioma at the L pontomedullary junction and at high risk for neurological deterioration or death due to: brainstem edema, requires mechanical ventilation 2/2 inability to protect airway
Agree with/edited as appropriate above  not critically ill but medically complex

## 2022-06-11 NOTE — PROGRESS NOTE ADULT - TIME BILLING
More than 30 minutes spent on total encounter: more than 50% of the visit was spent on educating the patient and family regarding condition, medications, follow up plans, signs and symptoms to be concerned with, preparing paperwork, and questions answered regarding discharge.
inpatient post op care as above

## 2022-06-12 DIAGNOSIS — J15.211 PNEUMONIA DUE TO METHICILLIN SUSCEPTIBLE STAPHYLOCOCCUS AUREUS: ICD-10-CM

## 2022-06-12 DIAGNOSIS — J38.00 PARALYSIS OF VOCAL CORDS AND LARYNX, UNSPECIFIED: ICD-10-CM

## 2022-06-12 DIAGNOSIS — R00.0 TACHYCARDIA, UNSPECIFIED: ICD-10-CM

## 2022-06-12 DIAGNOSIS — Z29.9 ENCOUNTER FOR PROPHYLACTIC MEASURES, UNSPECIFIED: ICD-10-CM

## 2022-06-12 PROCEDURE — 99233 SBSQ HOSP IP/OBS HIGH 50: CPT

## 2022-06-12 PROCEDURE — 71045 X-RAY EXAM CHEST 1 VIEW: CPT | Mod: 26

## 2022-06-12 RX ADMIN — Medication 3 MILLIGRAM(S): at 05:54

## 2022-06-12 RX ADMIN — Medication 3 MILLILITER(S): at 17:01

## 2022-06-12 RX ADMIN — Medication 650 MILLIGRAM(S): at 05:56

## 2022-06-12 RX ADMIN — PANTOPRAZOLE SODIUM 40 MILLIGRAM(S): 20 TABLET, DELAYED RELEASE ORAL at 12:24

## 2022-06-12 RX ADMIN — Medication 3 MILLIGRAM(S): at 21:13

## 2022-06-12 RX ADMIN — Medication 1 TABLET(S): at 12:23

## 2022-06-12 RX ADMIN — Medication 3 MILLILITER(S): at 13:33

## 2022-06-12 RX ADMIN — LOSARTAN POTASSIUM 100 MILLIGRAM(S): 100 TABLET, FILM COATED ORAL at 05:57

## 2022-06-12 RX ADMIN — Medication 3 MILLILITER(S): at 05:55

## 2022-06-12 RX ADMIN — Medication 3 MILLILITER(S): at 05:54

## 2022-06-12 RX ADMIN — Medication 4 MILLIGRAM(S): at 06:12

## 2022-06-12 RX ADMIN — Medication 1 TABLET(S): at 06:12

## 2022-06-12 RX ADMIN — Medication 1 TABLET(S): at 17:00

## 2022-06-12 RX ADMIN — ENOXAPARIN SODIUM 40 MILLIGRAM(S): 100 INJECTION SUBCUTANEOUS at 17:00

## 2022-06-12 RX ADMIN — Medication 12.5 MILLIGRAM(S): at 05:55

## 2022-06-12 RX ADMIN — AMLODIPINE BESYLATE 10 MILLIGRAM(S): 2.5 TABLET ORAL at 05:55

## 2022-06-12 RX ADMIN — Medication 12.5 MILLIGRAM(S): at 17:00

## 2022-06-12 RX ADMIN — Medication 3 MILLIGRAM(S): at 16:59

## 2022-06-12 NOTE — PROGRESS NOTE ADULT - ASSESSMENT
60 yo Mandarin speaking female with PMH of HTN, C-spine meningioma removal in 2018 in [place] who p/w dizziness, gait instability, and emesis x 3 days with MRI showing ethel-medullary, Right CPA, and Right temporal enhancing lesions c/f meningioma vs schwannoma, with some hydro. Now s/p L far lateral crani for meningioma resection w/ subtotal sacrifice of L vertebral artery on 6/1/22 with NSCU course c/b MSSA PNA on augmentin and new vocal cord paralysis/dysphagia with NGT. Transferred out of NSCU 6/12.

## 2022-06-12 NOTE — PROGRESS NOTE ADULT - NSPROGADDITIONALINFOA_GEN_ALL_CORE
.  Rosie Cuellar MD  Division of Hospital Medicine  [facility]   Spectra: [phone]    Plan discussed with patient, son and daughter-in-law bedside who provided Mandarin translation, and neurosurgery MAGGIE Willson.

## 2022-06-12 NOTE — PROGRESS NOTE ADULT - PROBLEM SELECTOR PLAN 3
tele reviewed. sinus tachycardia generally with HR range in 90s-110s. occasional bursts of 120s-130s not sustained.  - not hypoxic with screening duplex negative for DVT so suspicion for VTE lower  - could be in setting of known pneumonia/infection and discomfort from NGT  - would monitor closely on tele  - euvolemic currently but would monitor volume status closely as patient reporting multiple BMs yesterday (soft, not loose/watery)

## 2022-06-12 NOTE — PROGRESS NOTE ADULT - PROBLEM SELECTOR PLAN 1
- MRI showing ethel-medullary, Right CPA, and Right temporal enhancing lesions c/f meningioma vs schwannoma, with some hydro  - Now s/p L far lateral crani for meningioma resection w/ subtotal sacrifice of L vertebral artery  - c/w keppra for seizure ppx  - post-op care as per neurosurgery team

## 2022-06-12 NOTE — PROGRESS NOTE ADULT - PROBLEM SELECTOR PLAN 2
- CXR with infiltrates  - 6/6 sputum cx with MSSA - sensitivities noted  - c/w augmentin to complete total 5 day course of abx therapy  - clinically improved  - c/w chest PT, suctioning, mucomyst to help clear secretions

## 2022-06-12 NOTE — PROGRESS NOTE ADULT - SUBJECTIVE AND OBJECTIVE BOX
Rosie Cuellar MD  Division of Hospital Medicine  [facility]  Spectra: [phone]      Patient is a 61y old  Female who presents with a chief complaint of Foramen magnum meningioma (11 Jun 2022 06:18)    NSCU course: s/p L far lateral crani for meningioma resection w/ subtotal sacrifice of L verebral artery with NSCU course c/b MSSA PNA on augmentin and new vocal cord paralysis/dysphagia with NGT. Transferred out of NSCU 6/12.     SUBJECTIVE / OVERNIGHT EVENTS: Daughter in law and son bedside provided Mandarin translation. No acute events overnight. no fever, chills, chest pain, palpitations, dyspnea, cough, nor excessive secretions. still with hypophonia in setting of post-op vocal cord paralysis but doing okay.   ADDITIONAL REVIEW OF SYSTEMS:    Tele: Sinus tachycardia with HR 90-110s, occasional brief episodes of 120s-130s not sustained  MEDICATIONS  (STANDING):  acetylcysteine 20%  Inhalation 3 milliLiter(s) Inhalation every 6 hours  albuterol/ipratropium for Nebulization 3 milliLiter(s) Nebulizer every 6 hours  amLODIPine   Tablet 10 milliGRAM(s) Oral daily  amoxicillin  875 milliGRAM(s)/clavulanate 1 Tablet(s) Oral two times a day  dexAMETHasone  Injectable 3 milliGRAM(s) IV Push every 8 hours  doxazosin 4 milliGRAM(s) Oral daily  enoxaparin Injectable 40 milliGRAM(s) SubCutaneous <User Schedule>  losartan 100 milliGRAM(s) Oral daily  metoprolol tartrate 12.5 milliGRAM(s) Oral every 12 hours  multivitamin 1 Tablet(s) Oral daily  pantoprazole  Injectable 40 milliGRAM(s) IV Push daily  polyethylene glycol 3350 17 Gram(s) Oral two times a day  senna 2 Tablet(s) Oral at bedtime    MEDICATIONS  (PRN):  acetaminophen     Tablet .. 650 milliGRAM(s) Oral every 6 hours PRN Temp greater or equal to 38C (100.4F), Mild Pain (1 - 3)  ondansetron    Tablet 4 milliGRAM(s) Oral every 12 hours PRN Nausea  traMADol 25 milliGRAM(s) Oral every 4 hours PRN Moderate Pain (4 - 6)  traMADol 50 milliGRAM(s) Oral every 4 hours PRN Severe Pain (7 - 10)      CAPILLARY BLOOD GLUCOSE        I&O's Summary    11 Jun 2022 07:01  -  12 Jun 2022 07:00  --------------------------------------------------------  IN: 1560 mL / OUT: 1650 mL / NET: -90 mL        PHYSICAL EXAM:  Vital Signs Last 24 Hrs  T(C): 36.4 (12 Jun 2022 09:59), Max: 36.6 (12 Jun 2022 00:31)  T(F): 97.5 (12 Jun 2022 09:59), Max: 97.9 (12 Jun 2022 00:31)  HR: 91 (12 Jun 2022 09:59) (91 - 125)  BP: 92/58 (12 Jun 2022 09:59) (92/58 - 131/88)  BP(mean): --  RR: 18 (12 Jun 2022 09:59) (18 - 18)  SpO2: 97% (12 Jun 2022 09:59) (94% - 98%)    CONSTITUTIONAL: NAD, well-developed, well-groomed  EYES: PERRLA; conjunctiva and sclera clear  ENMT: Moist oral mucosa, no pharyngeal injection or exudates, +NGT in nares  NECK: Supple, no palpable masses; no thyromegaly  RESPIRATORY: Normal respiratory effort; scant rhonchi in mid lung fields b/l, no wheeze nor crackles  CARDIOVASCULAR: +sinus tachycardia, normal S1 and S2, no murmur/rub/gallop; No lower extremity edema; Peripheral pulses are 2+ bilaterally  ABDOMEN: Soft, Nondistended, Nontender to palpation, normoactive bowel sounds  MUSCULOSKELETAL:  No clubbing or cyanosis of digits; no joint swelling or tenderness to palpation  PSYCH: A+O to person, place, and time; affect appropriate  NEUROLOGY: +hypophonic, no sensory deficits, 5/5 on L, grossly 4/5 on R  SKIN: No rashes; no palpable lesions      LABS:            RADIOLOGY & ADDITIONAL TESTS:  Results Reviewed: leukocytosis to 14K and mild downtrend in H/H on last labs, stable Cr  Imaging Personally Reviewed:  6/7/22 CT Angio Chest:  IMPRESSION:    No pulmonary embolism from the main pulmonary artery up to and including   the segmental branches. Limited assessment of some subsegmental branches.    Bilateral consolidation and centrilobular nodules, likely   pneumonia/bronchiolitis, and with a distribution favoring aspiration.    6/8/22 CXR:  IMPRESSION:  NG tube tip within the stomach.    Grossly unchanged left lower lung/retrocardiac patchy airspace opacity.   There is a patchy airspace opacity in the right upper lobe. These   opacities could represent areas of subsegmental atelectasis or pneumonia.    6/8/22 TTE:  Conclusions:  1. Normal mitral valve. Minimal mitral regurgitation.  2. Aortic valve not well visualized; probably normal. No aortic valve regurgitation seen.  3. Hyperdynamic left ventricular systolic function.  4. Normal right ventricular size and function.    6/8/22 Duplex of LE:  IMPRESSION:  No evidence of deep venous thrombosis in either lower extremity.  Electrocardiogram Personally Reviewed:    COORDINATION OF CARE:  Care Discussed with Consultants/Other Providers [Y/N]:  Prior or Outpatient Records Reviewed [Y]: NSCU progress notes

## 2022-06-12 NOTE — PROGRESS NOTE ADULT - SUBJECTIVE AND OBJECTIVE BOX
SUBJECTIVE:  Patient seen, resting comfortably on RA. No acute complaints. NGT in place.      OVERNIGHT EVENTS: TX from NSCU yesterday, tachy this am, metoprolol added yesterday PE workup negative    Vital Signs Last 24 Hrs  T(C): 36.4 (12 Jun 2022 13:03), Max: 36.6 (12 Jun 2022 00:31)  T(F): 97.6 (12 Jun 2022 13:03), Max: 97.9 (12 Jun 2022 00:31)  HR: 96 (12 Jun 2022 13:03) (91 - 125)  BP: 100/60 (12 Jun 2022 13:03) (92/58 - 131/88)  BP(mean): --  RR: 18 (12 Jun 2022 13:03) (18 - 18)  SpO2: 96% (12 Jun 2022 13:03) (94% - 97%)    PHYSICAL EXAM:    General: No Acute Distress     Neurological: AOx3, left eye unable to abduct, tongue deviated to the left, palate doesn't elevate Left side intact power, right UE 4/5. Left LE 4/5    Pulmonary: Clear to Auscultation, No Rales, No Rhonchi, No Wheezes   Cardiovascular: S1, S2, Regular Rate and Rhythm   Gastrointestinal: Soft, Nontender, Nondistended     Incision: CDI    LABS:           06-11 @ 07:01  -  06-12 @ 07:00  --------------------------------------------------------  IN: 1560 mL / OUT: 1650 mL / NET: -90 mL      DRAINS:     MEDICATIONS:  Antibiotics:  amoxicillin  875 milliGRAM(s)/clavulanate 1 Tablet(s) Oral two times a day    Neuro:  acetaminophen     Tablet .. 650 milliGRAM(s) Oral every 6 hours PRN Temp greater or equal to 38C (100.4F), Mild Pain (1 - 3)  ondansetron    Tablet 4 milliGRAM(s) Oral every 12 hours PRN Nausea  traMADol 25 milliGRAM(s) Oral every 4 hours PRN Moderate Pain (4 - 6)  traMADol 50 milliGRAM(s) Oral every 4 hours PRN Severe Pain (7 - 10)    Cardiac:  amLODIPine   Tablet 10 milliGRAM(s) Oral daily  doxazosin 4 milliGRAM(s) Oral daily  losartan 100 milliGRAM(s) Oral daily  metoprolol tartrate 12.5 milliGRAM(s) Oral every 12 hours    Pulm:  acetylcysteine 20%  Inhalation 3 milliLiter(s) Inhalation every 6 hours  albuterol/ipratropium for Nebulization 3 milliLiter(s) Nebulizer every 6 hours    GI/:  pantoprazole  Injectable 40 milliGRAM(s) IV Push daily  polyethylene glycol 3350 17 Gram(s) Oral two times a day  senna 2 Tablet(s) Oral at bedtime    Other:   dexAMETHasone  Injectable 3 milliGRAM(s) IV Push every 8 hours  enoxaparin Injectable 40 milliGRAM(s) SubCutaneous <User Schedule>  multivitamin 1 Tablet(s) Oral daily      IMAGING:     No new images

## 2022-06-12 NOTE — PROGRESS NOTE ADULT - ASSESSMENT
Assessment: 61F, PMH HTN and C-spine meningioma removal in 2018 in [place]. P/w dizziness, gait instability, and emesis x3d. MRI shows ethel-medullary, Right CPA, and Right temporal enhancing lesions c/f meningioma vs schwannoma, with some hydro.  NOW POD 12 L far lateral crani for meningioma resection w/ subtotal sacrifice of L verebral artery POD 11  c/b with vocal cord paralysis and weak cough     NEURO:  neuro check q 4 hr   Cont dexamethasone 3 mg q8 hr for brainstem edema    Pain control    Pulm:   Nasal canula  vs RA  unilateral vocal cord paralysis , patient is self suctioning  Incentive spirometry as tolerated  duonebs and mucomyst      Cards:  sbp goal 100-160 mmhg   Cont Losartan 100 mg OD, amlodipine 10 mg   metoprolol 12.5 mg BID for tachycardia, PE workup negative    GI:  NGT with feeds at goal, Repeat swallow eval on Monday   Cont Protonix 40mg qd while on decadron   miralax and senna     RENAL/:  IVL   Cont Cardura, for retention. bladder scan q 6 hr, and Intermittent straight cath as needed     ENDOCRINE:  target sugar 140-180     HEME/ONC:  Lovenox 40   venous LE negative     INFECTIOUS:   afebrile   MSSA pneumonia on augmentin until 6/14, 5 day course.   cx staph in bronch     Dispo: AR    Discussed with patient and family wound care, follow up plans, activity, and medications. Questions answered, and they verbalized understanding.    d/w dr abad [id number]

## 2022-06-13 LAB
ANION GAP SERPL CALC-SCNC: 11 MMOL/L — SIGNIFICANT CHANGE UP (ref 5–17)
ANION GAP SERPL CALC-SCNC: 12 MMOL/L — SIGNIFICANT CHANGE UP (ref 5–17)
BUN SERPL-MCNC: 27 MG/DL — HIGH (ref 7–23)
BUN SERPL-MCNC: 27 MG/DL — HIGH (ref 7–23)
CALCIUM SERPL-MCNC: 9.1 MG/DL — SIGNIFICANT CHANGE UP (ref 8.4–10.5)
CALCIUM SERPL-MCNC: 9.1 MG/DL — SIGNIFICANT CHANGE UP (ref 8.4–10.5)
CHLORIDE SERPL-SCNC: 103 MMOL/L — SIGNIFICANT CHANGE UP (ref 96–108)
CHLORIDE SERPL-SCNC: 103 MMOL/L — SIGNIFICANT CHANGE UP (ref 96–108)
CO2 SERPL-SCNC: 28 MMOL/L — SIGNIFICANT CHANGE UP (ref 22–31)
CO2 SERPL-SCNC: 29 MMOL/L — SIGNIFICANT CHANGE UP (ref 22–31)
CREAT SERPL-MCNC: 0.36 MG/DL — LOW (ref 0.5–1.3)
CREAT SERPL-MCNC: 0.37 MG/DL — LOW (ref 0.5–1.3)
EGFR: 116 ML/MIN/1.73M2 — SIGNIFICANT CHANGE UP
EGFR: 117 ML/MIN/1.73M2 — SIGNIFICANT CHANGE UP
GLUCOSE SERPL-MCNC: 129 MG/DL — HIGH (ref 70–99)
GLUCOSE SERPL-MCNC: 129 MG/DL — HIGH (ref 70–99)
HCT VFR BLD CALC: 36.4 % — SIGNIFICANT CHANGE UP (ref 34.5–45)
HCT VFR BLD CALC: 36.8 % — SIGNIFICANT CHANGE UP (ref 34.5–45)
HGB BLD-MCNC: 11.9 G/DL — SIGNIFICANT CHANGE UP (ref 11.5–15.5)
HGB BLD-MCNC: 12 G/DL — SIGNIFICANT CHANGE UP (ref 11.5–15.5)
MAGNESIUM SERPL-MCNC: 2.5 MG/DL — SIGNIFICANT CHANGE UP (ref 1.6–2.6)
MAGNESIUM SERPL-MCNC: 2.5 MG/DL — SIGNIFICANT CHANGE UP (ref 1.6–2.6)
MCHC RBC-ENTMCNC: 31.6 PG — SIGNIFICANT CHANGE UP (ref 27–34)
MCHC RBC-ENTMCNC: 31.6 PG — SIGNIFICANT CHANGE UP (ref 27–34)
MCHC RBC-ENTMCNC: 32.6 GM/DL — SIGNIFICANT CHANGE UP (ref 32–36)
MCHC RBC-ENTMCNC: 32.7 GM/DL — SIGNIFICANT CHANGE UP (ref 32–36)
MCV RBC AUTO: 96.6 FL — SIGNIFICANT CHANGE UP (ref 80–100)
MCV RBC AUTO: 96.8 FL — SIGNIFICANT CHANGE UP (ref 80–100)
NRBC # BLD: 0 /100 WBCS — SIGNIFICANT CHANGE UP (ref 0–0)
NRBC # BLD: 0 /100 WBCS — SIGNIFICANT CHANGE UP (ref 0–0)
PHOSPHATE SERPL-MCNC: 4.4 MG/DL — SIGNIFICANT CHANGE UP (ref 2.5–4.5)
PHOSPHATE SERPL-MCNC: 4.5 MG/DL — SIGNIFICANT CHANGE UP (ref 2.5–4.5)
PLATELET # BLD AUTO: 354 K/UL — SIGNIFICANT CHANGE UP (ref 150–400)
PLATELET # BLD AUTO: 369 K/UL — SIGNIFICANT CHANGE UP (ref 150–400)
POTASSIUM SERPL-MCNC: 4.3 MMOL/L — SIGNIFICANT CHANGE UP (ref 3.5–5.3)
POTASSIUM SERPL-MCNC: 4.5 MMOL/L — SIGNIFICANT CHANGE UP (ref 3.5–5.3)
POTASSIUM SERPL-SCNC: 4.3 MMOL/L — SIGNIFICANT CHANGE UP (ref 3.5–5.3)
POTASSIUM SERPL-SCNC: 4.5 MMOL/L — SIGNIFICANT CHANGE UP (ref 3.5–5.3)
RBC # BLD: 3.77 M/UL — LOW (ref 3.8–5.2)
RBC # BLD: 3.8 M/UL — SIGNIFICANT CHANGE UP (ref 3.8–5.2)
RBC # FLD: 11.9 % — SIGNIFICANT CHANGE UP (ref 10.3–14.5)
RBC # FLD: 12.1 % — SIGNIFICANT CHANGE UP (ref 10.3–14.5)
SARS-COV-2 RNA SPEC QL NAA+PROBE: SIGNIFICANT CHANGE UP
SODIUM SERPL-SCNC: 142 MMOL/L — SIGNIFICANT CHANGE UP (ref 135–145)
SODIUM SERPL-SCNC: 144 MMOL/L — SIGNIFICANT CHANGE UP (ref 135–145)
SURGICAL PATHOLOGY STUDY: SIGNIFICANT CHANGE UP
WBC # BLD: 16.97 K/UL — HIGH (ref 3.8–10.5)
WBC # BLD: 17.03 K/UL — HIGH (ref 3.8–10.5)
WBC # FLD AUTO: 16.97 K/UL — HIGH (ref 3.8–10.5)
WBC # FLD AUTO: 17.03 K/UL — HIGH (ref 3.8–10.5)

## 2022-06-13 PROCEDURE — 99233 SBSQ HOSP IP/OBS HIGH 50: CPT

## 2022-06-13 PROCEDURE — 71045 X-RAY EXAM CHEST 1 VIEW: CPT | Mod: 26

## 2022-06-13 PROCEDURE — 99232 SBSQ HOSP IP/OBS MODERATE 35: CPT

## 2022-06-13 RX ORDER — POLYETHYLENE GLYCOL 3350 17 G/17G
17 POWDER, FOR SOLUTION ORAL DAILY
Refills: 0 | Status: DISCONTINUED | OUTPATIENT
Start: 2022-06-14 | End: 2022-06-20

## 2022-06-13 RX ORDER — DEXAMETHASONE 0.5 MG/5ML
2 ELIXIR ORAL EVERY 8 HOURS
Refills: 0 | Status: DISCONTINUED | OUTPATIENT
Start: 2022-06-13 | End: 2022-06-16

## 2022-06-13 RX ADMIN — Medication 3 MILLILITER(S): at 00:09

## 2022-06-13 RX ADMIN — Medication 1 TABLET(S): at 05:40

## 2022-06-13 RX ADMIN — Medication 3 MILLILITER(S): at 12:31

## 2022-06-13 RX ADMIN — Medication 3 MILLIGRAM(S): at 05:39

## 2022-06-13 RX ADMIN — Medication 4 MILLIGRAM(S): at 05:40

## 2022-06-13 RX ADMIN — Medication 2 MILLIGRAM(S): at 21:43

## 2022-06-13 RX ADMIN — SENNA PLUS 2 TABLET(S): 8.6 TABLET ORAL at 21:44

## 2022-06-13 RX ADMIN — Medication 12.5 MILLIGRAM(S): at 16:35

## 2022-06-13 RX ADMIN — Medication 3 MILLILITER(S): at 05:39

## 2022-06-13 RX ADMIN — Medication 1 TABLET(S): at 16:35

## 2022-06-13 RX ADMIN — PANTOPRAZOLE SODIUM 40 MILLIGRAM(S): 20 TABLET, DELAYED RELEASE ORAL at 12:31

## 2022-06-13 RX ADMIN — ENOXAPARIN SODIUM 40 MILLIGRAM(S): 100 INJECTION SUBCUTANEOUS at 16:35

## 2022-06-13 RX ADMIN — Medication 12.5 MILLIGRAM(S): at 05:41

## 2022-06-13 RX ADMIN — Medication 3 MILLILITER(S): at 00:22

## 2022-06-13 RX ADMIN — Medication 3 MILLILITER(S): at 05:41

## 2022-06-13 RX ADMIN — Medication 3 MILLILITER(S): at 16:36

## 2022-06-13 RX ADMIN — AMLODIPINE BESYLATE 10 MILLIGRAM(S): 2.5 TABLET ORAL at 05:40

## 2022-06-13 RX ADMIN — Medication 1 TABLET(S): at 12:31

## 2022-06-13 RX ADMIN — LOSARTAN POTASSIUM 100 MILLIGRAM(S): 100 TABLET, FILM COATED ORAL at 05:39

## 2022-06-13 RX ADMIN — Medication 1 TABLET(S): at 12:33

## 2022-06-13 RX ADMIN — Medication 3 MILLILITER(S): at 16:35

## 2022-06-13 NOTE — PROGRESS NOTE ADULT - PROBLEM SELECTOR PLAN 3
- Intermittent episodes of sinus tach  - LE duplex negative for DVT 6/8 and CTA chest 6/7 negative for PE  - could be in setting of known pneumonia, duoneb, free water depletion, discomfort from NGT?  - free water added via NG tube  - if tachycardia persists, can consider switching to xopenex neb instead of duoneb

## 2022-06-13 NOTE — PROGRESS NOTE ADULT - SUBJECTIVE AND OBJECTIVE BOX
[facility] Division of Hospital Medicine  Manju Loaiza MD  spectra [phone]    Patient is a 61y old  Female who presents with a chief complaint of Foramen magnum meningioma (13 Jun 2022 09:07)      SUBJECTIVE / OVERNIGHT EVENTS: patient seen and examined earlier today. family at bedside helped to translate. patient denies any SOB or pain but having to clear her throat frequently.   ADDITIONAL REVIEW OF SYSTEMS:    MEDICATIONS  (STANDING):  acetylcysteine 20%  Inhalation 3 milliLiter(s) Inhalation every 6 hours  albuterol/ipratropium for Nebulization 3 milliLiter(s) Nebulizer every 6 hours  amLODIPine   Tablet 10 milliGRAM(s) Oral daily  amoxicillin  875 milliGRAM(s)/clavulanate 1 Tablet(s) Oral two times a day  dexAMETHasone  Injectable 2 milliGRAM(s) IV Push every 8 hours  doxazosin 4 milliGRAM(s) Oral daily  enoxaparin Injectable 40 milliGRAM(s) SubCutaneous <User Schedule>  metoprolol tartrate 12.5 milliGRAM(s) Oral every 12 hours  multivitamin 1 Tablet(s) Oral daily  pantoprazole  Injectable 40 milliGRAM(s) IV Push daily  senna 2 Tablet(s) Oral at bedtime    MEDICATIONS  (PRN):  acetaminophen     Tablet .. 650 milliGRAM(s) Oral every 6 hours PRN Temp greater or equal to 38C (100.4F), Mild Pain (1 - 3)  ondansetron    Tablet 4 milliGRAM(s) Oral every 12 hours PRN Nausea  traMADol 25 milliGRAM(s) Oral every 4 hours PRN Moderate Pain (4 - 6)  traMADol 50 milliGRAM(s) Oral every 4 hours PRN Severe Pain (7 - 10)      CAPILLARY BLOOD GLUCOSE        I&O's Summary    12 Jun 2022 07:01  -  13 Jun 2022 07:00  --------------------------------------------------------  IN: 0 mL / OUT: 750 mL / NET: -750 mL        PHYSICAL EXAM:  Vital Signs Last 24 Hrs  T(C): 36.9 (13 Jun 2022 09:12), Max: 36.9 (13 Jun 2022 09:12)  T(F): 98.4 (13 Jun 2022 09:12), Max: 98.4 (13 Jun 2022 09:12)  HR: 76 (13 Jun 2022 09:12) (69 - 111)  BP: 109/64 (13 Jun 2022 09:12) (100/60 - 135/71)  BP(mean): --  RR: 18 (13 Jun 2022 09:12) (18 - 18)  SpO2: 94% (13 Jun 2022 09:12) (94% - 99%)    CONSTITUTIONAL: NAD, well-groomed, + NG tube  NECK: Supple, no palpable masses; no thyromegaly  RESPIRATORY: Normal respiratory effort; scattered rhonchi   CARDIOVASCULAR: normal S1 and S2, no murmur/rub/gallop; No lower extremity edema  ABDOMEN: Nontender to palpation, normoactive bowel sounds, no rebound/guarding; No hepatosplenomegaly  MUSCULOSKELETAL:  no clubbing or cyanosis of digits; no joint swelling or tenderness to palpation  PSYCH: A+O to person, place, and time; affect appropriate  NEURO: moves all extremities and follows commands   SKIN: No rashes; no palpable lesions    LABS:                        12.0   16.97 )-----------( 354      ( 13 Jun 2022 05:09 )             36.8     06-13    142  |  103  |  27<H>  ----------------------------<  129<H>  4.3   |  28  |  0.36<L>    Ca    9.1      13 Jun 2022 05:09  Phos  4.4     06-13  Mg     2.5     06-13                  RADIOLOGY & ADDITIONAL TESTS:  Results Reviewed:     < from: Xray Chest 1 View- PORTABLE-Urgent (Xray Chest 1 View- PORTABLE-Urgent .) (06.12.22 @ 20:34) >  COMPARISON STUDY: 6/9/2022    Frontal expiratory view of the chest showsthe heart to be normal in   size. Feeding tube reaches the central stomach.    The lungs show clearing of the left base and there is no evidence of   pneumothorax nor pleural effusion.    IMPRESSION:  Feeding tube to stomach.    < end of copied text >    Imaging Personally Reviewed:  Electrocardiogram Personally Reviewed:    COORDINATION OF CARE:  Care Discussed with Consultants/Other Providers [Y/N]: neurosurgery Darren)  Prior or Outpatient Records Reviewed [Y/N]:

## 2022-06-13 NOTE — PROGRESS NOTE ADULT - PROBLEM SELECTOR PLAN 7
DVT ppx: lovenox, LE duplex negative for DVT on 6/8.  multiple soft stools yesterday. monitor bowel movement closely while on augmentin and will change miralax to daily with hold parameter

## 2022-06-13 NOTE — PROGRESS NOTE ADULT - PROBLEM SELECTOR PLAN 1
- MRI showed ethel-medullary, right CPA, and right temporal enhancing lesions c/f meningioma vs schwannoma, with hydro  - s/p L far lateral crani for meningioma resection w/ subtotal sacrifice of L vertebral artery on 6/1  - decadron being tapered per neurosurgery  - post-op care as per neurosurgery team

## 2022-06-13 NOTE — PROGRESS NOTE ADULT - SUBJECTIVE AND OBJECTIVE BOX
no new complaints     REVIEW OF SYSTEMS  Constitutional - No fever,  No fatigue  HEENT - No vertigo, No neck pain  Neurological - No headaches, No memory loss, No loss of strength, No numbness, No tremors  Skin - No rashes, No lesions   Musculoskeletal - No joint pain, No joint swelling, No muscle pain  Psychiatric - No depression, No anxiety    FUNCTIONAL PROGRESS  6/13 SLP  poor secretion management   NPO    6!1 PT  bed mobility mod assist  transfers mod assist with RW  gait mod to max assist with RW, 2 steps   tachycardic, subsided with rest       VITALS  T(C): 36.9 (06-13-22 @ 12:39), Max: 36.9 (06-13-22 @ 09:12)  HR: 75 (06-13-22 @ 12:39) (69 - 111)  BP: 101/60 (06-13-22 @ 12:39) (100/60 - 135/71)  RR: 18 (06-13-22 @ 12:39) (18 - 18)  SpO2: 97% (06-13-22 @ 12:39) (94% - 99%)  Wt(kg): --    MEDICATIONS   acetaminophen     Tablet .. 650 milliGRAM(s) every 6 hours PRN  acetylcysteine 20%  Inhalation 3 milliLiter(s) every 6 hours  albuterol/ipratropium for Nebulization 3 milliLiter(s) every 6 hours  amLODIPine   Tablet 10 milliGRAM(s) daily  amoxicillin  875 milliGRAM(s)/clavulanate 1 Tablet(s) two times a day  dexAMETHasone  Injectable 2 milliGRAM(s) every 8 hours  doxazosin 4 milliGRAM(s) daily  enoxaparin Injectable 40 milliGRAM(s) <User Schedule>  metoprolol tartrate 12.5 milliGRAM(s) every 12 hours  multivitamin 1 Tablet(s) daily  ondansetron    Tablet 4 milliGRAM(s) every 12 hours PRN  pantoprazole  Injectable 40 milliGRAM(s) daily  senna 2 Tablet(s) at bedtime  traMADol 25 milliGRAM(s) every 4 hours PRN  traMADol 50 milliGRAM(s) every 4 hours PRN      RECENT LABS - Reviewed                        12.0   16.97 )-----------( 354      ( 13 Jun 2022 05:09 )             36.8     06-13    142  |  103  |  27<H>  ----------------------------<  129<H>  4.3   |  28  |  0.36<L>    Ca    9.1      13 Jun 2022 05:09  Phos  4.4     06-13  Mg     2.5     06-13    --------------------------------------------------------------------------------  PHYSICAL EXAM  Constitutional - NAD, in bed   +NGT   Chest - Breathing comfortably  Cardiovascular - S1S2   Abdomen - Soft   Extremities - No C/C/E, No calf tenderness   Neurologic Exam -                    Cognitive - Awake, Alert, AAO x3     Communication - hypophonic, follows commands        Motor - right sided weakness      Sensory - Intact to LT     Psychiatric - Mood stable, Affect WNL  ----------------------------------------------------------------------------------------  ASSESSMENT/PLAN  61yFemale h/o HTN, c spine meningioma removed 2018 with functional deficits due to meningioma  s/p L far lateral crani for meningioma resection w/ subtotal sacrifice of L vertebral artery   dexamethasone taper  left vocal cord paralysis, dysphagia, NGT, swallow eval   pneumonia, MRSA, augmentin  urinary retention, bowel regimen  Pain - Tylenol  DVT PPX - SCDs, lovenox   Rehab -   continue bedside therapy  patient would benefit from acute inpatient rehabilitation when medically stable, intensive PT/OT/SLP to restore function while being closely monitored and managed for ongoing medical issues which can destabilize

## 2022-06-13 NOTE — PROGRESS NOTE ADULT - PROBLEM SELECTOR PLAN 2
- CTA chest on 6/7 negative for PE but b/l consolidation noted concerning for aspiration pneumonia  - 6/6 sputum cx with MSSA - sensitivities noted  - repeat CXR 6/12 with clearing in left base  - c/w augmentin to complete total 5 day course of abx therapy (6/9-)  - c/w chest PT, suctioning, nebs/mucomyst to help clear secretions  - noted worsening leukocytosis, ? due to steroid +/- free water depletion as well given elevated BUN/cr ratio and rising serum Na- free water via NG tube added and steroid tapered today  - will monitor leukocytosis but if + fever or clinically worsening, check repeat cultures and may need to broaden abx coverage to add vanco (given + MRSA swab)

## 2022-06-13 NOTE — PROGRESS NOTE ADULT - ASSESSMENT
Assessment: 61F, PMH HTN and C-spine meningioma removal in 2018 in [place]. P/w dizziness, gait instability, and emesis x3d. MRI shows ethel-medullary, Right CPA, and Right temporal enhancing lesions c/f meningioma vs schwannoma, with some hydro.  NOW POD 12 L far lateral crani for meningioma resection w/ subtotal sacrifice of L verebral artery POD 11  c/b with vocal cord paralysis and weak cough     NEURO:  neuro check q 4 hr   Cont dexamethasone 3 mg q8 hr for brainstem edema    Pain control    Pulm:   Nasal canula  vs RA  unilateral vocal cord paralysis , patient is self suctioning  Incentive spirometry as tolerated  duonebs and mucomyst    +PNA on augmentin    Cards:  sbp goal 100-160 mmhg   Cont Losartan 100 mg OD, amlodipine 10 mg   metoprolol 12.5 mg BID for tachycardia, PE workup negative    GI:  NGT with feeds at goal, Repeat swallow eval today  Cont Protonix 40mg qd while on decadron   miralax and senna     RENAL/:  IVL   Cont Cardura, for retention. bladder scan q 6 hr, and Intermittent straight cath as needed     ENDOCRINE:  target sugar 140-180     HEME/ONC:  Lovenox 40   venous LE negative     INFECTIOUS:   increasing WBC, on ABX, continues to be afebrile, will monitor or fevers  MSSA pneumonia on augmentin until 6/14, 5 day course.   cx staph in bronch     Dispo: AR    Discussed with patient and family wound care, follow up plans, activity, and medications. Questions answered, and they verbalized understanding.    d/w dr abad [id number] Assessment: 61F, PMH HTN and C-spine meningioma removal in 2018 in [place]. P/w dizziness, gait instability, and emesis x3d. MRI shows ethel-medullary, Right CPA, and Right temporal enhancing lesions c/f meningioma vs schwannoma, with some hydro.  NOW POD 12 L far lateral crani for meningioma resection w/ subtotal sacrifice of L verebral artery POD 11  c/b with vocal cord paralysis and weak cough     NEURO:  neuro check q 4 hr   Tapered TODAY: dexamethasone 2 mg q8 hr for brainstem edema  (was 3Q8)  Pain control    Pulm:   Nasal canula  vs RA  unilateral vocal cord paralysis , patient is self suctioning  Incentive spirometry as tolerated  duonebs and mucomyst    +PNA on augmentin    Cards:  sbp goal 100-160 mmhg   Cont Losartan 100 mg OD, amlodipine 10 mg   metoprolol 12.5 mg BID for tachycardia, PE workup negative    GI:  NGT with feeds at goal, Repeat swallow eval today  Cont Protonix 40mg qd while on decadron   miralax and senna     RENAL/:  IVL   Cont Cardura, for retention. bladder scan q 6 hr, and Intermittent straight cath as needed     ENDOCRINE:  target sugar 140-180     HEME/ONC:  Lovenox 40   venous LE negative     INFECTIOUS:   increasing WBC, on ABX, continues to be afebrile, will monitor or fevers  MSSA pneumonia on augmentin until 6/14, 5 day course.   cx staph in bronch     Dispo: AR    Discussed with patient and family wound care, follow up plans, activity, and medications. Questions answered, and they verbalized understanding.    d/w dr abad [id number]

## 2022-06-13 NOTE — PROGRESS NOTE ADULT - SUBJECTIVE AND OBJECTIVE BOX
SUBJECTIVE:  Patient seen, resting comfortably on NC/RA. No acute complaints. Awaiting S&S re-eval    OVERNIGHT EVENTS: Removed NGT, was replaced and confirmed.    Vital Signs Last 24 Hrs  T(C): 36.4 (13 Jun 2022 04:50), Max: 36.7 (13 Jun 2022 01:00)  T(F): 97.5 (13 Jun 2022 04:50), Max: 98.1 (13 Jun 2022 01:00)  HR: 69 (13 Jun 2022 04:50) (69 - 111)  BP: 118/79 (13 Jun 2022 04:50) (92/58 - 135/71)  BP(mean): --  RR: 18 (13 Jun 2022 04:50) (18 - 18)  SpO2: 99% (13 Jun 2022 04:50) (95% - 99%)    PHYSICAL EXAM:    General: No Acute Distress     Neurological: AOx3, left eye unable to abduct, tongue deviated to the left, palate doesn't elevate Left side intact power, right UE 4/5. Left LE 4/5    Pulmonary: Clear to Auscultation, No Rales, No Rhonchi, No Wheezes   Cardiovascular: S1, S2, Regular Rate and Rhythm   Gastrointestinal: Soft, Nontender, Nondistended     Incision: CDI    LABS:                          12.0   16.97 )-----------( 354      ( 13 Jun 2022 05:09 )             36.8     06-13    142  |  103  |  27<H>  ----------------------------<  129<H>  4.3   |  28  |  0.36<L>    Ca    9.1      13 Jun 2022 05:09  Phos  4.4     06-13  Mg     2.5     06-13          DRAINS:     MEDICATIONS:  Antibiotics:  amoxicillin  875 milliGRAM(s)/clavulanate 1 Tablet(s) Oral two times a day    Neuro:  acetaminophen     Tablet .. 650 milliGRAM(s) Oral every 6 hours PRN Temp greater or equal to 38C (100.4F), Mild Pain (1 - 3)  ondansetron    Tablet 4 milliGRAM(s) Oral every 12 hours PRN Nausea  traMADol 25 milliGRAM(s) Oral every 4 hours PRN Moderate Pain (4 - 6)  traMADol 50 milliGRAM(s) Oral every 4 hours PRN Severe Pain (7 - 10)    Cardiac:  amLODIPine   Tablet 10 milliGRAM(s) Oral daily  doxazosin 4 milliGRAM(s) Oral daily  losartan 100 milliGRAM(s) Oral daily  metoprolol tartrate 12.5 milliGRAM(s) Oral every 12 hours    Pulm:  acetylcysteine 20%  Inhalation 3 milliLiter(s) Inhalation every 6 hours  albuterol/ipratropium for Nebulization 3 milliLiter(s) Nebulizer every 6 hours    GI/:  pantoprazole  Injectable 40 milliGRAM(s) IV Push daily  polyethylene glycol 3350 17 Gram(s) Oral two times a day  senna 2 Tablet(s) Oral at bedtime    Other:   dexAMETHasone  Injectable 3 milliGRAM(s) IV Push every 8 hours  enoxaparin Injectable 40 milliGRAM(s) SubCutaneous <User Schedule>  multivitamin 1 Tablet(s) Oral daily      IMAGING:     No new images

## 2022-06-13 NOTE — PROGRESS NOTE ADULT - ASSESSMENT
60 y/o Mandarin speaking female with PMH of HTN, C-spine meningioma removal in 2018 in [place] who p/w dizziness, gait instability, and emesis with MRI showing ethel-medullary, right CPA, and right temporal enhancing lesions c/f meningioma vs schwannoma, with hydro. Patient is s/p L far lateral crani for meningioma resection w/ subtotal sacrifice of L vertebral artery on 6/1/22. NSCU course c/b MSSA PNA (suspected aspiration pneumonia) on augmentin and vocal cord paralysis/dysphagia with NGT. Transferred out of NSCU 6/12.

## 2022-06-13 NOTE — CHART NOTE - NSCHARTNOTEFT_GEN_A_CORE
60 y/o F admitted with ethel-medullary, right CPA, and right temporal enhancing lesions s/p left far lateral for meningioma resection subtotal and sacrifice of left vertebral artery on 6/1. Re-intubated post op for stridor and extubated 6/6. Found to have L vf paralysis and aspiration of secretions when scoped by ENT on 6/6. Patient seen 6/10 for re-evaluation of swallow fx post-op. She presented with an oropharyngeal dysphagia and dysphonia. Rx at that time: Continue NPO, with non-oral nutrition/hydration/medications.    Pt was seen today for bedside swallow re-evaluation. RNIvis, reported pt intermittently utilizing Flocastsuer throughout the day for secretions. She was received at bedside awake and alert. Family present and provided translation to [employer]. +room air, +KFT, +dysphonia (hoarse, breathy, and weak vocal quality). Baseline wet/gurgly vocal quality and congested coughing. Unable to fully clear with cued cough. Pt was trialed with 1/2 tsp crushed ice chip x2. The swallow is marked by mildly reduced AP bolus transfer, delayed oral transit time, suspected delayed trigger of the swallow, suspected reduced laryngeal movement on palpation, and overt clinical s/s of aspiration or penetration (coughing and increased wetness post swallow). Discussed aspiration, aspiration risks, and POC with pt/family. Purposeful and proactive rounding completed. 5 P's addressed. Pt left in no distress.    Impressions:  Pt continues to present with poor secretion management evidenced by baseline wet/gurgly vocal quality. Overt clinical s/s of aspiration or penetration (coughing and increased wetness post swallow of ice chips). Will f/u for re-evaluation of swallow Wednesday. However, prognosis guarded given current clinical presentation and location of resection of meningioma.     Recommendations:  1) Continue NPO, with non-oral nutrition/hydration/medications via KFT.   2) Recommend bedside f/u in a few days to re-assess swallow function and determine candidacy for instrumental swallow study.   3) Diligent oral care and aspiration precautions for secretions/enteral feeds. Suggest allowing pt to swish and spit as pt is cognitively intact/able to understand that she cannot swallow anything safely at this time.  4) ST upon d/c.    Discussed the above with pt/family, Ivis TATUM, and Demetris SERRANO.    Pepper Otto M.S., CCC-SLP ext. [phone]

## 2022-06-14 LAB
ANION GAP SERPL CALC-SCNC: 11 MMOL/L — SIGNIFICANT CHANGE UP (ref 5–17)
BUN SERPL-MCNC: 25 MG/DL — HIGH (ref 7–23)
CALCIUM SERPL-MCNC: 8.8 MG/DL — SIGNIFICANT CHANGE UP (ref 8.4–10.5)
CHLORIDE SERPL-SCNC: 99 MMOL/L — SIGNIFICANT CHANGE UP (ref 96–108)
CO2 SERPL-SCNC: 28 MMOL/L — SIGNIFICANT CHANGE UP (ref 22–31)
CREAT SERPL-MCNC: 0.36 MG/DL — LOW (ref 0.5–1.3)
EGFR: 117 ML/MIN/1.73M2 — SIGNIFICANT CHANGE UP
GLUCOSE SERPL-MCNC: 129 MG/DL — HIGH (ref 70–99)
HCT VFR BLD CALC: 32.2 % — LOW (ref 34.5–45)
HGB BLD-MCNC: 10.8 G/DL — LOW (ref 11.5–15.5)
MAGNESIUM SERPL-MCNC: 2.4 MG/DL — SIGNIFICANT CHANGE UP (ref 1.6–2.6)
MCHC RBC-ENTMCNC: 32 PG — SIGNIFICANT CHANGE UP (ref 27–34)
MCHC RBC-ENTMCNC: 33.5 GM/DL — SIGNIFICANT CHANGE UP (ref 32–36)
MCV RBC AUTO: 95.5 FL — SIGNIFICANT CHANGE UP (ref 80–100)
NRBC # BLD: 0 /100 WBCS — SIGNIFICANT CHANGE UP (ref 0–0)
PHOSPHATE SERPL-MCNC: 4.2 MG/DL — SIGNIFICANT CHANGE UP (ref 2.5–4.5)
PLATELET # BLD AUTO: 349 K/UL — SIGNIFICANT CHANGE UP (ref 150–400)
POTASSIUM SERPL-MCNC: 4.2 MMOL/L — SIGNIFICANT CHANGE UP (ref 3.5–5.3)
POTASSIUM SERPL-SCNC: 4.2 MMOL/L — SIGNIFICANT CHANGE UP (ref 3.5–5.3)
RBC # BLD: 3.37 M/UL — LOW (ref 3.8–5.2)
RBC # FLD: 12.1 % — SIGNIFICANT CHANGE UP (ref 10.3–14.5)
SODIUM SERPL-SCNC: 138 MMOL/L — SIGNIFICANT CHANGE UP (ref 135–145)
WBC # BLD: 16.35 K/UL — HIGH (ref 3.8–10.5)
WBC # FLD AUTO: 16.35 K/UL — HIGH (ref 3.8–10.5)

## 2022-06-14 PROCEDURE — 99233 SBSQ HOSP IP/OBS HIGH 50: CPT

## 2022-06-14 RX ORDER — LEVALBUTEROL 1.25 MG/.5ML
0.63 SOLUTION, CONCENTRATE RESPIRATORY (INHALATION) EVERY 6 HOURS
Refills: 0 | Status: DISCONTINUED | OUTPATIENT
Start: 2022-06-14 | End: 2022-06-20

## 2022-06-14 RX ORDER — AMLODIPINE BESYLATE 2.5 MG/1
5 TABLET ORAL DAILY
Refills: 0 | Status: DISCONTINUED | OUTPATIENT
Start: 2022-06-15 | End: 2022-06-15

## 2022-06-14 RX ORDER — LANOLIN ALCOHOL/MO/W.PET/CERES
5 CREAM (GRAM) TOPICAL AT BEDTIME
Refills: 0 | Status: DISCONTINUED | OUTPATIENT
Start: 2022-06-14 | End: 2022-06-20

## 2022-06-14 RX ADMIN — LEVALBUTEROL 0.63 MILLIGRAM(S): 1.25 SOLUTION, CONCENTRATE RESPIRATORY (INHALATION) at 12:30

## 2022-06-14 RX ADMIN — Medication 3 MILLILITER(S): at 17:26

## 2022-06-14 RX ADMIN — Medication 3 MILLILITER(S): at 00:47

## 2022-06-14 RX ADMIN — Medication 1 TABLET(S): at 05:19

## 2022-06-14 RX ADMIN — Medication 4 MILLIGRAM(S): at 05:42

## 2022-06-14 RX ADMIN — LEVALBUTEROL 0.63 MILLIGRAM(S): 1.25 SOLUTION, CONCENTRATE RESPIRATORY (INHALATION) at 17:26

## 2022-06-14 RX ADMIN — Medication 3 MILLILITER(S): at 05:20

## 2022-06-14 RX ADMIN — Medication 3 MILLILITER(S): at 12:30

## 2022-06-14 RX ADMIN — TRAMADOL HYDROCHLORIDE 50 MILLIGRAM(S): 50 TABLET ORAL at 08:31

## 2022-06-14 RX ADMIN — POLYETHYLENE GLYCOL 3350 17 GRAM(S): 17 POWDER, FOR SOLUTION ORAL at 12:30

## 2022-06-14 RX ADMIN — TRAMADOL HYDROCHLORIDE 50 MILLIGRAM(S): 50 TABLET ORAL at 09:00

## 2022-06-14 RX ADMIN — Medication 5 MILLIGRAM(S): at 21:28

## 2022-06-14 RX ADMIN — ENOXAPARIN SODIUM 40 MILLIGRAM(S): 100 INJECTION SUBCUTANEOUS at 17:26

## 2022-06-14 RX ADMIN — Medication 3 MILLILITER(S): at 05:19

## 2022-06-14 RX ADMIN — Medication 2 MILLIGRAM(S): at 21:32

## 2022-06-14 RX ADMIN — SENNA PLUS 2 TABLET(S): 8.6 TABLET ORAL at 21:32

## 2022-06-14 RX ADMIN — Medication 1 TABLET(S): at 12:30

## 2022-06-14 RX ADMIN — Medication 2 MILLIGRAM(S): at 13:55

## 2022-06-14 RX ADMIN — PANTOPRAZOLE SODIUM 40 MILLIGRAM(S): 20 TABLET, DELAYED RELEASE ORAL at 12:30

## 2022-06-14 RX ADMIN — Medication 2 MILLIGRAM(S): at 05:19

## 2022-06-14 RX ADMIN — AMLODIPINE BESYLATE 10 MILLIGRAM(S): 2.5 TABLET ORAL at 05:19

## 2022-06-14 RX ADMIN — Medication 1 TABLET(S): at 17:26

## 2022-06-14 NOTE — CHART NOTE - NSCHARTNOTEFT_GEN_A_CORE
62 y/o F admitted with ethel-medullary, right CPA, and right temporal enhancing lesions s/p left far lateral for meningioma resection subtotal and sacrifice of left vertebral artery on 6/1. Re-intubated post op for stridor and extubated 6/6. Found to have L vf paralysis and aspiration of secretions when scoped by ENT on 6/6. Patient seen 6/10 and 6/13 for re-evaluation of swallow fx post-op. She presented with an oropharyngeal dysphagia and dysphonia. Rx at that time: Continue NPO, with non-oral nutrition/hydration/medications.    Pt was seen today for dysphagia f/u. She was received at bedside awake and alert. +NC (2L), +KFT. Language Line utilized for translation to Mandarin ([employer] #[id number]). +dysphonia (hoarse, breathy, and weak vocal quality-- known L vf paralysis). Intermittent trace wet/gurgly vocal quality, no congested coughing (improvement since yesterday). Pt was trialed with small ice chip x2. The swallow is marked by prolonged bolus manipulation, reduced AP bolus transfer, suspected delayed trigger of the swallow, suspected reduced laryngeal movement on palpation, and overt clinical s/s of aspiration or penetration (increased wetness post swallow and delayed congested cough). Discussed indication for FEES tomorrow. Purposeful and proactive rounding completed. 5 P's addressed. Pt left in no distress.    Impressions:  Pt presents with mild improvement in secretion management. Plan for FEES tomorrow.     Recommendations:  1) Continue NPO, with non-oral nutrition/hydration/medications via KFT.   2) FEES scheduled for tomorrow.  3) Diligent oral care and aspiration precautions for secretions/enteral feeds. Suggest allowing pt to swish and spit as pt is cognitively intact/able to understand that she cannot swallow anything safely at this time.  4) Suction PRN.   5) ST upon d/c.    Discussed the above with pt, RNOlivia, and Dionte SERRANO. Attempted to call emergency contact in chart; however, no response.     Pepper Otto M.S., CCC-SLP ext. [phone].

## 2022-06-14 NOTE — PROGRESS NOTE ADULT - SUBJECTIVE AND OBJECTIVE BOX
SUBJECTIVE:   Patient was seen and evaluated at bedside. Patient is resting in bed and is in no new acute distress.   OVERNIGHT EVENTS:   none   Vital Signs Last 24 Hrs  T(C): 36.6 (14 Jun 2022 12:22), Max: 36.9 (13 Jun 2022 16:10)  T(F): 97.9 (14 Jun 2022 12:22), Max: 98.5 (13 Jun 2022 16:10)  HR: 82 (14 Jun 2022 12:22) (74 - 109)  BP: 100/65 (14 Jun 2022 12:22) (96/61 - 115/65)  BP(mean): --  RR: 18 (14 Jun 2022 12:22) (18 - 18)  SpO2: 100% (14 Jun 2022 12:22) (94% - 100%)    PHYSICAL EXAM:    General: No Acute Distress     Neurological: Awake, alert oriented to person, hypophonic, eom -left 6th NP improved , pupils are equal and reactive , right side 4+ sensation is stable, left side 5/5.    Pulmonary: Clear to Auscultation, No Rales, No Rhonchi, No Wheezes     Cardiovascular: S1, S2, Regular Rate and Rhythm     Gastrointestinal: Soft, Nontender, Nondistended     Incision:   clean and dry   LABS:                        10.8   16.35 )-----------( 349      ( 14 Jun 2022 06:31 )             32.2    06-14    138  |  99  |  25<H>  ----------------------------<  129<H>  4.2   |  28  |  0.36<L>    Ca    8.8      14 Jun 2022 06:31  Phos  4.2     06-14  Mg     2.4     06-14 06-13 @ 07:01  -  06-14 @ 07:00  --------------------------------------------------------  IN: 0 mL / OUT: 1050 mL / NET: -1050 mL      DRAINS:     MEDICATIONS:  Antibiotics:  amoxicillin  875 milliGRAM(s)/clavulanate 1 Tablet(s) Oral two times a day    Neuro:  acetaminophen     Tablet .. 650 milliGRAM(s) Oral every 6 hours PRN Temp greater or equal to 38C (100.4F), Mild Pain (1 - 3)  ondansetron    Tablet 4 milliGRAM(s) Oral every 12 hours PRN Nausea  traMADol 25 milliGRAM(s) Oral every 4 hours PRN Moderate Pain (4 - 6)  traMADol 50 milliGRAM(s) Oral every 4 hours PRN Severe Pain (7 - 10)    Cardiac:  amLODIPine   Tablet 10 milliGRAM(s) Oral daily  doxazosin 4 milliGRAM(s) Oral daily    Pulm:  acetylcysteine 20%  Inhalation 3 milliLiter(s) Inhalation every 6 hours  levalbuterol Inhalation 0.63 milliGRAM(s) Inhalation every 6 hours    GI/:  pantoprazole  Injectable 40 milliGRAM(s) IV Push daily  polyethylene glycol 3350 17 Gram(s) Oral daily  senna 2 Tablet(s) Oral at bedtime    Other:   dexAMETHasone  Injectable 2 milliGRAM(s) IV Push every 8 hours  enoxaparin Injectable 40 milliGRAM(s) SubCutaneous <User Schedule>  multivitamin 1 Tablet(s) Oral daily    DIET: [] Regular [] CCD [] Renal [] Puree [] Dysphagia [] Tube Feeds:   glucerna @60   IMAGING:     ACC: [id number] EXAM:  MR BRAIN WAW IC                          PROCEDURE DATE:  06/03/2022          INTERPRETATION:  INDICATIONS:  Postoperative status post left far lateral   meningioma resection with sacrifice of the left vertebral artery    TECHNIQUE:  Multiplanar imaging was performed using T1 weighted, T2   weighted and FLAIR sequences.  Diffusion weighted and susceptibility   sensitive images were also obtained.  Following intravenous gadolinium,   multiplanar T1 weighted images were performed. 5 cc Gadavist were   administered. 2.5 cc were discarded.    COMPARISON EXAMINATION:  Preop CT 6/2/2022 preop MR 5/20/2022    FINDINGS:  VENTRICLES AND SULCI:  Subtle residual mass effect on the fourth   ventricle compared with the preop study  INTRA-AXIAL:  Residual edema at the level of the medulla and left ethel   and brachium pontis region seen preoperatively as well.. Microvascular   ischemic changes involving the periventricular and subcortical white   matter.  EXTRA-AXIAL:  Postop changes at the level of the left CP angle with air   and fluid and hemorrhage identified. Right frontal extraxial air and   fluid. Left cerebellar fluid collection.  Small enhancing neoplasm in the   right CP angle region extending into the right IAC again identified seen   on the preoperative evaluation as well measuring 1.5 x 0.83 x 1 cm (   APxTRxCC). Right superior tentorial margin lesion measuring 1.1 x 0.86 x   0.78 cm ( APxTRxCC).  VISUALIZED SINUSES:  Right maxillary fluid level.  VISUALIZED MASTOIDS:  Bilteral mastoid fluid.  CALVARIUM:  Left retrosigmoid craniotomy  CAROTID FLOW VOIDS:  Present.  MISCELLANEOUS:  The transverse and sigmoid sinus on the left appears   preserved however, confirmation with MRV may be helpful.      IMPRESSION:  Postop changes are appreciated at the left CP angle region. Residual   edema in the ethel and medulla and mass effect on the fourth ventricle   remains. Extra-axial small amount of postop hemorrhage identified in this   region. Right frontal small amount of extra-axial fluid. 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    --- End of Report ---            KAYLAH CARBALLO MD; Attending Radiologist  This document has been electronically signed. Polo  3 2022 11:05AM

## 2022-06-14 NOTE — PROGRESS NOTE ADULT - SUBJECTIVE AND OBJECTIVE BOX
[facility] Division of Hospital Medicine  Manju Loaiza MD  spectra [id number]    Patient is a 61y old  Female who presents with a chief complaint of Foramen magnum meningioma (13 Jun 2022 09:07)      SUBJECTIVE / OVERNIGHT EVENTS: patient seen and examined earlier this morning. family at bedside helped to translate. she reports intermittent headache but denies SOB. per staff, patient tends to desat when she is sleeping.   ADDITIONAL REVIEW OF SYSTEMS:    MEDICATIONS  (STANDING):  acetylcysteine 20%  Inhalation 3 milliLiter(s) Inhalation every 6 hours  amLODIPine   Tablet 10 milliGRAM(s) Oral daily  amoxicillin  875 milliGRAM(s)/clavulanate 1 Tablet(s) Oral two times a day  dexAMETHasone  Injectable 2 milliGRAM(s) IV Push every 8 hours  doxazosin 4 milliGRAM(s) Oral daily  enoxaparin Injectable 40 milliGRAM(s) SubCutaneous <User Schedule>  levalbuterol Inhalation 0.63 milliGRAM(s) Inhalation every 6 hours  multivitamin 1 Tablet(s) Oral daily  pantoprazole  Injectable 40 milliGRAM(s) IV Push daily  polyethylene glycol 3350 17 Gram(s) Oral daily  senna 2 Tablet(s) Oral at bedtime    MEDICATIONS  (PRN):  acetaminophen     Tablet .. 650 milliGRAM(s) Oral every 6 hours PRN Temp greater or equal to 38C (100.4F), Mild Pain (1 - 3)  ondansetron    Tablet 4 milliGRAM(s) Oral every 12 hours PRN Nausea  traMADol 25 milliGRAM(s) Oral every 4 hours PRN Moderate Pain (4 - 6)  traMADol 50 milliGRAM(s) Oral every 4 hours PRN Severe Pain (7 - 10)      CAPILLARY BLOOD GLUCOSE        I&O's Summary    13 Jun 2022 07:01  -  14 Jun 2022 07:00  --------------------------------------------------------  IN: 0 mL / OUT: 1050 mL / NET: -1050 mL        PHYSICAL EXAM:  Vital Signs Last 24 Hrs  T(C): 36.6 (14 Jun 2022 12:22), Max: 36.9 (13 Jun 2022 16:10)  T(F): 97.9 (14 Jun 2022 12:22), Max: 98.5 (13 Jun 2022 16:10)  HR: 82 (14 Jun 2022 12:22) (74 - 109)  BP: 100/65 (14 Jun 2022 12:22) (96/61 - 115/65)  BP(mean): --  RR: 18 (14 Jun 2022 12:22) (18 - 18)  SpO2: 100% (14 Jun 2022 12:22) (94% - 100%)    CONSTITUTIONAL: NAD, well-groomed, + NG tube  NECK: Supple, no palpable masses; no thyromegaly  RESPIRATORY: Normal respiratory effort; scattered rhonchi   CARDIOVASCULAR: normal S1 and S2, no murmur/rub/gallop; No lower extremity edema  ABDOMEN: Nontender to palpation, normoactive bowel sounds, no rebound/guarding; No hepatosplenomegaly  MUSCULOSKELETAL:  no clubbing or cyanosis of digits; no joint swelling or tenderness to palpation  PSYCH: A+O to person, place, and time; affect appropriate  NEURO: moves all extremities and follows commands   SKIN: No rashes; no palpable lesions    LABS:                        10.8   16.35 )-----------( 349      ( 14 Jun 2022 06:31 )             32.2     06-14    138  |  99  |  25<H>  ----------------------------<  129<H>  4.2   |  28  |  0.36<L>    Ca    8.8      14 Jun 2022 06:31  Phos  4.2     06-14  Mg     2.4     06-14                  RADIOLOGY & ADDITIONAL TESTS:  Results Reviewed:   < from: Xray Chest 1 View- PORTABLE-Urgent (Xray Chest 1 View- PORTABLE-Urgent .) (06.13.22 @ 09:20) >  COMPARISON STUDY: 6/12/2022    Frontal view of the chest shows the heart to be normal in size. Feeding   tube reaches the distal stomach.    The lungs are clear and there is no evidence of pneumothorax nor pleural   effusion.    IMPRESSION:  Feeding tube to stomach.    < end of copied text >      Imaging Personally Reviewed:  Electrocardiogram Personally Reviewed:    COORDINATION OF CARE:  Care Discussed with Consultants/Other Providers [Y/N]: neurosurgery PA(Dionte)  Prior or Outpatient Records Reviewed [Y/N]:

## 2022-06-14 NOTE — PROGRESS NOTE ADULT - ASSESSMENT
62 y/o Mandarin speaking female with PMH of HTN, C-spine meningioma removal in 2018 in [place] who p/w dizziness, gait instability, and emesis with MRI showing ethel-medullary, right CPA, and right temporal enhancing lesions c/f meningioma vs schwannoma, with hydro. Patient is s/p L far lateral crani for meningioma resection w/ subtotal sacrifice of L vertebral artery on 6/1/22. NSCU course c/b MSSA PNA (suspected aspiration pneumonia) on augmentin and vocal cord paralysis/dysphagia with NGT. Transferred out of NSCU 6/12.

## 2022-06-14 NOTE — PROGRESS NOTE ADULT - PROBLEM SELECTOR PLAN 7
DVT ppx: lovenox, LE duplex negative for DVT on 6/8.  monitor bowel movement closely while on augmentin. last documented bowel movement was 6/12. DVT ppx: lovenox, LE duplex negative for DVT on 6/8.  monitor bowel movement closely while on augmentin. last documented bowel movement was 6/12.  requiring intermittent straight cath. c/w cardura and if continues to require straight cath, consider placing pedro.

## 2022-06-14 NOTE — PROGRESS NOTE ADULT - ASSESSMENT
HPI:  61F, PMH HTN and C-spine meningioma removal in 2018 in [place]. P/w dizziness, gait instability, and emesis x3d. MRI shows ethel-medullary, Right CPA, and Right temporal enhancing lesions c/f meningioma vs schwannoma, with some hydro. Exam: Mandarin-speaking, AOx3, EOMI no nystagmus, PERRL, no facial/drift, CONRAD 5/5, SILT. Slightly wide-based antalgic gait, positive Romberg’s   (20 May 2022 14:15)    PROCEDURE: Craniotomy for meningioma  s/p left craniotomy and far lateral subtotal resection of foramen magnum meningioma on 6/1          PLAN:  Neuro:   1 out of bed   2 continue pt/ot  3 prn pain medication   4 decadron 2 q8 to control brain edema     Respiratory:    1 secretion - on xoponex , mucomyst and chest pt   2 cxr -6/13 stable     CV:  1 bp stable -continue amlodipine     Endocrine:   1 stable     Heme/Onc:             DVT ppx: sql and scds   1 h/h stable     Renal:   1 voiding   2 iv lock     ID:   1 augmentin -last day today for asp pna     GI:   1 s&s eval in am   2 continue tube feeds   3 last bm 6/11   Social/Family:   Discharge planning: pensding acute rehab once stable       -will discuss with Dr. Liu   -spectra [id number]

## 2022-06-14 NOTE — PROGRESS NOTE ADULT - PROBLEM SELECTOR PLAN 3
- Intermittent episodes of sinus tach  - LE duplex negative for DVT 6/8 and CTA chest 6/7 negative for PE  - could be in setting of known pneumonia, duoneb, free water depletion, discomfort from NGT?  - free water added via NG tube  - switched to xopenex neb instead

## 2022-06-14 NOTE — PROGRESS NOTE ADULT - PROBLEM SELECTOR PLAN 2
patient reportely with intermittent episodes of desat while sleeping, suspect likely due to oropharyngeal secretions and difficulty with clearing and aspiration of secretions  - CTA chest on 6/7 negative for PE but b/l consolidation noted concerning for aspiration pneumonia  - 6/6 sputum cx with MSSA - sensitivities noted  - repeat CXR 6/13 with clear lungs  - c/w augmentin to complete total 5 day course of abx (6/9-)  - c/w chest PT, suctioning, nebs/mucomyst to help clear secretions  - noted worsening leukocytosis, ? due to steroid +/- free water depletion as well given elevated BUN/cr ratio and rising serum Na- free water via NG tube added and steroid tapered 6/13  - monitor leukocytosis which appears to be slightly improved  - if + fever or clinically worsening, check repeat cultures and may need to broaden abx coverage to add vanco (given + MRSA swab) patient reportely with intermittent episodes of desat while sleeping, suspect likely due to oropharyngeal secretions and difficulty with clearing and aspiration of secretions  - CTA chest on 6/7 negative for PE but b/l consolidation noted concerning for aspiration pneumonia  - 6/6 sputum cx with MSSA - sensitivities noted  - repeat CXR 6/13 with clear lungs  - c/w augmentin to complete total 5 day course of abx (6/9-)  - c/w chest PT, suctioning, nebs/mucomyst to help clear secretions. she needs aggressive pulm toileting.  - noted worsening leukocytosis, ? due to steroid +/- free water depletion as well given elevated BUN/cr ratio and rising serum Na- free water via NG tube added and steroid tapered 6/13  - monitor leukocytosis which appears to be slightly improved  - if + fever or clinically worsening, check repeat cultures and may need to broaden abx coverage to add vanco (given + MRSA swab)

## 2022-06-14 NOTE — PROGRESS NOTE ADULT - PROBLEM SELECTOR PLAN 1
- MRI showed ethel-medullary, right CPA, and right temporal enhancing lesions c/f meningioma vs schwannoma, with hydro  - s/p L far lateral crani for meningioma resection w/ subtotal sacrifice of L vertebral artery on 6/1  - decadron being taper per neurosurgery  - post-op care as per neurosurgery team

## 2022-06-15 DIAGNOSIS — B00.1 HERPESVIRAL VESICULAR DERMATITIS: ICD-10-CM

## 2022-06-15 LAB
ANION GAP SERPL CALC-SCNC: 11 MMOL/L — SIGNIFICANT CHANGE UP (ref 5–17)
BUN SERPL-MCNC: 25 MG/DL — HIGH (ref 7–23)
CALCIUM SERPL-MCNC: 8.5 MG/DL — SIGNIFICANT CHANGE UP (ref 8.4–10.5)
CHLORIDE SERPL-SCNC: 100 MMOL/L — SIGNIFICANT CHANGE UP (ref 96–108)
CO2 SERPL-SCNC: 26 MMOL/L — SIGNIFICANT CHANGE UP (ref 22–31)
CREAT SERPL-MCNC: 0.37 MG/DL — LOW (ref 0.5–1.3)
EGFR: 116 ML/MIN/1.73M2 — SIGNIFICANT CHANGE UP
GLUCOSE SERPL-MCNC: 165 MG/DL — HIGH (ref 70–99)
HCT VFR BLD CALC: 33.1 % — LOW (ref 34.5–45)
HGB BLD-MCNC: 11.1 G/DL — LOW (ref 11.5–15.5)
MCHC RBC-ENTMCNC: 31.9 PG — SIGNIFICANT CHANGE UP (ref 27–34)
MCHC RBC-ENTMCNC: 33.5 GM/DL — SIGNIFICANT CHANGE UP (ref 32–36)
MCV RBC AUTO: 95.1 FL — SIGNIFICANT CHANGE UP (ref 80–100)
NRBC # BLD: 0 /100 WBCS — SIGNIFICANT CHANGE UP (ref 0–0)
PLATELET # BLD AUTO: 364 K/UL — SIGNIFICANT CHANGE UP (ref 150–400)
POTASSIUM SERPL-MCNC: 4.1 MMOL/L — SIGNIFICANT CHANGE UP (ref 3.5–5.3)
POTASSIUM SERPL-SCNC: 4.1 MMOL/L — SIGNIFICANT CHANGE UP (ref 3.5–5.3)
RBC # BLD: 3.48 M/UL — LOW (ref 3.8–5.2)
RBC # FLD: 12.1 % — SIGNIFICANT CHANGE UP (ref 10.3–14.5)
SARS-COV-2 RNA SPEC QL NAA+PROBE: SIGNIFICANT CHANGE UP
SODIUM SERPL-SCNC: 137 MMOL/L — SIGNIFICANT CHANGE UP (ref 135–145)
WBC # BLD: 16.33 K/UL — HIGH (ref 3.8–10.5)
WBC # FLD AUTO: 16.33 K/UL — HIGH (ref 3.8–10.5)

## 2022-06-15 PROCEDURE — 99233 SBSQ HOSP IP/OBS HIGH 50: CPT

## 2022-06-15 RX ORDER — BACITRACIN ZINC 500 UNIT/G
1 OINTMENT IN PACKET (EA) TOPICAL
Refills: 0 | Status: COMPLETED | OUTPATIENT
Start: 2022-06-15 | End: 2022-06-20

## 2022-06-15 RX ORDER — METOPROLOL TARTRATE 50 MG
12.5 TABLET ORAL EVERY 12 HOURS
Refills: 0 | Status: DISCONTINUED | OUTPATIENT
Start: 2022-06-15 | End: 2022-06-20

## 2022-06-15 RX ORDER — VALACYCLOVIR 500 MG/1
2000 TABLET, FILM COATED ORAL
Refills: 0 | Status: COMPLETED | OUTPATIENT
Start: 2022-06-15 | End: 2022-06-16

## 2022-06-15 RX ORDER — SODIUM CHLORIDE 9 MG/ML
1000 INJECTION INTRAMUSCULAR; INTRAVENOUS; SUBCUTANEOUS
Refills: 0 | Status: DISCONTINUED | OUTPATIENT
Start: 2022-06-15 | End: 2022-06-17

## 2022-06-15 RX ADMIN — Medication 1 APPLICATION(S): at 17:38

## 2022-06-15 RX ADMIN — Medication 12.5 MILLIGRAM(S): at 15:12

## 2022-06-15 RX ADMIN — LEVALBUTEROL 0.63 MILLIGRAM(S): 1.25 SOLUTION, CONCENTRATE RESPIRATORY (INHALATION) at 17:35

## 2022-06-15 RX ADMIN — Medication 650 MILLIGRAM(S): at 21:13

## 2022-06-15 RX ADMIN — LEVALBUTEROL 0.63 MILLIGRAM(S): 1.25 SOLUTION, CONCENTRATE RESPIRATORY (INHALATION) at 13:11

## 2022-06-15 RX ADMIN — Medication 4 MILLIGRAM(S): at 05:07

## 2022-06-15 RX ADMIN — Medication 3 MILLILITER(S): at 01:22

## 2022-06-15 RX ADMIN — LEVALBUTEROL 0.63 MILLIGRAM(S): 1.25 SOLUTION, CONCENTRATE RESPIRATORY (INHALATION) at 05:07

## 2022-06-15 RX ADMIN — Medication 3 MILLILITER(S): at 17:35

## 2022-06-15 RX ADMIN — Medication 1 TABLET(S): at 13:11

## 2022-06-15 RX ADMIN — Medication 5 MILLIGRAM(S): at 21:13

## 2022-06-15 RX ADMIN — Medication 2 MILLIGRAM(S): at 21:13

## 2022-06-15 RX ADMIN — PANTOPRAZOLE SODIUM 40 MILLIGRAM(S): 20 TABLET, DELAYED RELEASE ORAL at 13:10

## 2022-06-15 RX ADMIN — VALACYCLOVIR 2000 MILLIGRAM(S): 500 TABLET, FILM COATED ORAL at 17:34

## 2022-06-15 RX ADMIN — Medication 3 MILLILITER(S): at 05:08

## 2022-06-15 RX ADMIN — Medication 3 MILLILITER(S): at 13:10

## 2022-06-15 RX ADMIN — Medication 1 TABLET(S): at 05:07

## 2022-06-15 RX ADMIN — ENOXAPARIN SODIUM 40 MILLIGRAM(S): 100 INJECTION SUBCUTANEOUS at 17:35

## 2022-06-15 RX ADMIN — Medication 650 MILLIGRAM(S): at 21:43

## 2022-06-15 RX ADMIN — LEVALBUTEROL 0.63 MILLIGRAM(S): 1.25 SOLUTION, CONCENTRATE RESPIRATORY (INHALATION) at 01:21

## 2022-06-15 RX ADMIN — Medication 2 MILLIGRAM(S): at 05:07

## 2022-06-15 RX ADMIN — Medication 2 MILLIGRAM(S): at 13:11

## 2022-06-15 NOTE — CONSULT NOTE ADULT - CONSULT REQUESTED DATE/TIME
20-May-2022 20:40
06-Jun-2022
15-Polo-2022 14:38
23-May-2022 13:51
23-May-2022 15:45
02-Jun-2022 11:33

## 2022-06-15 NOTE — PROGRESS NOTE ADULT - ASSESSMENT
HPI:  61F, PMH HTN and C-spine meningioma removal in 2018 in [place]. P/w dizziness, gait instability, and emesis x3d. MRI shows ethel-medullary, Right CPA, and Right temporal enhancing lesions c/f meningioma vs schwannoma, with some hydro. Exam: Mandarin-speaking, AOx3, EOMI no nystagmus, PERRL, no facial/drift, CONRAD 5/5, SILT. Slightly wide-based antalgic gait, positive Romberg’s   (20 May 2022 14:15)    PROCEDURE: Craniotomy for meningioma     s/p left craniotomy and far lateral subtotal resection of foramen magnum meningioma on 6/1          PLAN:  Neuro:   1 out of bed   2 continue pt/ot  3 prn pain medication   4 decadron 2 q8 to control brain edema     Respiratory:    1 secretion - on xoponex , mucomyst and chest pt   2 cxr -6/13 stable   3 nasal suction q6 hours   CV:  1 bp stable -continue amlodipine     Endocrine:   1 stable     Heme/Onc:             DVT ppx: sql and scds   1 h/h stable     Renal:   1 voiding   2 iv lock     ID:   1 augmentin course finished for pna   GI:   1 feest failed   2 continue tube feeds   3 last bm 6/11   4 gi called for peg     Social/Family:   Discharge planning: pending acute rehab once stable       -will discuss with Dr. Liu   -NormOxys [id number]

## 2022-06-15 NOTE — SWALLOW VFSS/MBS ASSESSMENT ADULT - COMMENTS
5/20: ENT consulted as pt c/o dysphagia. NSCU concerned for possible VC paralysis and HL secondary to location of lesion. Laryngoscopy done at bedside, normal scope. Bilateral vocal cords mobile and intact. Rec speech and swallow evaluation.  5/22: bleeding from mouth - pt reported need crown replaced; dental consulted.  Seen for MBS on 5/24 with recommendations for a regular diet.  S/p left far lateral for meningioma resection subtotal and sacrifice of left vertebral artery on 6/1  post op extubated, stridor, required re intubation same day  extubated on 6/6  ENT consulted post extubation on 6/6 and indirect laryngoscopy performed was notable for left vocal cord paralysis and copious pooling of secretions with aspiration of secretions.  Initially required BiPAP and HFNC post extubation (until 6/8) - now on NC
5/20: ENT consulted as pt c/o dysphagia. NSCU concerned for possible VC paralysis and HL secondary to location of lesion. Laryngoscopy done at bedside, normal scope. Bilateral vocal cords mobile and intact. Rec speech and swallow evaluation.  5/22: bleeding from mouth - pt reported need crown replaced; dental consulted.    **Swallow hx: pt unknown to this service

## 2022-06-15 NOTE — SWALLOW FEES ASSESSMENT ADULT - SLP GENERAL OBSERVATIONS
Pt was received at bedside awake and alert. Family member present and provided translation to Mandarin. +NC (2L), +KFT. +dysphonia (hoarse, breathy, and weak vocal quality-- known L vf paralysis). Intermittent wet/gurgly vocal quality, with congested coughing (increased since yesterday).

## 2022-06-15 NOTE — PROGRESS NOTE ADULT - SUBJECTIVE AND OBJECTIVE BOX
[facility] Division of Hospital Medicine  Manju Loaiza MD  spectra [phone]    Patient is a 61y old  Female who presents with a chief complaint of Patient was admitted with brain tumor (14 Jun 2022 13:10)      SUBJECTIVE / OVERNIGHT EVENTS: patient seen and examined earlier today. Denies SOB but having intermittent cough to clear her secretions per family. she wants the NG tube removed.   ADDITIONAL REVIEW OF SYSTEMS:    MEDICATIONS  (STANDING):  acetylcysteine 20%  Inhalation 3 milliLiter(s) Inhalation every 6 hours  BACItracin   Ointment 1 Application(s) Topical two times a day  dexAMETHasone  Injectable 2 milliGRAM(s) IV Push every 8 hours  doxazosin 4 milliGRAM(s) Oral daily  enoxaparin Injectable 40 milliGRAM(s) SubCutaneous <User Schedule>  levalbuterol Inhalation 0.63 milliGRAM(s) Inhalation every 6 hours  melatonin 5 milliGRAM(s) Oral at bedtime  multivitamin 1 Tablet(s) Oral daily  pantoprazole  Injectable 40 milliGRAM(s) IV Push daily  polyethylene glycol 3350 17 Gram(s) Oral daily  senna 2 Tablet(s) Oral at bedtime  valACYclovir 2000 milliGRAM(s) Oral two times a day    MEDICATIONS  (PRN):  acetaminophen     Tablet .. 650 milliGRAM(s) Oral every 6 hours PRN Temp greater or equal to 38C (100.4F), Mild Pain (1 - 3)  ondansetron    Tablet 4 milliGRAM(s) Oral every 12 hours PRN Nausea  traMADol 25 milliGRAM(s) Oral every 4 hours PRN Moderate Pain (4 - 6)  traMADol 50 milliGRAM(s) Oral every 4 hours PRN Severe Pain (7 - 10)      CAPILLARY BLOOD GLUCOSE        I&O's Summary    14 Jun 2022 07:01  -  15 Polo 2022 07:00  --------------------------------------------------------  IN: 0 mL / OUT: 1130 mL / NET: -1130 mL        PHYSICAL EXAM:  Vital Signs Last 24 Hrs  T(C): 36.7 (15 Polo 2022 12:32), Max: 36.7 (15 Polo 2022 12:32)  T(F): 98.1 (15 Polo 2022 12:32), Max: 98.1 (15 Polo 2022 12:32)  HR: 110 (15 Polo 2022 12:32) (102 - 118)  BP: 120/75 (15 Polo 2022 12:32) (94/66 - 120/75)  BP(mean): --  RR: 18 (15 Polo 2022 12:32) (18 - 18)  SpO2: 95% (15 Polo 2022 12:32) (92% - 96%)    CONSTITUTIONAL: NAD, well-groomed, + NG tube  NECK: Supple, no palpable masses; no thyromegaly  RESPIRATORY: Normal respiratory effort; scattered rhonchi improved  CARDIOVASCULAR: normal S1 and S2, no murmur/rub/gallop; No lower extremity edema  ABDOMEN: Nontender to palpation, normoactive bowel sounds, no rebound/guarding; No hepatosplenomegaly  MUSCULOSKELETAL:  no clubbing or cyanosis of digits; no joint swelling or tenderness to palpation  PSYCH: A+O to person, place, and time; affect appropriate  NEURO: moves all extremities and follows commands   SKIN: No rashes; + blister noted in bottom lip    LABS:                        11.1   16.33 )-----------( 364      ( 15 Polo 2022 04:55 )             33.1     06-15    137  |  100  |  25<H>  ----------------------------<  165<H>  4.1   |  26  |  0.37<L>    Ca    8.5      15 Polo 2022 04:55  Phos  4.2     06-14  Mg     2.4     06-14                  RADIOLOGY & ADDITIONAL TESTS:  Results Reviewed:   Imaging Personally Reviewed:  Electrocardiogram Personally Reviewed:    COORDINATION OF CARE:  Care Discussed with Consultants/Other Providers [Y/N]: neurosurgery PA(Dionte)  Prior or Outpatient Records Reviewed [Y/N]:   [facility] Division of Hospital Medicine  Manju Loaiza MD  spectra [id number]    Patient is a 61y old  Female who presents with a chief complaint of Patient was admitted with brain tumor (14 Jun 2022 13:10)      SUBJECTIVE / OVERNIGHT EVENTS: patient seen and examined earlier today. Denies SOB but having intermittent cough to clear her secretions per family. she wants the NG tube removed.   ADDITIONAL REVIEW OF SYSTEMS:    MEDICATIONS  (STANDING):  acetylcysteine 20%  Inhalation 3 milliLiter(s) Inhalation every 6 hours  BACItracin   Ointment 1 Application(s) Topical two times a day  dexAMETHasone  Injectable 2 milliGRAM(s) IV Push every 8 hours  doxazosin 4 milliGRAM(s) Oral daily  enoxaparin Injectable 40 milliGRAM(s) SubCutaneous <User Schedule>  levalbuterol Inhalation 0.63 milliGRAM(s) Inhalation every 6 hours  melatonin 5 milliGRAM(s) Oral at bedtime  multivitamin 1 Tablet(s) Oral daily  pantoprazole  Injectable 40 milliGRAM(s) IV Push daily  polyethylene glycol 3350 17 Gram(s) Oral daily  senna 2 Tablet(s) Oral at bedtime  valACYclovir 2000 milliGRAM(s) Oral two times a day    MEDICATIONS  (PRN):  acetaminophen     Tablet .. 650 milliGRAM(s) Oral every 6 hours PRN Temp greater or equal to 38C (100.4F), Mild Pain (1 - 3)  ondansetron    Tablet 4 milliGRAM(s) Oral every 12 hours PRN Nausea  traMADol 25 milliGRAM(s) Oral every 4 hours PRN Moderate Pain (4 - 6)  traMADol 50 milliGRAM(s) Oral every 4 hours PRN Severe Pain (7 - 10)      CAPILLARY BLOOD GLUCOSE        I&O's Summary    14 Jun 2022 07:01  -  15 Polo 2022 07:00  --------------------------------------------------------  IN: 0 mL / OUT: 1130 mL / NET: -1130 mL        PHYSICAL EXAM:  Vital Signs Last 24 Hrs  T(C): 36.7 (15 Polo 2022 12:32), Max: 36.7 (15 Polo 2022 12:32)  T(F): 98.1 (15 Polo 2022 12:32), Max: 98.1 (15 Polo 2022 12:32)  HR: 110 (15 Polo 2022 12:32) (102 - 118)  BP: 120/75 (15 Polo 2022 12:32) (94/66 - 120/75)  BP(mean): --  RR: 18 (15 Polo 2022 12:32) (18 - 18)  SpO2: 95% (15 Polo 2022 12:32) (92% - 96%)    CONSTITUTIONAL: NAD, well-groomed, + NG tube  NECK: Supple, no palpable masses; no thyromegaly  RESPIRATORY: Normal respiratory effort; scattered rhonchi improved  CARDIOVASCULAR: normal S1 and S2, tachy, no murmur/rub/gallop; No lower extremity edema  ABDOMEN: Nontender to palpation, normoactive bowel sounds, no rebound/guarding; No hepatosplenomegaly  MUSCULOSKELETAL:  no clubbing or cyanosis of digits; no joint swelling or tenderness to palpation  PSYCH: A+O to person, place, and time; affect appropriate  NEURO: moves all extremities and follows commands   SKIN: No rashes; + blister noted in bottom lip    LABS:                        11.1   16.33 )-----------( 364      ( 15 Polo 2022 04:55 )             33.1     06-15    137  |  100  |  25<H>  ----------------------------<  165<H>  4.1   |  26  |  0.37<L>    Ca    8.5      15 Polo 2022 04:55  Phos  4.2     06-14  Mg     2.4     06-14                  RADIOLOGY & ADDITIONAL TESTS:  Results Reviewed:   Imaging Personally Reviewed:  Electrocardiogram Personally Reviewed:    COORDINATION OF CARE:  Care Discussed with Consultants/Other Providers [Y/N]: neurosurgery PA(Dionte)  Prior or Outpatient Records Reviewed [Y/N]:

## 2022-06-15 NOTE — SWALLOW VFSS/MBS ASSESSMENT ADULT - DIAGNOSTIC IMPRESSIONS
60 y/o F p/w dizziness, gait instability, and emesis x3 days, found to have multiple brain lesions (ethel-medullary, right CPA, and right temporal) c/f meningioma vs schwannoma. Pending OR. Pt was seen today for Modified Barium Swallow Study (MBS) given c/o pharyngeal stasis post intake of solid foods. Pt presents with a mild elsy-pharyngeal dysphagia. The oral stage was marked by piecemeal deglutition (x2) and uncontrolled spillover of thin liquids. Single instance of penetration before the swallow with thin liquids with retrieval. No other instances of penetration or aspiration observed with all other sips of thin liquids, puree, or soft/hard solids. Trace-absent pharyngeal residue observed. Recommend OP GI f/u given c/o globus sensation despite minimal residue. Disorders: reduced BOT to posterior pharyngeal wall contact and reduced laryngeal closure.
60 y/o F admitted with ethel-medullary, right CPA, and right temporal enhancing lesions s/p left far lateral for meningioma resection subtotal and sacrifice of left vertebral artery on 6/1. Re-intubated post op for stridor and extubated 6/6. Found to have L vf paralysis and aspiration of secretions when scoped by ENT on 6/6. Hospital course c/b dysphagia. Pt was seen today for Fiberoptic Endoscopic Evaluation of Swallow (FEES) which revealed poor secretion management and severe pharyngeal dysphagia. Baseline aspiration of moderate-severe thick yellow secretions. Ice chip noted to mix with secretions and resulted in persistent silent aspiration after the swallow. Unable to clear with cued cough. Pt noted to cough up ice chip trial with secretions at end of exam. Disorders: delay in trigger of the swallow reflex, reduced laryngeal closure, reduced pharyngeal contractility, reduced supraglottic/subglottic sensation.

## 2022-06-15 NOTE — SWALLOW FEES ASSESSMENT ADULT - COMMENTS
5/20: ENT consulted as pt c/o dysphagia. NSCU concerned for possible VC paralysis and HL secondary to location of lesion. Laryngoscopy done at bedside, normal scope. Bilateral vocal cords mobile and intact. Rec speech and swallow evaluation.  5/22: bleeding from mouth - pt reported need crown replaced; dental consulted.  Seen for MBS on 5/24 with recommendations for a regular diet.  S/p left far lateral for meningioma resection subtotal and sacrifice of left vertebral artery on 6/1  post op extubated, stridor, required re intubation same day  extubated on 6/6  ENT consulted post extubation on 6/6 and indirect laryngoscopy performed was notable for left vocal cord paralysis and copious pooling of secretions with aspiration of secretions.  Initially required BiPAP and HFNC post extubation (until 6/8) - now on NC. +baseline aspiration of moderate-severe thick yellow secretions in the pyriform sinus from the interarytenoid space

## 2022-06-15 NOTE — SWALLOW VFSS/MBS ASSESSMENT ADULT - ADDITIONAL RECOMMENDATIONS
GOALS:  1. Pt will complete dysphagia exercises to improve swallow function.
GOALS:  1. Pt will tolerate recommended diet with no overt, clinical s/s of aspiration.

## 2022-06-15 NOTE — SWALLOW FEES ASSESSMENT ADULT - ORAL PHASE
Reduced bolus formation; Delayed oral transit time; Reduced anterior - posterior transport; Piecemeal (x2-3)

## 2022-06-15 NOTE — SWALLOW VFSS/MBS ASSESSMENT ADULT - RECOMMENDED CONSISTENCY
NPO, with non-oral nutrition/hydration/medications. Recommend GOC discussion with pt/family/medical team re: long term means of nutrition/hydration/medication in this pt with severe dysphagia.
Regular diet and Thin liquids via small single sips

## 2022-06-15 NOTE — SWALLOW VFSS/MBS ASSESSMENT ADULT - SLP GENERAL OBSERVATIONS
Pt was received at bedside awake and alert. Family member present and provided translation to Mandarin. +NC (2L), +KFT. +dysphonia (hoarse, breathy, and weak vocal quality-- known L vf paralysis). Intermittent wet/gurgly vocal quality, with congested coughing (increased since yesterday).
Pt was received in radiology suite in MARKO chair. Accompanied by daughter-in-law who translated to Jimenezese. +room air. Pt c/o dizziness and felt nauseous throughout evaluation. Only accepted limited PO trials.

## 2022-06-15 NOTE — SWALLOW VFSS/MBS ASSESSMENT ADULT - SPECIFY REASON(S)
to objectively evaluate pt's elsy-pharyngeal swallow mechanism and r/o aspiration.
to objectively evaluate pt's elsy-pharyngeal swallow mechanism. Pt endorsed solids foods feeling stuck and difficult to swallow.

## 2022-06-15 NOTE — SWALLOW VFSS/MBS ASSESSMENT ADULT - ADDITIONAL INFORMATION
+baseline aspiration of moderate-severe thick yellow secretions in the pyriform sinus from the interarytenoid space
+small osteophytes on the anterior cervical spine at the level of C5-C6 and C6-C7 (non-obstructing, trace retention of material surrounding)

## 2022-06-15 NOTE — PROGRESS NOTE ADULT - PROBLEM SELECTOR PLAN 2
patient reportely with intermittent episodes of desat while sleeping, suspect likely due to oropharyngeal secretions and difficulty with clearing and aspiration of secretions  - CTA chest on 6/7 negative for PE but b/l consolidation noted concerning for aspiration pneumonia  - 6/6 sputum cx with MSSA - sensitivities noted  - repeat CXR 6/13 with clear lungs  - completed augmentin  - c/w chest PT, suctioning, nebs/mucomyst to help clear secretions. she needs aggressive pulm toileting.  - noted leukocytosis, ? due to steroid +/- free water depletion given elevated BUN/cr ratio- c/w free water via NG tube and steroid taper  - monitor leukocytosis which appears to be stable  - if + fever or clinically worsening, check repeat cultures and and may need to broaden abx coverage to add vanco (given + MRSA swab)

## 2022-06-15 NOTE — SWALLOW FEES ASSESSMENT ADULT - DIAGNOSTIC IMPRESSIONS
60 y/o F admitted with ethel-medullary, right CPA, and right temporal enhancing lesions s/p left far lateral for meningioma resection subtotal and sacrifice of left vertebral artery on 6/1. Re-intubated post op for stridor and extubated 6/6. Found to have L vf paralysis and aspiration of secretions when scoped by ENT on 6/6. Hospital course c/b dysphagia. Pt was seen today for Fiberoptic Endoscopic Evaluation of Swallow (FEES) which revealed poor secretion management and severe pharyngeal dysphagia. Baseline aspiration of moderate-severe thick yellow secretions. Ice chip noted to mix with secretions and resulted in persistent silent aspiration after the swallow. Unable to clear with cued cough. Pt noted to cough up ice chip trial with secretions at end of exam. Disorders: delay in trigger of the swallow reflex, reduced laryngeal closure, reduced pharyngeal contractility, reduced supraglottic/subglottic sensation.

## 2022-06-15 NOTE — PROGRESS NOTE ADULT - SUBJECTIVE AND OBJECTIVE BOX
SUBJECTIVE:   Patient was seen and evaluated at bedside. Patient is resting in bed and is in no new acute distress.   OVERNIGHT EVENTS:   none   Vital Signs Last 24 Hrs  T(C): 36.7 (15 Polo 2022 12:32), Max: 36.7 (15 Polo 2022 12:32)  T(F): 98.1 (15 Polo 2022 12:32), Max: 98.1 (15 Polo 2022 12:32)  HR: 110 (15 Polo 2022 12:32) (102 - 118)  BP: 120/75 (15 Polo 2022 12:32) (94/66 - 120/75)  BP(mean): --  RR: 18 (15 Polo 2022 12:32) (18 - 18)  SpO2: 95% (15 Polo 2022 12:32) (92% - 96%)    PHYSICAL EXAM:    General: No Acute Distress     Neurological:    Awake, alert oriented to person, hypophonic, eom -left 6th NP improved , pupils are equal and reactive , right side 4+ sensation is stable, left side 5/5.      Pulmonary: Clear to Auscultation, No Rales, No Rhonchi, No Wheezes     Cardiovascular: S1, S2, Regular Rate and Rhythm     Gastrointestinal: Soft, Nontender, Nondistended     Incision:   clean and dry   LABS:                        11.1   16.33 )-----------( 364      ( 15 Polo 2022 04:55 )             33.1    06-15    137  |  100  |  25<H>  ----------------------------<  165<H>  4.1   |  26  |  0.37<L>    Ca    8.5      15 Polo 2022 04:55  Phos  4.2     06-14  Mg     2.4     06-14          06-14 @ 07:01  -  06-15 @ 07:00  --------------------------------------------------------  IN: 0 mL / OUT: 1130 mL / NET: -1130 mL    06-15 @ 07:01  -  06-15 @ 13:51  --------------------------------------------------------  IN: 0 mL / OUT: 800 mL / NET: -800 mL      DRAINS:     MEDICATIONS:  Antibiotics:  valACYclovir 2000 milliGRAM(s) Oral two times a day    Neuro:  acetaminophen     Tablet .. 650 milliGRAM(s) Oral every 6 hours PRN Temp greater or equal to 38C (100.4F), Mild Pain (1 - 3)  melatonin 5 milliGRAM(s) Oral at bedtime  ondansetron    Tablet 4 milliGRAM(s) Oral every 12 hours PRN Nausea  traMADol 25 milliGRAM(s) Oral every 4 hours PRN Moderate Pain (4 - 6)  traMADol 50 milliGRAM(s) Oral every 4 hours PRN Severe Pain (7 - 10)    Cardiac:  doxazosin 4 milliGRAM(s) Oral daily  metoprolol tartrate 12.5 milliGRAM(s) Oral every 12 hours    Pulm:  acetylcysteine 20%  Inhalation 3 milliLiter(s) Inhalation every 6 hours  levalbuterol Inhalation 0.63 milliGRAM(s) Inhalation every 6 hours    GI/:  pantoprazole  Injectable 40 milliGRAM(s) IV Push daily  polyethylene glycol 3350 17 Gram(s) Oral daily  senna 2 Tablet(s) Oral at bedtime    Other:   BACItracin   Ointment 1 Application(s) Topical two times a day  dexAMETHasone  Injectable 2 milliGRAM(s) IV Push every 8 hours  enoxaparin Injectable 40 milliGRAM(s) SubCutaneous <User Schedule>  multivitamin 1 Tablet(s) Oral daily    DIET: [] Regular [] CCD [] Renal [] Puree [] Dysphagia [] Tube Feeds:   tube feeds   IMAGING:   no new imaging today   ACC: [id number] EXAM:  MR BRAIN WAW IC                          PROCEDURE DATE:  06/03/2022          INTERPRETATION:  INDICATIONS:  Postoperative status post left far lateral   meningioma resection with sacrifice of the left vertebral artery    TECHNIQUE:  Multiplanar imaging was performed using T1 weighted, T2   weighted and FLAIR sequences.  Diffusion weighted and susceptibility   sensitive images were also obtained.  Following intravenous gadolinium,   multiplanar T1 weighted images were performed. 5 cc Gadavist were   administered. 2.5 cc were discarded.    COMPARISON EXAMINATION:  Preop CT 6/2/2022 preop MR 5/20/2022    FINDINGS:  VENTRICLES AND SULCI:  Subtle residual mass effect on the fourth   ventricle compared with the preop study  INTRA-AXIAL:  Residual edema at the level of the medulla and left ethel   and brachium pontis region seen preoperatively as well.. Microvascular   ischemic changes involving the periventricular and subcortical white   matter.  EXTRA-AXIAL:  Postop changes at the level of the left CP angle with air   and fluid and hemorrhage identified. Right frontal extraxial air and   fluid. Left cerebellar fluid collection.  Small enhancing neoplasm in the   right CP angle region extending into the right IAC again identified seen   on the preoperative evaluation as well measuring 1.5 x 0.83 x 1 cm (   APxTRxCC). Right superior tentorial margin lesion measuring 1.1 x 0.86 x   0.78 cm ( APxTRxCC).  VISUALIZED SINUSES:  Right maxillary fluid level.  VISUALIZED MASTOIDS:  Bilteral mastoid fluid.  CALVARIUM:  Left retrosigmoid craniotomy  CAROTID FLOW VOIDS:  Present.  MISCELLANEOUS:  The transverse and sigmoid sinus on the left appears   preserved however, confirmation with MRV may be helpful.      IMPRESSION:  Postop changes are appreciated at the left CP angle region. Residual   edema in the ethel and medulla and mass effect on the fourth ventricle   remains. Extra-axial small amount of postop hemorrhage identified in this   region. Right frontal small amount of extra-axial fluid. 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    --- End of Report ---            KAYLAH CARBALLO MD; Attending Radiologist  This document has been electronically signed. Polo  3 2022 11:05AM

## 2022-06-15 NOTE — PROGRESS NOTE ADULT - PROBLEM SELECTOR PLAN 3
- Intermittent episodes of sinus tach  - LE duplex negative for DVT 6/8 and CTA chest 6/7 negative for PE  - could be in setting of aspiration/cough, free water depletion, discomfort from NGT?  - c/w free water via NG tube  - switched to xopenex neb instead

## 2022-06-15 NOTE — SWALLOW FEES ASSESSMENT ADULT - SLP PERTINENT HISTORY OF CURRENT PROBLEM
61F, PMH HTN and C-spine meningioma removal in 2018 in [place]. P/w dizziness, gait instability, and emesis x3d. MRI shows ethel-medullary, Right CPA, and Right temporal enhancing lesions c/f meningioma vs schwannoma, with some hydro. Exam: Mandarin-speaking, AOx3, EOMI no nystagmus, PERRL, no facial/drift, CONRAD 5/5, SILT. Slightly wide-based antalgic gait, positive Romberg’s. Plan for OR 6/1 depending on evolution of symptoms.

## 2022-06-15 NOTE — CONSULT NOTE ADULT - ASSESSMENT
61 year old female presenting with dizziness, gait instability, and emesis. 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    1. s/p craniotomy for meningioma  -per neurology    2. Dysphagia   -failed FEES today  -planned for PEG placement tomorrow  -hold TF after midnight     3. Pneumonia  -completed course of abx         Advanced care planning forms were discussed. Code status including forceful chest compressions, defibrillation and intubation were discussed. The risks benefits and alternatives to pertinent gastrointestinal procedures and interventions were discussed in detail and all questions were answered. Duration: 15 Minutes.  Attending supervision statement: I have personally seen and examined the patient. I fully participated in the care of this patient. I have made amendments to the documentation where necessary, and agree with the history, physical exam, and plan as outlined by the ACP.    [facility]   Gastroenterology and Hepatology  [address] [place], [place]  Office: [phone]  Cell: [phone] 61 year old female presenting with dizziness, gait instability, and emesis. 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    1. s/p craniotomy for meningioma  -per neurology    2. Dysphagia   -failed FEES today  -planned for PEG placement tomorrow  -discussed with daughter Shaina via telephone. All questions answered.  Agreeable to PEG.    -hold TF after midnight     3. Pneumonia  -completed course of abx         Advanced care planning forms were discussed. Code status including forceful chest compressions, defibrillation and intubation were discussed. The risks benefits and alternatives to pertinent gastrointestinal procedures and interventions were discussed in detail and all questions were answered. Duration: 15 Minutes.  Attending supervision statement: I have personally seen and examined the patient. I fully participated in the care of this patient. I have made amendments to the documentation where necessary, and agree with the history, physical exam, and plan as outlined by the ACP.    [facility]   Gastroenterology and Hepatology  [address] [place], [place]  Office: [phone]  Cell: [phone]

## 2022-06-15 NOTE — CONSULT NOTE ADULT - CONSULT REASON
PEG placement
Rehabilitation consult
hoarseness
Right hip skin damage
Dysphagia
Preoperative Clearance and medical comanagement

## 2022-06-15 NOTE — CONSULT NOTE ADULT - SUBJECTIVE AND OBJECTIVE BOX
Planned for PEG placement tomorrow.  Full consult note to follow.  Chief Complaint:  Patient is a 61y old  Female who presents with a chief complaint of Patient was admitted with brain tumor (15 Polo 2022 13:49)      Date of service: 06-15-22 @ 15:27    HPI:    The patient is a 61 year old female with history of HTN and C-spine meningioma removal in 2018 in [place] presenting with dizziness, gait instability, and emesis. MRI shows ethel-medullary, Right CPA, and Right temporal enhancing lesions c/f meningioma vs schwannoma, with some hydro. s/p left craniotomy and far lateral subtotal resection of foramen magnum meningioma on 6/1.  GI consulted for PEG placement s/p FEES today with recommendations to remain NPO. On exam, patient is awake, making hand movements but unable to verbally communicate. Currently with keofeed tube with TF at 60ml/hr. No abdominal tenderness noted.         Allergies:  albuterol (Other)  No Known Allergies      Home Medications:    Hospital Medications:  acetaminophen     Tablet .. 650 milliGRAM(s) Oral every 6 hours PRN  acetylcysteine 20%  Inhalation 3 milliLiter(s) Inhalation every 6 hours  BACItracin   Ointment 1 Application(s) Topical two times a day  dexAMETHasone  Injectable 2 milliGRAM(s) IV Push every 8 hours  doxazosin 4 milliGRAM(s) Oral daily  enoxaparin Injectable 40 milliGRAM(s) SubCutaneous <User Schedule>  levalbuterol Inhalation 0.63 milliGRAM(s) Inhalation every 6 hours  melatonin 5 milliGRAM(s) Oral at bedtime  metoprolol tartrate 12.5 milliGRAM(s) Oral every 12 hours  multivitamin 1 Tablet(s) Oral daily  ondansetron    Tablet 4 milliGRAM(s) Oral every 12 hours PRN  pantoprazole  Injectable 40 milliGRAM(s) IV Push daily  polyethylene glycol 3350 17 Gram(s) Oral daily  senna 2 Tablet(s) Oral at bedtime  sodium chloride 0.9%. 1000 milliLiter(s) IV Continuous <Continuous>  traMADol 25 milliGRAM(s) Oral every 4 hours PRN  traMADol 50 milliGRAM(s) Oral every 4 hours PRN  valACYclovir 2000 milliGRAM(s) Oral two times a day      PMHX/PSHX:  No pertinent past medical history    HTN (hypertension)    HLD (hyperlipidemia)    No significant past surgical history    No significant past surgical history    H/O cervical spine surgery        Family history:  No pertinent family history in first degree relatives        Social History:   Denies ethanol use.  Denies illicit drug use.    ROS:     General:  No wt loss, fevers, chills, night sweats, fatigue,   Eyes:  Good vision, no reported pain  ENT:  No sore throat, pain, runny nose, dysphagia  CV:  No pain, palpitations, hypo/hypertension  Resp:  No dyspnea, cough, tachypnea, wheezing  GI:  See HPI  :  No pain, bleeding, incontinence, nocturia  Muscle:  No pain, weakness  Neuro:  No weakness, tingling, memory problems  Psych:  No fatigue, insomnia, mood problems, depression  Endocrine:  No polyuria, polydipsia, cold/heat intolerance  Heme:  No petechiae, ecchymosis, easy bruisability  Integumentary:  No rash, edema      PHYSICAL EXAM:     GENERAL:  Appears stated age, well-groomed, well-nourished, no distress  HEENT:  NC/AT,  conjunctivae anicteric, clear and pink,   NECK: supple, trachea midline  CHEST:  Full & symmetric excursion, no increased effort, breath sounds clear  HEART:  Regular rhythm, no JVD  ABDOMEN:  Soft, non-tender, non-distended, normoactive bowel sounds,  no masses , no hepatosplenomegaly  EXTREMITIES:  no cyanosis,clubbing or edema  SKIN:  No rash, erythema, or, ecchymoses, no jaundice  NEURO:  Alert, non-focal, no asterixis  PSYCH: Appropriate affect, oriented to place and time  RECTAL: Deferred      Vital Signs:  Vital Signs Last 24 Hrs  T(C): 36.7 (15 Polo 2022 12:32), Max: 36.7 (15 Polo 2022 12:32)  T(F): 98.1 (15 Polo 2022 12:32), Max: 98.1 (15 Polo 2022 12:32)  HR: 110 (15 Polo 2022 12:32) (102 - 118)  BP: 120/75 (15 Polo 2022 12:32) (94/66 - 120/75)  BP(mean): --  RR: 18 (15 Polo 2022 12:32) (18 - 18)  SpO2: 95% (15 Polo 2022 12:32) (92% - 96%)  Daily     Daily     LABS: Labs personally reviewed by me:                        11.1   16.33 )-----------( 364      ( 15 Polo 2022 04:55 )             33.1     06-15    137  |  100  |  25<H>  ----------------------------<  165<H>  4.1   |  26  |  0.37<L>    Ca    8.5      15 Polo 2022 04:55  Phos  4.2     06-14  Mg     2.4     06-14                Imaging personally reviewed by me:

## 2022-06-15 NOTE — SWALLOW FEES ASSESSMENT ADULT - RECOMMENDED CONSISTENCY
NPO, with non-oral nutrition/hydration/medications. Recommend GOC discussion with pt/family/medical team re: long term means of nutrition/hydration/medication in this pt with severe dysphagia.

## 2022-06-15 NOTE — SWALLOW VFSS/MBS ASSESSMENT ADULT - ASPIRATION PRECAUTIONS
Monitor for s/s of aspiration or penetration (coughing, choking, wet/gurgly vocal qualiy). d/c PO intake if any signs are observed and contact speech department [phone]./yes
for secretion and enterals feeds/yes

## 2022-06-16 LAB
ANION GAP SERPL CALC-SCNC: 9 MMOL/L — SIGNIFICANT CHANGE UP (ref 5–17)
APPEARANCE UR: ABNORMAL
BACTERIA # UR AUTO: NEGATIVE — SIGNIFICANT CHANGE UP
BILIRUB UR-MCNC: NEGATIVE — SIGNIFICANT CHANGE UP
BUN SERPL-MCNC: 22 MG/DL — SIGNIFICANT CHANGE UP (ref 7–23)
CALCIUM SERPL-MCNC: 8.1 MG/DL — LOW (ref 8.4–10.5)
CHLORIDE SERPL-SCNC: 103 MMOL/L — SIGNIFICANT CHANGE UP (ref 96–108)
CO2 SERPL-SCNC: 26 MMOL/L — SIGNIFICANT CHANGE UP (ref 22–31)
COLOR SPEC: YELLOW — SIGNIFICANT CHANGE UP
CREAT SERPL-MCNC: 0.37 MG/DL — LOW (ref 0.5–1.3)
DIFF PNL FLD: NEGATIVE — SIGNIFICANT CHANGE UP
EGFR: 116 ML/MIN/1.73M2 — SIGNIFICANT CHANGE UP
EPI CELLS # UR: 3 /HPF — SIGNIFICANT CHANGE UP
GLUCOSE BLDC GLUCOMTR-MCNC: 213 MG/DL — HIGH (ref 70–99)
GLUCOSE SERPL-MCNC: 152 MG/DL — HIGH (ref 70–99)
GLUCOSE UR QL: NEGATIVE — SIGNIFICANT CHANGE UP
HCT VFR BLD CALC: 29.2 % — LOW (ref 34.5–45)
HGB BLD-MCNC: 9.7 G/DL — LOW (ref 11.5–15.5)
HYALINE CASTS # UR AUTO: 0 /LPF — SIGNIFICANT CHANGE UP (ref 0–7)
KETONES UR-MCNC: NEGATIVE — SIGNIFICANT CHANGE UP
LACTATE SERPL-SCNC: 3.3 MMOL/L — HIGH (ref 0.7–2)
LEUKOCYTE ESTERASE UR-ACNC: NEGATIVE — SIGNIFICANT CHANGE UP
MCHC RBC-ENTMCNC: 31.8 PG — SIGNIFICANT CHANGE UP (ref 27–34)
MCHC RBC-ENTMCNC: 33.2 GM/DL — SIGNIFICANT CHANGE UP (ref 32–36)
MCV RBC AUTO: 95.7 FL — SIGNIFICANT CHANGE UP (ref 80–100)
NITRITE UR-MCNC: NEGATIVE — SIGNIFICANT CHANGE UP
NRBC # BLD: 0 /100 WBCS — SIGNIFICANT CHANGE UP (ref 0–0)
PH UR: 7 — SIGNIFICANT CHANGE UP (ref 5–8)
PLATELET # BLD AUTO: 285 K/UL — SIGNIFICANT CHANGE UP (ref 150–400)
POTASSIUM SERPL-MCNC: 4.1 MMOL/L — SIGNIFICANT CHANGE UP (ref 3.5–5.3)
POTASSIUM SERPL-SCNC: 4.1 MMOL/L — SIGNIFICANT CHANGE UP (ref 3.5–5.3)
PROT UR-MCNC: ABNORMAL
RBC # BLD: 3.05 M/UL — LOW (ref 3.8–5.2)
RBC # FLD: 12.2 % — SIGNIFICANT CHANGE UP (ref 10.3–14.5)
RBC CASTS # UR COMP ASSIST: 3 /HPF — SIGNIFICANT CHANGE UP (ref 0–4)
SODIUM SERPL-SCNC: 138 MMOL/L — SIGNIFICANT CHANGE UP (ref 135–145)
SP GR SPEC: 1.02 — SIGNIFICANT CHANGE UP (ref 1.01–1.02)
UROBILINOGEN FLD QL: ABNORMAL
WBC # BLD: 22.92 K/UL — HIGH (ref 3.8–10.5)
WBC # FLD AUTO: 22.92 K/UL — HIGH (ref 3.8–10.5)
WBC UR QL: 2 /HPF — SIGNIFICANT CHANGE UP (ref 0–5)

## 2022-06-16 PROCEDURE — 99291 CRITICAL CARE FIRST HOUR: CPT

## 2022-06-16 PROCEDURE — 71045 X-RAY EXAM CHEST 1 VIEW: CPT | Mod: 26

## 2022-06-16 DEVICE — PEG KT SAFETY 20FR: Type: IMPLANTABLE DEVICE | Status: FUNCTIONAL

## 2022-06-16 RX ORDER — VANCOMYCIN HCL 1 G
VIAL (EA) INTRAVENOUS
Refills: 0 | Status: DISCONTINUED | OUTPATIENT
Start: 2022-06-16 | End: 2022-06-18

## 2022-06-16 RX ORDER — SODIUM CHLORIDE 9 MG/ML
1000 INJECTION INTRAMUSCULAR; INTRAVENOUS; SUBCUTANEOUS
Refills: 0 | Status: DISCONTINUED | OUTPATIENT
Start: 2022-06-16 | End: 2022-06-17

## 2022-06-16 RX ORDER — LIDOCAINE HCL 20 MG/ML
4 VIAL (ML) INJECTION ONCE
Refills: 0 | Status: DISCONTINUED | OUTPATIENT
Start: 2022-06-16 | End: 2022-06-20

## 2022-06-16 RX ORDER — TRAMADOL HYDROCHLORIDE 50 MG/1
50 TABLET ORAL EVERY 4 HOURS
Refills: 0 | Status: DISCONTINUED | OUTPATIENT
Start: 2022-06-16 | End: 2022-06-20

## 2022-06-16 RX ORDER — DEXAMETHASONE 0.5 MG/5ML
2 ELIXIR ORAL EVERY 8 HOURS
Refills: 0 | Status: DISCONTINUED | OUTPATIENT
Start: 2022-06-18 | End: 2022-06-20

## 2022-06-16 RX ORDER — HYDROCORTISONE 20 MG
50 TABLET ORAL EVERY 8 HOURS
Refills: 0 | Status: DISCONTINUED | OUTPATIENT
Start: 2022-06-16 | End: 2022-06-16

## 2022-06-16 RX ORDER — PIPERACILLIN AND TAZOBACTAM 4; .5 G/20ML; G/20ML
3.38 INJECTION, POWDER, LYOPHILIZED, FOR SOLUTION INTRAVENOUS ONCE
Refills: 0 | Status: COMPLETED | OUTPATIENT
Start: 2022-06-16 | End: 2022-06-16

## 2022-06-16 RX ORDER — SODIUM CHLORIDE 9 MG/ML
500 INJECTION INTRAMUSCULAR; INTRAVENOUS; SUBCUTANEOUS ONCE
Refills: 0 | Status: DISCONTINUED | OUTPATIENT
Start: 2022-06-16 | End: 2022-06-17

## 2022-06-16 RX ORDER — VANCOMYCIN HCL 1 G
1000 VIAL (EA) INTRAVENOUS EVERY 12 HOURS
Refills: 0 | Status: DISCONTINUED | OUTPATIENT
Start: 2022-06-16 | End: 2022-06-18

## 2022-06-16 RX ORDER — HYDROCORTISONE 20 MG
50 TABLET ORAL EVERY 8 HOURS
Refills: 0 | Status: DISCONTINUED | OUTPATIENT
Start: 2022-06-16 | End: 2022-06-17

## 2022-06-16 RX ORDER — ENOXAPARIN SODIUM 100 MG/ML
40 INJECTION SUBCUTANEOUS
Refills: 0 | Status: DISCONTINUED | OUTPATIENT
Start: 2022-06-16 | End: 2022-06-20

## 2022-06-16 RX ORDER — VANCOMYCIN HCL 1 G
1000 VIAL (EA) INTRAVENOUS ONCE
Refills: 0 | Status: COMPLETED | OUTPATIENT
Start: 2022-06-16 | End: 2022-06-16

## 2022-06-16 RX ORDER — ACETAMINOPHEN 500 MG
1000 TABLET ORAL ONCE
Refills: 0 | Status: COMPLETED | OUTPATIENT
Start: 2022-06-16 | End: 2022-06-16

## 2022-06-16 RX ORDER — HYDROCORTISONE 20 MG
25 TABLET ORAL EVERY 8 HOURS
Refills: 0 | Status: COMPLETED | OUTPATIENT
Start: 2022-06-17 | End: 2022-06-17

## 2022-06-16 RX ORDER — PIPERACILLIN AND TAZOBACTAM 4; .5 G/20ML; G/20ML
3.38 INJECTION, POWDER, LYOPHILIZED, FOR SOLUTION INTRAVENOUS EVERY 8 HOURS
Refills: 0 | Status: DISCONTINUED | OUTPATIENT
Start: 2022-06-16 | End: 2022-06-20

## 2022-06-16 RX ORDER — TRAMADOL HYDROCHLORIDE 50 MG/1
25 TABLET ORAL EVERY 4 HOURS
Refills: 0 | Status: DISCONTINUED | OUTPATIENT
Start: 2022-06-16 | End: 2022-06-20

## 2022-06-16 RX ADMIN — VALACYCLOVIR 2000 MILLIGRAM(S): 500 TABLET, FILM COATED ORAL at 05:01

## 2022-06-16 RX ADMIN — Medication 3 MILLILITER(S): at 17:21

## 2022-06-16 RX ADMIN — TRAMADOL HYDROCHLORIDE 50 MILLIGRAM(S): 50 TABLET ORAL at 21:08

## 2022-06-16 RX ADMIN — PIPERACILLIN AND TAZOBACTAM 200 GRAM(S): 4; .5 INJECTION, POWDER, LYOPHILIZED, FOR SOLUTION INTRAVENOUS at 11:32

## 2022-06-16 RX ADMIN — Medication 12.5 MILLIGRAM(S): at 05:05

## 2022-06-16 RX ADMIN — PIPERACILLIN AND TAZOBACTAM 25 GRAM(S): 4; .5 INJECTION, POWDER, LYOPHILIZED, FOR SOLUTION INTRAVENOUS at 21:08

## 2022-06-16 RX ADMIN — Medication 1 APPLICATION(S): at 06:56

## 2022-06-16 RX ADMIN — Medication 50 MILLIGRAM(S): at 11:58

## 2022-06-16 RX ADMIN — Medication 4 MILLIGRAM(S): at 05:02

## 2022-06-16 RX ADMIN — SENNA PLUS 2 TABLET(S): 8.6 TABLET ORAL at 21:08

## 2022-06-16 RX ADMIN — PANTOPRAZOLE SODIUM 40 MILLIGRAM(S): 20 TABLET, DELAYED RELEASE ORAL at 11:57

## 2022-06-16 RX ADMIN — Medication 2 MILLIGRAM(S): at 05:02

## 2022-06-16 RX ADMIN — Medication 5 MILLIGRAM(S): at 21:08

## 2022-06-16 RX ADMIN — TRAMADOL HYDROCHLORIDE 50 MILLIGRAM(S): 50 TABLET ORAL at 21:38

## 2022-06-16 RX ADMIN — LEVALBUTEROL 0.63 MILLIGRAM(S): 1.25 SOLUTION, CONCENTRATE RESPIRATORY (INHALATION) at 11:58

## 2022-06-16 RX ADMIN — Medication 1000 MILLIGRAM(S): at 12:08

## 2022-06-16 RX ADMIN — Medication 50 MILLIGRAM(S): at 21:08

## 2022-06-16 RX ADMIN — Medication 3 MILLILITER(S): at 00:00

## 2022-06-16 RX ADMIN — Medication 12.5 MILLIGRAM(S): at 17:22

## 2022-06-16 RX ADMIN — Medication 250 MILLIGRAM(S): at 11:57

## 2022-06-16 RX ADMIN — TRAMADOL HYDROCHLORIDE 50 MILLIGRAM(S): 50 TABLET ORAL at 16:28

## 2022-06-16 RX ADMIN — TRAMADOL HYDROCHLORIDE 50 MILLIGRAM(S): 50 TABLET ORAL at 15:05

## 2022-06-16 RX ADMIN — Medication 3 MILLILITER(S): at 11:58

## 2022-06-16 RX ADMIN — LEVALBUTEROL 0.63 MILLIGRAM(S): 1.25 SOLUTION, CONCENTRATE RESPIRATORY (INHALATION) at 00:00

## 2022-06-16 RX ADMIN — LEVALBUTEROL 0.63 MILLIGRAM(S): 1.25 SOLUTION, CONCENTRATE RESPIRATORY (INHALATION) at 17:21

## 2022-06-16 RX ADMIN — Medication 3 MILLILITER(S): at 05:03

## 2022-06-16 RX ADMIN — Medication 400 MILLIGRAM(S): at 11:38

## 2022-06-16 RX ADMIN — LEVALBUTEROL 0.63 MILLIGRAM(S): 1.25 SOLUTION, CONCENTRATE RESPIRATORY (INHALATION) at 05:03

## 2022-06-16 NOTE — PRE-ANESTHESIA EVALUATION ADULT - NSANTHPEFT_GEN_ALL_CORE
cor reg  lungs clear  abd soft  AO X3
Gen: Mandarin speaking, but AAOx3, able to answer questions through   Cards: RRR  Pulm: breathing comfortably on RA

## 2022-06-16 NOTE — PROGRESS NOTE ADULT - ASSESSMENT
60 y/o Mandarin speaking female with PMH of HTN, C-spine meningioma removal in 2018 in [place] who p/w dizziness, gait instability, and emesis with MRI showing ethel-medullary, right CPA, and right temporal enhancing lesions c/f meningioma vs schwannoma, with hydro. Patient is s/p L far lateral crani for meningioma resection w/ subtotal sacrifice of L vertebral artery on 6/1/22. NSCU course c/b MSSA PNA (suspected aspiration pneumonia) on augmentin and vocal cord paralysis/dysphagia with NGT. Transferred out of NSCU 6/12. Patient is s/p PEG on 6/16, post-procedure course c/b RRT for hemodynamic instability, hypoxia, and ? afib with RVR.

## 2022-06-16 NOTE — PROGRESS NOTE ADULT - PROBLEM SELECTOR PLAN 3
- Intermittent episodes of sinus tach  - LE duplex negative for DVT 6/8 and CTA chest 6/7 negative for PE  - could be in setting of aspiration/cough, free water depletion, discomfort from NGT?  - c/w free water via NG tube  - switched to xopenex neb instead  **patient was reportedly in transient afib with RVR during RRT with HR in 140's? given amiodarone 150mg IVP x1. EKG showed sinus tach   - monitor on tele  - continue with low dose metoprolol 12.5mg BID for now  - TTE on 6/8 showed hyperdynamic LV

## 2022-06-16 NOTE — PRE PROCEDURE NOTE - PRE PROCEDURE EVALUATION
Attending Physician:      stefan                      Procedure: egd    Indication for Procedure: dysphagia  ________________________________________________________  PAST MEDICAL & SURGICAL HISTORY:  HTN (hypertension)      HLD (hyperlipidemia)      H/O cervical spine surgery        ALLERGIES:  albuterol (Other)  No Known Allergies    HOME MEDICATIONS:  amLODIPine 10 mg oral tablet: 1 tab(s) orally once a day  Aspirin Enteric Coated 81 mg oral delayed release tablet: 1 tab(s) orally once a day (Indication: Cartoid Stenosis)  atorvastatin 40 mg oral tablet: 1 tab(s) orally once a day  Fish Oil 1000 mg oral capsule: 1 cap(s) orally once a day  losartan 100 mg oral tablet: 1 tab(s) orally once a day  omeprazole 40 mg oral delayed release capsule: 1 cap(s) orally once a day  Vitamin D2 1.25 mg (50,000 intl units) oral capsule: 1 cap(s) orally once a week    AICD/PPM: [ ] yes   [ ] no    PERTINENT LAB DATA:                        11.1   16.33 )-----------( 364      ( 15 Polo 2022 04:55 )             33.1     06-15    137  |  100  |  25<H>  ----------------------------<  165<H>  4.1   |  26  |  0.37<L>    Ca    8.5      15 Polo 2022 04:55                  PHYSICAL EXAMINATION:    T(C): 37.1  HR: 102  BP: 123/85  RR: 22  SpO2: 97%    Constitutional: NAD  HEENT: PERRLA, EOMI,    Neck:  No JVD  Respiratory: CTAB/L  Cardiovascular: S1 and S2  Gastrointestinal: BS+, soft, NT/ND  Extremities: No peripheral edema  Neurological: A/O x 3, no focal deficits  Psychiatric: Normal mood, normal affect  Skin: No rashes    ASA Class: I [ ]  II [ ]  III [x ]  IV [ ]    COMMENTS:    The patient is a suitable candidate for the planned procedure unless box checked [ ]  No, explain:

## 2022-06-16 NOTE — PROGRESS NOTE ADULT - ASSESSMENT
HPI:  61F, PMH HTN and C-spine meningioma removal in 2018 in [place]. P/w dizziness, gait instability, and emesis x3d. MRI shows ethel-medullary, Right CPA, and Right temporal enhancing lesions c/f meningioma vs schwannoma, with some hydro. Exam: Mandarin-speaking, AOx3, EOMI no nystagmus, PERRL, no facial/drift, CONRAD 5/5, SILT. Slightly wide-based antalgic gait, positive Romberg’s   (20 May 2022 14:15)    PROCEDURE: Craniotomy for meningioma     s/p left craniotomy and far lateral subtotal resection of foramen magnum meningioma on 6/1          PLAN:  Neuro:   1 out of bed   2 continue pt/ot  3 prn pain medication   4 decadron 2 q8 to control brain edema     Respiratory:    1 secretion - on xoponex , mucomyst and chest pt   2 cxr -6/13 stable   3 nasal suction q6 hours   CV:  1 bp stable -continue amlodipine     Endocrine:   1 stable     Heme/Onc:             DVT ppx: sql and scds   1 h/h stable     Renal:   1 voiding   2 iv lock     ID:   1 augmentin course finished for pna   GI:   1 feest failed   2 continue tube feeds   3 last bm 6/11   4 gi called for peg     Social/Family:   Discharge planning: pending acute rehab once stable       -will discuss with Dr. Liu   -Netflix [id number]       61F, PMH HTN and C-spine meningioma removal in 2018 in [place]. P/w dizziness, gait instability, and emesis x3d. MRI shows ethel-medullary, Right CPA, and Right temporal enhancing lesions c/f meningioma vs schwannoma, with some hydro. Exam: Mandarin-speaking, AOx3, EOMI no nystagmus, PERRL, no facial/drift, CONRAD 5/5, SILT. 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 on 6/1   S/p PEG 6/16     PLAN:  Neuro:   1 neuro checks q 4hrs  2 prn pain medication 2 continue pt/ot  3 hold decadron 2 q8 , stress dose steroids started       Respiratory:   1 secretion - on xoponex , mucomyst and chest pt   2 cxr -6/16 stable   3 nasal suction q6 hours  est CTA when stable     CV:  1 bp stable for now  2 stress steroids  3 continue low dose BB with hold parameters for sinus tach    Endocrine:   1 stable     Heme/Onc:             DVT ppx: sql and scds   1 h/h pend post procedure     Renal:   1 continue straight cath   2 NS @ 75    ID:   1 Vanco/ Zosyn started for pos aspiration pna   2 pan cx will f/u on result    GI:   1 s/p Peg  2 hold tube feeds till 6/17  3 last bm 6/11         Social/Family: Hospitalist spoke to family about event in Endo suite  Discharge planning: pending acute rehab once stable       -will discuss with Dr. Liu   -spectra [id number]

## 2022-06-16 NOTE — PROGRESS NOTE ADULT - PROBLEM SELECTOR PLAN 2
patient with intermittent episodes of desat while sleeping, suspect likely due to oropharyngeal secretions and difficulty with clearing and aspiration of secretions  - CTA chest on 6/7 negative for PE but b/l consolidation noted concerning for aspiration pneumonia  - 6/6 sputum cx with MSSA, completed augmentin  - c/w chest PT, suctioning, nebs/mucomyst to help clear secretions. she needs aggressive pulm toileting.  **patient noted with hypoxia and hemodynamic instability post-PEG today- suspect in setting of anesthesia and possible recurrent aspiration  - repeat CXR appears grossly clear  - check STAT labs including blood culture, lactate, repeat CTA chest if remains hemodynamically stable  - start IV vanco and zosyn empirically  - as patient has been on decadron for the last couple of weeks, start stress dose steroid with IV hydrocortisone 50mg q8h x 3 then we'll reassess tomorrow

## 2022-06-16 NOTE — PRE-ANESTHESIA EVALUATION ADULT - NSANTHOSAYNRD_GEN_A_CORE
No. BERHANE screening performed.  STOP BANG Legend: 0-2 = LOW Risk; 3-4 = INTERMEDIATE Risk; 5-8 = HIGH Risk
No. BERHANE screening performed.  STOP BANG Legend: 0-2 = LOW Risk; 3-4 = INTERMEDIATE Risk; 5-8 = HIGH Risk

## 2022-06-16 NOTE — RAPID RESPONSE TEAM SUMMARY - NSSITUATIONBACKGROUNDRRT_GEN_ALL_CORE
60 y/o Mandarin speaking female with PMH of HTN, C-spine meningioma removal in 2018 in [place] who p/w dizziness, gait instability, and emesis with MRI showing ethel-medullary, right CPA, and right temporal enhancing lesions c/f meningioma vs schwannoma, with hydro. Patient is s/p L far lateral crani for meningioma resection w/ subtotal sacrifice of L vertebral artery on 6/1/22. NSCU course c/b MSSA PNA (suspected aspiration pneumonia) on augmentin and vocal cord paralysis/dysphagia with NGT. Transferred out of NSCU 6/12, s/p PEG on 6/16. RRT called in endoscopy suite for Afib RVR. Pt seen at bedside. Blood pressure ranging in 80s-90s systolic. HR 130s-140s, irregularly irregular on tele, reportedly afib RVR on EKG. Pt on nonrebreather, reportedly increasingly hypoxic as well, previously on 2L NC. Spoke w/ primary team on phone, reportedly this is new Afib. Pt given 150mg amiodarone push w/ improvement in rate to 80s-90s, improvement in BP to 100s systolic, appears to be at patient's baseline. Concern for possible aspiration. Pt to be transferred back to tele bed on nonrebreather, to be titrated off nonrebreather on the floors. Primary team to call NSICU for consult.

## 2022-06-16 NOTE — PROGRESS NOTE ADULT - CRITICAL CARE ATTENDING COMMENT
patient found with hemodynamic instability post-PEG with hypotension, hypoxia, and tachycardia as described above. I was present at the bedside during RRT and helped coordinate plan of care as noted in the above assessment and plan with the neurosurgery team. NSCU team also made aware.

## 2022-06-16 NOTE — PROGRESS NOTE ADULT - PROBLEM SELECTOR PLAN 1
- MRI showed ethel-medullary, right CPA, and right temporal enhancing lesions c/f meningioma vs schwannoma, with hydro  - s/p L far lateral crani for meningioma resection w/ subtotal sacrifice of L vertebral artery on 6/1  - was on decadron taper but would hold as patient now started on stress dose steroid  - post-op care as per neurosurgery team

## 2022-06-16 NOTE — PROGRESS NOTE ADULT - SUBJECTIVE AND OBJECTIVE BOX
Post-op day # 15 s/p Craniotomy for meningioma    Overnight event:      Vital Signs Last 24 Hrs  T(C): 37.1 (16 Jun 2022 08:15), Max: 37.1 (16 Jun 2022 08:06)  T(F): 98.8 (16 Jun 2022 08:06), Max: 98.8 (16 Jun 2022 08:06)  HR: 149 (16 Jun 2022 09:20) (101 - 149)  BP: 93/61 (16 Jun 2022 09:20) (93/61 - 123/85)  BP(mean): --  RR: 23 (16 Jun 2022 09:20) (18 - 24)  SpO2: 92% (16 Jun 2022 09:10) (92% - 97%)                          11.1   16.33 )-----------( 364      ( 15 Polo 2022 04:55 )             33.1    06-15    137  |  100  |  25<H>  ----------------------------<  165<H>  4.1   |  26  |  0.37<L>    Ca    8.5      15 Polo 2022 04:55       Stroke Core Measures      DRAIN OUTPUT:     NEUROIMAGING:     PHYSICAL EXAM:    General: No Acute Distress     Neurological: Awake, alert oriented to person, place and time, Following Commands, PERRL, EOMI, Face Symmetrical, Speech Fluent, Moving all extremities, Muscle Strength normal in all four extremities, No Drift, Sensation to Light Touch Intact    Pulmonary: Clear to Auscultation, No Rales, No Rhonchi, No Wheezes     Cardiovascular: S1, S2, Regular Rate and Rhythm     Gastrointestinal: Soft, Nontender, Nondistended     Incision:     MEDICATIONS:   Antibiotics:    Neuro:  acetaminophen     Tablet .. 650 milliGRAM(s) Oral every 6 hours PRN Temp greater or equal to 38C (100.4F), Mild Pain (1 - 3)  melatonin 5 milliGRAM(s) Oral at bedtime  ondansetron    Tablet 4 milliGRAM(s) Oral every 12 hours PRN Nausea  traMADol 25 milliGRAM(s) Oral every 4 hours PRN Moderate Pain (4 - 6)  traMADol 50 milliGRAM(s) Oral every 4 hours PRN Severe Pain (7 - 10)    Anticoagulation:    Cardiology:  doxazosin 4 milliGRAM(s) Oral daily  metoprolol tartrate 12.5 milliGRAM(s) Oral every 12 hours    Endo:   dexAMETHasone  Injectable 2 milliGRAM(s) IV Push every 8 hours    Pulm:  acetylcysteine 20%  Inhalation 3 milliLiter(s) Inhalation every 6 hours  levalbuterol Inhalation 0.63 milliGRAM(s) Inhalation every 6 hours    GI/:  pantoprazole  Injectable 40 milliGRAM(s) IV Push daily  polyethylene glycol 3350 17 Gram(s) Oral daily  senna 2 Tablet(s) Oral at bedtime    Other:  BACItracin   Ointment 1 Application(s) Topical two times a day  lidocaine 4% Injection for Nebulization 4 milliLiter(s) Nebulizer once  multivitamin 1 Tablet(s) Oral daily  sodium chloride 0.9%. 1000 milliLiter(s) IV Continuous <Continuous>  acetylcysteine 20%  Inhalation 3 milliLiter(s) Inhalation every 6 hours  levalbuterol Inhalation 0.63 milliGRAM(s) Inhalation every 6 hours   pantoprazole  Injectable 40 milliGRAM(s) IV Push daily  polyethylene glycol 3350 17 Gram(s) Oral daily  senna 2 Tablet(s) Oral at bedtime       Post-op day # 15 s/p Craniotomy for meningioma    Overnight event: no acute event over night  Patient went to endoscopy this morning for Peg placement and after the procedure patient became hypoxic, hypotensive and tachycardic. Rapid response was called. Patient got amiodarone and 1 liter of fluid  100% FM started patient stabilizes and was transfer back to [address] [place] [address]     Vital Signs Last 24 Hrs  T(C): 37.1 (16 Jun 2022 08:15), Max: 37.1 (16 Jun 2022 08:06)  T(F): 98.8 (16 Jun 2022 08:06), Max: 98.8 (16 Jun 2022 08:06)  HR: 149 (16 Jun 2022 09:20) (101 - 149)  BP: 93/61 (16 Jun 2022 09:20) (93/61 - 123/85)  BP(mean): --  RR: 23 (16 Jun 2022 09:20) (18 - 24)  SpO2: 92% (16 Jun 2022 09:10) (92% - 97%)                          11.1   16.33 )-----------( 364      ( 15 Polo 2022 04:55 )             33.1    06-15    137  |  100  |  25<H>  ----------------------------<  165<H>  4.1   |  26  |  0.37<L>    Ca    8.5      15 Polo 2022 04:55       Stroke Core Measures      DRAIN OUTPUT:     NEUROIMAGING:     PHYSICAL EXAM:    General: No Acute Distress     Neurological: Awake, alert oriented to person, place and time in [facility], hypophonic speech, following commands, moving her Lt side with good motor strength 5/5 Rt side 4/5    Pulmonary: Clear to Auscultation, No Rales, No Rhonchi, No Wheezes     Cardiovascular: S1, S2, Regular Rate and Rhythm     Gastrointestinal: Soft, Nontender, Nondistended     Incision: intact    MEDICATIONS:   Antibiotics:    Neuro:  acetaminophen     Tablet .. 650 milliGRAM(s) Oral every 6 hours PRN Temp greater or equal to 38C (100.4F), Mild Pain (1 - 3)  melatonin 5 milliGRAM(s) Oral at bedtime  ondansetron    Tablet 4 milliGRAM(s) Oral every 12 hours PRN Nausea  traMADol 25 milliGRAM(s) Oral every 4 hours PRN Moderate Pain (4 - 6)  traMADol 50 milliGRAM(s) Oral every 4 hours PRN Severe Pain (7 - 10)    Anticoagulation:    Cardiology:  doxazosin 4 milliGRAM(s) Oral daily  metoprolol tartrate 12.5 milliGRAM(s) Oral every 12 hours    Endo:   dexAMETHasone  Injectable 2 milliGRAM(s) IV Push every 8 hours    Pulm:  acetylcysteine 20%  Inhalation 3 milliLiter(s) Inhalation every 6 hours  levalbuterol Inhalation 0.63 milliGRAM(s) Inhalation every 6 hours    GI/:  pantoprazole  Injectable 40 milliGRAM(s) IV Push daily  polyethylene glycol 3350 17 Gram(s) Oral daily  senna 2 Tablet(s) Oral at bedtime    Other:  BACItracin   Ointment 1 Application(s) Topical two times a day  lidocaine 4% Injection for Nebulization 4 milliLiter(s) Nebulizer once  multivitamin 1 Tablet(s) Oral daily  sodium chloride 0.9%. 1000 milliLiter(s) IV Continuous <Continuous>  acetylcysteine 20%  Inhalation 3 milliLiter(s) Inhalation every 6 hours  levalbuterol Inhalation 0.63 milliGRAM(s) Inhalation every 6 hours   pantoprazole  Injectable 40 milliGRAM(s) IV Push daily  polyethylene glycol 3350 17 Gram(s) Oral daily  senna 2 Tablet(s) Oral at bedtime

## 2022-06-16 NOTE — PROGRESS NOTE ADULT - SUBJECTIVE AND OBJECTIVE BOX
[facility] Division of Hospital Medicine  Manju Loaiza MD  spectra [id number]    Patient is a 61y old  Female who presents with a chief complaint of Brain tumor (16 Jun 2022 09:52)      SUBJECTIVE / OVERNIGHT EVENTS: patient seen and examined earlier in endoscopy recovery suite post PEG placement. RRT was called for patient reportedly being hypotensive with SBP in 80's, tachycardic (? afib with RVR), hypoxic- placed on NRB. Patient required fluid bolus, amiodarone 150mg IVP x 1. Eventually stabilized hemodynamically with SBP in 's, HR 90's, and able to titrate down supplemental O2 to 4L NC. Patient was transported back to [address] [place] [address] on tele monitor.     ADDITIONAL REVIEW OF SYSTEMS:    MEDICATIONS  (STANDING):  acetylcysteine 20%  Inhalation 3 milliLiter(s) Inhalation every 6 hours  BACItracin   Ointment 1 Application(s) Topical two times a day  doxazosin 4 milliGRAM(s) Oral daily  hydrocortisone sodium succinate Injectable 50 milliGRAM(s) IV Push every 8 hours  levalbuterol Inhalation 0.63 milliGRAM(s) Inhalation every 6 hours  lidocaine 4% Injection for Nebulization 4 milliLiter(s) Nebulizer once  melatonin 5 milliGRAM(s) Oral at bedtime  metoprolol tartrate 12.5 milliGRAM(s) Oral every 12 hours  multivitamin 1 Tablet(s) Oral daily  pantoprazole  Injectable 40 milliGRAM(s) IV Push daily  piperacillin/tazobactam IVPB.. 3.375 Gram(s) IV Intermittent every 8 hours  polyethylene glycol 3350 17 Gram(s) Oral daily  senna 2 Tablet(s) Oral at bedtime  sodium chloride 0.9% Bolus 500 milliLiter(s) IV Bolus once  sodium chloride 0.9%. 1000 milliLiter(s) (75 mL/Hr) IV Continuous <Continuous>  sodium chloride 0.9%. 1000 milliLiter(s) (60 mL/Hr) IV Continuous <Continuous>  vancomycin  IVPB 1000 milliGRAM(s) IV Intermittent once  vancomycin  IVPB 1000 milliGRAM(s) IV Intermittent every 12 hours  vancomycin  IVPB        MEDICATIONS  (PRN):  acetaminophen     Tablet .. 650 milliGRAM(s) Oral every 6 hours PRN Temp greater or equal to 38C (100.4F), Mild Pain (1 - 3)  ondansetron    Tablet 4 milliGRAM(s) Oral every 12 hours PRN Nausea  traMADol 25 milliGRAM(s) Oral every 4 hours PRN Moderate Pain (4 - 6)  traMADol 50 milliGRAM(s) Oral every 4 hours PRN Severe Pain (7 - 10)      CAPILLARY BLOOD GLUCOSE      POCT Blood Glucose.: 213 mg/dL (16 Jun 2022 10:18)    I&O's Summary    15 Polo 2022 07:01  -  16 Jun 2022 07:00  --------------------------------------------------------  IN: 0 mL / OUT: 1800 mL / NET: -1800 mL        PHYSICAL EXAM:  Vital Signs Last 24 Hrs  T(C): 36.3 (16 Jun 2022 11:15), Max: 37.1 (16 Jun 2022 08:06)  T(F): 97.4 (16 Jun 2022 11:15), Max: 98.8 (16 Jun 2022 08:06)  HR: 104 (16 Jun 2022 11:15) (101 - 149)  BP: 91/64 (16 Jun 2022 11:50) (74/57 - 123/85)  BP(mean): --  RR: 20 (16 Jun 2022 11:15) (18 - 33)  SpO2: 96% (16 Jun 2022 11:15) (86% - 97%)    CONSTITUTIONAL: NAD, well-groomed  NECK: Supple, no palpable masses; no thyromegaly  RESPIRATORY: Normal respiratory effort; scattered rhonchi  CARDIOVASCULAR: normal S1 and S2, no murmur/rub/gallop; No lower extremity edema  ABDOMEN: soft, + PEG covered with dressing, diminished bowel sounds, no rebound/guarding; No hepatosplenomegaly  MUSCULOSKELETAL:  no clubbing or cyanosis of digits; no joint swelling or tenderness to palpation  PSYCH: A+O to person, place, and time; affect appropriate   SKIN: No rashes; + healing herpes labialis in lower lip    LABS:                        11.1   16.33 )-----------( 364      ( 15 Polo 2022 04:55 )             33.1     06-15    137  |  100  |  25<H>  ----------------------------<  165<H>  4.1   |  26  |  0.37<L>    Ca    8.5      15 Polo 2022 04:55                  RADIOLOGY & ADDITIONAL TESTS:  Results Reviewed:   Imaging Personally Reviewed: CXR personally reviewed- no focal infiltrate  Electrocardiogram Personally Reviewed: sinus tach 108bpm    COORDINATION OF CARE:  Care Discussed with Consultants/Other Providers [Y/N]: neurosurgery PA(Korina)  Prior or Outpatient Records Reviewed [Y/N]:

## 2022-06-17 ENCOUNTER — TRANSCRIPTION ENCOUNTER (OUTPATIENT)
Age: 62
End: 2022-06-17

## 2022-06-17 DIAGNOSIS — D64.9 ANEMIA, UNSPECIFIED: ICD-10-CM

## 2022-06-17 LAB
ANION GAP SERPL CALC-SCNC: 8 MMOL/L — SIGNIFICANT CHANGE UP (ref 5–17)
BASOPHILS # BLD AUTO: 0.03 K/UL — SIGNIFICANT CHANGE UP (ref 0–0.2)
BASOPHILS NFR BLD AUTO: 0.2 % — SIGNIFICANT CHANGE UP (ref 0–2)
BUN SERPL-MCNC: 16 MG/DL — SIGNIFICANT CHANGE UP (ref 7–23)
CALCIUM SERPL-MCNC: 8.1 MG/DL — LOW (ref 8.4–10.5)
CHLORIDE SERPL-SCNC: 103 MMOL/L — SIGNIFICANT CHANGE UP (ref 96–108)
CO2 SERPL-SCNC: 26 MMOL/L — SIGNIFICANT CHANGE UP (ref 22–31)
CREAT SERPL-MCNC: 0.37 MG/DL — LOW (ref 0.5–1.3)
EGFR: 116 ML/MIN/1.73M2 — SIGNIFICANT CHANGE UP
EOSINOPHIL # BLD AUTO: 0.01 K/UL — SIGNIFICANT CHANGE UP (ref 0–0.5)
EOSINOPHIL NFR BLD AUTO: 0.1 % — SIGNIFICANT CHANGE UP (ref 0–6)
GLUCOSE SERPL-MCNC: 108 MG/DL — HIGH (ref 70–99)
HCT VFR BLD CALC: 26.4 % — LOW (ref 34.5–45)
HCT VFR BLD CALC: 26.6 % — LOW (ref 34.5–45)
HGB BLD-MCNC: 8.6 G/DL — LOW (ref 11.5–15.5)
HGB BLD-MCNC: 8.6 G/DL — LOW (ref 11.5–15.5)
IMM GRANULOCYTES NFR BLD AUTO: 2.6 % — HIGH (ref 0–1.5)
LACTATE SERPL-SCNC: 0.6 MMOL/L — LOW (ref 0.7–2)
LYMPHOCYTES # BLD AUTO: 0.81 K/UL — LOW (ref 1–3.3)
LYMPHOCYTES # BLD AUTO: 5.9 % — LOW (ref 13–44)
MCHC RBC-ENTMCNC: 31.2 PG — SIGNIFICANT CHANGE UP (ref 27–34)
MCHC RBC-ENTMCNC: 31.3 PG — SIGNIFICANT CHANGE UP (ref 27–34)
MCHC RBC-ENTMCNC: 32.3 GM/DL — SIGNIFICANT CHANGE UP (ref 32–36)
MCHC RBC-ENTMCNC: 32.6 GM/DL — SIGNIFICANT CHANGE UP (ref 32–36)
MCV RBC AUTO: 95.7 FL — SIGNIFICANT CHANGE UP (ref 80–100)
MCV RBC AUTO: 96.7 FL — SIGNIFICANT CHANGE UP (ref 80–100)
MONOCYTES # BLD AUTO: 0.34 K/UL — SIGNIFICANT CHANGE UP (ref 0–0.9)
MONOCYTES NFR BLD AUTO: 2.5 % — SIGNIFICANT CHANGE UP (ref 2–14)
NEUTROPHILS # BLD AUTO: 12.28 K/UL — HIGH (ref 1.8–7.4)
NEUTROPHILS NFR BLD AUTO: 88.7 % — HIGH (ref 43–77)
NRBC # BLD: 0 /100 WBCS — SIGNIFICANT CHANGE UP (ref 0–0)
NRBC # BLD: 0 /100 WBCS — SIGNIFICANT CHANGE UP (ref 0–0)
PLATELET # BLD AUTO: 242 K/UL — SIGNIFICANT CHANGE UP (ref 150–400)
PLATELET # BLD AUTO: 255 K/UL — SIGNIFICANT CHANGE UP (ref 150–400)
POTASSIUM SERPL-MCNC: 3.7 MMOL/L — SIGNIFICANT CHANGE UP (ref 3.5–5.3)
POTASSIUM SERPL-SCNC: 3.7 MMOL/L — SIGNIFICANT CHANGE UP (ref 3.5–5.3)
RBC # BLD: 2.75 M/UL — LOW (ref 3.8–5.2)
RBC # BLD: 2.76 M/UL — LOW (ref 3.8–5.2)
RBC # FLD: 12.7 % — SIGNIFICANT CHANGE UP (ref 10.3–14.5)
RBC # FLD: 12.7 % — SIGNIFICANT CHANGE UP (ref 10.3–14.5)
SODIUM SERPL-SCNC: 137 MMOL/L — SIGNIFICANT CHANGE UP (ref 135–145)
WBC # BLD: 13.4 K/UL — HIGH (ref 3.8–10.5)
WBC # BLD: 13.83 K/UL — HIGH (ref 3.8–10.5)
WBC # FLD AUTO: 13.4 K/UL — HIGH (ref 3.8–10.5)
WBC # FLD AUTO: 13.83 K/UL — HIGH (ref 3.8–10.5)

## 2022-06-17 PROCEDURE — 71275 CT ANGIOGRAPHY CHEST: CPT | Mod: 26

## 2022-06-17 PROCEDURE — 99233 SBSQ HOSP IP/OBS HIGH 50: CPT

## 2022-06-17 RX ORDER — ASCORBIC ACID 60 MG
500 TABLET,CHEWABLE ORAL DAILY
Refills: 0 | Status: DISCONTINUED | OUTPATIENT
Start: 2022-06-17 | End: 2022-06-20

## 2022-06-17 RX ADMIN — Medication 3 MILLILITER(S): at 23:55

## 2022-06-17 RX ADMIN — LEVALBUTEROL 0.63 MILLIGRAM(S): 1.25 SOLUTION, CONCENTRATE RESPIRATORY (INHALATION) at 00:40

## 2022-06-17 RX ADMIN — Medication 12.5 MILLIGRAM(S): at 05:04

## 2022-06-17 RX ADMIN — Medication 12.5 MILLIGRAM(S): at 17:22

## 2022-06-17 RX ADMIN — Medication 250 MILLIGRAM(S): at 12:02

## 2022-06-17 RX ADMIN — LEVALBUTEROL 0.63 MILLIGRAM(S): 1.25 SOLUTION, CONCENTRATE RESPIRATORY (INHALATION) at 23:53

## 2022-06-17 RX ADMIN — Medication 250 MILLIGRAM(S): at 00:00

## 2022-06-17 RX ADMIN — LEVALBUTEROL 0.63 MILLIGRAM(S): 1.25 SOLUTION, CONCENTRATE RESPIRATORY (INHALATION) at 17:21

## 2022-06-17 RX ADMIN — TRAMADOL HYDROCHLORIDE 25 MILLIGRAM(S): 50 TABLET ORAL at 09:19

## 2022-06-17 RX ADMIN — POLYETHYLENE GLYCOL 3350 17 GRAM(S): 17 POWDER, FOR SOLUTION ORAL at 12:01

## 2022-06-17 RX ADMIN — PIPERACILLIN AND TAZOBACTAM 25 GRAM(S): 4; .5 INJECTION, POWDER, LYOPHILIZED, FOR SOLUTION INTRAVENOUS at 12:03

## 2022-06-17 RX ADMIN — PANTOPRAZOLE SODIUM 40 MILLIGRAM(S): 20 TABLET, DELAYED RELEASE ORAL at 12:01

## 2022-06-17 RX ADMIN — Medication 5 MILLIGRAM(S): at 21:10

## 2022-06-17 RX ADMIN — Medication 1 APPLICATION(S): at 17:21

## 2022-06-17 RX ADMIN — Medication 25 MILLIGRAM(S): at 14:38

## 2022-06-17 RX ADMIN — Medication 3 MILLILITER(S): at 17:22

## 2022-06-17 RX ADMIN — Medication 3 MILLILITER(S): at 05:03

## 2022-06-17 RX ADMIN — Medication 3 MILLILITER(S): at 12:02

## 2022-06-17 RX ADMIN — ENOXAPARIN SODIUM 40 MILLIGRAM(S): 100 INJECTION SUBCUTANEOUS at 17:21

## 2022-06-17 RX ADMIN — PIPERACILLIN AND TAZOBACTAM 25 GRAM(S): 4; .5 INJECTION, POWDER, LYOPHILIZED, FOR SOLUTION INTRAVENOUS at 21:09

## 2022-06-17 RX ADMIN — Medication 1 APPLICATION(S): at 05:08

## 2022-06-17 RX ADMIN — PIPERACILLIN AND TAZOBACTAM 25 GRAM(S): 4; .5 INJECTION, POWDER, LYOPHILIZED, FOR SOLUTION INTRAVENOUS at 05:03

## 2022-06-17 RX ADMIN — TRAMADOL HYDROCHLORIDE 25 MILLIGRAM(S): 50 TABLET ORAL at 10:28

## 2022-06-17 RX ADMIN — Medication 500 MILLIGRAM(S): at 21:10

## 2022-06-17 RX ADMIN — Medication 3 MILLILITER(S): at 00:40

## 2022-06-17 RX ADMIN — LEVALBUTEROL 0.63 MILLIGRAM(S): 1.25 SOLUTION, CONCENTRATE RESPIRATORY (INHALATION) at 05:04

## 2022-06-17 RX ADMIN — Medication 25 MILLIGRAM(S): at 21:09

## 2022-06-17 RX ADMIN — LEVALBUTEROL 0.63 MILLIGRAM(S): 1.25 SOLUTION, CONCENTRATE RESPIRATORY (INHALATION) at 12:01

## 2022-06-17 RX ADMIN — Medication 25 MILLIGRAM(S): at 05:04

## 2022-06-17 RX ADMIN — Medication 1 TABLET(S): at 12:02

## 2022-06-17 NOTE — PROGRESS NOTE ADULT - ASSESSMENT
61 year old female presenting with dizziness, gait instability, and emesis. 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    1. s/p craniotomy for meningioma  -per neurology    2. Dysphagia   -s/p PEG placement 6/16  -may begin use of PEG for TF    3. Pneumonia  -completed course of abx     4. Atrial fibrillation  -on metoprolol       Attending supervision statement: I have personally seen and examined the patient. I fully participated in the care of this patient. I have made amendments to the documentation where necessary, and agree with the history, physical exam, and plan as outlined by the ACP.    [facility]   Gastroenterology and Hepatology  [address] [place], [place]  Office: [phone]  Cell: [phone]

## 2022-06-17 NOTE — DISCHARGE NOTE PROVIDER - CARE PROVIDER_API CALL
Giovanni Liu)  Neurosurgery  General  [address], [address]  [place] [address]  Phone: [phone]  Fax: [phone]  Follow Up Time:    Giovanni Liu)  Neurosurgery  General  [address] [facility], [address]  [place] [address]  Phone: [phone]  Fax: [phone]  Follow Up Time:     Sabina Nolasco)  Otolaryngology Facial Plastics  [address] [facility] [facility] [facility] [address]  [place] [address]  Phone: [phone]  Fax: [phone]  Follow Up Time:

## 2022-06-17 NOTE — PROGRESS NOTE ADULT - PROBLEM SELECTOR PLAN 1
- MRI showed ethel-medullary, right CPA, and right temporal enhancing lesions c/f meningioma vs schwannoma, with hydro  - s/p L far lateral crani for meningioma resection w/ subtotal sacrifice of L vertebral artery on 6/1  - on steroid  - post-op care as per neurosurgery team

## 2022-06-17 NOTE — DISCHARGE NOTE PROVIDER - NSDCCPCAREPLAN_GEN_ALL_CORE_FT
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   S/p left far lateral resection of meningioma   Will need to follow up with neurosurgeon after discharged from rehab      SECONDARY DISCHARGE DIAGNOSES  Diagnosis: Pneumonia, aspiration  Assessment and Plan of Treatment: Found to have MSSA PNA and treated with course of antibiotics - Stacia  Found to have bilateral consolidations on CTA, started on IV Abx    Diagnosis: Vocal cord paralysis  Assessment and Plan of Treatment: S/p PEG placement on 6/16   Should follow up with ENT once discharged from rehab     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    Diagnosis: Dysphagia  Assessment and Plan of Treatment: continue swallow therapy and upgrade diet from tube feeds as tolerated    Diagnosis: Urinary retention  Assessment and Plan of Treatment: PLease give trial of void once activity improves.    Diagnosis: HLD (hyperlipidemia)  Assessment and Plan of Treatment: Please continue medications and follow up with your primary care physician within 1-2 weeks.    Diagnosis: HTN (hypertension)  Assessment and Plan of Treatment: home losartan and norvasc on hold during admission given marginal BP, restart as needed. Continue metoprolol BID.

## 2022-06-17 NOTE — DISCHARGE NOTE PROVIDER - CARE PROVIDERS DIRECT ADDRESSES
[email or web address]

## 2022-06-17 NOTE — DISCHARGE NOTE PROVIDER - HOSPITAL COURSE
61 year old Female with PMHx of HTN and C-spine meningioma removal in 2018 in [place]. Patient presented with dizziness, gait instability, and vomiting over the last 3 days prior to admission. MRI showed ethel-medullary, Right CPA, and Right temporal enhancing lesions.  Possible meningioma vs schwannoma.  Patient was monitored in the ICU for hydro watch. Workup included MRI of the Brain, C/T/L spine and CT of the chest, abdomen and pelvis which was negative for malignancies on 5/20.  Patient was seen by ENT for dysphagia and recommended patient be seen by speech and swallow preop and underwent MBS and was able to continue with a regular diet.  Due to location of the lesion, ENT recommended audiology consult.  She was found to have intact hearing but mildly decreased bilaterally. Transferred to the floor to await surgery. Patient went to the OR on 6/1 for a left far lateral meningioma resection. Patient was monitored in the ICU postoperatively. Postop imaging was done with CT scans and MRI which were reviewed by the neurosurgical team. Attempted extubation the same night after surgery, however she developed stridor and was reintubated.  Patient was successfully extubated on 6/6 to high flow.  Patient was also scoped by ENT and found  to have left vocal cord paralysis.  Post extubation patient had a lot of secretions and was given Mucomyst and Duoneb. There was a concern for PE given respiratory status so CTA chest was done on 6/7 which was negative for PE. 6/9 patient was weaned off high flow, however frequent suctioning was still required. Fed via NGT and per speech and swallow recommendations, she was kept NPO. Patient was also found to have MSSA pneumonia and was treated with Augmentin.  Once respiratory status stabilized, patient was transferred tot he floor on 6/12.  Speech and swallow continued to follow and patient was recommended for a PEG.  PEG was placed on 6/16. Patient also had two episodes of desaturation on 6/16, follow up with another CTA chest; which was also negative for PE, but showed b/l lower lobe consolidations and IV antibiotics were started. Seen by physical and occupational therapy who recommended acute rehab.  Also seen by rehab medicine who agreed with acute rehab placement. 61 year old Female with PMHx of HTN and C-spine meningioma removal in 2018 in [place]. Patient presented with dizziness, gait instability, and vomiting over the last 3 days prior to admission. MRI showed ethel-medullary, Right CPA, and Right temporal enhancing lesions.  Possible meningioma vs schwannoma.  Patient was monitored in the ICU for hydro watch. Workup included MRI of the Brain, C/T/L spine and CT of the chest, abdomen and pelvis which was negative for malignancies on 5/20.  Patient was seen by ENT for dysphagia and recommended patient be seen by speech and swallow preop and underwent MBS and was able to continue with a regular diet.  Due to location of the lesion, ENT recommended audiology consult.  She was found to have intact hearing but mildly decreased bilaterally. Transferred to the floor to await surgery. Patient went to the OR on 6/1 for a left far lateral meningioma resection. Patient was monitored in the ICU postoperatively. Postop imaging was done with CT scans and MRI which were reviewed by the neurosurgical team. Attempted extubation the same night after surgery, however she developed stridor and was reintubated.  Patient was successfully extubated on 6/6 to high flow.  Patient was also scoped by ENT and found  to have left vocal cord paralysis.  Post extubation patient had a lot of secretions and was given Mucomyst and Duoneb. There was a concern for PE given respiratory status so CTA chest was done on 6/7 which was negative for PE. 6/9 patient was weaned off high flow, however frequent suctioning was still required. Fed via NGT and per speech and swallow recommendations, she was kept NPO. Patient was also found to have MSSA pneumonia and was treated with Augmentin.  Once respiratory status stabilized, patient was transferred tot he floor on 6/12.  Speech and swallow continued to follow and patient was recommended for a PEG.  PEG was placed on 6/16. Patient also had two episodes of desaturation on 6/16, follow up with another CTA chest; which was also negative for PE, but showed b/l lower lobe consolidations and IV antibiotics were completed on 6/20. Seen by physical and occupational therapy who recommended acute rehab.  Also seen by rehab medicine who agreed with acute rehab placement. On the day of discharge she is medically and neurologically stable for discharge to rehab.

## 2022-06-17 NOTE — PROGRESS NOTE ADULT - SUBJECTIVE AND OBJECTIVE BOX
Chief Complaint:  Patient is a 61y old  Female who presents with a chief complaint of Brain tumor (2022 09:52)      Date of service 22 @ 09:33      Interval Events:   Patient seen and examined.   s/p PEG placement yesterday.     Hospital Medications:  acetaminophen     Tablet .. 650 milliGRAM(s) Oral every 6 hours PRN  acetylcysteine 20%  Inhalation 3 milliLiter(s) Inhalation every 6 hours  BACItracin   Ointment 1 Application(s) Topical two times a day  enoxaparin Injectable 40 milliGRAM(s) SubCutaneous <User Schedule>  hydrocortisone sodium succinate Injectable 25 milliGRAM(s) IV Push every 8 hours  levalbuterol Inhalation 0.63 milliGRAM(s) Inhalation every 6 hours  lidocaine 4% Injection for Nebulization 4 milliLiter(s) Nebulizer once  melatonin 5 milliGRAM(s) Oral at bedtime  metoprolol tartrate 12.5 milliGRAM(s) Oral every 12 hours  multivitamin 1 Tablet(s) Oral daily  ondansetron    Tablet 4 milliGRAM(s) Oral every 12 hours PRN  pantoprazole  Injectable 40 milliGRAM(s) IV Push daily  piperacillin/tazobactam IVPB.. 3.375 Gram(s) IV Intermittent every 8 hours  polyethylene glycol 3350 17 Gram(s) Oral daily  senna 2 Tablet(s) Oral at bedtime  sodium chloride 0.9%. 1000 milliLiter(s) IV Continuous <Continuous>  traMADol 25 milliGRAM(s) Oral every 4 hours PRN  traMADol 50 milliGRAM(s) Oral every 4 hours PRN  vancomycin  IVPB 1000 milliGRAM(s) IV Intermittent every 12 hours  vancomycin  IVPB            Review of Systems:  General:  No wt loss, fevers, chills, night sweats, fatigue,   Eyes:  Good vision, no reported pain  ENT:  No sore throat, pain, runny nose, dysphagia  CV:  No pain, palpitations, hypo/hypertension  Resp:  No dyspnea, cough, tachypnea, wheezing  GI:  See HPI  :  No pain, bleeding, incontinence, nocturia  Muscle:  No pain, weakness  Neuro:  No weakness, tingling, memory problems  Psych:  No fatigue, insomnia, mood problems, depression  Endocrine:  No polyuria, polydipsia, cold/heat intolerance  Heme:  No petechiae, ecchymosis, easy bruisability  Integumentary:  No rash, edema    PHYSICAL EXAM:   Vital Signs:  Vital Signs Last 24 Hrs  T(C): 36.8 (2022 05:00), Max: 36.9 (2022 01:12)  T(F): 98.2 (2022 05:00), Max: 98.4 (2022 01:12)  HR: 73 (2022 05:00) (70 - 133)  BP: 119/72 (2022 05:00) (74/57 - 119/72)  BP(mean): --  RR: 18 (2022 05:00) (18 - 33)  SpO2: 97% (2022 05:00) (91% - 99%)  Daily     Daily       PHYSICAL EXAM:     GENERAL:  Appears stated age, well-groomed, well-nourished, no distress  HEENT:  NC/AT,  conjunctivae anicteric, clear and pink,   NECK: supple, trachea midline  CHEST:  Full & symmetric excursion, no increased effort, breath sounds clear  HEART:  Regular rhythm, no JVD  ABDOMEN:  Soft, non-tender, non-distended, normoactive bowel sounds,  no masses , no hepatosplenomegaly, +gastrostomy (clean ,dry, intact)  EXTREMITIES:  no cyanosis,clubbing or edema  SKIN:  No rash, erythema, or, ecchymoses, no jaundice  NEURO:  Alert, non-focal, no asterixis  PSYCH: Appropriate affect, oriented to place and time  RECTAL: Deferred      LABS Personally reviewed by me:                        8.6    13.83 )-----------( 242      ( 2022 06:22 )             26.6     Mean Cell Volume: 96.7 fl (22 @ 06:22)        137  |  103  |  16  ----------------------------<  108<H>  3.7   |  26  |  0.37<L>    Ca    8.1<L>      2022 06:22          Urinalysis Basic - ( 2022 11:46 )    Color: Yellow / Appearance: Slightly Turbid / S.023 / pH: x  Gluc: x / Ketone: Negative  / Bili: Negative / Urobili: 2 mg/dL   Blood: x / Protein: Trace / Nitrite: Negative   Leuk Esterase: Negative / RBC: 3 /hpf / WBC 2 /HPF   Sq Epi: x / Non Sq Epi: 3 /hpf / Bacteria: Negative                              8.6    13.83 )-----------( 242      ( 2022 06:22 )             26.6                         9.7    22.92 )-----------( 285      ( 2022 11:46 )             29.2                         11.1   16.33 )-----------( 364      ( 15 Polo 2022 04:55 )             33.1       Imaging personally reviewed by me:

## 2022-06-17 NOTE — PROGRESS NOTE ADULT - ASSESSMENT
HPI:  61F, PMH HTN and C-spine meningioma removal in 2018 in [place]. P/w dizziness, gait instability, and emesis x3d. MRI shows ethel-medullary, Right CPA, and Right temporal enhancing lesions c/f meningioma vs schwannoma, with some hydro. Exam: Mandarin-speaking, AOx3, EOMI no nystagmus, PERRL, no facial/drift, CONRAD 5/5, SILT. Slightly wide-based antalgic gait, positive Romberg’s   (20 May 2022 14:15)    PROCEDURE: Craniotomy for meningioma        s/p left craniotomy and far lateral subtotal resection of foramen magnum meningioma on 6/1    s/p peg placement on 6/16       PLAN:  Neuro:   1 out of bed   2 continue pt/ot  3 prn pain medication   4 stress dose steroid to be done today     Respiratory:    1 secretion - on xoponex , mucomyst and chest pt   2 cxr -6/13 stable   3 patient self suctions prn   4 on nasal cannula     CV:  1 bp stable -hypotensive on 6/16 amlodipine held     Endocrine:   1 stable     Heme/Onc:             DVT ppx: sql and scds   1 h/h stable 8.6/26.4 - likely dilusional.   2 hypotension. tachy and desaturation on 6/16 - CTA chest to r/o PE     Renal:   1 pedro placed for retention on 6/16     ID:   1 augmentin course finished for pna 6/15   2 supect aspiration pna from event 6/16 - on vanco zozsyn for 7 days total now   3 vanco trough tonight     GI:   1 s/p peg -tube feeds to start today   2  last bm 6/14       Social/Family:   Discharge planning: pending acute rehab once stable       -will discuss with Dr. Liu   -[facility] [id number]

## 2022-06-17 NOTE — PROGRESS NOTE ADULT - PROBLEM SELECTOR PLAN 7
- drop in H/H suspect due to dilutional effect from IVF   - repeat CBC stable  - once patient is started on tube feed, can IV lock and stop IVF  - monitor H/H periodically

## 2022-06-17 NOTE — DISCHARGE NOTE PROVIDER - REASON FOR ADMISSION
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

## 2022-06-17 NOTE — PROGRESS NOTE ADULT - PROBLEM SELECTOR PLAN 2
patient with intermittent episodes of hypoxia, suspect likely due to oropharyngeal secretions and difficulty with clearing and aspiration of secretions  - CTA chest on 6/7 negative for PE but b/l consolidation noted concerning for aspiration pneumonia (sputum cx with MSSA), completed augmentin  - c/w chest PT, suctioning, nebs/mucomyst to help clear secretions. she needs aggressive pulm toileting.  **patient noted with hypoxia and hemodynamic instability post-PEG on 6/16- likely in setting of anesthesia and possible recurrent aspiration, started on empiric IV abx and stress dose steroid  - c/w IV vanco and zosyn empirically for now pending repeat blood cultures and CTA chest. If cultures negative and no evidence of pna on CT chest, can stop IV abx.   - taper stress dose steroid IV hydrocortisone to 25mg q8h today and can resume previous dose of decadron 2mg q8 starting 6/18 patient with intermittent episodes of hypoxia, suspect likely due to oropharyngeal secretions and difficulty with clearing and aspiration of secretions  - CTA chest on 6/7 negative for PE but b/l consolidation noted concerning for aspiration pneumonia (sputum cx with MSSA), completed augmentin  - c/w chest PT, suctioning, nebs/mucomyst to help clear secretions. she needs aggressive pulm toileting.  **patient noted with hypoxia and hemodynamic instability post-PEG on 6/16- likely in setting of anesthesia and possible recurrent aspiration, started on empiric IV abx and stress dose steroid  - c/w IV vanco and zosyn empirically for now pending repeat blood cultures and CTA chest. If cultures negative and no evidence of pna on CT chest, can stop IV abx. check vanco trough prior to 4th dose.  - taper stress dose steroid IV hydrocortisone to 25mg q8h today and can resume previous dose of decadron 2mg q8 starting 6/18

## 2022-06-17 NOTE — PROGRESS NOTE ADULT - PROBLEM SELECTOR PLAN 3
- Intermittent episodes of sinus tach overall improved now  - LE duplex negative for DVT 6/8 and CTA chest 6/7 negative for PE  - repeat CTA chest pending  - switched to xopenex neb instead  **patient was reportedly in transient afib with RVR during RRT with HR in 140's? given amiodarone 150mg IVP x1. EKG showed sinus tach and so far NO evidence of afib on tele  - continue to monitor on tele  - continue with low dose metoprolol 12.5mg BID for now  - TTE on 6/8 showed hyperdynamic LV

## 2022-06-17 NOTE — CHART NOTE - NSCHARTNOTEFT_GEN_A_CORE
Nutrition Follow Up Note  Patient seen for: malnutrition follow-up. Chart reviewed, events noted.     "60 y/o Mandarin speaking female with PMH of HTN, C-spine meningioma removal in 2018 in [place] who p/w dizziness, gait instability, and emesis with MRI showing ethel-medullary, right CPA, and right temporal enhancing lesions c/f meningioma vs schwannoma, with hydro. Patient is s/p L far lateral crani for meningioma resection w/ subtotal sacrifice of L vertebral artery on 6/1/22. NSCU course c/b MSSA PNA (suspected aspiration pneumonia) on augmentin and vocal cord paralysis/dysphagia with NGT. Transferred out of NSCU 6/12. Patient is s/p PEG on 6/16, post-procedure course c/b RRT for hemodynamic instability, hypoxia, and ? afib with RVR."    Source: [x] Patient       [x] Medical Record        [] RN        [x] Family at bedside       [] Other:    -If unable to interview patient: [] Trach/Vent/BiPAP  [] Disoriented/confused/inappropriate to interview    Diet Order:   Diet, NPO with Tube Feed:   Tube Feeding Modality: Nasogastric  Glucerna 1.5 Ash (GLUCERNA1.5RTH)  Total Volume for 24 Hours (mL): 1440  Continuous  Starting Tube Feed Rate {mL per Hour}: 20  Increase Tube Feed Rate by (mL): 10     Every 4 hours  Until Goal Tube Feed Rate (mL per Hour): 60  Tube Feed Duration (in Hours): 24  Tube Feed Start Time: 11:00  Supplement Feeding Modality:  Nasogastric  Probiotic Yogurt/Smoothie Cans or Servings Per Day:  2       Frequency:  Daily (06-08-22)    - Is current order appropriate/adequate?  [x]  No: 1440 ml formula, 2160 kcal, 119 g protein, and 1093 ml free water. Danactive 2x/day provides an additional 160 kcal and 6 g pro. Meets 43 kcal/kg and 2.3 g pro/kg based on dosing wt 54 lg.    - Nutrition-related concerns:      - s/p PEG 6/16. Glucerna 1.5 currently running at 20ml/hr at time of visit.      - Pt s/p FEES 6/15, with recommendation for NPO, with non-oral nutrition/hydration/medications. Recommend GOC discussion with pt/family/medical team re: long term means of nutrition/hydration/medication in this pt with severe dysphagia.      - Per chart: pt is ordered for zosyn, vancomycin, decadron, Solu-Cortef, and a multivitamin     GI: Denies nausea, vomiting, constipation, diarrhea.  Last BM 6/15 per pt/daughter in law.   Bowel Regimen? [x] Yes (senna, miralax)  [] No      Weights:   54 kg (06-08)  Dosing wt: 54 kg (06-16)  RD to continue to monitor weight trends as able/available.     MEDICATIONS  (STANDING):  acetylcysteine 20%  Inhalation 3 milliLiter(s) Inhalation every 6 hours  BACItracin   Ointment 1 Application(s) Topical two times a day  enoxaparin Injectable 40 milliGRAM(s) SubCutaneous <User Schedule>  hydrocortisone sodium succinate Injectable 25 milliGRAM(s) IV Push every 8 hours  levalbuterol Inhalation 0.63 milliGRAM(s) Inhalation every 6 hours  lidocaine 4% Injection for Nebulization 4 milliLiter(s) Nebulizer once  melatonin 5 milliGRAM(s) Oral at bedtime  metoprolol tartrate 12.5 milliGRAM(s) Oral every 12 hours  multivitamin 1 Tablet(s) Oral daily  pantoprazole  Injectable 40 milliGRAM(s) IV Push daily  piperacillin/tazobactam IVPB.. 3.375 Gram(s) IV Intermittent every 8 hours  polyethylene glycol 3350 17 Gram(s) Oral daily  senna 2 Tablet(s) Oral at bedtime  vancomycin  IVPB      vancomycin  IVPB 1000 milliGRAM(s) IV Intermittent every 12 hours    MEDICATIONS  (PRN):  acetaminophen     Tablet .. 650 milliGRAM(s) Oral every 6 hours PRN Temp greater or equal to 38C (100.4F), Mild Pain (1 - 3)  ondansetron    Tablet 4 milliGRAM(s) Oral every 12 hours PRN Nausea  traMADol 25 milliGRAM(s) Oral every 4 hours PRN Moderate Pain (4 - 6)  traMADol 50 milliGRAM(s) Oral every 4 hours PRN Severe Pain (7 - 10)      Pertinent Labs:   06-17 @ 06:22: Na 137, BUN 16, Cr 0.37<L>, <H>, K+ 3.7    A1C with Estimated Average Glucose Result: 5.8 % (06-01-22 @ 20:30)    Skin per nursing documentation: R hip DTI  Edema per nursing documentation: none noted    Estimated Needs:   [x] no change since previous assessment  Estimated Energy Needs: (30-35kcal/kg): 8385-7767 kcal/day   Estimated Protein Needs: (1.3-1.6g protein/kg): 70-86 g pro/day protein  Estimated Fluid Needs: defer fluid needs to medical team  (based on dosing wt 119 lbs/54kg)    Previous Nutrition Diagnosis: acute severe protein calorie malnutrition, increased nutrient needs  Nutrition Diagnosis is: [x] ongoing  [] resolved [] not applicable     Nutrition Care Plan:  [x] In Progress  [] Achieved  [] Not applicable    New Nutrition Diagnosis: [x] Not applicable    Nutrition Interventions:     Education Provided   [x] Yes: Pt's daughter in law at bedside with questions regarding pt's nutrition. All questions answered and made aware RD remains available.    Recommendations:      1) Recommend change EN: Glucerna 1.2 at 60 ml/hr x 24 hrs. Defer free water flushes to team.  - To provide (based on 54kg): 1440ml formula, 1728 kcal (32 kcal/kg), 86 g protein (1.6 g protein/kg), and 1159 ml free water.  2) Continue multivitamin as prescribed. Recommend vitamin C to aid in wound healing.   3) RD to remain available to adjust EN formulary, volume/rate PRN.   4) If PO diet advanced, consider consistent carbohydrate diet if persistently elevated FSBG. Defer consistency to medical team/SLP.  5) Monitor GI tolerance, skin integrity, labs, weight, and bowel movement regularity.   6) malnutrition alert in chart     Monitoring and Evaluation:   Continue to monitor nutritional intake, tolerance to diet prescription, weights, labs, skin integrity    RD remains available upon request and will follow up per protocol  Jael Mares, NOEMY, CDN Pager #[phone]

## 2022-06-17 NOTE — DISCHARGE NOTE PROVIDER - NSDCCPTREATMENT_GEN_ALL_CORE_FT
PRINCIPAL PROCEDURE  Procedure: Craniotomy with surgical removal of meningioma  Findings and Treatment: S/p left far lateral craniotomy for resection of memingioma   Will need to follow up with neurosurgeon 1-2 after discharge from rehab

## 2022-06-17 NOTE — DISCHARGE NOTE PROVIDER - PROVIDER TOKENS
PROVIDER:[TOKEN:[[id number]]] PROVIDER:[TOKEN:[[id number]]],PROVIDER:[TOKEN:[[id number]]]

## 2022-06-17 NOTE — PROGRESS NOTE ADULT - SUBJECTIVE AND OBJECTIVE BOX
[facility] Division of Hospital Medicine  Manju Loaiza MD  spectra [phone]    Patient is a 61y old  Female who presents with a chief complaint of Brain tumor (2022 09:52)      SUBJECTIVE / OVERNIGHT EVENTS: patient seen and examined. doing better today, she is c/o dry mouth but less secretions. denies SOB. family at bedside helped to translate.   ADDITIONAL REVIEW OF SYSTEMS:    MEDICATIONS  (STANDING):  acetylcysteine 20%  Inhalation 3 milliLiter(s) Inhalation every 6 hours  BACItracin   Ointment 1 Application(s) Topical two times a day  enoxaparin Injectable 40 milliGRAM(s) SubCutaneous <User Schedule>  hydrocortisone sodium succinate Injectable 25 milliGRAM(s) IV Push every 8 hours  levalbuterol Inhalation 0.63 milliGRAM(s) Inhalation every 6 hours  lidocaine 4% Injection for Nebulization 4 milliLiter(s) Nebulizer once  melatonin 5 milliGRAM(s) Oral at bedtime  metoprolol tartrate 12.5 milliGRAM(s) Oral every 12 hours  multivitamin 1 Tablet(s) Oral daily  pantoprazole  Injectable 40 milliGRAM(s) IV Push daily  piperacillin/tazobactam IVPB.. 3.375 Gram(s) IV Intermittent every 8 hours  polyethylene glycol 3350 17 Gram(s) Oral daily  senna 2 Tablet(s) Oral at bedtime  sodium chloride 0.9%. 1000 milliLiter(s) (75 mL/Hr) IV Continuous <Continuous>  vancomycin  IVPB 1000 milliGRAM(s) IV Intermittent every 12 hours  vancomycin  IVPB        MEDICATIONS  (PRN):  acetaminophen     Tablet .. 650 milliGRAM(s) Oral every 6 hours PRN Temp greater or equal to 38C (100.4F), Mild Pain (1 - 3)  ondansetron    Tablet 4 milliGRAM(s) Oral every 12 hours PRN Nausea  traMADol 25 milliGRAM(s) Oral every 4 hours PRN Moderate Pain (4 - 6)  traMADol 50 milliGRAM(s) Oral every 4 hours PRN Severe Pain (7 - 10)      CAPILLARY BLOOD GLUCOSE        I&O's Summary    2022 07:01  -  2022 07:00  --------------------------------------------------------  IN: 0 mL / OUT: 1800 mL / NET: -1800 mL      tele: sinus , no evidence of afib    PHYSICAL EXAM:  Vital Signs Last 24 Hrs  T(C): 36.8 (2022 05:00), Max: 36.9 (2022 01:12)  T(F): 98.2 (2022 05:00), Max: 98.4 (2022 01:12)  HR: 73 (2022 05:00) (70 - 132)  BP: 119/72 (2022 05:00) (74/57 - 119/72)  BP(mean): --  RR: 18 (2022 05:00) (18 - 20)  SpO2: 97% (2022 05:00) (95% - 99%)    CONSTITUTIONAL: NAD, well-groomed  NECK: Supple, no palpable masses; no thyromegaly  RESPIRATORY: Normal respiratory effort; scattered rhonchi slightly improved  CARDIOVASCULAR: normal S1 and S2, no murmur/rub/gallop; No lower extremity edema  ABDOMEN: soft, + PEG covered with dressing, diminished bowel sounds, no rebound/guarding; No hepatosplenomegaly  MUSCULOSKELETAL:  no clubbing or cyanosis of digits; no joint swelling or tenderness to palpation  PSYCH: A+O to person, place, and time; affect appropriate   SKIN: No rashes; + healing herpes labialis in lower lip    LABS:                        8.6    13.40 )-----------( 255      ( 2022 10:07 )             26.4     06-17    137  |  103  |  16  ----------------------------<  108<H>  3.7   |  26  |  0.37<L>    Ca    8.1<L>      2022 06:22            Urinalysis Basic - ( 2022 11:46 )    Color: Yellow / Appearance: Slightly Turbid / S.023 / pH: x  Gluc: x / Ketone: Negative  / Bili: Negative / Urobili: 2 mg/dL   Blood: x / Protein: Trace / Nitrite: Negative   Leuk Esterase: Negative / RBC: 3 /hpf / WBC 2 /HPF   Sq Epi: x / Non Sq Epi: 3 /hpf / Bacteria: Negative          RADIOLOGY & ADDITIONAL TESTS:  Results Reviewed:   Imaging Personally Reviewed:  Electrocardiogram Personally Reviewed:    COORDINATION OF CARE:  Care Discussed with Consultants/Other Providers [Y/N]: neurosurgery PA(Dionte)  Prior or Outpatient Records Reviewed [Y/N]:

## 2022-06-17 NOTE — DISCHARGE NOTE PROVIDER - NSDCMRMEDTOKEN_GEN_ALL_CORE_FT
amLODIPine 10 mg oral tablet: 1 tab(s) orally once a day  Aspirin Enteric Coated 81 mg oral delayed release tablet: 1 tab(s) orally once a day (Indication: Cartoid Stenosis)  atorvastatin 40 mg oral tablet: 1 tab(s) orally once a day  Fish Oil 1000 mg oral capsule: 1 cap(s) orally once a day  losartan 100 mg oral tablet: 1 tab(s) orally once a day  omeprazole 40 mg oral delayed release capsule: 1 cap(s) orally once a day  Vitamin D2 1.25 mg (50,000 intl units) oral capsule: 1 cap(s) orally once a week   acetaminophen 325 mg oral tablet: 2 tab(s) orally every 6 hours, As needed, Temp greater or equal to 38C (100.4F), Mild Pain (1 - 3)  acetylcysteine 20% inhalation solution: 3 milliliter(s) inhaled every 6 hours  ascorbic acid 500 mg oral tablet: 1 tab(s) orally once a day  Aspirin Enteric Coated 81 mg oral delayed release tablet: 1 tab(s) orally once a day (Indication: Cartoid Stenosis)  atorvastatin 40 mg oral tablet: 1 tab(s) orally once a day  dexamethasone 2 mg oral tablet: 1 tab(s) orally every 12 hours x 2 days, 1 tab daily x 2 days and stop.   enoxaparin: 40 milligram(s) subcutaneous once a day (at bedtime)  Fish Oil 1000 mg oral capsule: 1 cap(s) orally once a day  levalbuterol 0.63 mg/3 mL inhalation solution: 3 milliliter(s) inhaled every 6 hours  melatonin 5 mg oral tablet: 1 tab(s) orally once a day (at bedtime)  metoprolol: 12.5 milligram(s) orally 2 times a day via PEG  Multiple Vitamins oral tablet: 1 tab(s) orally once a day  omeprazole 40 mg oral delayed release capsule: 1 cap(s) orally once a day  ondansetron 4 mg oral tablet: 1 tab(s) orally every 12 hours, As needed, Nausea  polyethylene glycol 3350 oral powder for reconstitution: 17 gram(s) orally once a day  senna oral tablet: 2 tab(s) orally once a day (at bedtime)  traMADol 50 mg oral tablet: 0.5 tab(s) orally every 4 hours, As needed, Moderate Pain (4 - 6)  traMADol 50 mg oral tablet: 1 tab(s) orally every 4 hours, As needed, Severe Pain (7 - 10)  Vitamin D2 1.25 mg (50,000 intl units) oral capsule: 1 cap(s) orally once a week

## 2022-06-17 NOTE — PROGRESS NOTE ADULT - SUBJECTIVE AND OBJECTIVE BOX
SUBJECTIVE:   Patient was seen and evaluated at [facility]  OVERNIGHT EVENTS:     Vital Signs Last 24 Hrs  T(C): 36.8 (17 Jun 2022 05:00), Max: 36.9 (17 Jun 2022 01:12)  T(F): 98.2 (17 Jun 2022 05:00), Max: 98.4 (17 Jun 2022 01:12)  HR: 73 (17 Jun 2022 05:00) (70 - 104)  BP: 119/72 (17 Jun 2022 05:00) (74/57 - 119/72)  BP(mean): --  RR: 18 (17 Jun 2022 05:00) (18 - 20)  SpO2: 97% (17 Jun 2022 05:00) (96% - 99%)    PHYSICAL EXAM:    General: No Acute Distress     Neurological: Awake, alert oriented to person, place and time, Following Commands, PERRL, EOMI, Face Symmetrical, Speech Fluent, Moving all extremities, Muscle Strength normal in all four extremities, No Drift, Sensation to Light Touch Intact    Pulmonary: Clear to Auscultation, No Rales, No Rhonchi, No Wheezes     Cardiovascular: S1, S2, Regular Rate and Rhythm     Gastrointestinal: Soft, Nontender, Nondistended     Incision:     LABS:                        8.6    13.40 )-----------( 255      ( 17 Jun 2022 10:07 )             26.4    06-17    137  |  103  |  16  ----------------------------<  108<H>  3.7   |  26  |  0.37<L>    Ca    8.1<L>      17 Jun 2022 06:22          06-16 @ 07:01  -  06-17 @ 07:00  --------------------------------------------------------  IN: 0 mL / OUT: 1800 mL / NET: -1800 mL      DRAINS:     MEDICATIONS:  Antibiotics:  piperacillin/tazobactam IVPB.. 3.375 Gram(s) IV Intermittent every 8 hours  vancomycin  IVPB 1000 milliGRAM(s) IV Intermittent every 12 hours  vancomycin  IVPB        Neuro:  acetaminophen     Tablet .. 650 milliGRAM(s) Oral every 6 hours PRN Temp greater or equal to 38C (100.4F), Mild Pain (1 - 3)  melatonin 5 milliGRAM(s) Oral at bedtime  ondansetron    Tablet 4 milliGRAM(s) Oral every 12 hours PRN Nausea  traMADol 25 milliGRAM(s) Oral every 4 hours PRN Moderate Pain (4 - 6)  traMADol 50 milliGRAM(s) Oral every 4 hours PRN Severe Pain (7 - 10)    Cardiac:  metoprolol tartrate 12.5 milliGRAM(s) Oral every 12 hours    Pulm:  acetylcysteine 20%  Inhalation 3 milliLiter(s) Inhalation every 6 hours  levalbuterol Inhalation 0.63 milliGRAM(s) Inhalation every 6 hours    GI/:  pantoprazole  Injectable 40 milliGRAM(s) IV Push daily  polyethylene glycol 3350 17 Gram(s) Oral daily  senna 2 Tablet(s) Oral at bedtime    Other:   BACItracin   Ointment 1 Application(s) Topical two times a day  enoxaparin Injectable 40 milliGRAM(s) SubCutaneous <User Schedule>  hydrocortisone sodium succinate Injectable 25 milliGRAM(s) IV Push every 8 hours  lidocaine 4% Injection for Nebulization 4 milliLiter(s) Nebulizer once  multivitamin 1 Tablet(s) Oral daily  sodium chloride 0.9%. 1000 milliLiter(s) IV Continuous <Continuous>    DIET: [] Regular [] CCD [] Renal [] Puree [] Dysphagia [] Tube Feeds:     IMAGING:    SUBJECTIVE:   Patient was seen and evaluated at bedside. Patient is resting in bed and is in no new acute distress.     OVERNIGHT EVENTS:     Vital Signs Last 24 Hrs  T(C): 36.8 (17 Jun 2022 05:00), Max: 36.9 (17 Jun 2022 01:12)  T(F): 98.2 (17 Jun 2022 05:00), Max: 98.4 (17 Jun 2022 01:12)  HR: 73 (17 Jun 2022 05:00) (70 - 104)  BP: 119/72 (17 Jun 2022 05:00) (74/57 - 119/72)  BP(mean): --  RR: 18 (17 Jun 2022 05:00) (18 - 20)  SpO2: 97% (17 Jun 2022 05:00) (96% - 99%)    PHYSICAL EXAM:    General: No Acute Distress     Neurological:  Awake, alert oriented to person, place and time, Following Commands, PERRL, EOMI, Face Symmetrical, Speech Fluent, Moving all extremities, Muscle Strength normal in all four extremities, No Drift, Sensation to Light Touch Intact    Pulmonary: Clear to Auscultation, No Rales, No Rhonchi, No Wheezes     Cardiovascular: S1, S2, Regular Rate and Rhythm     Gastrointestinal: Soft, Nontender, Nondistended     Incision:   clean and dry   LABS:                        8.6    13.40 )-----------( 255      ( 17 Jun 2022 10:07 )             26.4    06-17    137  |  103  |  16  ----------------------------<  108<H>  3.7   |  26  |  0.37<L>    Ca    8.1<L>      17 Jun 2022 06:22          06-16 @ 07:01  -  06-17 @ 07:00  --------------------------------------------------------  IN: 0 mL / OUT: 1800 mL / NET: -1800 mL      DRAINS:     MEDICATIONS:  Antibiotics:  piperacillin/tazobactam IVPB.. 3.375 Gram(s) IV Intermittent every 8 hours  vancomycin  IVPB 1000 milliGRAM(s) IV Intermittent every 12 hours  vancomycin  IVPB        Neuro:  acetaminophen     Tablet .. 650 milliGRAM(s) Oral every 6 hours PRN Temp greater or equal to 38C (100.4F), Mild Pain (1 - 3)  melatonin 5 milliGRAM(s) Oral at bedtime  ondansetron    Tablet 4 milliGRAM(s) Oral every 12 hours PRN Nausea  traMADol 25 milliGRAM(s) Oral every 4 hours PRN Moderate Pain (4 - 6)  traMADol 50 milliGRAM(s) Oral every 4 hours PRN Severe Pain (7 - 10)    Cardiac:  metoprolol tartrate 12.5 milliGRAM(s) Oral every 12 hours    Pulm:  acetylcysteine 20%  Inhalation 3 milliLiter(s) Inhalation every 6 hours  levalbuterol Inhalation 0.63 milliGRAM(s) Inhalation every 6 hours    GI/:  pantoprazole  Injectable 40 milliGRAM(s) IV Push daily  polyethylene glycol 3350 17 Gram(s) Oral daily  senna 2 Tablet(s) Oral at bedtime    Other:   BACItracin   Ointment 1 Application(s) Topical two times a day  enoxaparin Injectable 40 milliGRAM(s) SubCutaneous <User Schedule>  hydrocortisone sodium succinate Injectable 25 milliGRAM(s) IV Push every 8 hours  lidocaine 4% Injection for Nebulization 4 milliLiter(s) Nebulizer once  multivitamin 1 Tablet(s) Oral daily  sodium chloride 0.9%. 1000 milliLiter(s) IV Continuous <Continuous>    DIET: [] Regular [] CCD [] Renal [] Puree [] Dysphagia [] Tube Feeds:   glucerna @60   IMAGING:   ACC: [id number] EXAM:  MR BRAIN WAW IC                          PROCEDURE DATE:  06/03/2022          INTERPRETATION:  INDICATIONS:  Postoperative status post left far lateral   meningioma resection with sacrifice of the left vertebral artery    TECHNIQUE:  Multiplanar imaging was performed using T1 weighted, T2   weighted and FLAIR sequences.  Diffusion weighted and susceptibility   sensitive images were also obtained.  Following intravenous gadolinium,   multiplanar T1 weighted images were performed. 5 cc Gadavist were   administered. 2.5 cc were discarded.    COMPARISON EXAMINATION:  Preop CT 6/2/2022 preop MR 5/20/2022    FINDINGS:  VENTRICLES AND SULCI:  Subtle residual mass effect on the fourth   ventricle compared with the preop study  INTRA-AXIAL:  Residual edema at the level of the medulla and left ethel   and brachium pontis region seen preoperatively as well.. Microvascular   ischemic changes involving the periventricular and subcortical white   matter.  EXTRA-AXIAL:  Postop changes at the level of the left CP angle with air   and fluid and hemorrhage identified. Right frontal extraxial air and   fluid. Left cerebellar fluid collection.  Small enhancing neoplasm in the   right CP angle region extending into the right IAC again identified seen   on the preoperative evaluation as well measuring 1.5 x 0.83 x 1 cm (   APxTRxCC). Right superior tentorial margin lesion measuring 1.1 x 0.86 x   0.78 cm ( APxTRxCC).  VISUALIZED SINUSES:  Right maxillary fluid level.  VISUALIZED MASTOIDS:  Bilteral mastoid fluid.  CALVARIUM:  Left retrosigmoid craniotomy  CAROTID FLOW VOIDS:  Present.  MISCELLANEOUS:  The transverse and sigmoid sinus on the left appears   preserved however, confirmation with MRV may be helpful.      IMPRESSION:  Postop changes are appreciated at the left CP angle region. Residual   edema in the ethel and medulla and mass effect on the fourth ventricle   remains. Extra-axial small amount of postop hemorrhage identified in this   region. Right frontal small amount of extra-axial fluid. 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    --- End of Report ---            KAYLAH CARBALLO MD; Attending Radiologist  This document has been electronically signed. Pol[place]  3 2022 11:05AM

## 2022-06-18 LAB
ANION GAP SERPL CALC-SCNC: 12 MMOL/L — SIGNIFICANT CHANGE UP (ref 5–17)
BUN SERPL-MCNC: 16 MG/DL — SIGNIFICANT CHANGE UP (ref 7–23)
CALCIUM SERPL-MCNC: 8 MG/DL — LOW (ref 8.4–10.5)
CHLORIDE SERPL-SCNC: 100 MMOL/L — SIGNIFICANT CHANGE UP (ref 96–108)
CO2 SERPL-SCNC: 27 MMOL/L — SIGNIFICANT CHANGE UP (ref 22–31)
CREAT SERPL-MCNC: 0.36 MG/DL — LOW (ref 0.5–1.3)
EGFR: 117 ML/MIN/1.73M2 — SIGNIFICANT CHANGE UP
GLUCOSE SERPL-MCNC: 106 MG/DL — HIGH (ref 70–99)
HCT VFR BLD CALC: 27.2 % — LOW (ref 34.5–45)
HGB BLD-MCNC: 9 G/DL — LOW (ref 11.5–15.5)
MCHC RBC-ENTMCNC: 31.8 PG — SIGNIFICANT CHANGE UP (ref 27–34)
MCHC RBC-ENTMCNC: 33.1 GM/DL — SIGNIFICANT CHANGE UP (ref 32–36)
MCV RBC AUTO: 96.1 FL — SIGNIFICANT CHANGE UP (ref 80–100)
NRBC # BLD: 0 /100 WBCS — SIGNIFICANT CHANGE UP (ref 0–0)
PLATELET # BLD AUTO: 276 K/UL — SIGNIFICANT CHANGE UP (ref 150–400)
POTASSIUM SERPL-MCNC: 3 MMOL/L — LOW (ref 3.5–5.3)
POTASSIUM SERPL-SCNC: 3 MMOL/L — LOW (ref 3.5–5.3)
RBC # BLD: 2.83 M/UL — LOW (ref 3.8–5.2)
RBC # FLD: 12.5 % — SIGNIFICANT CHANGE UP (ref 10.3–14.5)
SODIUM SERPL-SCNC: 139 MMOL/L — SIGNIFICANT CHANGE UP (ref 135–145)
VANCOMYCIN TROUGH SERPL-MCNC: 8.7 UG/ML — LOW (ref 10–20)
WBC # BLD: 10.96 K/UL — HIGH (ref 3.8–10.5)
WBC # FLD AUTO: 10.96 K/UL — HIGH (ref 3.8–10.5)

## 2022-06-18 PROCEDURE — 99233 SBSQ HOSP IP/OBS HIGH 50: CPT

## 2022-06-18 RX ORDER — VANCOMYCIN HCL 1 G
1250 VIAL (EA) INTRAVENOUS EVERY 12 HOURS
Refills: 0 | Status: DISCONTINUED | OUTPATIENT
Start: 2022-06-18 | End: 2022-06-18

## 2022-06-18 RX ORDER — POTASSIUM CHLORIDE 20 MEQ
40 PACKET (EA) ORAL
Refills: 0 | Status: COMPLETED | OUTPATIENT
Start: 2022-06-18 | End: 2022-06-18

## 2022-06-18 RX ADMIN — PIPERACILLIN AND TAZOBACTAM 25 GRAM(S): 4; .5 INJECTION, POWDER, LYOPHILIZED, FOR SOLUTION INTRAVENOUS at 05:29

## 2022-06-18 RX ADMIN — LEVALBUTEROL 0.63 MILLIGRAM(S): 1.25 SOLUTION, CONCENTRATE RESPIRATORY (INHALATION) at 23:17

## 2022-06-18 RX ADMIN — PIPERACILLIN AND TAZOBACTAM 25 GRAM(S): 4; .5 INJECTION, POWDER, LYOPHILIZED, FOR SOLUTION INTRAVENOUS at 12:13

## 2022-06-18 RX ADMIN — Medication 2 MILLIGRAM(S): at 13:59

## 2022-06-18 RX ADMIN — PANTOPRAZOLE SODIUM 40 MILLIGRAM(S): 20 TABLET, DELAYED RELEASE ORAL at 12:14

## 2022-06-18 RX ADMIN — LEVALBUTEROL 0.63 MILLIGRAM(S): 1.25 SOLUTION, CONCENTRATE RESPIRATORY (INHALATION) at 06:19

## 2022-06-18 RX ADMIN — PIPERACILLIN AND TAZOBACTAM 25 GRAM(S): 4; .5 INJECTION, POWDER, LYOPHILIZED, FOR SOLUTION INTRAVENOUS at 19:32

## 2022-06-18 RX ADMIN — LEVALBUTEROL 0.63 MILLIGRAM(S): 1.25 SOLUTION, CONCENTRATE RESPIRATORY (INHALATION) at 12:14

## 2022-06-18 RX ADMIN — Medication 2 MILLIGRAM(S): at 06:18

## 2022-06-18 RX ADMIN — SENNA PLUS 2 TABLET(S): 8.6 TABLET ORAL at 21:42

## 2022-06-18 RX ADMIN — Medication 3 MILLILITER(S): at 23:17

## 2022-06-18 RX ADMIN — Medication 500 MILLIGRAM(S): at 12:13

## 2022-06-18 RX ADMIN — LEVALBUTEROL 0.63 MILLIGRAM(S): 1.25 SOLUTION, CONCENTRATE RESPIRATORY (INHALATION) at 17:29

## 2022-06-18 RX ADMIN — TRAMADOL HYDROCHLORIDE 25 MILLIGRAM(S): 50 TABLET ORAL at 15:35

## 2022-06-18 RX ADMIN — Medication 5 MILLIGRAM(S): at 21:42

## 2022-06-18 RX ADMIN — Medication 1 TABLET(S): at 12:13

## 2022-06-18 RX ADMIN — Medication 40 MILLIEQUIVALENT(S): at 12:13

## 2022-06-18 RX ADMIN — Medication 250 MILLIGRAM(S): at 00:54

## 2022-06-18 RX ADMIN — Medication 1 APPLICATION(S): at 17:29

## 2022-06-18 RX ADMIN — Medication 3 MILLILITER(S): at 06:19

## 2022-06-18 RX ADMIN — Medication 40 MILLIEQUIVALENT(S): at 10:01

## 2022-06-18 RX ADMIN — TRAMADOL HYDROCHLORIDE 25 MILLIGRAM(S): 50 TABLET ORAL at 16:10

## 2022-06-18 RX ADMIN — Medication 3 MILLILITER(S): at 12:15

## 2022-06-18 RX ADMIN — Medication 2 MILLIGRAM(S): at 21:42

## 2022-06-18 RX ADMIN — Medication 166.67 MILLIGRAM(S): at 12:16

## 2022-06-18 RX ADMIN — Medication 3 MILLILITER(S): at 17:29

## 2022-06-18 RX ADMIN — Medication 1 APPLICATION(S): at 06:19

## 2022-06-18 RX ADMIN — ENOXAPARIN SODIUM 40 MILLIGRAM(S): 100 INJECTION SUBCUTANEOUS at 17:28

## 2022-06-18 NOTE — CHART NOTE - NSCHARTNOTESELECT_GEN_ALL_CORE
Nutrition Services
ENT/Event Note
Event Note
Nutrition Services
Speech and Swallow
Speech and Swallow
VTE Risk Assessment/Event Note

## 2022-06-18 NOTE — PROGRESS NOTE ADULT - PROBLEM SELECTOR PLAN 7
- drop in H/H suspect due to dilutional effect from IVF   - repeat CBC stable  - monitor H/H periodically

## 2022-06-18 NOTE — PROGRESS NOTE ADULT - SUBJECTIVE AND OBJECTIVE BOX
[facility] Division of Hospital Medicine  Mima Robles DO  Available on Teams [place]    Patient is a 61y old  Female who presents with a chief complaint of Patient was admitted with brain tumor (17 Jun 2022 10:36)    Mandarin : [id number]    SUBJECTIVE / OVERNIGHT EVENTS: none. Patient says her breathing is fine today, no cough, and less phlegm. Has a minor headache, 1-2/10, does not want tylenol yet.   ADDITIONAL REVIEW OF SYSTEMS: negative    MEDICATIONS  (STANDING):  acetylcysteine 20%  Inhalation 3 milliLiter(s) Inhalation every 6 hours  ascorbic acid 500 milliGRAM(s) Oral daily  BACItracin   Ointment 1 Application(s) Topical two times a day  dexAMETHasone     Tablet 2 milliGRAM(s) Oral every 8 hours  enoxaparin Injectable 40 milliGRAM(s) SubCutaneous <User Schedule>  levalbuterol Inhalation 0.63 milliGRAM(s) Inhalation every 6 hours  lidocaine 4% Injection for Nebulization 4 milliLiter(s) Nebulizer once  melatonin 5 milliGRAM(s) Oral at bedtime  metoprolol tartrate 12.5 milliGRAM(s) Oral every 12 hours  multivitamin 1 Tablet(s) Oral daily  pantoprazole  Injectable 40 milliGRAM(s) IV Push daily  piperacillin/tazobactam IVPB.. 3.375 Gram(s) IV Intermittent every 8 hours  polyethylene glycol 3350 17 Gram(s) Oral daily  potassium chloride   Solution 40 milliEquivalent(s) Oral every 2 hours  senna 2 Tablet(s) Oral at bedtime  vancomycin  IVPB 1250 milliGRAM(s) IV Intermittent every 12 hours    MEDICATIONS  (PRN):  acetaminophen     Tablet .. 650 milliGRAM(s) Oral every 6 hours PRN Temp greater or equal to 38C (100.4F), Mild Pain (1 - 3)  ondansetron    Tablet 4 milliGRAM(s) Oral every 12 hours PRN Nausea  traMADol 25 milliGRAM(s) Oral every 4 hours PRN Moderate Pain (4 - 6)  traMADol 50 milliGRAM(s) Oral every 4 hours PRN Severe Pain (7 - 10)      CAPILLARY BLOOD GLUCOSE        I&O's Summary    17 Jun 2022 07:01  -  18 Jun 2022 07:00  --------------------------------------------------------  IN: 410 mL / OUT: 1600 mL / NET: -1190 mL    18 Jun 2022 07:01  -  18 Jun 2022 12:08  --------------------------------------------------------  IN: 130 mL / OUT: 0 mL / NET: 130 mL        PHYSICAL EXAM:  Vital Signs Last 24 Hrs  T(C): 36.8 (18 Jun 2022 05:00), Max: 36.9 (18 Jun 2022 01:11)  T(F): 98.3 (18 Jun 2022 05:00), Max: 98.5 (18 Jun 2022 01:11)  HR: 86 (18 Jun 2022 05:00) (73 - 86)  BP: 102/67 (18 Jun 2022 05:00) (85/56 - 125/80)  BP(mean): --  RR: 18 (18 Jun 2022 05:00) (18 - 18)  SpO2: 98% (18 Jun 2022 05:00) (92% - 98%)    CONSTITUTIONAL: NAD, well-groomed  NECK: Supple, no palpable masses; no thyromegaly  RESPIRATORY: Normal respiratory effort; scattered rhonchi slightly improved  CARDIOVASCULAR: normal S1 and S2, no murmur/rub/gallop; No lower extremity edema  ABDOMEN: soft, + PEG covered with dressing, diminished bowel sounds, no rebound/guarding; No hepatosplenomegaly  MUSCULOSKELETAL:  no clubbing or cyanosis of digits; no joint swelling or tenderness to palpation  PSYCH: A+O to person, place, and time; affect appropriate   SKIN: No rashes; + healing herpes labialis in lower lip    LABS:                        9.0    10.96 )-----------( 276      ( 18 Jun 2022 06:07 )             27.2     06-18    139  |  100  |  16  ----------------------------<  106<H>  3.0<L>   |  27  |  0.36<L>    Ca    8.0<L>      18 Jun 2022 06:07                Culture - Blood (collected 16 Jun 2022 11:15)  Source: .Blood Blood  Preliminary Report (17 Jun 2022 17:01):    No growth to date.    Culture - Blood (collected 16 Jun 2022 11:10)  Source: .Blood Blood  Preliminary Report (17 Jun 2022 17:01):    No growth to date.

## 2022-06-18 NOTE — PROGRESS NOTE ADULT - SUBJECTIVE AND OBJECTIVE BOX
SUBJECTIVE:   Patient was seen and evaluated at bedside. Patient is resting in bed and is in no new acute distress.   OVERNIGHT EVENTS:   none   Vital Signs Last 24 Hrs  T(C): 36.9 (2022 11:00), Max: 36.9 (2022 01:11)  T(F): 98.4 (2022 11:00), Max: 98.5 (2022 01:11)  HR: 72 (2022 11:00) (72 - 86)  BP: 107/72 (2022 11:00) (85/56 - 125/80)  BP(mean): --  RR: 18 (2022 11:00) (18 - 18)  SpO2: 98% (2022 05:00) (92% - 98%)    PHYSICAL EXAM:    General: No Acute Distress     Neurological:   Awake, alert oriented to person, place and time, Following Commands, PERRL, EOMI, Face Symmetrical, Speech Fluent, Moving all extremities, Muscle Strength normal in all four extremities, No Drift, Sensation to Light Touch Intact      Pulmonary: Clear to Auscultation, No Rales, No Rhonchi, No Wheezes     Cardiovascular: S1, S2, Regular Rate and Rhythm     Gastrointestinal: Soft, Nontender, Nondistended     Incision: clean and dry     LABS:                        9.0    10.96 )-----------( 276      ( 2022 06:07 )             27.2    18    139  |  100  |  16  ----------------------------<  106<H>  3.0<L>   |  27  |  0.36<L>    Ca    8.0<L>      2022 06:07          06-17 @ 07:01  -  -18 @ 07:00  --------------------------------------------------------  IN: 410 mL / OUT: 1600 mL / NET: -1190 mL    18 @ 07:01  -  18 @ 12:22  --------------------------------------------------------  IN: 130 mL / OUT: 0 mL / NET: 130 mL      DRAINS:     MEDICATIONS:  Antibiotics:  piperacillin/tazobactam IVPB.. 3.375 Gram(s) IV Intermittent every 8 hours    Neuro:  acetaminophen     Tablet .. 650 milliGRAM(s) Oral every 6 hours PRN Temp greater or equal to 38C (100.4F), Mild Pain (1 - 3)  melatonin 5 milliGRAM(s) Oral at bedtime  ondansetron    Tablet 4 milliGRAM(s) Oral every 12 hours PRN Nausea  traMADol 25 milliGRAM(s) Oral every 4 hours PRN Moderate Pain (4 - 6)  traMADol 50 milliGRAM(s) Oral every 4 hours PRN Severe Pain (7 - 10)    Cardiac:  metoprolol tartrate 12.5 milliGRAM(s) Oral every 12 hours    Pulm:  acetylcysteine 20%  Inhalation 3 milliLiter(s) Inhalation every 6 hours  levalbuterol Inhalation 0.63 milliGRAM(s) Inhalation every 6 hours    GI/:  pantoprazole  Injectable 40 milliGRAM(s) IV Push daily  polyethylene glycol 3350 17 Gram(s) Oral daily  senna 2 Tablet(s) Oral at bedtime    Other:   ascorbic acid 500 milliGRAM(s) Oral daily  BACItracin   Ointment 1 Application(s) Topical two times a day  dexAMETHasone     Tablet 2 milliGRAM(s) Oral every 8 hours  enoxaparin Injectable 40 milliGRAM(s) SubCutaneous <User Schedule>  lidocaine 4% Injection for Nebulization 4 milliLiter(s) Nebulizer once  multivitamin 1 Tablet(s) Oral daily    DIET: [] Regular [] CCD [] Renal [] Puree [] Dysphagia [] Tube Feeds:   glucerna @60   IMAGIN EXAM:  MR BRAIN WAW IC                          PROCEDURE DATE:  2022          INTERPRETATION:  INDICATIONS:  Postoperative status post left far lateral   meningioma resection with sacrifice of the left vertebral artery    TECHNIQUE:  Multiplanar imaging was performed using T1 weighted, T2   weighted and FLAIR sequences.  Diffusion weighted and susceptibility   sensitive images were also obtained.  Following intravenous gadolinium,   multiplanar T1 weighted images were performed. 5 cc Gadavist were   administered. 2.5 cc were discarded.    COMPARISON EXAMINATION:  Preop CT 2022 preop MR 2022    FINDINGS:  VENTRICLES AND SULCI:  Subtle residual mass effect on the fourth   ventricle compared with the preop study  INTRA-AXIAL:  Residual edema at the level of the medulla and left ethel   and brachium pontis region seen preoperatively as well.. Microvascular   ischemic changes involving the periventricular and subcortical white   matter.  EXTRA-AXIAL:  Postop changes at the level of the left CP angle with air   and fluid and hemorrhage identified. Right frontal extraxial air and   fluid. Left cerebellar fluid collection.  Small enhancing neoplasm in the   right CP angle region extending into the right IAC again identified seen   on the preoperative evaluation as well measuring 1.5 x 0.83 x 1 cm (   APxTRxCC). Right superior tentorial margin lesion measuring 1.1 x 0.86 x   0.78 cm ( APxTRxCC).  VISUALIZED SINUSES:  Right maxillary fluid level.  VISUALIZED MASTOIDS:  Bilteral mastoid fluid.  CALVARIUM:  Left retrosigmoid craniotomy  CAROTID FLOW VOIDS:  Present.  MISCELLANEOUS:  The transverse and sigmoid sinus on the left appears   preserved however, confirmation with MRV may be helpful.      IMPRESSION:  Postop changes are appreciated at the left CP angle region. Residual   edema in the ethel and medulla and mass effect on the fourth ventricle   remains. Extra-axial small amount of postop hemorrhage identified in this   region. Right frontal small amount of extra-axial fluid. 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    --- End of Report ---

## 2022-06-18 NOTE — PROGRESS NOTE ADULT - SUBJECTIVE AND OBJECTIVE BOX
Chief Complaint:  Patient is a 61y old  Female who presents with a chief complaint of Brain tumor (2022 09:52)            Interval Events:   Patient seen and examined.       Hospital Medications:  acetaminophen     Tablet .. 650 milliGRAM(s) Oral every 6 hours PRN  acetylcysteine 20%  Inhalation 3 milliLiter(s) Inhalation every 6 hours  BACItracin   Ointment 1 Application(s) Topical two times a day  enoxaparin Injectable 40 milliGRAM(s) SubCutaneous <User Schedule>  hydrocortisone sodium succinate Injectable 25 milliGRAM(s) IV Push every 8 hours  levalbuterol Inhalation 0.63 milliGRAM(s) Inhalation every 6 hours  lidocaine 4% Injection for Nebulization 4 milliLiter(s) Nebulizer once  melatonin 5 milliGRAM(s) Oral at bedtime  metoprolol tartrate 12.5 milliGRAM(s) Oral every 12 hours  multivitamin 1 Tablet(s) Oral daily  ondansetron    Tablet 4 milliGRAM(s) Oral every 12 hours PRN  pantoprazole  Injectable 40 milliGRAM(s) IV Push daily  piperacillin/tazobactam IVPB.. 3.375 Gram(s) IV Intermittent every 8 hours  polyethylene glycol 3350 17 Gram(s) Oral daily  senna 2 Tablet(s) Oral at bedtime  sodium chloride 0.9%. 1000 milliLiter(s) IV Continuous <Continuous>  traMADol 25 milliGRAM(s) Oral every 4 hours PRN  traMADol 50 milliGRAM(s) Oral every 4 hours PRN  vancomycin  IVPB 1000 milliGRAM(s) IV Intermittent every 12 hours  vancomycin  IVPB              PHYSICAL EXAM:   Vital Signs:  Vital Signs Last 24 Hrs  T(C): 36.8 (2022 05:00), Max: 36.9 (2022 01:12)  T(F): 98.2 (2022 05:00), Max: 98.4 (2022 01:12)  HR: 73 (2022 05:00) (70 - 133)  BP: 119/72 (2022 05:00) (74/57 - 119/72)  BP(mean): --  RR: 18 (2022 05:00) (18 - 33)  SpO2: 97% (2022 05:00) (91% - 99%)  Daily     Daily       PHYSICAL EXAM:     GENERAL:  Appears stated age, well-groomed, well-nourished, no distress  HEENT:  NC/AT,  conjunctivae anicteric, clear and pink,   NECK: supple, trachea midline  CHEST:  Full & symmetric excursion, no increased effort, breath sounds clear  HEART:  Regular rhythm, no JVD  ABDOMEN:  Soft, non-tender, non-distended, normoactive bowel sounds,  no masses , no hepatosplenomegaly, +gastrostomy (clean ,dry, intact)  EXTREMITIES:  no cyanosis,clubbing or edema  SKIN:  No rash, erythema, or, ecchymoses, no jaundice  NEURO:   non-focal, no asterixis    RECTAL: Deferred      LABS Personally reviewed by me:                        8.6    1383 )-----------( 242      ( 2022 06:22 )             26.6     Mean Cell Volume: 96.7 fl (22 @ 06:22)    -    137  |  103  |  16  ----------------------------<  108<H>  3.7   |  26  |  0.37<L>    Ca    8.1<L>      2022 06:22          Urinalysis Basic - ( 2022 11:46 )    Color: Yellow / Appearance: Slightly Turbid / S.023 / pH: x  Gluc: x / Ketone: Negative  / Bili: Negative / Urobili: 2 mg/dL   Blood: x / Protein: Trace / Nitrite: Negative   Leuk Esterase: Negative / RBC: 3 /hpf / WBC 2 /HPF   Sq Epi: x / Non Sq Epi: 3 /hpf / Bacteria: Negative                              8.6    13.83 )-----------( 242      ( 2022 06:22 )             26.6                         9.7    22.92 )-----------( 285      ( 2022 11:46 )             29.2                         11.1   16.33 )-----------( 364      ( 15 [place]o 2022 04:55 )             33.1       Imaging personally reviewed by me:

## 2022-06-18 NOTE — PROGRESS NOTE ADULT - ASSESSMENT
HPI:  61F, PMH HTN and C-spine meningioma removal in 2018 in [place]. P/w dizziness, gait instability, and emesis x3d. MRI shows ethel-medullary, Right CPA, and Right temporal enhancing lesions c/f meningioma vs schwannoma, with some hydro. Exam: Mandarin-speaking, AOx3, EOMI no nystagmus, PERRL, no facial/drift, CONRAD 5/5, SILT. Slightly wide-based antalgic gait, positive Romberg’s   (20 May 2022 14:15)    PROCEDURE: Craniotomy for meningioma        s/p left craniotomy and far lateral subtotal resection of foramen magnum meningioma on 6/1    s/p peg placement on 6/16       PLAN:  Neuro:   1 out of bed   2 continue pt/ot  3 prn pain medication   4 decadron 2 q8 to control edema around tumor area   Respiratory:    1 secretion - on xoponex , mucomyst and chest pt   2 cxr -6/13 stable   3 patient self suctions prn   4 on room air     CV:  1 bp stable -hypotensive on 6/16 amlodipine held     Endocrine:   1 stable     Heme/Onc:             DVT ppx: sql and scds   1 h/h stable   2 hypotension. tachy and desaturation on 6/16 - CTA chestno PE 6/17     Renal:   1 pedro placed for retention on 6/16     ID:   1 augmentin course finished for pna 6/15   2 suspect aspiration pna from event 6/16 - on zosyn for 7 days total now     GI:   1 s/p peg -tube feeds to start today   2  last bm 6/14       Social/Family:   Discharge planning: pending acute rehab once stable       -will discuss with Dr. Liu   -[place] [id number]

## 2022-06-18 NOTE — PROGRESS NOTE ADULT - ASSESSMENT
61 year old female presenting with dizziness, gait instability, and emesis. 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    1. s/p craniotomy for meningioma  -per neurology    2. Dysphagia   -s/p PEG placement 6/16    3. Pneumonia  -completed course of abx     4. Atrial fibrillation  -on metoprolol       Attending supervision statement: I have personally seen and examined the patient. I fully participated in the care of this patient. I have made amendments to the documentation where necessary, and agree with the history, physical exam, and plan as outlined by the ACP.    [facility]   Gastroenterology and Hepatology  [address] [place], [place]  Office: [phone]  Cell: [phone]

## 2022-06-18 NOTE — PROGRESS NOTE ADULT - ASSESSMENT
62 y/o Mandarin speaking female with PMH of HTN, C-spine meningioma removal in 2018 in [place] who p/w dizziness, gait instability, and emesis with MRI showing ethel-medullary, right CPA, and right temporal enhancing lesions c/f meningioma vs schwannoma, with hydro. Patient is s/p L far lateral crani for meningioma resection w/ subtotal sacrifice of L vertebral artery on 6/1/22. NSCU course c/b MSSA PNA (suspected aspiration pneumonia) on augmentin and vocal cord paralysis/dysphagia with NGT. Transferred out of NSCU 6/12. Patient is s/p PEG on 6/16, post-procedure course c/b RRT for hemodynamic instability, hypoxia, and ? afib with RVR.

## 2022-06-18 NOTE — PROGRESS NOTE ADULT - PROBLEM SELECTOR PLAN 2
patient with intermittent episodes of hypoxia, suspect likely due to oropharyngeal secretions and difficulty with clearing and aspiration of secretions  - CTA chest on 6/7 negative for PE but b/l consolidation noted concerning for aspiration pneumonia (sputum cx with MSSA), completed augmentin  - c/w chest PT, suctioning, nebs/mucomyst to help clear secretions. she needs aggressive pulm toileting.  **patient noted with hypoxia and hemodynamic instability post-PEG on 6/16- likely in setting of anesthesia and possible recurrent aspiration  - treating for aspiration pneumonia with zosyn, given clinical improvement with abx would continue for 7 days  - blood cx ngtd, CTA chest neg for PE, has unchanged bibasilar consolidations, but improving GGO.   - can stop vancomycin since suspicion for aspiration pneumonia is high. If worsens can restart vanco.  - back on previous dose of decadron 2mg q8 starting 6/18

## 2022-06-19 LAB
ANION GAP SERPL CALC-SCNC: 7 MMOL/L — SIGNIFICANT CHANGE UP (ref 5–17)
BUN SERPL-MCNC: 13 MG/DL — SIGNIFICANT CHANGE UP (ref 7–23)
CALCIUM SERPL-MCNC: 8.3 MG/DL — LOW (ref 8.4–10.5)
CHLORIDE SERPL-SCNC: 100 MMOL/L — SIGNIFICANT CHANGE UP (ref 96–108)
CO2 SERPL-SCNC: 28 MMOL/L — SIGNIFICANT CHANGE UP (ref 22–31)
CREAT SERPL-MCNC: 0.37 MG/DL — LOW (ref 0.5–1.3)
EGFR: 116 ML/MIN/1.73M2 — SIGNIFICANT CHANGE UP
GLUCOSE SERPL-MCNC: 117 MG/DL — HIGH (ref 70–99)
POTASSIUM SERPL-MCNC: 4.1 MMOL/L — SIGNIFICANT CHANGE UP (ref 3.5–5.3)
POTASSIUM SERPL-SCNC: 4.1 MMOL/L — SIGNIFICANT CHANGE UP (ref 3.5–5.3)
SODIUM SERPL-SCNC: 135 MMOL/L — SIGNIFICANT CHANGE UP (ref 135–145)

## 2022-06-19 PROCEDURE — 99232 SBSQ HOSP IP/OBS MODERATE 35: CPT

## 2022-06-19 RX ADMIN — Medication 12.5 MILLIGRAM(S): at 17:24

## 2022-06-19 RX ADMIN — Medication 2 MILLIGRAM(S): at 13:56

## 2022-06-19 RX ADMIN — Medication 3 MILLILITER(S): at 17:24

## 2022-06-19 RX ADMIN — Medication 500 MILLIGRAM(S): at 12:08

## 2022-06-19 RX ADMIN — LEVALBUTEROL 0.63 MILLIGRAM(S): 1.25 SOLUTION, CONCENTRATE RESPIRATORY (INHALATION) at 05:29

## 2022-06-19 RX ADMIN — PIPERACILLIN AND TAZOBACTAM 25 GRAM(S): 4; .5 INJECTION, POWDER, LYOPHILIZED, FOR SOLUTION INTRAVENOUS at 03:42

## 2022-06-19 RX ADMIN — Medication 2 MILLIGRAM(S): at 05:28

## 2022-06-19 RX ADMIN — Medication 2 MILLIGRAM(S): at 21:56

## 2022-06-19 RX ADMIN — TRAMADOL HYDROCHLORIDE 50 MILLIGRAM(S): 50 TABLET ORAL at 09:30

## 2022-06-19 RX ADMIN — Medication 650 MILLIGRAM(S): at 17:55

## 2022-06-19 RX ADMIN — PIPERACILLIN AND TAZOBACTAM 25 GRAM(S): 4; .5 INJECTION, POWDER, LYOPHILIZED, FOR SOLUTION INTRAVENOUS at 20:20

## 2022-06-19 RX ADMIN — PANTOPRAZOLE SODIUM 40 MILLIGRAM(S): 20 TABLET, DELAYED RELEASE ORAL at 12:06

## 2022-06-19 RX ADMIN — LEVALBUTEROL 0.63 MILLIGRAM(S): 1.25 SOLUTION, CONCENTRATE RESPIRATORY (INHALATION) at 23:33

## 2022-06-19 RX ADMIN — Medication 1 APPLICATION(S): at 05:28

## 2022-06-19 RX ADMIN — ENOXAPARIN SODIUM 40 MILLIGRAM(S): 100 INJECTION SUBCUTANEOUS at 17:24

## 2022-06-19 RX ADMIN — Medication 3 MILLILITER(S): at 23:33

## 2022-06-19 RX ADMIN — Medication 3 MILLILITER(S): at 12:07

## 2022-06-19 RX ADMIN — PIPERACILLIN AND TAZOBACTAM 25 GRAM(S): 4; .5 INJECTION, POWDER, LYOPHILIZED, FOR SOLUTION INTRAVENOUS at 12:31

## 2022-06-19 RX ADMIN — LEVALBUTEROL 0.63 MILLIGRAM(S): 1.25 SOLUTION, CONCENTRATE RESPIRATORY (INHALATION) at 17:24

## 2022-06-19 RX ADMIN — TRAMADOL HYDROCHLORIDE 50 MILLIGRAM(S): 50 TABLET ORAL at 08:53

## 2022-06-19 RX ADMIN — LEVALBUTEROL 0.63 MILLIGRAM(S): 1.25 SOLUTION, CONCENTRATE RESPIRATORY (INHALATION) at 12:07

## 2022-06-19 RX ADMIN — Medication 5 MILLIGRAM(S): at 21:55

## 2022-06-19 RX ADMIN — Medication 3 MILLILITER(S): at 05:28

## 2022-06-19 RX ADMIN — POLYETHYLENE GLYCOL 3350 17 GRAM(S): 17 POWDER, FOR SOLUTION ORAL at 12:08

## 2022-06-19 RX ADMIN — Medication 1 TABLET(S): at 12:08

## 2022-06-19 RX ADMIN — Medication 1 APPLICATION(S): at 18:48

## 2022-06-19 RX ADMIN — Medication 650 MILLIGRAM(S): at 17:25

## 2022-06-19 NOTE — PROGRESS NOTE ADULT - SUBJECTIVE AND OBJECTIVE BOX
SUBJECTIVE:   Patient was seen and evaluated at bedside. Patient is resting in bed and is in no new acute distress.     OVERNIGHT EVENTS:   none     Vital Signs Last 24 Hrs  T(C): 36.5 (2022 17:08), Max: 36.9 (2022 21:24)  T(F): 97.7 (2022 17:08), Max: 98.4 (2022 21:24)  HR: 81 (2022 17:08) (79 - 96)  BP: 120/78 (2022 17:08) (105/67 - 127/83)  BP(mean): --  RR: 18 (2022 17:08) (18 - 20)  SpO2: 95% (2022 17:08) (94% - 95%)    PHYSICAL EXAM:    General: No Acute Distress     Neurological:   Awake, alert oriented to person, place and time, Following Commands, PERRL, EOMI, Face Symmetrical, Speech Fluent, Moving all extremities, Muscle Strength normal in all four extremities, No Drift, Sensation to Light Touch Intact    Pulmonary: Clear to Auscultation, No Rales, No Rhonchi, No Wheezes     Cardiovascular: S1, S2, Regular Rate and Rhythm     Gastrointestinal: Soft, Nontender, Nondistended     Incision: clean and dry     LABS:                        9.0    10.96 )-----------( 276      ( 2022 06:07 )             27.2    18    139  |  100  |  16  ----------------------------<  106<H>  3.0<L>   |  27  |  0.36<L>    Ca    8.0<L>      2022 06:07    06-17 @ 07:01  -  18 @ 07:00  --------------------------------------------------------  IN: 410 mL / OUT: 1600 mL / NET: -1190 mL    18 @ 07:01  -  18 @ 12:22  --------------------------------------------------------  IN: 130 mL / OUT: 0 mL / NET: 130 mL      MEDICATIONS  (STANDING):  acetylcysteine 20%  Inhalation 3 milliLiter(s) Inhalation every 6 hours  ascorbic acid 500 milliGRAM(s) Oral daily  BACItracin   Ointment 1 Application(s) Topical two times a day  dexAMETHasone     Tablet 2 milliGRAM(s) Oral every 8 hours  enoxaparin Injectable 40 milliGRAM(s) SubCutaneous <User Schedule>  levalbuterol Inhalation 0.63 milliGRAM(s) Inhalation every 6 hours  lidocaine 4% Injection for Nebulization 4 milliLiter(s) Nebulizer once  melatonin 5 milliGRAM(s) Oral at bedtime  metoprolol tartrate 12.5 milliGRAM(s) Oral every 12 hours  multivitamin 1 Tablet(s) Oral daily  pantoprazole  Injectable 40 milliGRAM(s) IV Push daily  piperacillin/tazobactam IVPB.. 3.375 Gram(s) IV Intermittent every 8 hours  polyethylene glycol 3350 17 Gram(s) Oral daily  senna 2 Tablet(s) Oral at bedtime    MEDICATIONS  (PRN):  acetaminophen     Tablet .. 650 milliGRAM(s) Oral every 6 hours PRN Temp greater or equal to 38C (100.4F), Mild Pain (1 - 3)  ondansetron    Tablet 4 milliGRAM(s) Oral every 12 hours PRN Nausea  traMADol 25 milliGRAM(s) Oral every 4 hours PRN Moderate Pain (4 - 6)  traMADol 50 milliGRAM(s) Oral every 4 hours PRN Severe Pain (7 - 10)    DIET: [] Regular [] CCD [] Renal [] Puree [] Dysphagia [] Tube Feeds:   glucerna @60     IMAGIN EXAM:  MR BRAIN WAW IC                          PROCEDURE DATE:  2022          INTERPRETATION:  INDICATIONS:  Postoperative status post left far lateral   meningioma resection with sacrifice of the left vertebral artery    TECHNIQUE:  Multiplanar imaging was performed using T1 weighted, T2   weighted and FLAIR sequences.  Diffusion weighted and susceptibility   sensitive images were also obtained.  Following intravenous gadolinium,   multiplanar T1 weighted images were performed. 5 cc Gadavist were   administered. 2.5 cc were discarded.    COMPARISON EXAMINATION:  Preop CT 2022 preop MR 2022    FINDINGS:  VENTRICLES AND SULCI:  Subtle residual mass effect on the fourth   ventricle compared with the preop study  INTRA-AXIAL:  Residual edema at the level of the medulla and left ethel   and brachium pontis region seen preoperatively as well.. Microvascular   ischemic changes involving the periventricular and subcortical white   matter.  EXTRA-AXIAL:  Postop changes at the level of the left CP angle with air   and fluid and hemorrhage identified. Right frontal extraxial air and   fluid. Left cerebellar fluid collection.  Small enhancing neoplasm in the   right CP angle region extending into the right IAC again identified seen   on the preoperative evaluation as well measuring 1.5 x 0.83 x 1 cm (   APxTRxCC). Right superior tentorial margin lesion measuring 1.1 x 0.86 x   0.78 cm ( APxTRxCC).  VISUALIZED SINUSES:  Right maxillary fluid level.  VISUALIZED MASTOIDS:  Bilteral mastoid fluid.  CALVARIUM:  Left retrosigmoid craniotomy  CAROTID FLOW VOIDS:  Present.  MISCELLANEOUS:  The transverse and sigmoid sinus on the left appears   preserved however, confirmation with MRV may be helpful.      IMPRESSION:  Postop changes are appreciated at the left CP angle region. Residual   edema in the ethel and medulla and mass effect on the fourth ventricle   remains. Extra-axial small amount of postop hemorrhage identified in this   region. Right frontal small amount of extra-axial fluid. 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    --- End of Report ---     SUBJECTIVE:   Patient was seen and evaluated at bedside. Patient is resting in bed and is in no new acute distress.     OVERNIGHT EVENTS:   none     Vital Signs Last 24 Hrs  T(C): 36.5 (2022 17:08), Max: 36.9 (2022 21:24)  T(F): 97.7 (2022 17:08), Max: 98.4 (2022 21:24)  HR: 81 (2022 17:08) (79 - 96)  BP: 120/78 (2022 17:08) (105/67 - 127/83)  BP(mean): --  RR: 18 (2022 17:08) (18 - 20)  SpO2: 95% (2022 17:08) (94% - 95%)    PHYSICAL EXAM:    General: No Acute Distress     Neurological:   Awake, alert oriented to person, place and time, Following Commands, PERRL, EOMI, Face Symmetrical, Speech Fluent, Moving all extremities, Muscle Strength normal in all four extremities, No Drift, Sensation to Light Touch Intact    Pulmonary: Clear to Auscultation, No Rales, No Rhonchi, No Wheezes     Cardiovascular: S1, S2, Regular Rate and Rhythm     Gastrointestinal: Soft, Nontender, Nondistended     Incision: clean and dry     LABS:                               9.0    10.96 )-----------( 276      ( 2022 06:07 )             27.2     06-19    135  |  100  |  13  ----------------------------<  117<H>  4.1   |  28  |  0.37<L>    Ca    8.3<L>      2022 05:45    MEDICATIONS  (STANDING):  acetylcysteine 20%  Inhalation 3 milliLiter(s) Inhalation every 6 hours  ascorbic acid 500 milliGRAM(s) Oral daily  BACItracin   Ointment 1 Application(s) Topical two times a day  dexAMETHasone     Tablet 2 milliGRAM(s) Oral every 8 hours  enoxaparin Injectable 40 milliGRAM(s) SubCutaneous <User Schedule>  levalbuterol Inhalation 0.63 milliGRAM(s) Inhalation every 6 hours  lidocaine 4% Injection for Nebulization 4 milliLiter(s) Nebulizer once  melatonin 5 milliGRAM(s) Oral at bedtime  metoprolol tartrate 12.5 milliGRAM(s) Oral every 12 hours  multivitamin 1 Tablet(s) Oral daily  pantoprazole  Injectable 40 milliGRAM(s) IV Push daily  piperacillin/tazobactam IVPB.. 3.375 Gram(s) IV Intermittent every 8 hours  polyethylene glycol 3350 17 Gram(s) Oral daily  senna 2 Tablet(s) Oral at bedtime    MEDICATIONS  (PRN):  acetaminophen     Tablet .. 650 milliGRAM(s) Oral every 6 hours PRN Temp greater or equal to 38C (100.4F), Mild Pain (1 - 3)  ondansetron    Tablet 4 milliGRAM(s) Oral every 12 hours PRN Nausea  traMADol 25 milliGRAM(s) Oral every 4 hours PRN Moderate Pain (4 - 6)  traMADol 50 milliGRAM(s) Oral every 4 hours PRN Severe Pain (7 - 10)    DIET: [] Regular [] CCD [] Renal [] Puree [] Dysphagia [] Tube Feeds:   glucerna @60     IMAGIN EXAM:  MR BRAIN WAW IC                          PROCEDURE DATE:  2022          INTERPRETATION:  INDICATIONS:  Postoperative status post left far lateral   meningioma resection with sacrifice of the left vertebral artery    TECHNIQUE:  Multiplanar imaging was performed using T1 weighted, T2   weighted and FLAIR sequences.  Diffusion weighted and susceptibility   sensitive images were also obtained.  Following intravenous gadolinium,   multiplanar T1 weighted images were performed. 5 cc Gadavist were   administered. 2.5 cc were discarded.    COMPARISON EXAMINATION:  Preop CT 2022 preop MR 2022    FINDINGS:  VENTRICLES AND SULCI:  Subtle residual mass effect on the fourth   ventricle compared with the preop study  INTRA-AXIAL:  Residual edema at the level of the medulla and left ethel   and brachium pontis region seen preoperatively as well.. Microvascular   ischemic changes involving the periventricular and subcortical white   matter.  EXTRA-AXIAL:  Postop changes at the level of the left CP angle with air   and fluid and hemorrhage identified. Right frontal extraxial air and   fluid. Left cerebellar fluid collection.  Small enhancing neoplasm in the   right CP angle region extending into the right IAC again identified seen   on the preoperative evaluation as well measuring 1.5 x 0.83 x 1 cm (   APxTRxCC). Right superior tentorial margin lesion measuring 1.1 x 0.86 x   0.78 cm ( APxTRxCC).  VISUALIZED SINUSES:  Right maxillary fluid level.  VISUALIZED MASTOIDS:  Bilteral mastoid fluid.  CALVARIUM:  Left retrosigmoid craniotomy  CAROTID FLOW VOIDS:  Present.  MISCELLANEOUS:  The transverse and sigmoid sinus on the left appears   preserved however, confirmation with MRV may be helpful.      IMPRESSION:  Postop changes are appreciated at the left CP angle region. Residual   edema in the ethel and medulla and mass effect on the fourth ventricle   remains. Extra-axial small amount of postop hemorrhage identified in this   region. Right frontal small amount of extra-axial fluid. 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    --- End of Report ---

## 2022-06-19 NOTE — PROGRESS NOTE ADULT - ASSESSMENT
HPI:  61F, PMH HTN and C-spine meningioma removal in 2018 in [place]. P/w dizziness, gait instability, and emesis x3d. MRI shows ethel-medullary, Right CPA, and Right temporal enhancing lesions c/f meningioma vs schwannoma, with some hydro. Exam: Mandarin-speaking, AOx3, EOMI no nystagmus, PERRL, no facial/drift, CONRAD 5/5, SILT. Slightly wide-based antalgic gait, positive Romberg’s   (20 May 2022 14:15)    PROCEDURE:    s/p left craniotomy and far lateral subtotal resection of foramen magnum meningioma on 6/1    s/p peg placement on 6/16     PLAN:  Neuro:   1 out of bed   2 continue pt/ot  3 prn pain medication   4 decadron 2 q8 to control edema around tumor area   Respiratory:    1 secretion - on xoponex , mucomyst and chest pt   2 cxr -6/13 stable   3 patient self suctions prn   4 on room air     CV:  1 bp stable -hypotensive on 6/16 amlodipine held     Endocrine:   1 stable     Heme/Onc:             DVT ppx: sql and scds   1 h/h stable   2 hypotension. tachy and desaturation on 6/16; CTA chest- no PE 6/17     Renal:   1 pedro placed for retention on 6/16     ID:   1 augmentin course finished for pna 6/15   2 suspect aspiration pna from event 6/16 - on zosyn for 7 days total now     GI:   1 s/p peg -tube feeds at goal   2  last bm 6/14       Social/Family:   Discharge planning: pending acute rehab once stable     -will discuss with Dr. Liu   -[place] [id number]    HPI:  61F, PMH HTN and C-spine meningioma removal in 2018 in [place]. P/w dizziness, gait instability, and emesis x3d. MRI shows ethel-medullary, Right CPA, and Right temporal enhancing lesions c/f meningioma vs schwannoma, with some hydro. Exam: Mandarin-speaking, AOx3, EOMI no nystagmus, PERRL, no facial/drift, CONRAD 5/5, SILT. Slightly wide-based antalgic gait, positive Romberg’s   (20 May 2022 14:15)    PROCEDURE:    s/p left craniotomy and far lateral subtotal resection of foramen magnum meningioma on 6/1    s/p peg placement on 6/16     PLAN:  Neuro:   1 out of bed   2 continue pt/ot  3 prn pain medication   4 decadron 2 q8 to control edema around tumor area     Respiratory:    1 secretion - on xoponex , mucomyst and chest pt   2 cxr -6/13 stable   3 patient self suctions prn   4 on room air     CV:  1 bp stable -hypotensive on 6/16 amlodipine held     Endocrine:   1 stable     Heme/Onc:             DVT ppx: sql and scds   1 h/h stable   2 hypotension. tachy and desaturation on 6/16; CTA chest- no PE 6/17     Renal:   1 pedro placed for retention on 6/16     ID:   1 augmentin course finished for pna 6/15   2 suspect aspiration pna from event 6/16 - on zosyn for 7 days total now     GI:   1 s/p peg -tube feeds at goal   2  last bm 6/14   3 hypokalemia resolved after supplementation    Social/Family:   Discharge planning: pending acute rehab once stable     -will discuss with Dr. Liu   -[employer] [id number]

## 2022-06-19 NOTE — PROGRESS NOTE ADULT - REASON FOR ADMISSION
Foramen magnum meningioma
Patient was admitted with brain tumor
Foramen magnum meningioma
Foramen magnum meningioma
Patient was admitted with brain tumor
Patient was admitted with brain tumor
brain tumors
Admitted 5/20 found with multiple enhancing brain tumors
Foramen magnum meningioma
Multiple meningiomas
Patient was admitted with brain tumor
Patient was admitted with multiple enhancing brain lesions, presented with dizziness and vomiting.
multiple enhancing brain tumors NO overn
Admitted 5/20 found with multiple enhancing brain tumors
Brain tumor
Foramen magnum meningioma
Multiple meningiomas
Patient was admitted with multiple enhancing brain lesions, presented with dizziness and vomiting.
brain tumors
Foramen magnum meningioma
Patient was admitted with brain tumor
Patient was admitted with multiple enhancing brain lesions, presented with dizziness and vomiting.

## 2022-06-19 NOTE — PROGRESS NOTE ADULT - SUBJECTIVE AND OBJECTIVE BOX
[facility] Division of Hospital Medicine  Mima Robles DO  Available on Teams [place]    Patient is a 61y old  Female who presents with a chief complaint of Patient was admitted with brain tumor (18 Jun 2022 12:20)    Patient's family member at bedside provided interpretation.     SUBJECTIVE / OVERNIGHT EVENTS: no events. Patient has no complaints currently, had some coughing earlier in the morning around 4am. Breathing comfortably. Tolerating tube feeds, at goal rate. Sat in the chair yesterday.  ADDITIONAL REVIEW OF SYSTEMS: negative    MEDICATIONS  (STANDING):  acetylcysteine 20%  Inhalation 3 milliLiter(s) Inhalation every 6 hours  ascorbic acid 500 milliGRAM(s) Oral daily  BACItracin   Ointment 1 Application(s) Topical two times a day  dexAMETHasone     Tablet 2 milliGRAM(s) Oral every 8 hours  enoxaparin Injectable 40 milliGRAM(s) SubCutaneous <User Schedule>  levalbuterol Inhalation 0.63 milliGRAM(s) Inhalation every 6 hours  lidocaine 4% Injection for Nebulization 4 milliLiter(s) Nebulizer once  melatonin 5 milliGRAM(s) Oral at bedtime  metoprolol tartrate 12.5 milliGRAM(s) Oral every 12 hours  multivitamin 1 Tablet(s) Oral daily  pantoprazole  Injectable 40 milliGRAM(s) IV Push daily  piperacillin/tazobactam IVPB.. 3.375 Gram(s) IV Intermittent every 8 hours  polyethylene glycol 3350 17 Gram(s) Oral daily  senna 2 Tablet(s) Oral at bedtime    MEDICATIONS  (PRN):  acetaminophen     Tablet .. 650 milliGRAM(s) Oral every 6 hours PRN Temp greater or equal to 38C (100.4F), Mild Pain (1 - 3)  ondansetron    Tablet 4 milliGRAM(s) Oral every 12 hours PRN Nausea  traMADol 25 milliGRAM(s) Oral every 4 hours PRN Moderate Pain (4 - 6)  traMADol 50 milliGRAM(s) Oral every 4 hours PRN Severe Pain (7 - 10)      CAPILLARY BLOOD GLUCOSE        I&O's Summary    18 Jun 2022 07:01  -  19 Jun 2022 07:00  --------------------------------------------------------  IN: 750 mL / OUT: 600 mL / NET: 150 mL        PHYSICAL EXAM:  Vital Signs Last 24 Hrs  T(C): 36.5 (19 Jun 2022 08:34), Max: 36.9 (18 Jun 2022 21:24)  T(F): 97.7 (19 Jun 2022 08:34), Max: 98.4 (18 Jun 2022 21:24)  HR: 84 (19 Jun 2022 08:34) (81 - 96)  BP: 127/83 (19 Jun 2022 08:34) (96/65 - 127/83)  BP(mean): --  RR: 18 (19 Jun 2022 08:34) (18 - 20)  SpO2: 94% (19 Jun 2022 08:34) (94% - 95%)    CONSTITUTIONAL: NAD, well-groomed  NECK: Supple, no palpable masses; no thyromegaly  RESPIRATORY: Normal respiratory effort; scattered rhonchi  CARDIOVASCULAR: normal S1 and S2, no murmur/rub/gallop; No lower extremity edema  ABDOMEN: soft, + PEG covered with dressing, diminished bowel sounds, no rebound/guarding; No hepatosplenomegaly  MUSCULOSKELETAL:  no clubbing or cyanosis of digits; no joint swelling or tenderness to palpation  PSYCH: A+O to person, place, and time; affect appropriate   SKIN: No rashes; + healing herpes labialis in lower lip    LABS:                        9.0    10.96 )-----------( 276      ( 18 Jun 2022 06:07 )             27.2     06-19    135  |  100  |  13  ----------------------------<  117<H>  4.1   |  28  |  0.37<L>    Ca    8.3<L>      19 Jun 2022 05:45                Culture - Blood (collected 16 Jun 2022 11:15)  Source: .Blood Blood  Preliminary Report (17 Jun 2022 17:01):    No growth to date.    Culture - Blood (collected 16 Jun 2022 11:10)  Source: .Blood Blood  Preliminary Report (17 Jun 2022 17:01):    No growth to date.        RADIOLOGY & ADDITIONAL TESTS:  Results Reviewed:   Imaging Personally Reviewed:  Electrocardiogram Personally Reviewed:    COORDINATION OF CARE:  Care Discussed with Consultants/Other Providers [Y/N]:  Prior or Outpatient Records Reviewed [Y/N]:

## 2022-06-20 ENCOUNTER — TRANSCRIPTION ENCOUNTER (OUTPATIENT)
Age: 62
End: 2022-06-20

## 2022-06-20 ENCOUNTER — INPATIENT (INPATIENT)
Facility: HOSPITAL | Age: 62
LOS: 24 days | Discharge: HOME | DRG: 949 | End: 2022-07-15
Attending: STUDENT IN AN ORGANIZED HEALTH CARE EDUCATION/TRAINING PROGRAM | Admitting: STUDENT IN AN ORGANIZED HEALTH CARE EDUCATION/TRAINING PROGRAM
Payer: MEDICAID

## 2022-06-20 VITALS
WEIGHT: 106.04 LBS | DIASTOLIC BLOOD PRESSURE: 73 MMHG | HEART RATE: 80 BPM | SYSTOLIC BLOOD PRESSURE: 113 MMHG | HEIGHT: 61 IN | OXYGEN SATURATION: 94 % | RESPIRATION RATE: 16 BRPM | TEMPERATURE: 97 F

## 2022-06-20 VITALS
DIASTOLIC BLOOD PRESSURE: 70 MMHG | SYSTOLIC BLOOD PRESSURE: 109 MMHG | TEMPERATURE: 98 F | OXYGEN SATURATION: 95 % | HEART RATE: 68 BPM | RESPIRATION RATE: 20 BRPM

## 2022-06-20 DIAGNOSIS — D43.2 NEOPLASM OF UNCERTAIN BEHAVIOR OF BRAIN, UNSPECIFIED: ICD-10-CM

## 2022-06-20 DIAGNOSIS — Z98.890 OTHER SPECIFIED POSTPROCEDURAL STATES: Chronic | ICD-10-CM

## 2022-06-20 LAB — SARS-COV-2 RNA SPEC QL NAA+PROBE: SIGNIFICANT CHANGE UP

## 2022-06-20 PROCEDURE — 84145 PROCALCITONIN (PCT): CPT

## 2022-06-20 PROCEDURE — 70553 MRI BRAIN STEM W/O & W/DYE: CPT

## 2022-06-20 PROCEDURE — 94640 AIRWAY INHALATION TREATMENT: CPT

## 2022-06-20 PROCEDURE — 97116 GAIT TRAINING THERAPY: CPT

## 2022-06-20 PROCEDURE — 84132 ASSAY OF SERUM POTASSIUM: CPT

## 2022-06-20 PROCEDURE — 84480 ASSAY TRIIODOTHYRONINE (T3): CPT

## 2022-06-20 PROCEDURE — 80202 ASSAY OF VANCOMYCIN: CPT

## 2022-06-20 PROCEDURE — 97530 THERAPEUTIC ACTIVITIES: CPT

## 2022-06-20 PROCEDURE — 74230 X-RAY XM SWLNG FUNCJ C+: CPT

## 2022-06-20 PROCEDURE — 86803 HEPATITIS C AB TEST: CPT

## 2022-06-20 PROCEDURE — 84295 ASSAY OF SERUM SODIUM: CPT

## 2022-06-20 PROCEDURE — 87070 CULTURE OTHR SPECIMN AEROBIC: CPT

## 2022-06-20 PROCEDURE — 85014 HEMATOCRIT: CPT

## 2022-06-20 PROCEDURE — 88307 TISSUE EXAM BY PATHOLOGIST: CPT

## 2022-06-20 PROCEDURE — 94003 VENT MGMT INPAT SUBQ DAY: CPT

## 2022-06-20 PROCEDURE — 82947 ASSAY GLUCOSE BLOOD QUANT: CPT

## 2022-06-20 PROCEDURE — 81001 URINALYSIS AUTO W/SCOPE: CPT

## 2022-06-20 PROCEDURE — U0003: CPT

## 2022-06-20 PROCEDURE — 93306 TTE W/DOPPLER COMPLETE: CPT

## 2022-06-20 PROCEDURE — 85730 THROMBOPLASTIN TIME PARTIAL: CPT

## 2022-06-20 PROCEDURE — 83036 HEMOGLOBIN GLYCOSYLATED A1C: CPT

## 2022-06-20 PROCEDURE — 85610 PROTHROMBIN TIME: CPT

## 2022-06-20 PROCEDURE — 86850 RBC ANTIBODY SCREEN: CPT

## 2022-06-20 PROCEDURE — 92526 ORAL FUNCTION THERAPY: CPT

## 2022-06-20 PROCEDURE — 88360 TUMOR IMMUNOHISTOCHEM/MANUAL: CPT

## 2022-06-20 PROCEDURE — 86923 COMPATIBILITY TEST ELECTRIC: CPT

## 2022-06-20 PROCEDURE — 92610 EVALUATE SWALLOWING FUNCTION: CPT

## 2022-06-20 PROCEDURE — 99223 1ST HOSP IP/OBS HIGH 75: CPT

## 2022-06-20 PROCEDURE — 82435 ASSAY OF BLOOD CHLORIDE: CPT

## 2022-06-20 PROCEDURE — 70546 MR ANGIOGRAPH HEAD W/O&W/DYE: CPT | Mod: MA

## 2022-06-20 PROCEDURE — 87040 BLOOD CULTURE FOR BACTERIA: CPT

## 2022-06-20 PROCEDURE — 94799 UNLISTED PULMONARY SVC/PX: CPT

## 2022-06-20 PROCEDURE — 88333 PATH CONSLTJ SURG CYTO XM 1: CPT

## 2022-06-20 PROCEDURE — 71275 CT ANGIOGRAPHY CHEST: CPT

## 2022-06-20 PROCEDURE — 74177 CT ABD & PELVIS W/CONTRAST: CPT | Mod: MA

## 2022-06-20 PROCEDURE — 84100 ASSAY OF PHOSPHORUS: CPT

## 2022-06-20 PROCEDURE — L8699: CPT

## 2022-06-20 PROCEDURE — 85018 HEMOGLOBIN: CPT

## 2022-06-20 PROCEDURE — 85027 COMPLETE CBC AUTOMATED: CPT

## 2022-06-20 PROCEDURE — 70450 CT HEAD/BRAIN W/O DYE: CPT

## 2022-06-20 PROCEDURE — 94002 VENT MGMT INPAT INIT DAY: CPT

## 2022-06-20 PROCEDURE — 97110 THERAPEUTIC EXERCISES: CPT

## 2022-06-20 PROCEDURE — 71260 CT THORAX DX C+: CPT | Mod: MA

## 2022-06-20 PROCEDURE — G0378: CPT

## 2022-06-20 PROCEDURE — 83605 ASSAY OF LACTIC ACID: CPT

## 2022-06-20 PROCEDURE — 86900 BLOOD TYPING SEROLOGIC ABO: CPT

## 2022-06-20 PROCEDURE — 87641 MR-STAPH DNA AMP PROBE: CPT

## 2022-06-20 PROCEDURE — 82962 GLUCOSE BLOOD TEST: CPT

## 2022-06-20 PROCEDURE — 80048 BASIC METABOLIC PNL TOTAL CA: CPT

## 2022-06-20 PROCEDURE — C1713: CPT

## 2022-06-20 PROCEDURE — 80053 COMPREHEN METABOLIC PANEL: CPT

## 2022-06-20 PROCEDURE — A9585: CPT

## 2022-06-20 PROCEDURE — 84436 ASSAY OF TOTAL THYROXINE: CPT

## 2022-06-20 PROCEDURE — 72157 MRI CHEST SPINE W/O & W/DYE: CPT

## 2022-06-20 PROCEDURE — 96374 THER/PROPH/DIAG INJ IV PUSH: CPT

## 2022-06-20 PROCEDURE — 99232 SBSQ HOSP IP/OBS MODERATE 35: CPT

## 2022-06-20 PROCEDURE — 36415 COLL VENOUS BLD VENIPUNCTURE: CPT

## 2022-06-20 PROCEDURE — 84443 ASSAY THYROID STIM HORMONE: CPT

## 2022-06-20 PROCEDURE — 93005 ELECTROCARDIOGRAM TRACING: CPT

## 2022-06-20 PROCEDURE — 81025 URINE PREGNANCY TEST: CPT

## 2022-06-20 PROCEDURE — C1889: CPT

## 2022-06-20 PROCEDURE — C1769: CPT

## 2022-06-20 PROCEDURE — 97162 PT EVAL MOD COMPLEX 30 MIN: CPT

## 2022-06-20 PROCEDURE — 82803 BLOOD GASES ANY COMBINATION: CPT

## 2022-06-20 PROCEDURE — 82330 ASSAY OF CALCIUM: CPT

## 2022-06-20 PROCEDURE — 97164 PT RE-EVAL EST PLAN CARE: CPT

## 2022-06-20 PROCEDURE — 97166 OT EVAL MOD COMPLEX 45 MIN: CPT

## 2022-06-20 PROCEDURE — 86901 BLOOD TYPING SEROLOGIC RH(D): CPT

## 2022-06-20 PROCEDURE — 80061 LIPID PANEL: CPT

## 2022-06-20 PROCEDURE — 72158 MRI LUMBAR SPINE W/O & W/DYE: CPT

## 2022-06-20 PROCEDURE — 82565 ASSAY OF CREATININE: CPT

## 2022-06-20 PROCEDURE — 85025 COMPLETE CBC W/AUTO DIFF WBC: CPT

## 2022-06-20 PROCEDURE — 97535 SELF CARE MNGMENT TRAINING: CPT

## 2022-06-20 PROCEDURE — 97168 OT RE-EVAL EST PLAN CARE: CPT

## 2022-06-20 PROCEDURE — 92611 MOTION FLUOROSCOPY/SWALLOW: CPT

## 2022-06-20 PROCEDURE — 92553 AUDIOMETRY AIR & BONE: CPT

## 2022-06-20 PROCEDURE — 99285 EMERGENCY DEPT VISIT HI MDM: CPT | Mod: 25

## 2022-06-20 PROCEDURE — 71045 X-RAY EXAM CHEST 1 VIEW: CPT

## 2022-06-20 PROCEDURE — 97112 NEUROMUSCULAR REEDUCATION: CPT

## 2022-06-20 PROCEDURE — 83735 ASSAY OF MAGNESIUM: CPT

## 2022-06-20 PROCEDURE — 87640 STAPH A DNA AMP PROBE: CPT

## 2022-06-20 PROCEDURE — U0005: CPT

## 2022-06-20 PROCEDURE — 72156 MRI NECK SPINE W/O & W/DYE: CPT

## 2022-06-20 PROCEDURE — 88331 PATH CONSLTJ SURG 1 BLK 1SPC: CPT

## 2022-06-20 PROCEDURE — 88341 IMHCHEM/IMCYTCHM EA ADD ANTB: CPT

## 2022-06-20 PROCEDURE — 70496 CT ANGIOGRAPHY HEAD: CPT

## 2022-06-20 PROCEDURE — 93970 EXTREMITY STUDY: CPT

## 2022-06-20 RX ORDER — ACETYLCYSTEINE 200 MG/ML
3 VIAL (ML) MISCELLANEOUS EVERY 6 HOURS
Refills: 0 | Status: DISCONTINUED | OUTPATIENT
Start: 2022-06-20 | End: 2022-06-22

## 2022-06-20 RX ORDER — DEXAMETHASONE 0.5 MG/5ML
2 ELIXIR ORAL DAILY
Refills: 0 | Status: COMPLETED | OUTPATIENT
Start: 2022-06-23 | End: 2022-06-24

## 2022-06-20 RX ORDER — ASPIRIN/CALCIUM CARB/MAGNESIUM 324 MG
81 TABLET ORAL DAILY
Refills: 0 | Status: DISCONTINUED | OUTPATIENT
Start: 2022-06-20 | End: 2022-06-20

## 2022-06-20 RX ORDER — ENOXAPARIN SODIUM 100 MG/ML
40 INJECTION SUBCUTANEOUS
Refills: 0 | Status: DISCONTINUED | OUTPATIENT
Start: 2022-06-21 | End: 2022-07-15

## 2022-06-20 RX ORDER — LOSARTAN POTASSIUM 100 MG/1
1 TABLET, FILM COATED ORAL
Qty: 0 | Refills: 0 | DISCHARGE

## 2022-06-20 RX ORDER — ACETAMINOPHEN 500 MG
650 TABLET ORAL EVERY 6 HOURS
Refills: 0 | Status: DISCONTINUED | OUTPATIENT
Start: 2022-06-20 | End: 2022-07-15

## 2022-06-20 RX ORDER — ACETAMINOPHEN 500 MG
2 TABLET ORAL
Qty: 0 | Refills: 0 | DISCHARGE
Start: 2022-06-20

## 2022-06-20 RX ORDER — METOPROLOL TARTRATE 50 MG
12.5 TABLET ORAL EVERY 12 HOURS
Refills: 0 | Status: DISCONTINUED | OUTPATIENT
Start: 2022-06-20 | End: 2022-07-15

## 2022-06-20 RX ORDER — ASCORBIC ACID 60 MG
1 TABLET,CHEWABLE ORAL
Qty: 0 | Refills: 0 | DISCHARGE
Start: 2022-06-20

## 2022-06-20 RX ORDER — ASCORBIC ACID 60 MG
500 TABLET,CHEWABLE ORAL DAILY
Refills: 0 | Status: DISCONTINUED | OUTPATIENT
Start: 2022-06-20 | End: 2022-07-15

## 2022-06-20 RX ORDER — DEXAMETHASONE 0.5 MG/5ML
2 ELIXIR ORAL EVERY 12 HOURS
Refills: 0 | Status: COMPLETED | OUTPATIENT
Start: 2022-06-20 | End: 2022-06-22

## 2022-06-20 RX ORDER — ATORVASTATIN CALCIUM 80 MG/1
40 TABLET, FILM COATED ORAL AT BEDTIME
Refills: 0 | Status: DISCONTINUED | OUTPATIENT
Start: 2022-06-20 | End: 2022-06-20

## 2022-06-20 RX ORDER — TRAMADOL HYDROCHLORIDE 50 MG/1
1 TABLET ORAL
Qty: 0 | Refills: 0 | DISCHARGE
Start: 2022-06-20

## 2022-06-20 RX ORDER — LANOLIN ALCOHOL/MO/W.PET/CERES
6 CREAM (GRAM) TOPICAL AT BEDTIME
Refills: 0 | Status: DISCONTINUED | OUTPATIENT
Start: 2022-06-20 | End: 2022-07-15

## 2022-06-20 RX ORDER — LEVALBUTEROL 1.25 MG/.5ML
3 SOLUTION, CONCENTRATE RESPIRATORY (INHALATION)
Qty: 0 | Refills: 0 | DISCHARGE
Start: 2022-06-20

## 2022-06-20 RX ORDER — METOPROLOL TARTRATE 50 MG
12.5 TABLET ORAL
Qty: 0 | Refills: 0 | DISCHARGE
Start: 2022-06-20

## 2022-06-20 RX ORDER — AMLODIPINE BESYLATE 2.5 MG/1
1 TABLET ORAL
Qty: 0 | Refills: 0 | DISCHARGE

## 2022-06-20 RX ORDER — POLYETHYLENE GLYCOL 3350 17 G/17G
17 POWDER, FOR SOLUTION ORAL
Qty: 0 | Refills: 0 | DISCHARGE
Start: 2022-06-20

## 2022-06-20 RX ORDER — LANOLIN ALCOHOL/MO/W.PET/CERES
1 CREAM (GRAM) TOPICAL
Qty: 0 | Refills: 0 | DISCHARGE
Start: 2022-06-20

## 2022-06-20 RX ORDER — DEXAMETHASONE 0.5 MG/5ML
2 ELIXIR ORAL EVERY 12 HOURS
Refills: 0 | Status: DISCONTINUED | OUTPATIENT
Start: 2022-06-20 | End: 2022-06-20

## 2022-06-20 RX ORDER — POLYETHYLENE GLYCOL 3350 17 G/17G
17 POWDER, FOR SOLUTION ORAL DAILY
Refills: 0 | Status: DISCONTINUED | OUTPATIENT
Start: 2022-06-20 | End: 2022-07-15

## 2022-06-20 RX ORDER — ONDANSETRON 8 MG/1
1 TABLET, FILM COATED ORAL
Qty: 0 | Refills: 0 | DISCHARGE
Start: 2022-06-20

## 2022-06-20 RX ORDER — ONDANSETRON 8 MG/1
4 TABLET, FILM COATED ORAL EVERY 12 HOURS
Refills: 0 | Status: DISCONTINUED | OUTPATIENT
Start: 2022-06-20 | End: 2022-07-15

## 2022-06-20 RX ORDER — ACETYLCYSTEINE 200 MG/ML
3 VIAL (ML) MISCELLANEOUS
Qty: 0 | Refills: 0 | DISCHARGE
Start: 2022-06-20

## 2022-06-20 RX ORDER — TRAMADOL HYDROCHLORIDE 50 MG/1
0.5 TABLET ORAL
Qty: 0 | Refills: 0 | DISCHARGE
Start: 2022-06-20

## 2022-06-20 RX ORDER — TRAMADOL HYDROCHLORIDE 50 MG/1
50 TABLET ORAL EVERY 4 HOURS
Refills: 0 | Status: DISCONTINUED | OUTPATIENT
Start: 2022-06-20 | End: 2022-06-20

## 2022-06-20 RX ORDER — SENNA PLUS 8.6 MG/1
2 TABLET ORAL
Qty: 0 | Refills: 0 | DISCHARGE
Start: 2022-06-20

## 2022-06-20 RX ORDER — DEXAMETHASONE 0.5 MG/5ML
1 ELIXIR ORAL
Qty: 0 | Refills: 0 | DISCHARGE
Start: 2022-06-20

## 2022-06-20 RX ORDER — TRAMADOL HYDROCHLORIDE 50 MG/1
25 TABLET ORAL EVERY 4 HOURS
Refills: 0 | Status: DISCONTINUED | OUTPATIENT
Start: 2022-06-20 | End: 2022-06-20

## 2022-06-20 RX ORDER — PIPERACILLIN AND TAZOBACTAM 4; .5 G/20ML; G/20ML
3.38 INJECTION, POWDER, LYOPHILIZED, FOR SOLUTION INTRAVENOUS EVERY 8 HOURS
Refills: 0 | Status: DISCONTINUED | OUTPATIENT
Start: 2022-06-20 | End: 2022-06-21

## 2022-06-20 RX ORDER — LEVALBUTEROL 1.25 MG/.5ML
0.63 SOLUTION, CONCENTRATE RESPIRATORY (INHALATION) EVERY 6 HOURS
Refills: 0 | Status: DISCONTINUED | OUTPATIENT
Start: 2022-06-20 | End: 2022-06-21

## 2022-06-20 RX ORDER — DEXAMETHASONE 0.5 MG/5ML
ELIXIR ORAL
Refills: 0 | Status: COMPLETED | OUTPATIENT
Start: 2022-06-20 | End: 2022-06-25

## 2022-06-20 RX ORDER — SENNA PLUS 8.6 MG/1
2 TABLET ORAL AT BEDTIME
Refills: 0 | Status: DISCONTINUED | OUTPATIENT
Start: 2022-06-20 | End: 2022-07-15

## 2022-06-20 RX ORDER — ENOXAPARIN SODIUM 100 MG/ML
40 INJECTION SUBCUTANEOUS
Qty: 0 | Refills: 0 | DISCHARGE
Start: 2022-06-20

## 2022-06-20 RX ADMIN — Medication 2 MILLIGRAM(S): at 05:24

## 2022-06-20 RX ADMIN — ENOXAPARIN SODIUM 40 MILLIGRAM(S): 100 INJECTION SUBCUTANEOUS at 17:12

## 2022-06-20 RX ADMIN — LEVALBUTEROL 0.63 MILLIGRAM(S): 1.25 SOLUTION, CONCENTRATE RESPIRATORY (INHALATION) at 05:24

## 2022-06-20 RX ADMIN — PANTOPRAZOLE SODIUM 40 MILLIGRAM(S): 20 TABLET, DELAYED RELEASE ORAL at 11:49

## 2022-06-20 RX ADMIN — Medication 1 TABLET(S): at 11:49

## 2022-06-20 RX ADMIN — TRAMADOL HYDROCHLORIDE 25 MILLIGRAM(S): 50 TABLET ORAL at 04:27

## 2022-06-20 RX ADMIN — PIPERACILLIN AND TAZOBACTAM 25 GRAM(S): 4; .5 INJECTION, POWDER, LYOPHILIZED, FOR SOLUTION INTRAVENOUS at 13:14

## 2022-06-20 RX ADMIN — Medication 2 MILLIGRAM(S): at 17:12

## 2022-06-20 RX ADMIN — LEVALBUTEROL 0.63 MILLIGRAM(S): 1.25 SOLUTION, CONCENTRATE RESPIRATORY (INHALATION) at 11:49

## 2022-06-20 RX ADMIN — Medication 1 APPLICATION(S): at 05:23

## 2022-06-20 RX ADMIN — LEVALBUTEROL 0.63 MILLIGRAM(S): 1.25 SOLUTION, CONCENTRATE RESPIRATORY (INHALATION) at 17:12

## 2022-06-20 RX ADMIN — PIPERACILLIN AND TAZOBACTAM 25 GRAM(S): 4; .5 INJECTION, POWDER, LYOPHILIZED, FOR SOLUTION INTRAVENOUS at 23:18

## 2022-06-20 RX ADMIN — Medication 12.5 MILLIGRAM(S): at 05:24

## 2022-06-20 RX ADMIN — PIPERACILLIN AND TAZOBACTAM 25 GRAM(S): 4; .5 INJECTION, POWDER, LYOPHILIZED, FOR SOLUTION INTRAVENOUS at 03:41

## 2022-06-20 RX ADMIN — Medication 3 MILLILITER(S): at 17:12

## 2022-06-20 RX ADMIN — SENNA PLUS 2 TABLET(S): 8.6 TABLET ORAL at 22:07

## 2022-06-20 RX ADMIN — Medication 3 MILLILITER(S): at 11:49

## 2022-06-20 RX ADMIN — Medication 6 MILLIGRAM(S): at 22:07

## 2022-06-20 RX ADMIN — Medication 12.5 MILLIGRAM(S): at 22:06

## 2022-06-20 RX ADMIN — TRAMADOL HYDROCHLORIDE 25 MILLIGRAM(S): 50 TABLET ORAL at 04:57

## 2022-06-20 RX ADMIN — Medication 3 MILLILITER(S): at 05:23

## 2022-06-20 RX ADMIN — Medication 500 MILLIGRAM(S): at 11:49

## 2022-06-20 NOTE — H&P ADULT - ATTENDING COMMENTS
Pt. seen with resident 6/21 AM.  Agree with documentation above as per resident on call and NP with amendments made as appropriate. Patient medically stable. Appropriate for acute rehab.    Spoke with pt. via with Dr. Uribe--hospitalist-- and Maximiliano Cano --PT--interpreting in Mandarin.  Pt. intelligibility limited by hypophonia.  Pt. denies pain. slept during the night.  Loose BM yesterday.  Pt. expresses anxiety regarding her deficits and recovery-- reassurance provided.  tolerating PEG feed.     Vital Signs Last 24 Hrs  T(C): 36.4 (21 Jun 2022 07:29), Max: 36.6 (20 Jun 2022 17:08)  T(F): 97.5 (21 Jun 2022 07:29), Max: 97.9 (20 Jun 2022 17:08)  HR: 60 (21 Jun 2022 09:05) (60 - 80)  BP: 113/71 (21 Jun 2022 07:29) (109/70 - 121/78)  BP(mean): --  RR: 15 (21 Jun 2022 07:29) (15 - 20)  SpO2: 95% (21 Jun 2022 09:05) (94% - 95%)    Physical exam as amended above                          9.8    8.72  )-----------( 266      ( 21 Jun 2022 06:10 )             29.6   06-21    133<L>  |  98  |  18  ----------------------------<  87  4.0   |  32<H>  |  0.38<L>    Ca    8.5      21 Jun 2022 06:10    TPro  5.8<L>  /  Alb  2.5<L>  /  TBili  0.8  /  DBili  x   /  AST  50<H>  /  ALT  117<H>  /  AlkPhos  72  06-21    60 yo Mandarin speaking F with PMH of HTN, C Spine meningioma (removed 2018 in [place]) who presented with dizziness, ataxia, and emesis.  MRI with ethel-medullary. right CPA, and right temporal enhancing lesions concern for meningioma vs schwannoma, with hydro.  Now S/p Left far lateral crani for meningioma resection with subtotal sacrifice of left vertebral artery (6/1).  Noted with vocal cord paralysis, dysphagia - NPO on TF (PEG placed 6/16). Also right Hemiparesis, sensory, gait and ADL impairment.       -- pt. had pedro placed on 6/18 for I &O monitoring, not retention.  Will remove pedro tonight for TOV in AM.      cont. current meds.  Stopped levoalbuterol inhaler.     Contacted Dr. Liu to clarify if pt. ok to restart ASA-- stated would hold for now if it was for carotid stenosis

## 2022-06-20 NOTE — H&P ADULT - NSICDXFAMILYHX_GEN_ALL_CORE_FT
FAMILY HISTORY:  No pertinent family history in first degree relatives     FAMILY HISTORY:  No pertinent family history in first degree relatives

## 2022-06-20 NOTE — H&P ADULT - HISTORY OF PRESENT ILLNESS
61 year old Female with PMHx of HTN and C-spine meningioma removal in 2018 in [place]. Patient presented with dizziness, gait instability, and vomiting over the last 3 days prior to admission. MRI showed ethel-medullary, Right CPA, and Right temporal enhancing lesions.  Possible meningioma vs schwannoma.  Patient was monitored in the ICU for hydro watch. Workup included MRI of the Brain, C/T/L spine and CT of the chest, abdomen and pelvis which was negative for malignancies on 5/20.  Patient was seen by ENT for dysphagia and recommended patient be seen by speech and swallow preop and underwent MBS and was able to continue with a regular diet.  Due to location of the lesion, ENT recommended audiology consult.  She was found to have intact hearing but mildly decreased bilaterally. Transferred to the floor to await surgery. Patient went to the OR on 6/1 for a left far lateral meningioma resection. Patient was monitored in the ICU postoperatively. Postop imaging was done with CT scans and MRI which were reviewed by the neurosurgical team. Attempted extubation the same night after surgery, however she developed stridor and was reintubated.  Patient was successfully extubated on 6/6 to high flow.  Patient was also scoped by ENT and found  to have left vocal cord paralysis.  Post extubation patient had a lot of secretions and was given Mucomyst and Duoneb. There was a concern for PE given respiratory status so CTA chest was done on 6/7 which was negative for PE. 6/9 patient was weaned off high flow, however frequent suctioning was still required. Fed via NGT and per speech and swallow recommendations, she was kept NPO. Patient was also found to have MSSA pneumonia and was treated with Augmentin.  Once respiratory status stabilized, patient was transferred tot he floor on 6/12.  Speech and swallow continued to follow and patient was recommended for a PEG.  PEG was placed on 6/16. Patient also had two episodes of desaturation on 6/16, follow up with another CTA chest; which was also negative for PE, but showed b/l lower lobe consolidations and IV antibiotics (Zosyn) were started.     Patient was evaluated by PM&R and therapy for functional deficits and gait/ADL impairments and recommend acute rehabilitation.  Patient was medically optimized for discharge to [facility] on 6/20/22.  61 year old Female with PMHx of HTN and C-spine meningioma removal in 2018 in [place]. Patient presented with dizziness, gait instability, and vomiting over the last 3 days prior to admission. MRI showed ethel-medullary, Right CPA, and Right temporal enhancing lesions.  Possible meningioma vs schwannoma.  Patient was monitored in the ICU for hydro watch. Workup included MRI of the Brain, C/T/L spine and CT of the chest, abdomen and pelvis which was negative for malignancies on 5/20.  Patient was seen by ENT for dysphagia and recommended patient be seen by speech and swallow preop and underwent MBS and was able to continue with a regular diet.  Due to location of the lesion, ENT recommended audiology consult.  She was found to have intact hearing but mildly decreased bilaterally. Underwent Left far lateral crani for meningioma resection with subtotal sacrifice of left vertebral artery on 6/1. Attempted extubation the same night after surgery, however she developed stridor and was reintubated.  Patient was successfully extubated on 6/6 to high flow.  Patient scoped by ENT and found  to have left vocal cord paralysis.  Post extubation patient had a lot of secretions and was given Mucomyst and Duoneb. There was a concern for PE given respiratory status - CTA chest 6/7 negative for PE but with bilateral consolidation. Patient was weaned off high flow (6/9), however frequent suctioning was still required. FED via NGT and per speech and swallow recommendations, she was kept NPO. Patient was also found to have MSSA pneumonia and was treated with Augmentin.  Speech and swallow continued to follow and patient was recommended for a PEG.  PEG was placed on 6/16. Patient also had two episodes of desaturation on 6/16, follow up with another CTA chest; which was also negative for PE, but showed b/l lower lobe consolidations and IV antibiotics (Zosyn) were started.     Patient was evaluated by PM&R and therapy for functional deficits and gait/ADL impairments and recommend acute rehabilitation.  Patient was medically optimized for discharge to [facility] on 6/20/22.

## 2022-06-20 NOTE — PROGRESS NOTE ADULT - ASSESSMENT
61 year old female presenting with dizziness, gait instability, and emesis. 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    1. s/p craniotomy for meningioma  -per neurology    2. Dysphagia   -s/p PEG placement 6/16    3. Pneumonia  -completed course of abx     4. Atrial fibrillation  -on metoprolol     5. Drop in hemoglobin, no GI bleeding observed      Attending supervision statement: I have personally seen and examined the patient. I fully participated in the care of this patient. I have made amendments to the documentation where necessary, and agree with the history, physical exam, and plan as outlined by the ACP.    [facility]   Gastroenterology and Hepatology  [address] [place], [place]  Office: [phone]  Cell: [phone]

## 2022-06-20 NOTE — PROGRESS NOTE ADULT - PROBLEM SELECTOR PROBLEM 3
Sinus tachycardia
Sinus tachycardia
Dysphagia
Sinus tachycardia
Dysphagia
Dysphagia
Sinus tachycardia
Brain mass
Sinus tachycardia
Dysphagia
Sinus tachycardia
Sinus tachycardia

## 2022-06-20 NOTE — PROGRESS NOTE ADULT - PROBLEM SELECTOR PLAN 9
DVT ppx: c/w lovenox for DVT prophylaxis. LE duplex negative for DVT on 6/8.  monitor bowel movement- last 6/14  required intermittent straight cath. cardura stopped given hypotension and pedro placed on 6/16 for strict I/O.
DVT ppx: c/w lovenox for DVT prophylaxis. LE duplex negative for DVT on 6/8.  monitor bowel movement- last 6/18  required intermittent straight cath. cardura stopped given hypotension and pedro placed on 6/16 for strict I/O.
DVT ppx: c/w lovenox for DVT prophylaxis. LE duplex negative for DVT on 6/8.  monitor bowel movement- last 6/19  required intermittent straight cath. cardura was started and stopped this admission given hypotension. pedro placed on 6/16 for strict I/O. TOV when able
DVT ppx: c/w lovenox for DVT prophylaxis. LE duplex negative for DVT on 6/8.  monitor bowel movement- last 6/14  required intermittent straight cath. cardura stopped given hypotension and pedro placed on 6/16 for strict I/O.

## 2022-06-20 NOTE — PROGRESS NOTE ADULT - PROBLEM SELECTOR PLAN 2
patient with intermittent episodes of hypoxia, suspect likely due to oropharyngeal secretions and difficulty with clearing and aspiration of secretions  - CTA chest on 6/7 negative for PE but b/l consolidation noted concerning for aspiration pneumonia (sputum cx with MSSA), completed augmentin  - c/w chest PT, suctioning, nebs/mucomyst to help clear secretions. she needs aggressive pulm toileting.  **patient noted with hypoxia and hemodynamic instability post-PEG on 6/16- likely in setting of anesthesia and possible recurrent aspiration  - Had clinical improvement on Zosyn. Completing 5 day course today for recurrent aspiration pneumonia   - blood cx ngtd, CTA chest neg for PE, has unchanged bibasilar consolidations, but improving GGO. Likely residual from prior infection

## 2022-06-20 NOTE — H&P ADULT - NSHPPHYSICALEXAM_GEN_ALL_CORE
Gen - NAD, Comfortable  HEENT - NCAT, EOMI, MMM  Neck - Supple, No limited ROM  Pulm - Decreased BS bilateral bases, No wheeze, No rhonchi, No crackles  Cardiovascular - RRR, S1S2, No murmurs  Abdomen - Soft, NT/ND, +BS, (+) PEG tube  Extremities - No C/C/E, No calf tenderness  Neuro-     Cognitive - AAOx3     Communication - Fluent, No dysarthria, decreased phonation     Attention: Intact, able to recite days of week backwards, perform serial 5's     Memory: Recall 3 objects immediate and 3 min later         Cranial Nerves - Slight impaired R shoulder shrug, otherwise intact. Visual fields intact.     Motor -                     LEFT    UE - ShAB 5/5, EF 5/5, EE 5/5, WE 5/5,  5/5                    RIGHT UE - ShAB 4+/5, EF 3/5, EE 3/5, WE 3/5,  3/5                    LEFT    LE - HF 5/5, KE 5/5, DF 5/5, PF 5/5                    RIGHT LE - HF 4/5, KE 4/5, DF 3/5, PF 4/5        Sensory - Slightly diminished sensation to light touch in RUE and RLE     Tone - normal  Psychiatric - Mood stable, Affect WNL  Skin:  (+) lip sore  Wounds: None Present Gen - NAD, Comfortable  HEENT - NCAT, EOMI, MMM  Neck - Supple, No limited ROM  Pulm - Decreased BS bilateral bases, No wheeze, No rhonchi, No crackles  Cardiovascular - RRR, S1S2, No murmurs  Abdomen - Soft, NT/ND, +BS, (+) PEG tube  Extremities - No calf tenderness  Neuro-     Cognitive - AAOx3. --did not know hospital name     Communication - Fluent, No dysarthria, decreased phonation     Attention: Intact, able to recite days of week backwards, perform serial 5's     Memory: Recall 3 objects immediate and 3 min later         Cranial Nerves - PERRLA, EOMI, +mild diplopia, Slight impaired R shoulder shrug, . Visual fields intact. +dysphagia, +hypophonia,  Orolingual weakness     Motor -                     LEFT    UE - ShAB 5/5, EF 5/5, EE 5/5, WE 5/5,  5/5                    RIGHT UE - ShAB 4+/5, EF 3/5, EE 3/5, WE 3/5,  3/5                    LEFT    LE - HF 5/5, KE 5/5, DF 5/5, PF 5/5                    RIGHT LE - HF 4/5, KE 4/5, DF 3/5, PF 4/5        Sensory - Slightly diminished sensation to light touch in RUE and RLE     Coordination -- impaired on right     Tone - normal  Psychiatric - Mood stable, Affect WNL  Skin:  (+) lip sore  Wounds: None Present

## 2022-06-20 NOTE — PROGRESS NOTE ADULT - PROBLEM SELECTOR PLAN 1
- MRI showed ethel-medullary, right CPA, and right temporal enhancing lesions c/f meningioma vs schwannoma, with hydro  - s/p L far lateral crani for meningioma resection w/ subtotal sacrifice of L vertebral artery on 6/1  - on decadron   - post-op care as per neurosurgery team

## 2022-06-20 NOTE — DISCHARGE NOTE NURSING/CASE MANAGEMENT/SOCIAL WORK - PATIENT PORTAL LINK FT
You can access the FollowMyHealth Patient Portal offered by [facility] by registering at the following website: [email or web address]. By joining [employer]’s FollowMyHealth portal, you will also be able to view your health information using other applications (apps) compatible with our system.

## 2022-06-20 NOTE — PROGRESS NOTE ADULT - PROBLEM SELECTOR PLAN 3
Now resolved  - LE duplex negative for DVT 6/8 and CTA chest 6/7 negative for PE  - repeat CTA chest neg for PE  - switched to xopenex neb, continue   **patient was reportedly in transient afib with RVR during RRT with HR in 140's? given amiodarone 150mg IVP x1. EKG showed sinus tach and no evidence of afib on tele  - continue with low dose metoprolol 12.5mg BID for now  - TTE on 6/8 showed hyperdynamic LV

## 2022-06-20 NOTE — PROGRESS NOTE ADULT - SUBJECTIVE AND OBJECTIVE BOX
[facility] Division of Hospital Medicine  Jeannie Garcia DO   Available via [employer] Teams      Patient is a 61y old  Female who presents with a chief complaint of Patient was admitted with brain tumor (19 Jun 2022 17:51)      SUBJECTIVE / OVERNIGHT EVENTS:  Daughter in law at bedside. Per patient preference she provided translation.   Patient feels well. Afebrile, no SOB, CP, dizziness, abd pain, N/V  Having BMs  Still having some secretions per RN     MEDICATIONS  (STANDING):  acetylcysteine 20%  Inhalation 3 milliLiter(s) Inhalation every 6 hours  ascorbic acid 500 milliGRAM(s) Oral daily  dexAMETHasone     Tablet 2 milliGRAM(s) Oral every 12 hours  enoxaparin Injectable 40 milliGRAM(s) SubCutaneous <User Schedule>  levalbuterol Inhalation 0.63 milliGRAM(s) Inhalation every 6 hours  lidocaine 4% Injection for Nebulization 4 milliLiter(s) Nebulizer once  melatonin 5 milliGRAM(s) Oral at bedtime  metoprolol tartrate 12.5 milliGRAM(s) Oral every 12 hours  multivitamin 1 Tablet(s) Oral daily  pantoprazole  Injectable 40 milliGRAM(s) IV Push daily  piperacillin/tazobactam IVPB.. 3.375 Gram(s) IV Intermittent every 8 hours  polyethylene glycol 3350 17 Gram(s) Oral daily  senna 2 Tablet(s) Oral at bedtime    MEDICATIONS  (PRN):  acetaminophen     Tablet .. 650 milliGRAM(s) Oral every 6 hours PRN Temp greater or equal to 38C (100.4F), Mild Pain (1 - 3)  ondansetron    Tablet 4 milliGRAM(s) Oral every 12 hours PRN Nausea  traMADol 25 milliGRAM(s) Oral every 4 hours PRN Moderate Pain (4 - 6)  traMADol 50 milliGRAM(s) Oral every 4 hours PRN Severe Pain (7 - 10)      CAPILLARY BLOOD GLUCOSE        I&O's Summary    19 Jun 2022 07:01  -  20 Jun 2022 07:00  --------------------------------------------------------  IN: 1120 mL / OUT: 1450 mL / NET: -330 mL    20 Jun 2022 07:01  -  20 Jun 2022 14:23  --------------------------------------------------------  IN: 560 mL / OUT: 0 mL / NET: 560 mL        PHYSICAL EXAM:  Vital Signs Last 24 Hrs  T(C): 36.4 (20 Jun 2022 14:19), Max: 36.9 (20 Jun 2022 01:13)  T(F): 97.6 (20 Jun 2022 14:19), Max: 98.4 (20 Jun 2022 01:13)  HR: 76 (20 Jun 2022 14:19) (70 - 87)  BP: 121/78 (20 Jun 2022 14:19) (107/69 - 121/78)  BP(mean): --  RR: 18 (20 Jun 2022 14:19) (18 - 18)  SpO2: 95% (20 Jun 2022 14:19) (94% - 96%)    CONSTITUTIONAL: NAD, well-groomed  RESPIRATORY: Normal respiratory effort; mild scattered rhonchi but mostly clear  CARDIOVASCULAR: normal S1 and S2, no murmur/rub/gallop; No lower extremity edema  ABDOMEN: soft, + PEG covered with dressing, + bowel sounds, no rebound/guarding  MUSCULOSKELETAL:  no clubbing or cyanosis of digits; no joint swelling or tenderness to palpation  PSYCH: A+O to person, place, and time; affect appropriate   SKIN: No rashes; + healing herpes labialis in lower lip    LABS:    06-19    135  |  100  |  13  ----------------------------<  117<H>  4.1   |  28  |  0.37<L>    Ca    8.3<L>      19 Jun 2022 05:45                  RADIOLOGY & ADDITIONAL TESTS:  Results Reviewed:   Imaging Personally Reviewed:  Electrocardiogram Personally Reviewed:    COORDINATION OF CARE:  Care Discussed with Consultants/Other Providers [Y/N]: Neurosurgery   Prior or Outpatient Records Reviewed [Y/N]:

## 2022-06-20 NOTE — PROGRESS NOTE ADULT - PROBLEM SELECTOR PROBLEM 4
Herpes labialis
Benign hypertension
Herpes labialis
Benign hypertension
Dysphagia
Herpes labialis
Herpes labialis
Dysphagia
Dysphagia
Herpes labialis
Herpes labialis

## 2022-06-20 NOTE — PROGRESS NOTE ADULT - PROBLEM SELECTOR PLAN 4
+ blister noted in bottom lip  s/p valtrex 2g BID x 2 doses on 6/15
BP low normal. Losartan dose decreased, holding parameters added.
BP is stable currently   cont with Losartan   Monitor BP.
- c/w tube feeds via NGT  - plan for repeat swallow eval 6/15  - will need PEG if patient fails again    - aspiration precautions
BP is stable currently   cont with Losartan   Monitor BP.
+ blister noted in bottom lip  valtrex 2g BID x 2 doses(ordered)
- c/w tube feeds via NGT  - plan for repeat swallow eval tomorrow 6/13  - may eventually need PEG if does not pass above eval  - aspiration precautions
BP is stable currently   cont with Losartan   Monitor BP.
- c/w tube feeds via NGT  - plan for repeat swallow eval tomorrow 6/15  - may eventually need PEG if does not pass   - aspiration precautions
+ blister noted in bottom lip  s/p valtrex 2g BID x 2 doses on 6/15
+ blister noted in bottom lip  s/p valtrex 2g BID x 2 doses on 6/15

## 2022-06-20 NOTE — PROGRESS NOTE ADULT - PROVIDER SPECIALTY LIST ADULT
NSICU
Rehab Medicine
Rehab Medicine
Wound Care
Gastroenterology
Gastroenterology
NSICU
Neurosurgery
Rehab Medicine
Gastroenterology
NSICU
Neurosurgery
NSICU
NSICU
Neurosurgery
Neurosurgery
Hospitalist
Internal Medicine
NSICU
NSICU
Neurosurgery
Hospitalist
Internal Medicine
Internal Medicine
Hospitalist
Hospitalist
Internal Medicine
Hospitalist
Internal Medicine
Internal Medicine
Hospitalist
Internal Medicine

## 2022-06-20 NOTE — DISCHARGE NOTE NURSING/CASE MANAGEMENT/SOCIAL WORK - NSDCPEFALRISK_GEN_ALL_CORE
For information on Fall & Injury Prevention, visit: [email or web address] OR  [email or web address] OR  https://www.cdc.gov/steadi/patient.html

## 2022-06-20 NOTE — H&P ADULT - NSHPLABSRESULTS_GEN_ALL_CORE
6-19    135  |  100  |  13  ----------------------------<  117<H>  4.1   |  28  |  0.37<L>    Ca    8.3<L>      19 Jun 2022 05:45      RADIOLOGY, EKG & ADDITIONAL TESTS:   CT Head No Cont (05.19.22 @ 16:31) >  Possible 1.4 x 1.4 x 1.8 cm mildly hyperdense, possibly extra-axial lesion in the region of the left pontomedullary junction. Apparent low density in the left basis pontis may represent edema.    Alternatively, this may represent a pseudolesion, dolichoectatic left vertebral artery, and streak artifact.    MR Head w/wo IV Cont (05.20.22 @ 10:39) >  MRI BRAIN:  2.2 x 1.8 x 1.6 cm (maximal AP x TV x CC dimensions) avidly enhancing extra-axial mass approximating the left pontomedullary junction, suspicious for the presence of meningioma.    The lesion encases the distal intradural vertebral artery, which is patent.    Mass effect upon the medulla and inferior ethel. Vasogenic edema within the medulla and basis pontis.    1.3 x 0.9 cm (axial plane) avidly enhancing extra-axial mass in the right cerebellopontine angle extending into the right internal auditory canal. Differential diagnosisincludes a schwannoma and meningioma.    0.8 x 0.6 cm (axial plane) avidly enhancing extra-axial lesion along the inferior aspect of the right tentorium, suspicious for the presence of meningioma.    MRA BRAIN:  No flow-limiting stenosis or vascular aneurysm.    Large bilateral posterior communicating arteries with hypoplastic left and small caliber right P1 segments.    CT Head No Cont (05.21.22 @ 08:01) >  No acute intracranial hemorrhage, new mass effect, midline shift, or herniation. Stable ventricular size and configuration.  Hyperdense, extra-axial lesion at the left pontomedullary junction, compatible with meningioma, with adjacent parenchymal edema, better evaluated on recent MRI.    CT Head No Cont (06.01.22 @ 19:36) >  Interval left suboccipital craniotomy and cranioplasty. Subjacent low density extra-axial collection measures 4 mm in greatest depth.    Interval placement of 2 aneurysm clips along the left aspect of the inferior brainstem.    Scattered pneumocephalus.    Slightly increased mild dilatation of the supratentorial ventricular system    CT Head No Cont (06.02.22 @ 08:29) >  Resolution of considerable amount of postop air compared with the prior 5/21/2022 with minimal residual remaining in the right frontal extra-axial space as well as some minimal subarachnoid air at the level of the sylvian regions.    MR Head w/wo IV Cont (06.03.22 @ 10:16) >  Postop changes are appreciated at the left CP angle region. Residual edema in the ethel and medulla and mass effect on the fourth ventricle remains. Extra-axial small amount of postop hemorrhage identified in this region. Right frontal small amount of extra-axial fluid. 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    CT Angio Chest PE Protocol w/ IV Cont (06.17.22 @ 13:25) >  No pulmonary embolus.  Unchanged bibasilar consolidations. Interval decrease in patchy bilateral groundglass opacities seen on previous study which may have been due to infection or aspiration. Bilateral lower lobe tree-in-bud nodules are unchanged.    Small airways inflammation. 6-19    135  |  100  |  13  ----------------------------<  117<H>  4.1   |  28  |  0.37<L>    Ca    8.3<L>      19 Jun 2022 05:45      RADIOLOGY, EKG & ADDITIONAL TESTS:   CT Head No Cont (05.19.22 @ 16:31) >  Possible 1.4 x 1.4 x 1.8 cm mildly hyperdense, possibly extra-axial lesion in the region of the left pontomedullary junction. Apparent low density in the left basis pontis may represent edema.    Alternatively, this may represent a pseudolesion, dolichoectatic left vertebral artery, and streak artifact.    MR Head w/wo IV Cont (05.20.22 @ 10:39) >  MRI BRAIN:  2.2 x 1.8 x 1.6 cm (maximal AP x TV x CC dimensions) avidly enhancing extra-axial mass approximating the left pontomedullary junction, suspicious for the presence of meningioma.    The lesion encases the distal intradural vertebral artery, which is patent.    Mass effect upon the medulla and inferior ethel. Vasogenic edema within the medulla and basis pontis.    1.3 x 0.9 cm (axial plane) avidly enhancing extra-axial mass in the right cerebellopontine angle extending into the right internal auditory canal. Differential diagnosisincludes a schwannoma and meningioma.    0.8 x 0.6 cm (axial plane) avidly enhancing extra-axial lesion along the inferior aspect of the right tentorium, suspicious for the presence of meningioma.    MRA BRAIN:  No flow-limiting stenosis or vascular aneurysm.    Large bilateral posterior communicating arteries with hypoplastic left and small caliber right P1 segments.    CT Head No Cont (05.21.22 @ 08:01) >  No acute intracranial hemorrhage, new mass effect, midline shift, or herniation. Stable ventricular size and configuration.  Hyperdense, extra-axial lesion at the left pontomedullary junction, compatible with meningioma, with adjacent parenchymal edema, better evaluated on recent MRI.    CT Head No Cont (06.01.22 @ 19:36) >  Interval left suboccipital craniotomy and cranioplasty. Subjacent low density extra-axial collection measures 4 mm in greatest depth.    Interval placement of 2 aneurysm clips along the left aspect of the inferior brainstem.    Scattered pneumocephalus.    Slightly increased mild dilatation of the supratentorial ventricular system    CT Head No Cont (06.02.22 @ 08:29) >  Resolution of considerable amount of postop air compared with the prior 5/21/2022 with minimal residual remaining in the right frontal extra-axial space as well as some minimal subarachnoid air at the level of the sylvian regions.    MR Head w/wo IV Cont (06.03.22 @ 10:16) >  Postop changes are appreciated at the left CP angle region. Residual edema in the ethel and medulla and mass effect on the fourth ventricle remains. Extra-axial small amount of postop hemorrhage identified in this region. Right frontal small amount of extra-axial fluid. 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    CT Angio Chest PE Protocol w/ IV Cont (06.17.22 @ 13:25) >  No pulmonary embolus.  Unchanged bibasilar consolidations. Interval decrease in patchy bilateral ground glass opacities seen on previous study which may have been due to infection or aspiration. Bilateral lower lobe tree-in-bud nodules are unchanged.    Small airways inflammation.

## 2022-06-20 NOTE — PROGRESS NOTE ADULT - PROBLEM SELECTOR PROBLEM 5
Dysphagia
Vocal cord paralysis
Dysphagia
Dysphagia
Vocal cord paralysis
Dysphagia
Vocal cord paralysis

## 2022-06-20 NOTE — PROGRESS NOTE ADULT - PROBLEM SELECTOR PLAN 8
- stopped losartan and amlodipine due to marginal BP  - c/w metoprolol 12.5mg q12h
DVT ppx: resume lovenox when deemed stable, LE duplex negative for DVT on 6/8.  monitor bowel movement- last 6/14  requiring intermittent straight cath. d/c cardura given hypotension and place pedro for strict I/O.
- losartan and amlodipine stopped due to marginal BP this admission   - c/w metoprolol 12.5mg q12h
- stopped losartan and amlodipine due to marginal BP  - c/w metoprolol 12.5mg q12h
- stopped losartan and amlodipine due to marginal BP  - c/w metoprolol 12.5mg q12h
DVT ppx: lovenox, LE duplex negative for DVT on 6/8.  monitor bowel movement- last 6/14  requiring intermittent straight cath. c/w cardura and if continues to require straight cath, consider placing pedro.

## 2022-06-20 NOTE — PROGRESS NOTE ADULT - PROBLEM SELECTOR PROBLEM 6
Benign hypertension
Vocal cord paralysis
Vocal cord paralysis
Benign hypertension
Vocal cord paralysis
Benign hypertension
Vocal cord paralysis

## 2022-06-20 NOTE — PROGRESS NOTE ADULT - SUBJECTIVE AND OBJECTIVE BOX
no new complaints     REVIEW OF SYSTEMS  Constitutional - No fever,  No fatigue  HEENT - No vertigo, No neck pain  Neurological - No headaches, No memory loss, No loss of strength, No numbness, No tremors  Skin - No rashes, No lesions   Musculoskeletal - No joint pain, No joint swelling, No muscle pain  Psychiatric - No depression, No anxiety    FUNCTIONAL PROGRESS  6/17 PT  bed mobility mod assist  transfers mod assist  gait mod assist x 5 steps with RW    VITALS  T(C): 36.3 (06-20-22 @ 11:00), Max: 36.9 (06-20-22 @ 01:13)  HR: 77 (06-20-22 @ 11:00) (70 - 87)  BP: 116/78 (06-20-22 @ 11:00) (107/69 - 120/78)  RR: 18 (06-20-22 @ 11:00) (18 - 18)  SpO2: 94% (06-20-22 @ 11:00) (94% - 96%)  Wt(kg): --    MEDICATIONS   acetaminophen     Tablet .. 650 milliGRAM(s) every 6 hours PRN  acetylcysteine 20%  Inhalation 3 milliLiter(s) every 6 hours  ascorbic acid 500 milliGRAM(s) daily  dexAMETHasone     Tablet 2 milliGRAM(s) every 12 hours  enoxaparin Injectable 40 milliGRAM(s) <User Schedule>  levalbuterol Inhalation 0.63 milliGRAM(s) every 6 hours  lidocaine 4% Injection for Nebulization 4 milliLiter(s) once  melatonin 5 milliGRAM(s) at bedtime  metoprolol tartrate 12.5 milliGRAM(s) every 12 hours  multivitamin 1 Tablet(s) daily  ondansetron    Tablet 4 milliGRAM(s) every 12 hours PRN  pantoprazole  Injectable 40 milliGRAM(s) daily  piperacillin/tazobactam IVPB.. 3.375 Gram(s) every 8 hours  polyethylene glycol 3350 17 Gram(s) daily  senna 2 Tablet(s) at bedtime  traMADol 25 milliGRAM(s) every 4 hours PRN  traMADol 50 milliGRAM(s) every 4 hours PRN      RECENT LABS - Reviewed    06-19    135  |  100  |  13  ----------------------------<  117<H>  4.1   |  28  |  0.37<L>    Ca    8.3<L>      19 Jun 2022 05:45    ------------------------------------------------------------------------  PHYSICAL EXAM  Constitutional - NAD, in bed   Chest - Breathing comfortably  Cardiovascular - S1S2   Abdomen - Soft, PEG   Extremities - No C/C/E, No calf tenderness   Neurologic Exam -                    Cognitive - Awake, Alert, AAO x3     Communication - hypophonic, follows commands        Motor - moves all ext      Sensory - Intact to LT     Psychiatric - Mood stable, Affect WNL  ----------------------------------------------------------------------------------------  ASSESSMENT/PLAN  61yFemale h/o HTN, c spine meningioma removed 2018 with functional deficits due to meningioma  s/p L far lateral crani for meningioma resection w/ subtotal sacrifice of L vertebral artery   dexamethasone taper  aspiration pna, on zosyn  vocal cord paralysis, self suctioning    s/p PEG 6/16  urinary retention, bowel regimen  Pain - Tylenol, tramadol   DVT PPX - SCDs, lovenox   Rehab -   continue bedside therapy  patient would benefit from acute inpatient rehabilitation when medically stable, intensive PT/OT/SLP to restore function while being closely monitored and managed for ongoing medical issues which can destabilize

## 2022-06-20 NOTE — PROGRESS NOTE ADULT - PROBLEM SELECTOR PROBLEM 8
Benign hypertension
Prophylactic measure
Benign hypertension
Prophylactic measure

## 2022-06-20 NOTE — PROGRESS NOTE ADULT - PROBLEM SELECTOR PROBLEM 7
Anemia
Prophylactic measure
Prophylactic measure
Benign hypertension
Benign hypertension
Anemia
Prophylactic measure
Anemia
Anemia

## 2022-06-20 NOTE — H&P ADULT - NSHPREVIEWOFSYSTEMS_GEN_ALL_CORE
REVIEW OF SYSTEMS  Constitutional: No fever, No Chills, No fatigue  HEENT: No eye pain, No visual disturbances, No difficulty hearing  Pulm: No cough,  No shortness of breath  Cardio: No chest pain, No palpitations  GI:  No abdominal pain, No nausea, No vomiting, No diarrhea, No constipation  : No dysuria, No frequency, No hematuria  Neuro: No headaches, No memory loss, No loss of strength, No numbness, No tremors  Skin: No itching, No rashes, No lesions   Endo: No temperature intolerance  MSK: No joint pain, No joint swelling, No muscle pain, No Neck or back pain  Psych:  No depression, No anxiety REVIEW OF SYSTEMS  Constitutional: No fever, No Chills, No fatigue  HEENT: No eye pain, No difficulty hearing (+) L blurred vision  Pulm: No cough,  No shortness of breath  Cardio: No chest pain, No palpitations  GI:  No abdominal pain, No nausea, No vomiting, No diarrhea, No constipation  : No dysuria, No frequency, No hematuria  Neuro: No headaches, No memory loss, No tremors (+) loss of strength, difficulty walking, decreased sensation  Skin: No itching, No rashes, (+) lip sore  Endo: No temperature intolerance  MSK: No joint pain, No joint swelling, No muscle pain, No Neck or back pain  Psych:  No depression, No anxiety REVIEW OF SYSTEMS  Constitutional: No fever, No Chills, No fatigue  HEENT: No eye pain, No difficulty hearing (+) L blurred vision, +double vision. hypophonia  Pulm: No cough,  No shortness of breath  Cardio: No chest pain, No palpitations  GI:  No abdominal pain, No nausea, No vomiting, No diarrhea, No constipation  : No dysuria, No frequency, No hematuria  Neuro: No headaches, No memory loss, No tremors (+) loss of strength, difficulty walking, decreased sensation.  dysphagia  Skin: No itching, No rashes, (+) lip sore  Endo: No temperature intolerance  MSK: No joint pain, No joint swelling, No muscle pain, No Neck or back pain  Psych:  No depression, No anxiety

## 2022-06-20 NOTE — H&P ADULT - NSHPSOCIALHISTORY_GEN_ALL_CORE
SOCIAL HISTORY  Smoking - Denied  EtOH - Denied   Drugs - Denied    FUNCTIONAL HISTORY  Lives with son & daughter-in-law in a private home with 4 steps to enter & 1 flight to basement  Prior Level of Function: Independent in ADLs and ambulation    CURRENT FUNCTIONAL STATUS  - Bed Mobility: mod A  - Transfers: Mod A  - Gait: 5 steps mod A  - ADLs: Max A dressing (upper and lower body)/bathing/toileting, mod A grooming, min A eating

## 2022-06-20 NOTE — PROGRESS NOTE ADULT - PROBLEM SELECTOR PROBLEM 1
Brain mass
Benign hypertension
Brain mass

## 2022-06-20 NOTE — PROGRESS NOTE ADULT - NUTRITIONAL ASSESSMENT
This patient has been assessed with a concern for Malnutrition and has been determined to have a diagnosis/diagnoses of Severe protein-calorie malnutrition.    This patient is being managed with:   Diet NPO after Midnight-     NPO Start Date: 15-Polo-2022   NPO Start Time: 23:59  Except Medications  Entered: Polo 15 2022  1:56PM    Diet NPO with Tube Feed-  Tube Feeding Modality: Nasogastric  Glucerna 1.5 Ash (GLUCERNA1.5RTH)  Total Volume for 24 Hours (mL): 1440  Continuous  Starting Tube Feed Rate {mL per Hour}: 20  Increase Tube Feed Rate by (mL): 10     Every 4 hours  Until Goal Tube Feed Rate (mL per Hour): 60  Tube Feed Duration (in Hours): 24  Tube Feed Start Time: 11:00  Supplement Feeding Modality:  Nasogastric  Probiotic Yogurt/Smoothie Cans or Servings Per Day:  2       Frequency:  Daily  Entered: Jun 8 2022  9:10AM    
This patient has been assessed with a concern for Malnutrition and has been determined to have a diagnosis/diagnoses of Severe protein-calorie malnutrition.    This patient is being managed with:   Diet NPO with Tube Feed-  Tube Feeding Modality: Nasogastric  Glucerna 1.5 Ash (GLUCERNA1.5RTH)  Total Volume for 24 Hours (mL): 1440  Continuous  Starting Tube Feed Rate {mL per Hour}: 20  Increase Tube Feed Rate by (mL): 10     Every 4 hours  Until Goal Tube Feed Rate (mL per Hour): 60  Tube Feed Duration (in Hours): 24  Tube Feed Start Time: 11:00  Supplement Feeding Modality:  Nasogastric  Probiotic Yogurt/Smoothie Cans or Servings Per Day:  2       Frequency:  Daily  Entered: Jun 2 2022  8:48PM    
This patient has been assessed with a concern for Malnutrition and has been determined to have a diagnosis/diagnoses of Severe protein-calorie malnutrition.    This patient is being managed with:   Diet NPO-  Except Medications  Entered: Jun 6 2022 12:23PM    
This patient has been assessed with a concern for Malnutrition and has been determined to have a diagnosis/diagnoses of Severe protein-calorie malnutrition.    This patient is being managed with:   Diet NPO with Tube Feed-  Tube Feeding Modality: Nasogastric  Glucerna 1.5 Ash (GLUCERNA1.5RTH)  Total Volume for 24 Hours (mL): 1440  Continuous  Starting Tube Feed Rate {mL per Hour}: 20  Increase Tube Feed Rate by (mL): 10     Every 4 hours  Until Goal Tube Feed Rate (mL per Hour): 60  Tube Feed Duration (in Hours): 24  Tube Feed Start Time: 11:00  Supplement Feeding Modality:  Nasogastric  Probiotic Yogurt/Smoothie Cans or Servings Per Day:  2       Frequency:  Daily  Entered: Jun 2 2022  8:48PM    
This patient has been assessed with a concern for Malnutrition and has been determined to have a diagnosis/diagnoses of Severe protein-calorie malnutrition.    This patient is being managed with:   Diet NPO with Tube Feed-  Tube Feeding Modality: Nasogastric  Glucerna 1.5 Ash (GLUCERNA1.5RTH)  Total Volume for 24 Hours (mL): 1440  Continuous  Starting Tube Feed Rate {mL per Hour}: 20  Increase Tube Feed Rate by (mL): 10     Every 4 hours  Until Goal Tube Feed Rate (mL per Hour): 60  Tube Feed Duration (in Hours): 24  Tube Feed Start Time: 11:00  Supplement Feeding Modality:  Nasogastric  Probiotic Yogurt/Smoothie Cans or Servings Per Day:  2       Frequency:  Daily  Entered: Jun 8 2022  9:10AM    
This patient has been assessed with a concern for Malnutrition and has been determined to have a diagnosis/diagnoses of Severe protein-calorie malnutrition.    This patient is being managed with:   Diet NPO with Tube Feed-  Tube Feeding Modality: Nasogastric  Glucerna 1.5 Ash (GLUCERNA1.5RTH)  Total Volume for 24 Hours (mL): 1440  Continuous  Starting Tube Feed Rate {mL per Hour}: 20  Increase Tube Feed Rate by (mL): 10     Every 4 hours  Until Goal Tube Feed Rate (mL per Hour): 60  Tube Feed Duration (in Hours): 24  Tube Feed Start Time: 11:00  Supplement Feeding Modality:  Nasogastric  Probiotic Yogurt/Smoothie Cans or Servings Per Day:  2       Frequency:  Daily  Entered: Jun 8 2022  9:10AM    
This patient has been assessed with a concern for Malnutrition and has been determined to have a diagnosis/diagnoses of Severe protein-calorie malnutrition.    This patient is being managed with:   Diet Regular-  Supplement Feeding Modality:  Oral  Ensure Enlive Cans or Servings Per Day:  1       Frequency:  Two Times a day  Entered: May 27 2022  5:22PM    
This patient has been assessed with a concern for Malnutrition and has been determined to have a diagnosis/diagnoses of Severe protein-calorie malnutrition.    This patient is being managed with:   Diet NPO with Tube Feed-  Tube Feeding Modality: Nasogastric  Glucerna 1.2 Ash (GLUCERNARTH)  Total Volume for 24 Hours (mL): 1440  Continuous  Starting Tube Feed Rate {mL per Hour}: 20  Increase Tube Feed Rate by (mL): 10     Every 4 hours  Until Goal Tube Feed Rate (mL per Hour): 60  Tube Feed Duration (in Hours): 24  Tube Feed Start Time: 11:00  Supplement Feeding Modality:  Nasogastric  Probiotic Yogurt/Smoothie Cans or Servings Per Day:  2       Frequency:  Daily  Entered: Jun 17 2022  5:21PM    
This patient has been assessed with a concern for Malnutrition and has been determined to have a diagnosis/diagnoses of Severe protein-calorie malnutrition.    This patient is being managed with:   Diet NPO with Tube Feed-  Tube Feeding Modality: Nasogastric  Glucerna 1.5 Ash (GLUCERNA1.5RTH)  Total Volume for 24 Hours (mL): 1440  Continuous  Starting Tube Feed Rate {mL per Hour}: 20  Increase Tube Feed Rate by (mL): 10     Every 4 hours  Until Goal Tube Feed Rate (mL per Hour): 60  Tube Feed Duration (in Hours): 24  Tube Feed Start Time: 11:00  Supplement Feeding Modality:  Nasogastric  Probiotic Yogurt/Smoothie Cans or Servings Per Day:  2       Frequency:  Daily  Entered: Jun 2 2022  8:48PM    
This patient has been assessed with a concern for Malnutrition and has been determined to have a diagnosis/diagnoses of Severe protein-calorie malnutrition.    This patient is being managed with:   Diet NPO with Tube Feed-  Tube Feeding Modality: Nasogastric  Glucerna 1.5 Ash (GLUCERNA1.5RTH)  Total Volume for 24 Hours (mL): 1440  Continuous  Starting Tube Feed Rate {mL per Hour}: 20  Increase Tube Feed Rate by (mL): 10     Every 4 hours  Until Goal Tube Feed Rate (mL per Hour): 60  Tube Feed Duration (in Hours): 24  Tube Feed Start Time: 11:00  Supplement Feeding Modality:  Nasogastric  Probiotic Yogurt/Smoothie Cans or Servings Per Day:  2       Frequency:  Daily  Entered: Jun 2 2022  8:48PM    
This patient has been assessed with a concern for Malnutrition and has been determined to have a diagnosis/diagnoses of Severe protein-calorie malnutrition.    This patient is being managed with:   Diet NPO-  Except Medications  Entered: Jun 6 2022 12:23PM    
This patient has been assessed with a concern for Malnutrition and has been determined to have a diagnosis/diagnoses of Severe protein-calorie malnutrition.    This patient is being managed with:   Diet NPO with Tube Feed-  Tube Feeding Modality: Nasogastric  Glucerna 1.5 Ash (GLUCERNA1.5RTH)  Total Volume for 24 Hours (mL): 1440  Continuous  Starting Tube Feed Rate {mL per Hour}: 20  Increase Tube Feed Rate by (mL): 10     Every 4 hours  Until Goal Tube Feed Rate (mL per Hour): 60  Tube Feed Duration (in Hours): 24  Tube Feed Start Time: 11:00  Supplement Feeding Modality:  Nasogastric  Probiotic Yogurt/Smoothie Cans or Servings Per Day:  2       Frequency:  Daily  Entered: Jun 2 2022  8:48PM    
This patient has been assessed with a concern for Malnutrition and has been determined to have a diagnosis/diagnoses of Severe protein-calorie malnutrition.    This patient is being managed with:   Diet NPO with Tube Feed-  Tube Feeding Modality: Nasogastric  Glucerna 1.5 Ash (GLUCERNA1.5RTH)  Total Volume for 24 Hours (mL): 1440  Continuous  Starting Tube Feed Rate {mL per Hour}: 20  Increase Tube Feed Rate by (mL): 10     Every 4 hours  Until Goal Tube Feed Rate (mL per Hour): 60  Tube Feed Duration (in Hours): 24  Tube Feed Start Time: 11:00  Supplement Feeding Modality:  Nasogastric  Probiotic Yogurt/Smoothie Cans or Servings Per Day:  2       Frequency:  Daily  Entered: Jun 2 2022  8:48PM    
This patient has been assessed with a concern for Malnutrition and has been determined to have a diagnosis/diagnoses of Severe protein-calorie malnutrition.    This patient is being managed with:   Diet NPO with Tube Feed-  Tube Feeding Modality: Nasogastric  Glucerna 1.5 Ash (GLUCERNA1.5RTH)  Total Volume for 24 Hours (mL): 1440  Continuous  Starting Tube Feed Rate {mL per Hour}: 20  Increase Tube Feed Rate by (mL): 10     Every 4 hours  Until Goal Tube Feed Rate (mL per Hour): 60  Tube Feed Duration (in Hours): 24  Tube Feed Start Time: 11:00  Supplement Feeding Modality:  Nasogastric  Probiotic Yogurt/Smoothie Cans or Servings Per Day:  2       Frequency:  Daily  Entered: Jun 8 2022  9:10AM    
This patient has been assessed with a concern for Malnutrition and has been determined to have a diagnosis/diagnoses of Severe protein-calorie malnutrition.    This patient is being managed with:   Diet NPO with Tube Feed-  Tube Feeding Modality: Nasogastric  Glucerna 1.5 Ash (GLUCERNA1.5RTH)  Total Volume for 24 Hours (mL): 1440  Continuous  Starting Tube Feed Rate {mL per Hour}: 20  Increase Tube Feed Rate by (mL): 10     Every 4 hours  Until Goal Tube Feed Rate (mL per Hour): 60  Tube Feed Duration (in Hours): 24  Tube Feed Start Time: 11:00  Supplement Feeding Modality:  Nasogastric  Probiotic Yogurt/Smoothie Cans or Servings Per Day:  2       Frequency:  Daily  Entered: Jun 8 2022  9:10AM    
This patient has been assessed with a concern for Malnutrition and has been determined to have a diagnosis/diagnoses of Severe protein-calorie malnutrition.    This patient is being managed with:   Diet NPO-  Except Medications  Entered: Jun 6 2022 12:23PM    
This patient has been assessed with a concern for Malnutrition and has been determined to have a diagnosis/diagnoses of Severe protein-calorie malnutrition.      
This patient has been assessed with a concern for Malnutrition and has been determined to have a diagnosis/diagnoses of Severe protein-calorie malnutrition.      
This patient has been assessed with a concern for Malnutrition and has been determined to have a diagnosis/diagnoses of Severe protein-calorie malnutrition.    This patient is being managed with:   Diet NPO after Midnight-     NPO Start Date: 15-Polo-2022   NPO Start Time: 23:59  Except Medications  Entered: Polo 15 2022  1:56PM    Diet NPO with Tube Feed-  Tube Feeding Modality: Nasogastric  Glucerna 1.5 Ash (GLUCERNA1.5RTH)  Total Volume for 24 Hours (mL): 1440  Continuous  Starting Tube Feed Rate {mL per Hour}: 20  Increase Tube Feed Rate by (mL): 10     Every 4 hours  Until Goal Tube Feed Rate (mL per Hour): 60  Tube Feed Duration (in Hours): 24  Tube Feed Start Time: 11:00  Supplement Feeding Modality:  Nasogastric  Probiotic Yogurt/Smoothie Cans or Servings Per Day:  2       Frequency:  Daily  Entered: Jun 8 2022  9:10AM    
This patient has been assessed with a concern for Malnutrition and has been determined to have a diagnosis/diagnoses of Severe protein-calorie malnutrition.    This patient is being managed with:   Diet NPO after Midnight-     NPO Start Date: 31-May-2022   NPO Start Time: 23:59  Except Medications  Entered: May 31 2022  6:02AM    Diet Regular-  Supplement Feeding Modality:  Oral  Ensure Enlive Cans or Servings Per Day:  1       Frequency:  Two Times a day  Entered: May 27 2022  5:22PM    
This patient has been assessed with a concern for Malnutrition and has been determined to have a diagnosis/diagnoses of Severe protein-calorie malnutrition.    This patient is being managed with:   Diet NPO with Tube Feed-  Tube Feeding Modality: Nasogastric  Glucerna 1.5 Ash (GLUCERNA1.5RTH)  Total Volume for 24 Hours (mL): 1440  Continuous  Starting Tube Feed Rate {mL per Hour}: 20  Increase Tube Feed Rate by (mL): 10     Every 4 hours  Until Goal Tube Feed Rate (mL per Hour): 60  Tube Feed Duration (in Hours): 24  Tube Feed Start Time: 11:00  Supplement Feeding Modality:  Nasogastric  Probiotic Yogurt/Smoothie Cans or Servings Per Day:  2       Frequency:  Daily  Entered: Jun 8 2022  9:10AM    
This patient has been assessed with a concern for Malnutrition and has been determined to have a diagnosis/diagnoses of Severe protein-calorie malnutrition.    This patient is being managed with:   Diet NPO with Tube Feed-  Tube Feeding Modality: Nasogastric  Glucerna 1.5 Sah (GLUCERNA1.5RTH)  Total Volume for 24 Hours (mL): 1440  Continuous  Starting Tube Feed Rate {mL per Hour}: 20  Increase Tube Feed Rate by (mL): 10     Every 4 hours  Until Goal Tube Feed Rate (mL per Hour): 60  Tube Feed Duration (in Hours): 24  Tube Feed Start Time: 11:00  Supplement Feeding Modality:  Nasogastric  Probiotic Yogurt/Smoothie Cans or Servings Per Day:  2       Frequency:  Daily  Entered: Jun 2 2022  8:48PM    
This patient has been assessed with a concern for Malnutrition and has been determined to have a diagnosis/diagnoses of Severe protein-calorie malnutrition.    This patient is being managed with:   Diet NPO-  Except Medications  Entered: Jun 6 2022 12:23PM    
This patient has been assessed with a concern for Malnutrition and has been determined to have a diagnosis/diagnoses of Severe protein-calorie malnutrition.    This patient is being managed with:   Diet NPO-  NPO for Procedure/Test     NPO Start Date: 06-Jun-2022   NPO Start Time: 03:00  Entered: Jun 5 2022  5:21PM    Diet NPO with Tube Feed-  Tube Feeding Modality: Nasogastric  Glucerna 1.5 Ash (GLUCERNA1.5RTH)  Total Volume for 24 Hours (mL): 1440  Continuous  Starting Tube Feed Rate {mL per Hour}: 20  Increase Tube Feed Rate by (mL): 10     Every 4 hours  Until Goal Tube Feed Rate (mL per Hour): 60  Tube Feed Duration (in Hours): 24  Tube Feed Start Time: 11:00  Supplement Feeding Modality:  Nasogastric  Probiotic Yogurt/Smoothie Cans or Servings Per Day:  2       Frequency:  Daily  Entered: Jun 2 2022  8:48PM    
This patient has been assessed with a concern for Malnutrition and has been determined to have a diagnosis/diagnoses of Severe protein-calorie malnutrition.    This patient is being managed with:   Diet NPO-  NPO for Procedure/Test     NPO Start Date: 06-Jun-2022   NPO Start Time: 03:00  Entered: Jun 5 2022  5:21PM    Diet NPO with Tube Feed-  Tube Feeding Modality: Nasogastric  Glucerna 1.5 Ash (GLUCERNA1.5RTH)  Total Volume for 24 Hours (mL): 1440  Continuous  Starting Tube Feed Rate {mL per Hour}: 20  Increase Tube Feed Rate by (mL): 10     Every 4 hours  Until Goal Tube Feed Rate (mL per Hour): 60  Tube Feed Duration (in Hours): 24  Tube Feed Start Time: 11:00  Supplement Feeding Modality:  Nasogastric  Probiotic Yogurt/Smoothie Cans or Servings Per Day:  2       Frequency:  Daily  Entered: Jun 2 2022  8:48PM    
This patient has been assessed with a concern for Malnutrition and has been determined to have a diagnosis/diagnoses of Severe protein-calorie malnutrition.    This patient is being managed with:   Diet NPO with Tube Feed-  Tube Feeding Modality: Nasogastric  Glucerna 1.2 Ash (GLUCERNARTH)  Total Volume for 24 Hours (mL): 1440  Continuous  Starting Tube Feed Rate {mL per Hour}: 20  Increase Tube Feed Rate by (mL): 10     Every 4 hours  Until Goal Tube Feed Rate (mL per Hour): 60  Tube Feed Duration (in Hours): 24  Tube Feed Start Time: 11:00  Supplement Feeding Modality:  Nasogastric  Probiotic Yogurt/Smoothie Cans or Servings Per Day:  2       Frequency:  Daily  Entered: Jun 17 2022  5:21PM    
This patient has been assessed with a concern for Malnutrition and has been determined to have a diagnosis/diagnoses of Severe protein-calorie malnutrition.    This patient is being managed with:   Diet NPO with Tube Feed-  Tube Feeding Modality: Nasogastric  Glucerna 1.5 Ash (GLUCERNA1.5RTH)  Total Volume for 24 Hours (mL): 1440  Continuous  Starting Tube Feed Rate {mL per Hour}: 20  Increase Tube Feed Rate by (mL): 10     Every 4 hours  Until Goal Tube Feed Rate (mL per Hour): 60  Tube Feed Duration (in Hours): 24  Tube Feed Start Time: 11:00  Supplement Feeding Modality:  Nasogastric  Probiotic Yogurt/Smoothie Cans or Servings Per Day:  2       Frequency:  Daily  Entered: Jun 2 2022  8:48PM    
This patient has been assessed with a concern for Malnutrition and has been determined to have a diagnosis/diagnoses of Severe protein-calorie malnutrition.    This patient is being managed with:   Diet NPO with Tube Feed-  Tube Feeding Modality: Nasogastric  Glucerna 1.5 Ash (GLUCERNA1.5RTH)  Total Volume for 24 Hours (mL): 1440  Continuous  Starting Tube Feed Rate {mL per Hour}: 20  Increase Tube Feed Rate by (mL): 10     Every 4 hours  Until Goal Tube Feed Rate (mL per Hour): 60  Tube Feed Duration (in Hours): 24  Tube Feed Start Time: 11:00  Supplement Feeding Modality:  Nasogastric  Probiotic Yogurt/Smoothie Cans or Servings Per Day:  2       Frequency:  Daily  Entered: Jun 8 2022  9:10AM    
This patient has been assessed with a concern for Malnutrition and has been determined to have a diagnosis/diagnoses of Severe protein-calorie malnutrition.    This patient is being managed with:   Diet NPO with Tube Feed-  Tube Feeding Modality: Nasogastric  Glucerna 1.5 Ash (GLUCERNA1.5RTH)  Total Volume for 24 Hours (mL): 1440  Continuous  Starting Tube Feed Rate {mL per Hour}: 20  Increase Tube Feed Rate by (mL): 10     Every 4 hours  Until Goal Tube Feed Rate (mL per Hour): 60  Tube Feed Duration (in Hours): 24  Tube Feed Start Time: 11:00  Supplement Feeding Modality:  Nasogastric  Probiotic Yogurt/Smoothie Cans or Servings Per Day:  2       Frequency:  Daily  Entered: Jun 8 2022  9:10AM    
This patient has been assessed with a concern for Malnutrition and has been determined to have a diagnosis/diagnoses of Severe protein-calorie malnutrition.    This patient is being managed with:   Diet Regular-  Supplement Feeding Modality:  Oral  Ensure Enlive Cans or Servings Per Day:  1       Frequency:  Two Times a day  Entered: May 27 2022  5:22PM    
This patient has been assessed with a concern for Malnutrition and has been determined to have a diagnosis/diagnoses of Severe protein-calorie malnutrition.    This patient is being managed with:   Diet Regular-  Supplement Feeding Modality:  Oral  Ensure Enlive Cans or Servings Per Day:  1       Frequency:  Two Times a day  Entered: May 27 2022  5:22PM    
This patient has been assessed with a concern for Malnutrition and has been determined to have a diagnosis/diagnoses of Severe protein-calorie malnutrition.    This patient is being managed with:   Diet NPO with Tube Feed-  Tube Feeding Modality: Nasogastric  Glucerna 1.2 Ash (GLUCERNARTH)  Total Volume for 24 Hours (mL): 1440  Continuous  Starting Tube Feed Rate {mL per Hour}: 20  Increase Tube Feed Rate by (mL): 10     Every 4 hours  Until Goal Tube Feed Rate (mL per Hour): 60  Tube Feed Duration (in Hours): 24  Tube Feed Start Time: 11:00  Supplement Feeding Modality:  Nasogastric  Probiotic Yogurt/Smoothie Cans or Servings Per Day:  2       Frequency:  Daily  Entered: Jun 17 2022  5:21PM    

## 2022-06-20 NOTE — PROGRESS NOTE ADULT - PROBLEM SELECTOR PROBLEM 2
Preoperative clearance
Pneumonia due to methicillin susceptible Staphylococcus aureus (MSSA)
Preoperative clearance
Preoperative clearance
Pneumonia due to methicillin susceptible Staphylococcus aureus (MSSA)
Preoperative clearance
HLD (hyperlipidemia)
Pneumonia due to methicillin susceptible Staphylococcus aureus (MSSA)

## 2022-06-20 NOTE — H&P ADULT - ASSESSMENT
ASSESSMENT/PLAN  62 yo Mandarin speaking F with PMH of HTN, C Spine meningioma (removed 2018 in [place]) who presented with dizziness, ataxia, and emesis.  MRI with ethel-medullary. right CPA, and right temporal enhancing lesions concern for meningioma vs schwannoma, with hydro.  Now S/p Left far lateral crani for meningioma resection with subtotal sacrifice of left vertebral artery (6/1).  Hospital course complicated by MSSA PNA (suspected aspiration PNA) - completed course of Augmentin + zosyn 7D. Noted with vocal cord paralysis, dysphagia - NPO on TF (PEG placed 6/16). Post procedure hypoxia + intubated.  Afib with RVR.    COMORBIDITES/ACTIVE MEDICAL ISSUES     #Brain tumor - Meningioma   - S/p Left far lateral crani for meningioma resection with subtotal sacrifice of left vertebral artery (6/1)  - Dexamethasone taper - 2mg Q8h  - Gait Instability, ADL impairments and Functional impairments: start Comprehensive Rehab Program of PT/OT     #Transient A fib with RVR  - s/p amiodarone IVP x 1  - Sinus tach - EKG without evidence of a fib  - Metoprolol 12.5 BID    #HTN  - Was on losartan and amlodipine - dc d/t soft BP  - only on metoprolol 12.5 BID now    #Aspiration PNA  - CTA: negative for PE  - completed augmentin course 6/5  - Zosyn IV x7 days (last dose 6/23)  #secretion control  - Xoponex, mucomyst Neb    #Vocal cord paralysis  - Self suctioning    #Pain control  - Tylenol PRN, Tramadol    #GI/Bowel Mgmt   - At risk for constipation due to neurologic diagnosis, immobility and/or medication use  - Senna,  Miralax PRN    #Bladder management  - PVR q 8 hours (SC if > 400)  - Encourage timed voids Q4hrs while awake for independence and to promote continence during therapy  - Urinary tretention - pedro inserted 6/16    #DVT  - Lovenox  - SCDs, TEDs     #Skin:  -No active issues at this time  -At risk for pressure injury due to neurologic diagnosis and relative immobility  -Bilateral heels - soft heel protectors, apply liquid barrier film daily and monitor for tissue type changes  - Turn Q2 hr while in bed, air mattress  - Skin barrier cream as needed  - Nursing to monitor skin Qshift    #Sleep:  -Maintain quiet hours and low stim environment  -Monitor sleep logs    FEN   - Diet - NPO with TF  - PEG placed (6/16)  - Dysphagia  SLP - evaluation and treatment    Precautions / PROPHYLAXIS:   - Falls  - ortho: Weight bearing status: WBAT   - Lungs: Aspiration, Incentive Spirometer   - Pressure injury/Skin: Turn Q2hrs while in bed, OOB to Chair, PT/OT      Follow ups:      MEDICAL PROGNOSIS: GOOD            REHAB POTENTIAL: GOOD             ESTIMATED DISPOSITION: HOME WITH HOME CARE            ELOS: 10-14 Days   EXPECTED THERAPY:     P.T. 1hr/day       O.T. 1hr/day      S.L.P. 1hr/day     P&O Unnecessary     EXP FREQUENCY: 5 days per 7 day period     PRESCREEN COMPARISION:   I have reviewed the prescreen information and I have found no relevant changes between the preadmission screening and my post admission evaluation     RATIONALE FOR INPATIENT ADMISSION - Patient demonstrates the following: (check all that apply)  [X] Medically appropriate for rehabilitation admission  [X] Has attainable rehab goals with an appropriate initial discharge plan  [X] Has rehabilitation potential (expected to make a significant improvement within a reasonable period of time)   [X] Requires close medical management by a rehab physician, rehab nursing care, Hospitalist and comprehensive interdisciplinary team (including PT, OT, & or SLP, Prosthetics and Orthotics)   ASSESSMENT/PLAN  60 yo Mandarin speaking F with PMH of HTN, C Spine meningioma (removed 2018 in [place]) who presented with dizziness, ataxia, and emesis.  MRI with ethel-medullary. right CPA, and right temporal enhancing lesions concern for meningioma vs schwannoma, with hydro.  Now S/p Left far lateral crani for meningioma resection with subtotal sacrifice of left vertebral artery (6/1).  Hospital course complicated by MSSA PNA (suspected aspiration PNA) - completed course of Augmentin + zosyn 7D. Noted with vocal cord paralysis, dysphagia - NPO on TF (PEG placed 6/16). Post procedure hypoxia + intubated.  Afib with RVR.    COMORBIDITES/ACTIVE MEDICAL ISSUES     #Brain tumor - Meningioma   - S/p Left far lateral crani for meningioma resection with subtotal sacrifice of left vertebral artery (6/1)  - Dexamethasone taper - 2mg Q8h  - Gait Instability, ADL impairments and Functional impairments: start Comprehensive Rehab Program of PT/OT     #Transient A fib with RVR  - s/p amiodarone IVP x 1  - Sinus tach - EKG without evidence of a fib  - Metoprolol 12.5 BID    #HTN  - Was on losartan and amlodipine - dc d/t soft BP  - only on metoprolol 12.5 BID now    #Aspiration PNA  - CTA: negative for PE  - completed augmentin course 6/5  - Zosyn IV x7 days (last dose 6/23)  #secretion control  - Xoponex, mucomyst Neb    #Vocal cord paralysis  - Self suctioning    #Pain control  - Tylenol PRN, Tramadol    #GI/Bowel Mgmt   - At risk for constipation due to neurologic diagnosis, immobility and/or medication use  - Senna,  Miralax PRN    #Bladder management  - PVR q 8 hours (SC if > 400)  - Encourage timed voids Q4hrs while awake for independence and to promote continence during therapy  - Urinary tretention - pedro inserted 6/16    #DVT  - Lovenox  - SCDs, TEDs     #Skin:  -No active issues at this time  -At risk for pressure injury due to neurologic diagnosis and relative immobility  -Bilateral heels - soft heel protectors, apply liquid barrier film daily and monitor for tissue type changes  - Turn Q2 hr while in bed, air mattress  - Skin barrier cream as needed  - Nursing to monitor skin Qshift    #Sleep:  -Maintain quiet hours and low stim environment  -Monitor sleep logs    FEN   - Diet - NPO with TF  - PEG placed (6/16)  - Dysphagia  SLP - evaluation and treatment    Precautions / PROPHYLAXIS:   - Falls  - ortho: Weight bearing status: WBAT   - Lungs: Aspiration, Incentive Spirometer   - Pressure injury/Skin: Turn Q2hrs while in bed, OOB to Chair, PT/OT      Follow ups:  Giovanni Liu (MD)  Neurosurgery  General  [address] [place] [address], [address]  [place] [address]  Phone: [phone]  Fax: [phone]      MEDICAL PROGNOSIS: GOOD            REHAB POTENTIAL: GOOD             ESTIMATED DISPOSITION: HOME WITH HOME CARE            ELOS: 10-14 Days   EXPECTED THERAPY:     P.T. 1hr/day       O.T. 1hr/day      S.L.P. 1hr/day     P&O Unnecessary     EXP FREQUENCY: 5 days per 7 day period     PRESCREEN COMPARISION:   I have reviewed the prescreen information and I have found no relevant changes between the preadmission screening and my post admission evaluation     RATIONALE FOR INPATIENT ADMISSION - Patient demonstrates the following: (check all that apply)  [X] Medically appropriate for rehabilitation admission  [X] Has attainable rehab goals with an appropriate initial discharge plan  [X] Has rehabilitation potential (expected to make a significant improvement within a reasonable period of time)   [X] Requires close medical management by a rehab physician, rehab nursing care, Hospitalist and comprehensive interdisciplinary team (including PT, OT, & or SLP, Prosthetics and Orthotics)   ASSESSMENT/PLAN  62 yo Mandarin speaking F with PMH of HTN, C Spine meningioma (removed 2018 in [place]) who presented with dizziness, ataxia, and emesis.  MRI with ethel-medullary. right CPA, and right temporal enhancing lesions concern for meningioma vs schwannoma, with hydro.  Now S/p Left far lateral crani for meningioma resection with subtotal sacrifice of left vertebral artery (6/1).  Hospital course complicated by MSSA PNA (suspected aspiration PNA) - completed course of Augmentin + zosyn 7D. Noted with vocal cord paralysis, dysphagia - NPO on TF (PEG placed 6/16). Post procedure hypoxia + intubated.  Afib with RVR.    COMORBIDITES/ACTIVE MEDICAL ISSUES     #Brain tumor - Meningioma   - S/p Left far lateral crani for meningioma resection with subtotal sacrifice of left vertebral artery (6/1)  - Dexamethasone taper - 2mg Q8h -> 2mg Q12h  - Gait Instability, ADL impairments and Functional impairments: start Comprehensive Rehab Program of PT/OT     #Transient A fib with RVR  - s/p amiodarone IVP x 1  - Sinus tach - EKG without evidence of a fib  - Metoprolol 12.5 BID    #HTN  - Was on losartan and amlodipine - dc d/t soft BP  - only on metoprolol 12.5 BID now    #Aspiration PNA  - CTA: negative for PE  - completed augmentin course 6/5  - Zosyn IV x7 days (last dose 6/23)  #secretion control  - Xoponex, mucomyst Neb    #Vocal cord paralysis  - Self suctioning    #Pain control  - Tylenol PRN, Tramadol    #GI/Bowel Mgmt   - At risk for constipation due to neurologic diagnosis, immobility and/or medication use  - Senna,  Miralax PRN    #Bladder management  - PVR q 8 hours (SC if > 400)  - Encourage timed voids Q4hrs while awake for independence and to promote continence during therapy  - Urinary tretention - pedro inserted 6/16    #DVT  - Lovenox  - SCDs, TEDs     #Skin:  -No active issues at this time  -At risk for pressure injury due to neurologic diagnosis and relative immobility  -Bilateral heels - soft heel protectors, apply liquid barrier film daily and monitor for tissue type changes  - Turn Q2 hr while in bed, air mattress  - Skin barrier cream as needed  - Nursing to monitor skin Qshift    #Sleep:  -Maintain quiet hours and low stim environment  -Monitor sleep logs    FEN   - Diet - NPO with TF  - PEG placed (6/16)  - Dysphagia  SLP - evaluation and treatment    Precautions / PROPHYLAXIS:   - Falls  - ortho: Weight bearing status: WBAT   - Lungs: Aspiration, Incentive Spirometer   - Pressure injury/Skin: Turn Q2hrs while in bed, OOB to Chair, PT/OT      Follow ups:  Giovanni Liu (MD)  Neurosurgery  [address], [address]  [place] [address]  Phone: [phone]  Fax: [phone]      MEDICAL PROGNOSIS: GOOD            REHAB POTENTIAL: GOOD             ESTIMATED DISPOSITION: HOME WITH HOME CARE            ELOS: 10-14 Days   EXPECTED THERAPY:     P.T. 1hr/day       O.T. 1hr/day      S.L.P. 1hr/day     P&O Unnecessary     EXP FREQUENCY: 5 days per 7 day period     PRESCREEN COMPARISION:   I have reviewed the prescreen information and I have found no relevant changes between the preadmission screening and my post admission evaluation     RATIONALE FOR INPATIENT ADMISSION - Patient demonstrates the following: (check all that apply)  [X] Medically appropriate for rehabilitation admission  [X] Has attainable rehab goals with an appropriate initial discharge plan  [X] Has rehabilitation potential (expected to make a significant improvement within a reasonable period of time)   [X] Requires close medical management by a rehab physician, rehab nursing care, Hospitalist and comprehensive interdisciplinary team (including PT, OT, & or SLP, Prosthetics and Orthotics)   ASSESSMENT/PLAN  62 yo Mandarin speaking F with PMH of HTN, C Spine meningioma (removed 2018 in [place]) who presented with dizziness, ataxia, and emesis.  MRI with ethel-medullary. right CPA, and right temporal enhancing lesions concern for meningioma vs schwannoma, with hydro.  Now S/p Left far lateral crani for meningioma resection with subtotal sacrifice of left vertebral artery (6/1).  Hospital course complicated by MSSA PNA (suspected aspiration PNA) - completed course of Augmentin + zosyn 7D. Noted with vocal cord paralysis, dysphagia - NPO on TF (PEG placed 6/16). Post procedure hypoxia + intubated.  Afib with RVR.    COMORBIDITES/ACTIVE MEDICAL ISSUES     #Brain tumor - Meningioma   - S/p Left far lateral crani for meningioma resection with subtotal sacrifice of left vertebral artery (6/1)  - Dexamethasone taper - 2mg Q8h -> 2mg Q12h x 2 days, then 2mg x 2 days  - Gait Instability, ADL impairments and Functional impairments: start Comprehensive Rehab Program of PT/OT     #Transient A fib with RVR  - s/p amiodarone IVP x 1  - Sinus tach - EKG without evidence of a fib  - Metoprolol 12.5 BID    # Cranial stenosis  - CTA head showed defect in the right M1 suspicious for possible right M1 occlusion though the vessel   - Recommended to "continue ASA 81mg qd and atorvastatin 40mg qhs" on discharge paperwork, however patient not on it in prior hospital  - Lipid panel 5/21 unremarkable  - Consider starting if needed    #HTN  - Was on losartan and amlodipine - dc d/t soft BP  - only on metoprolol 12.5 BID now    #Aspiration PNA  - CTA: negative for PE  - completed augmentin course 6/5  - Zosyn IV x7 days (last dose 6/23)    #secretion control  - Xoponex, mucomyst Neb    #Vocal cord paralysis  - Self suctioning    # Urinary retention  - s/p pedro 6/16  - Pedro replaced on admission  - Consider TOV when able    # Lip sore  - ? herpes labialis. No prior hx of lip sores  - s/p valtrex 2g BID x 2 doses on 6/15.    #Pain control  - Tylenol PRN, Tramadol    #GI/Bowel Mgmt   - At risk for constipation due to neurologic diagnosis, immobility and/or medication use  - Senna,  Miralax PRN    #DVT  - Lovenox  - SCDs, TEDs     #Skin:  -No active issues at this time  -At risk for pressure injury due to neurologic diagnosis and relative immobility  -Bilateral heels - soft heel protectors, apply liquid barrier film daily and monitor for tissue type changes  - Turn Q2 hr while in bed, air mattress  - Skin barrier cream as needed  - Nursing to monitor skin Qshift    #Sleep:  -Maintain quiet hours and low stim environment  -Monitor sleep logs    FEN   - Diet - NPO with TF  - PEG placed (6/16)  - Dysphagia  SLP - evaluation and treatment    Precautions / PROPHYLAXIS:   - Falls  - ortho: Weight bearing status: WBAT   - Lungs: Aspiration, Incentive Spirometer   - Pressure injury/Skin: Turn Q2hrs while in bed, OOB to Chair, PT/OT      Follow ups:  Giovanni Liu)  Neurosurgery  General  [address] [place] [address], [address]  [place] [address]  Phone: [phone]  Fax: [phone]      MEDICAL PROGNOSIS: GOOD            REHAB POTENTIAL: GOOD             ESTIMATED DISPOSITION: HOME WITH HOME CARE            ELOS: 10-14 Days   EXPECTED THERAPY:     P.T. 1hr/day       O.T. 1hr/day      S.L.P. 1hr/day     P&O Unnecessary     EXP FREQUENCY: 5 days per 7 day period     PRESCREEN COMPARISION:   I have reviewed the prescreen information and I have found no relevant changes between the preadmission screening and my post admission evaluation     RATIONALE FOR INPATIENT ADMISSION - Patient demonstrates the following: (check all that apply)  [X] Medically appropriate for rehabilitation admission  [X] Has attainable rehab goals with an appropriate initial discharge plan  [X] Has rehabilitation potential (expected to make a significant improvement within a reasonable period of time)   [X] Requires close medical management by a rehab physician, rehab nursing care, Hospitalist and comprehensive interdisciplinary team (including PT, OT, & or SLP, Prosthetics and Orthotics)   ASSESSMENT/PLAN  60 yo Mandarin speaking F with PMH of HTN, C Spine meningioma (removed 2018 in [place]) who presented with dizziness, ataxia, and emesis.  MRI with ethel-medullary. right CPA, and right temporal enhancing lesions concern for meningioma vs schwannoma, with hydro.  Now S/p Left far lateral crani for meningioma resection with subtotal sacrifice of left vertebral artery (6/1).  Noted with vocal cord paralysis, dysphagia - NPO on TF (PEG placed 6/16). Also right Hemiparesis, sensory, gait and ADL impairment.     COMORBIDITES/ACTIVE MEDICAL ISSUES     #Brain tumor - Meningioma   - S/p Left far lateral crani for meningioma resection with subtotal sacrifice of left vertebral artery (6/1)  - Dexamethasone taper - 2mg Q8h -> 2mg Q12h x 2 days, then 2mg x 2 days  - Gait Instability, ADL impairments and Functional impairments: start Comprehensive Rehab Program of PT/OT & speech    #Transient A fib with RVR  - s/p amiodarone IVP x 1  - Sinus tach - EKG without evidence of a fib  - Metoprolol 12.5 BID    # Cranial stenosis  - CTA head showed defect in the right M1 suspicious for possible right M1 occlusion though the vessel   - Recommended to "continue ASA 81mg qd and atorvastatin 40mg qhs" on discharge paperwork, however patient not on it in prior hospital  - Lipid panel 5/21 unremarkable  - Consider starting if needed    #HTN  - Was on losartan and amlodipine - dc d/t soft BP  - only on metoprolol 12.5 BID now    #Aspiration PNA  - CTA: negative for PE  - completed augmentin course 6/5  - Zosyn IV x7 days (last dose 6/23)    #secretion control  - Xoponex, mucomyst Neb    #Vocal cord paralysis  - Self suctioning    # Urinary retention  - s/p pedro 6/16  - Pedro replaced on admission  - Consider TOV when able    # Lip sore  - ? herpes labialis. No prior hx of lip sores  - s/p valtrex 2g BID x 2 doses on 6/15.    #Pain control  - Tylenol PRN, Tramadol    #GI/Bowel Mgmt   - At risk for constipation due to neurologic diagnosis, immobility and/or medication use  - Senna,  Miralax PRN    #DVT  - Lovenox    #Skin:  -No active issues at this time  -At risk for pressure injury due to neurologic diagnosis and relative immobility  -Bilateral heels - soft heel protectors, apply liquid barrier film daily and monitor for tissue type changes  - Turn Q2 hr while in bed, air mattress  - Skin barrier cream as needed  - Nursing to monitor skin Qshift    #Sleep:  -Maintain quiet hours and low stim environment  -Monitor sleep logs    FEN   - Diet - NPO with TF  - PEG placed (6/16)  - Dysphagia  SLP - evaluation and treatment    Precautions / PROPHYLAXIS:   - Falls  - Lungs: Aspiration, Incentive Spirometer   - Pressure injury/Skin: Turn Q2hrs while in bed, OOB to Chair, PT/OT      Follow ups:  Giovanni Liu)  Neurosurgery  General  [address] [facility] [address], [address]  [place] [address]  Phone: [phone]  Fax: [phone]      MEDICAL PROGNOSIS: GOOD            REHAB POTENTIAL: GOOD             ESTIMATED DISPOSITION: HOME WITH HOME CARE            ELOS: 14-18 Days   EXPECTED THERAPY:     P.T. 1hr/day       O.T. 1hr/day      S.L.P. 1hr/day     P&O Unnecessary     EXP FREQUENCY: 5 days per 7 day period     PRESCREEN COMPARISION:   I have reviewed the prescreen information and I have found no relevant changes between the preadmission screening and my post admission evaluation     RATIONALE FOR INPATIENT ADMISSION - Patient demonstrates the following: (check all that apply)  [X] Medically appropriate for rehabilitation admission  [X] Has attainable rehab goals with an appropriate initial discharge plan  [X] Has rehabilitation potential (expected to make a significant improvement within a reasonable period of time)   [X] Requires close medical management by a rehab physician, rehab nursing care, Hospitalist and comprehensive interdisciplinary team (including PT, OT, & or SLP, Prosthetics and Orthotics)

## 2022-06-20 NOTE — PROGRESS NOTE ADULT - PROBLEM SELECTOR PLAN 5
- failed FEEST  - s/p PEG 6/16  - start TF possibly later today if stable per GI
s/p laryngoscopy with ENT showing left vocal cord paralysis and copious pooling of secretions with aspiration of secretions  - per ENT, can consider vocal cord injection once patient is stable
- failed FEEST  - s/p PEG 6/16  - cont tube feeds, at goal  - c/w PPI
- failed FEEST  - s/p PEG 6/16  - cont tube feeds, at goal  - c/w PPI
s/p laryngoscopy with ENT showing left vocal cord paralysis and copious pooling of secretions with aspiration of secretions  - SLP following  - per ENT, can consider vocal cord injection once patient is stable
- failed FEEST  - s/p PEG 6/16  - cont tube feeds, not yet at goal  - c/w PPI
- c/w tube feeds via NGT  - failed FEEST  - GI consult for PEG     - aspiration precautions
s/p laryngoscopy with ENT showing left vocal cord paralysis and copious pooling of secretions with aspiration of secretions  - SLP following  - per ENT, can consider vocal cord injection once patient is stable
- failed FEEST  - s/p PEG 6/16  - start TF when cleared from GI  - once patient is started on tube feed, can IV lock and stop IVF  - c/w PPI

## 2022-06-20 NOTE — PROGRESS NOTE ADULT - PROBLEM/PLAN-4
DISPLAY PLAN FREE TEXT
Consent: The patient's consent was obtained including but not limited to risks of crusting, scabbing, blistering, scarring, darker or lighter pigmentary change, recurrence, incomplete removal and infection.
Render Post-Care Instructions In Note?: no
Duration Of Freeze Thaw-Cycle (Seconds): 0
Detail Level: Detailed
Post-Care Instructions: I reviewed with the patient in detail post-care instructions. Patient is to wear sunprotection, and avoid picking at any of the treated lesions. Pt may apply Vaseline to crusted or scabbing areas.

## 2022-06-20 NOTE — PROGRESS NOTE ADULT - PROBLEM SELECTOR PLAN 6
SBP marginal  - decrease losartan 50mg daily  - c/w amlodipine 10mg daily for now  - c/w metoprolol 12.5mg q12h  - monitor SBP and if still remains in low 100's, can d/c losartan
s/p laryngoscopy with ENT showing left vocal cord paralysis and copious pooling of secretions with aspiration of secretions  - per ENT consult 6/6 after extubation, can consider vocal cord injection but injection laryngoplasty is highly unlikely to prevent aspiration in this patient at current stage given the extent of her pooling. They recommended respiratory toilet and time to compensate for secretions.
s/p laryngoscopy with ENT showing left vocal cord paralysis and copious pooling of secretions with aspiration of secretions  - per ENT, can consider vocal cord injection?
SBP remains marginal  - stopped losartan 6/13  - decrease amlodipine 5mg daily   - c/w metoprolol 12.5mg q12h  - monitor SBP and if still remains in low 100's, can d/c amlodipine as well
s/p laryngoscopy with ENT showing left vocal cord paralysis and copious pooling of secretions with aspiration of secretions  - per ENT, can consider vocal cord injection?
stable.  - c/w losartan 100mg daily  - c/w amlodipine 10mg daily  - c/w metoprolol 12.5mg q12h
s/p laryngoscopy with ENT showing left vocal cord paralysis and copious pooling of secretions with aspiration of secretions  - per ENT, can consider vocal cord injection once patient is stable
s/p laryngoscopy with ENT showing left vocal cord paralysis and copious pooling of secretions with aspiration of secretions  - per ENT, can consider vocal cord injection
s/p laryngoscopy with ENT showing left vocal cord paralysis and copious pooling of secretions with aspiration of secretions  - per ENT, can consider vocal cord injection?

## 2022-06-21 LAB
ALBUMIN SERPL ELPH-MCNC: 2.5 G/DL — LOW (ref 3.3–5)
ALP SERPL-CCNC: 72 U/L — SIGNIFICANT CHANGE UP (ref 40–120)
ALT FLD-CCNC: 117 U/L — HIGH (ref 10–45)
ANION GAP SERPL CALC-SCNC: 3 MMOL/L — LOW (ref 5–17)
AST SERPL-CCNC: 50 U/L — HIGH (ref 10–40)
BASOPHILS # BLD AUTO: 0.02 K/UL — SIGNIFICANT CHANGE UP (ref 0–0.2)
BASOPHILS NFR BLD AUTO: 0.2 % — SIGNIFICANT CHANGE UP (ref 0–2)
BILIRUB SERPL-MCNC: 0.8 MG/DL — SIGNIFICANT CHANGE UP (ref 0.2–1.2)
BUN SERPL-MCNC: 18 MG/DL — SIGNIFICANT CHANGE UP (ref 7–23)
CALCIUM SERPL-MCNC: 8.5 MG/DL — SIGNIFICANT CHANGE UP (ref 8.4–10.5)
CHLORIDE SERPL-SCNC: 98 MMOL/L — SIGNIFICANT CHANGE UP (ref 96–108)
CO2 SERPL-SCNC: 32 MMOL/L — HIGH (ref 22–31)
CREAT SERPL-MCNC: 0.38 MG/DL — LOW (ref 0.5–1.3)
CULTURE RESULTS: SIGNIFICANT CHANGE UP
CULTURE RESULTS: SIGNIFICANT CHANGE UP
EGFR: 116 ML/MIN/1.73M2 — SIGNIFICANT CHANGE UP
EOSINOPHIL # BLD AUTO: 0.19 K/UL — SIGNIFICANT CHANGE UP (ref 0–0.5)
EOSINOPHIL NFR BLD AUTO: 2.2 % — SIGNIFICANT CHANGE UP (ref 0–6)
GLUCOSE SERPL-MCNC: 87 MG/DL — SIGNIFICANT CHANGE UP (ref 70–99)
HCT VFR BLD CALC: 29.6 % — LOW (ref 34.5–45)
HGB BLD-MCNC: 9.8 G/DL — LOW (ref 11.5–15.5)
IMM GRANULOCYTES NFR BLD AUTO: 1.7 % — HIGH (ref 0–1.5)
LYMPHOCYTES # BLD AUTO: 1.08 K/UL — SIGNIFICANT CHANGE UP (ref 1–3.3)
LYMPHOCYTES # BLD AUTO: 12.4 % — LOW (ref 13–44)
MCHC RBC-ENTMCNC: 32 PG — SIGNIFICANT CHANGE UP (ref 27–34)
MCHC RBC-ENTMCNC: 33.1 GM/DL — SIGNIFICANT CHANGE UP (ref 32–36)
MCV RBC AUTO: 96.7 FL — SIGNIFICANT CHANGE UP (ref 80–100)
MONOCYTES # BLD AUTO: 0.31 K/UL — SIGNIFICANT CHANGE UP (ref 0–0.9)
MONOCYTES NFR BLD AUTO: 3.6 % — SIGNIFICANT CHANGE UP (ref 2–14)
NEUTROPHILS # BLD AUTO: 6.97 K/UL — SIGNIFICANT CHANGE UP (ref 1.8–7.4)
NEUTROPHILS NFR BLD AUTO: 79.9 % — HIGH (ref 43–77)
NRBC # BLD: 0 /100 WBCS — SIGNIFICANT CHANGE UP (ref 0–0)
PLATELET # BLD AUTO: 266 K/UL — SIGNIFICANT CHANGE UP (ref 150–400)
POTASSIUM SERPL-MCNC: 4 MMOL/L — SIGNIFICANT CHANGE UP (ref 3.5–5.3)
POTASSIUM SERPL-SCNC: 4 MMOL/L — SIGNIFICANT CHANGE UP (ref 3.5–5.3)
PROT SERPL-MCNC: 5.8 G/DL — LOW (ref 6–8.3)
RBC # BLD: 3.06 M/UL — LOW (ref 3.8–5.2)
RBC # FLD: 14.2 % — SIGNIFICANT CHANGE UP (ref 10.3–14.5)
SARS-COV-2 RNA SPEC QL NAA+PROBE: SIGNIFICANT CHANGE UP
SODIUM SERPL-SCNC: 133 MMOL/L — LOW (ref 135–145)
SPECIMEN SOURCE: SIGNIFICANT CHANGE UP
SPECIMEN SOURCE: SIGNIFICANT CHANGE UP
WBC # BLD: 8.72 K/UL — SIGNIFICANT CHANGE UP (ref 3.8–10.5)
WBC # FLD AUTO: 8.72 K/UL — SIGNIFICANT CHANGE UP (ref 3.8–10.5)

## 2022-06-21 PROCEDURE — 99223 1ST HOSP IP/OBS HIGH 75: CPT

## 2022-06-21 PROCEDURE — 99221 1ST HOSP IP/OBS SF/LOW 40: CPT

## 2022-06-21 RX ORDER — PETROLATUM,WHITE
1 JELLY (GRAM) TOPICAL DAILY
Refills: 0 | Status: DISCONTINUED | OUTPATIENT
Start: 2022-06-21 | End: 2022-07-15

## 2022-06-21 RX ADMIN — Medication 2 MILLIGRAM(S): at 06:06

## 2022-06-21 RX ADMIN — PIPERACILLIN AND TAZOBACTAM 25 GRAM(S): 4; .5 INJECTION, POWDER, LYOPHILIZED, FOR SOLUTION INTRAVENOUS at 06:06

## 2022-06-21 RX ADMIN — Medication 12.5 MILLIGRAM(S): at 06:06

## 2022-06-21 RX ADMIN — LEVALBUTEROL 0.63 MILLIGRAM(S): 1.25 SOLUTION, CONCENTRATE RESPIRATORY (INHALATION) at 08:40

## 2022-06-21 RX ADMIN — Medication 2 MILLIGRAM(S): at 17:53

## 2022-06-21 RX ADMIN — Medication 500 MILLIGRAM(S): at 12:57

## 2022-06-21 RX ADMIN — Medication 3 MILLILITER(S): at 08:34

## 2022-06-21 RX ADMIN — Medication 12.5 MILLIGRAM(S): at 17:53

## 2022-06-21 RX ADMIN — Medication 6 MILLIGRAM(S): at 22:16

## 2022-06-21 RX ADMIN — Medication 1 TABLET(S): at 12:57

## 2022-06-21 RX ADMIN — Medication 1 APPLICATION(S): at 12:59

## 2022-06-21 RX ADMIN — POLYETHYLENE GLYCOL 3350 17 GRAM(S): 17 POWDER, FOR SOLUTION ORAL at 12:57

## 2022-06-21 RX ADMIN — SENNA PLUS 2 TABLET(S): 8.6 TABLET ORAL at 22:16

## 2022-06-21 RX ADMIN — ENOXAPARIN SODIUM 40 MILLIGRAM(S): 100 INJECTION SUBCUTANEOUS at 17:52

## 2022-06-21 NOTE — PATIENT PROFILE ADULT - MEDICATIONS/VISITS
"Enid Lombardo's chief complaint for this visit includes:  Chief Complaint   Patient presents with     Follow Up     Follow up after MRI     PCP: Shi Alonso    Referring Provider:  No referring provider defined for this encounter.    /81   Pulse 61   Ht 1.645 m (5' 4.75\")   Wt 81.2 kg (179 lb)   LMP 10/14/2001   BMI 30.02 kg/m    Mild Pain (3)       " no

## 2022-06-21 NOTE — CONSULT NOTE ADULT - SUBJECTIVE AND OBJECTIVE BOX
HPI:  61 year old Female with PMHx of HTN and C-spine meningioma removal in 2018 in [place]. Patient presented with dizziness, gait instability, and vomiting over the last 3 days prior to admission. MRI showed ethel-medullary, Right CPA, and Right temporal enhancing lesions.  Possible meningioma vs schwannoma.  Patient was monitored in the ICU for hydro watch. Workup included MRI of the Brain, C/T/L spine and CT of the chest, abdomen and pelvis which was negative for malignancies on 5/20.  Patient was seen by ENT for dysphagia and recommended patient be seen by speech and swallow preop and underwent MBS and was able to continue with a regular diet.  Due to location of the lesion, ENT recommended audiology consult.  She was found to have intact hearing but mildly decreased bilaterally. Underwent Left far lateral crani for meningioma resection with subtotal sacrifice of left vertebral artery on 6/1. Attempted extubation the same night after surgery, however she developed stridor and was reintubated.  Patient was successfully extubated on 6/6 to high flow.  Patient scoped by ENT and found  to have left vocal cord paralysis.  Post extubation patient had a lot of secretions and was given Mucomyst and Duoneb. There was a concern for PE given respiratory status - CTA chest 6/7 negative for PE but with bilateral consolidation. Patient was weaned off high flow (6/9), however frequent suctioning was still required. FED via NGT and per speech and swallow recommendations, she was kept NPO. Patient was also found to have MSSA pneumonia and was treated with Augmentin.  Speech and swallow continued to follow and patient was recommended for a PEG.  PEG was placed on 6/16. Patient also had two episodes of desaturation on 6/16, follow up with another CTA chest; which was also negative for PE, but showed b/l lower lobe consolidations and IV antibiotics (Zosyn) were started.     Patient was evaluated by PM&R and therapy for functional deficits and gait/ADL impairments and recommend acute rehabilitation.  Patient was medically optimized for discharge to [facility] on 6/20/22.  (20 Jun 2022 13:21)      Subjective and overnight events:  Patient seen and examined at bedside. pt reports left sided UE and LE weakness and numbness/tingling. also admits to biocular diplopia. no headache, dizziness, fever, chills, cough, sob , cp, palpitations, abd pain, n/v. + loose stools the past couple days     ALLERGIES:  albuterol (Other)  No Known Allergies    MEDICATIONS  (STANDING):  acetylcysteine 20%  Inhalation 3 milliLiter(s) Inhalation every 6 hours  ascorbic acid 500 milliGRAM(s) Oral daily  dexAMETHasone     Tablet   Oral   dexAMETHasone     Tablet 2 milliGRAM(s) Oral every 12 hours  enoxaparin Injectable 40 milliGRAM(s) SubCutaneous <User Schedule>  melatonin 6 milliGRAM(s) Oral at bedtime  metoprolol tartrate 12.5 milliGRAM(s) Oral every 12 hours  multivitamin 1 Tablet(s) Oral daily  petrolatum white Ointment 1 Application(s) Topical daily  polyethylene glycol 3350 17 Gram(s) Oral daily  senna 2 Tablet(s) Oral at bedtime    MEDICATIONS  (PRN):  acetaminophen     Tablet .. 650 milliGRAM(s) Oral every 6 hours PRN Temp greater or equal to 38C (100.4F), Mild Pain (1 - 3)  ondansetron    Tablet 4 milliGRAM(s) Oral every 12 hours PRN Nausea  traMADol 25 milliGRAM(s) Oral every 4 hours PRN Moderate Pain (4 - 6)  traMADol 50 milliGRAM(s) Oral every 4 hours PRN Severe Pain (7 - 10)    Vital Signs Last 24 Hrs  T(F): 97.5 (21 Jun 2022 07:29), Max: 97.9 (20 Jun 2022 17:08)  HR: 60 (21 Jun 2022 09:05) (60 - 80)  BP: 113/71 (21 Jun 2022 07:29) (109/70 - 121/78)  RR: 15 (21 Jun 2022 07:29) (15 - 20)  SpO2: 95% (21 Jun 2022 09:05) (94% - 95%)  I&O's Summary    20 Jun 2022 07:01  -  21 Jun 2022 07:00  --------------------------------------------------------  IN: 420 mL / OUT: 700 mL / NET: -280 mL      PHYSICAL EXAM:  General: NAD, A/O x 3  ENT: MMM  Neck: Supple, No JVD  Lungs: Clear to auscultation bilaterally  Cardio: RRR, S1/S2, No murmurs  Abdomen: Soft, Nontender, Nondistended; Bowel sounds present  Extremities: No calf tenderness, No pitting edema  neuro: cn II to xII gorssly intact. except binoular diplopia. RUE 4/5, LUE 5/5, RLE 4/5, LLE 5/5. sensation impaired on the right side     LABS:                        9.8    8.72  )-----------( 266      ( 21 Jun 2022 06:10 )             29.6     06-21    133  |  98  |  18  ----------------------------<  87  4.0   |  32  |  0.38    Ca    8.5      21 Jun 2022 06:10    TPro  5.8  /  Alb  2.5  /  TBili  0.8  /  DBili  x   /  AST  50  /  ALT  117  /  AlkPhos  72  06-21 05-21 Chol 159 mg/dL LDL -- HDL 50 mg/dL Trig 99 mg/dL      Culture - Blood (collected 16 Jun 2022 11:15)  Source: .Blood Blood  Preliminary Report (17 Jun 2022 17:01):    No growth to date.    Culture - Blood (collected 16 Jun 2022 11:10)  Source: .Blood Blood  Preliminary Report (17 Jun 2022 17:01):    No growth to date.      COVID-19 PCR: NotDetec (06-20-22 @ 23:35)  COVID-19 PCR: NotDetec (06-20-22 @ 10:32)  COVID-19 PCR: NotDetec (06-15-22 @ 16:24)  COVID-19 PCR: NotDetec (06-13-22 @ 05:08)  COVID-19 PCR: NotDetec (05-31-22 @ 06:12)      RADIOLOGY & ADDITIONAL TESTS:    < from: CT Angio Chest PE Protocol w/ IV Cont (06.17.22 @ 13:25) >    IMPRESSION:    No pulmonary embolus.    Unchanged bibasilar consolidations. Interval decrease in patchy bilateral   groundglass opacities seen on previous study which may have been due to   infection or aspiration. Bilateral lower lobe tree-in-bud nodules are   unchanged.    Small airways inflammation.    --- End of Report ---    < end of copied text >    Care Discussed with Consultants/Other Providers: rehab team. mckenzie completed. DC zosyn.

## 2022-06-21 NOTE — DIETITIAN NUTRITION RISK NOTIFICATION - TREATMENT: THE FOLLOWING DIET HAS BEEN RECOMMENDED
Diet, NPO with Tube Feed:   Tube Feeding Modality: Gastrostomy  Glucerna 1.5 Ash  Total Volume for 24 Hours (mL): 1280  Bolus  Total Volume of Bolus (mL):  320  Total # of Feeds: 4  Tube Feed Frequency: Every 6 hours   Tube Feed Start Time: 08:00  Bolus Feed Rate (mL per Hour): 320   Bolus Feed Duration (in Hours): 0.5  Bolus Feed Instructions:  Bolus feeds at 8AM, 12PM, 4PM, and 8PM  Free Water Flush  Free Water Flush Instructions:  Free water flush: 150 cc Q6hrs - at least 1 hours apart from bolus feed  Supplement Feeding Modality:  Gastrostomy  Probiotic Yogurt/Smoothie Cans or Servings Per Day:  2       Frequency:  Daily (06-20-22 @ 23:53) [Active]

## 2022-06-21 NOTE — DIETITIAN INITIAL EVALUATION ADULT - PERTINENT LABORATORY DATA
06-21    133<L>  |  98  |  18  ----------------------------<  87  4.0   |  32<H>  |  0.38<L>    Ca    8.5      21 Jun 2022 06:10    TPro  5.8<L>  /  Alb  2.5<L>  /  TBili  0.8  /  DBili  x   /  AST  50<H>  /  ALT  117<H>  /  AlkPhos  72  06-21  A1C with Estimated Average Glucose Result: 5.8 % (06-01-22 @ 20:30)

## 2022-06-21 NOTE — DIETITIAN INITIAL EVALUATION ADULT - PERTINENT MEDS FT
MEDICATIONS  (STANDING):  acetylcysteine 20%  Inhalation 3 milliLiter(s) Inhalation every 6 hours  ascorbic acid 500 milliGRAM(s) Oral daily  dexAMETHasone     Tablet   Oral   dexAMETHasone     Tablet 2 milliGRAM(s) Oral every 12 hours  enoxaparin Injectable 40 milliGRAM(s) SubCutaneous <User Schedule>  levalbuterol Inhalation 0.63 milliGRAM(s) Inhalation every 6 hours  melatonin 6 milliGRAM(s) Oral at bedtime  metoprolol tartrate 12.5 milliGRAM(s) Oral every 12 hours  multivitamin 1 Tablet(s) Oral daily  piperacillin/tazobactam IVPB.. 3.375 Gram(s) IV Intermittent every 8 hours  polyethylene glycol 3350 17 Gram(s) Oral daily  senna 2 Tablet(s) Oral at bedtime    MEDICATIONS  (PRN):  acetaminophen     Tablet .. 650 milliGRAM(s) Oral every 6 hours PRN Temp greater or equal to 38C (100.4F), Mild Pain (1 - 3)  ondansetron    Tablet 4 milliGRAM(s) Oral every 12 hours PRN Nausea  traMADol 25 milliGRAM(s) Oral every 4 hours PRN Moderate Pain (4 - 6)  traMADol 50 milliGRAM(s) Oral every 4 hours PRN Severe Pain (7 - 10)

## 2022-06-21 NOTE — DIETITIAN INITIAL EVALUATION ADULT - ORAL INTAKE PTA/DIET HISTORY
[employer] ID#[id number] used for Mandarin translation, although  was unable to hear pt due to hypophonia. Information obtained through chart review. Per previous dietitian favian, pt noted with poor PO intake PTA due to taste changes. At [facility], pt started on a regular diet + Ensure enlive, but was then intubated + on enteral feeds of Glucerna 1.2 @ 60 ml/hr x 24 hours + danactive smoothie (1440 ml formula, 2160 kcal, 119 g protein, and 1093 ml free water. Danactive 2x/day provides an additional 160 kcal and 6 g pro). Pt reported UBW of 123 lbs. Weighed 119 lbs on 5/28/22 per RD evaluation.

## 2022-06-21 NOTE — DIETITIAN INITIAL EVALUATION ADULT - ENTERAL
Suggest changing TF To TwoCal  ml QID (will provide 1920 kcal, 80 g protein, 672 ml water ), d/c "probiotic smoothie".

## 2022-06-21 NOTE — DIETITIAN INITIAL EVALUATION ADULT - OTHER INFO
60 yo Mandarin speaking F with PMH of HTN, C Spine meningioma (removed 2018 in [place]) who presented with dizziness, ataxia, and emesis.  MRI with ethel-medullary. right CPA, and right temporal enhancing lesions concern for meningioma vs schwannoma, with hydro.  Now S/p Left far lateral crani for meningioma resection with subtotal sacrifice of left vertebral artery (6/1).  Hospital course complicated by MSSA PNA (suspected aspiration PNA) - completed course of Augmentin + zosyn 7D. Noted with vocal cord paralysis, dysphagia - NPO on TF (PEG placed 6/16). Post procedure hypoxia + intubated.  Afib with RVR.

## 2022-06-21 NOTE — DIETITIAN INITIAL EVALUATION ADULT - NS FNS DIET ORDER
Diet, NPO with Tube Feed:   Tube Feeding Modality: Gastrostomy  Glucerna 1.5 Ash  Total Volume for 24 Hours (mL): 1280  Bolus  Total Volume of Bolus (mL):  320  Total # of Feeds: 4  Tube Feed Frequency: Every 6 hours   Tube Feed Start Time: 08:00  Bolus Feed Rate (mL per Hour): 320   Bolus Feed Duration (in Hours): 0.5  Bolus Feed Instructions:  Bolus feeds at 8AM, 12PM, 4PM, and 8PM  Free Water Flush  Free Water Flush Instructions:  Free water flush: 150 cc Q6hrs - at least 1 hours apart from bolus feed  Supplement Feeding Modality:  Gastrostomy  Probiotic Yogurt/Smoothie Cans or Servings Per Day:  2       Frequency:  Daily (06-20-22 @ 23:53)

## 2022-06-21 NOTE — DIETITIAN INITIAL EVALUATION ADULT - NSFNSGIIOFT_GEN_A_CORE
06-20-22 @ 07:01  -  06-21-22 @ 07:00  --------------------------------------------------------  OUT:  Total OUT: 0 mL    Total NET: 320 mL

## 2022-06-22 PROCEDURE — 99232 SBSQ HOSP IP/OBS MODERATE 35: CPT

## 2022-06-22 RX ORDER — FAMOTIDINE 10 MG/ML
20 INJECTION INTRAVENOUS DAILY
Refills: 0 | Status: DISCONTINUED | OUTPATIENT
Start: 2022-06-22 | End: 2022-06-24

## 2022-06-22 RX ADMIN — FAMOTIDINE 20 MILLIGRAM(S): 10 INJECTION INTRAVENOUS at 21:37

## 2022-06-22 RX ADMIN — Medication 2 MILLIGRAM(S): at 05:10

## 2022-06-22 RX ADMIN — SENNA PLUS 2 TABLET(S): 8.6 TABLET ORAL at 21:36

## 2022-06-22 RX ADMIN — Medication 1 TABLET(S): at 12:51

## 2022-06-22 RX ADMIN — Medication 1 APPLICATION(S): at 12:52

## 2022-06-22 RX ADMIN — Medication 12.5 MILLIGRAM(S): at 05:10

## 2022-06-22 RX ADMIN — Medication 6 MILLIGRAM(S): at 21:36

## 2022-06-22 RX ADMIN — Medication 500 MILLIGRAM(S): at 12:51

## 2022-06-22 RX ADMIN — Medication 2 MILLIGRAM(S): at 17:32

## 2022-06-22 RX ADMIN — ENOXAPARIN SODIUM 40 MILLIGRAM(S): 100 INJECTION SUBCUTANEOUS at 17:33

## 2022-06-22 RX ADMIN — POLYETHYLENE GLYCOL 3350 17 GRAM(S): 17 POWDER, FOR SOLUTION ORAL at 12:50

## 2022-06-22 RX ADMIN — Medication 12.5 MILLIGRAM(S): at 17:32

## 2022-06-22 NOTE — PROGRESS NOTE ADULT - SUBJECTIVE AND OBJECTIVE BOX
HPI:  61 year old Female with PMHx of HTN and C-spine meningioma removal in 2018 in [place]. Patient presented with dizziness, gait instability, and vomiting over the last 3 days prior to admission. MRI showed ethel-medullary, Right CPA, and Right temporal enhancing lesions.  Possible meningioma vs schwannoma.  Patient was monitored in the ICU for hydro watch. Workup included MRI of the Brain, C/T/L spine and CT of the chest, abdomen and pelvis which was negative for malignancies on 5/20.  Patient was seen by ENT for dysphagia and recommended patient be seen by speech and swallow preop and underwent MBS and was able to continue with a regular diet.  Due to location of the lesion, ENT recommended audiology consult.  She was found to have intact hearing but mildly decreased bilaterally. Underwent Left far lateral crani for meningioma resection with subtotal sacrifice of left vertebral artery on 6/1. Attempted extubation the same night after surgery, however she developed stridor and was reintubated.  Patient was successfully extubated on 6/6 to high flow.  Patient scoped by ENT and found  to have left vocal cord paralysis.  Post extubation patient had a lot of secretions and was given Mucomyst and Duoneb. There was a concern for PE given respiratory status - CTA chest 6/7 negative for PE but with bilateral consolidation. Patient was weaned off high flow (6/9), however frequent suctioning was still required. FED via NGT and per speech and swallow recommendations, she was kept NPO. Patient was also found to have MSSA pneumonia and was treated with Augmentin.  Speech and swallow continued to follow and patient was recommended for a PEG.  PEG was placed on 6/16. Patient also had two episodes of desaturation on 6/16, follow up with another CTA chest; which was also negative for PE, but showed b/l lower lobe consolidations and IV antibiotics (Zosyn) were started.     Patient was evaluated by PM&R and therapy for functional deficits and gait/ADL impairments and recommend acute rehabilitation.  Patient was medically optimized for discharge to [facility] on 6/20/22.  (20 Jun 2022 13:21)          Subjective:  spoke with pt. via Mandarin telephone  # [phone].  Pt. did not void overnight or early this AM-- bladder scan 307 at 5am.  Pt. denies urge to void when seen on round but did void a short while later when nurse brought her to the toilet.  Pt. voided with PVR  in 200s, but states she had to void more a little later.  Pt. denies pain, slept during the night.        VITALS  Vital Signs Last 24 Hrs  T(C): 36.9 (22 Jun 2022 10:49), Max: 36.9 (21 Jun 2022 21:07)  T(F): 98.4 (22 Jun 2022 10:49), Max: 98.4 (21 Jun 2022 21:07)  HR: 82 (22 Jun 2022 10:49) (63 - 82)  BP: 97/63 (22 Jun 2022 10:49) (97/63 - 119/79)  BP(mean): --  RR: 14 (22 Jun 2022 10:49) (14 - 14)  SpO2: 97% (22 Jun 2022 10:49) (93% - 97%)    REVIEW OF SYMPTOMS  Neurological deficits--dysphagia, hypophonia, right sided weakness  Urinary retention      PHYSICAL EXAM  Gen - NAD, Comfortable  HEENT - NCAT,    Pulm - CTA  Cardiovascular - RRR,  Abdomen - Soft, NT/ND, +BS, (+) PEG tube--clean  Extremities - No calf tenderness  Neuro-     Cognitive - AAOx3. --did not know hospital name     Communication - Fluent, hypophonia     Attention: Intact, able to recite days of week backwards, perform serial 5's     Memory: Recall 3 objects immediate and 3 min later         Cranial Nerves - PERRLA, EOMI, +mild diplopia, Slight impaired R shoulder shrug, . Visual fields intact. +dysphagia, +hypophonia,  Orolingual weakness     Motor -                     LEFT    UE - ShAB 5/5, EF 5/5, EE 5/5, WE 5/5,  5/5                    RIGHT UE - ShAB 4+/5, EF 3/5, EE 3/5, WE 3/5,  3/5                    LEFT    LE - HF 5/5, KE 5/5, DF 5/5, PF 5/5                    RIGHT LE - HF 4/5, KE 4/5, DF 3/5, PF 4/5        Sensory - Slightly diminished sensation to light touch in RUE and RLE     Coordination -- impaired on right     Tone - normal  Psychiatric - Mood stable, Affect WNL        RECENT LABS                        9.8    8.72  )-----------( 266      ( 21 Jun 2022 06:10 )             29.6     06-21    133<L>  |  98  |  18  ----------------------------<  87  4.0   |  32<H>  |  0.38<L>    Ca    8.5      21 Jun 2022 06:10    TPro  5.8<L>  /  Alb  2.5<L>  /  TBili  0.8  /  DBili  x   /  AST  50<H>  /  ALT  117<H>  /  AlkPhos  72  06-21            RADIOLOGY/OTHER RESULTS      MEDICATIONS  (STANDING):  acetylcysteine 20%  Inhalation 3 milliLiter(s) Inhalation every 6 hours  ascorbic acid 500 milliGRAM(s) Oral daily  dexAMETHasone     Tablet   Oral   dexAMETHasone     Tablet 2 milliGRAM(s) Oral every 12 hours  enoxaparin Injectable 40 milliGRAM(s) SubCutaneous <User Schedule>  melatonin 6 milliGRAM(s) Oral at bedtime  metoprolol tartrate 12.5 milliGRAM(s) Oral every 12 hours  multivitamin 1 Tablet(s) Oral daily  petrolatum white Ointment 1 Application(s) Topical daily  polyethylene glycol 3350 17 Gram(s) Oral daily  senna 2 Tablet(s) Oral at bedtime    MEDICATIONS  (PRN):  acetaminophen     Tablet .. 650 milliGRAM(s) Oral every 6 hours PRN Temp greater or equal to 38C (100.4F), Mild Pain (1 - 3)  ondansetron    Tablet 4 milliGRAM(s) Oral every 12 hours PRN Nausea  traMADol 25 milliGRAM(s) Oral every 4 hours PRN Moderate Pain (4 - 6)  traMADol 50 milliGRAM(s) Oral every 4 hours PRN Severe Pain (7 - 10)

## 2022-06-22 NOTE — PROGRESS NOTE ADULT - ASSESSMENT
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  MRI with ethel-medullary. right CPA, and right temporal enhancing lesions concern for meningioma vs schwannoma, with hydro.  Now S/p Left far lateral crani for meningioma resection with subtotal sacrifice of left vertebral artery (6/1).  Noted with vocal cord paralysis, dysphagia - NPO on TF (PEG placed 6/16). Also right Hemiparesis, sensory, gait and ADL impairment.     #Meningioma s/p L lateral crani for meningioma resection with subtotal sacrifice of L vertebral artery on 6/1  -Decadron taper as per neurosurgery  -Continue comprehensive rehab program -PT/OT/SLP per rehab team  -Pain management, bowel regimen per rehab     #Dysphagia s/p PEG placement  -cont tube feeds  -SLP eval     #Left vocal cord paralysis  -Follow up with ENT as outpatient    #Recent MSSA PNA and aspiration pna  -Completed 5 days course of zosyn on 6/20  -Xopenex Mucomyst neb for secretion control     #PAF  -Pt with possible transient A fib with rvr during RRT at [facility]. EKG showed sinus tach and no evidence pafib on tele  -Cont with low dose lopressor  -TTE reviewed: EF 75%. hyperdynamic LV systolic function     #Urinary retention  -TOV per rehab, cont PVR - pt voiding this AM    #HTN  -cont with lopressor   -was on losartan and amlodipine, on hold due to BP on lower side    Transaminitis  -Monitor LFTs, avoid hepatotoxins    DVT ppx - lovenox  GI ppx - pepcid

## 2022-06-22 NOTE — CHART NOTE - NSCHARTNOTEFT_GEN_A_CORE
[facility] Interdisciplinary Plan of Care    REHABILITATION DIAGNOSIS:  Neoplasm of uncertain behavior of brain          COMORBIDITIES/COMPLICATING CONDITIONS IMPACTING REHABILITATION:  HEALTH ISSUES - PROBLEM Dx:        PAST MEDICAL & SURGICAL HISTORY:  HTN (hypertension)      HLD (hyperlipidemia)      H/O cervical spine surgery          Based upon consideration of the patient's impairments, functional status, complicating conditions and any other contributing factors and after information garnered from the assessments of all therapy disciplines involved in treating the patient and other pertinent clinicians:    INTERDISCIPLINARY REHABILITATION INTERVENTIONS:    [ X  ] Transfer Training  [ X  ] Bed Mobility  [ X  ] Therapeutic Exercise  [ X ] Balance/Coordination Exercises  [ X ] Locomotion retraining  [ X  ] Stairs  [  X ] Functional Transfer Training  [ X  ] Bowel/Bladder program  [ X  ] Pain Management  [ X  ] Skin/Wound Care  [ X  ] Visual/Perceptual Training  [ X  ] Therapeutic Recreation Activities  [  X ] Neuromuscular Re-education  [ X  ] Activities of Daily Living  [ X  ] Speech Exercise  [X   ] Swallowing Exercises  [ x  ] Vital Stim  [   ] Dietary Supplements  [   ] Calorie Count  [ X  ] Cognitive Exercises  [  X ] Cognitive-linguistic Treatment  [  x ] Behavior Program  [  x ] Neuropsych Therapy  [ X  ] Patient/Family Counseling  [ X ] Family Training  [ X  ] Community Re-entry  [  x ] Orthotic Evaluation  [   ] Prosthetic Eval/Training    MEDICAL PROGNOSIS: good      REHAB POTENTIAL:  Good    EXPECTED DAILY THERAPY:  3 hours/day           ESTIMATED LOS:  [  ] 5-7 Days  [  ] 7-10 Days  [  ] 10- 14 Days  [  ] 14- 18 Days  [x  ] 18- 21 Days    ESTIMATED DISPOSITION:  [  ] Home   [ x ] Home with Outpatient Therapies  [ x ] Home with Home Therapies  [  x] subacute rehab  [  ] Nursing Home  [  ] Long Term Acute Care    INTERDISCIPLINARY FUNCTIONAL OUTCOMES/GOALS:         Gait/Mobility: 5       Transfers: 5       ADLs: 5-4       Functional Transfers: 5       Medication Management: 5       Communication: 5       Cognitive: 5       Dysphagia: 4-5       Bladder: 5       Bowel: 5     Functional Independent Measures:   7 = Independent  6 = Modified Independent  5 = Supervision  4 = Minimal Assist/ Contact Guard  3 = Moderate Assistance  2 = Maximum Assistance  1 = Total Assistance  0 = Unable to assess

## 2022-06-22 NOTE — PROGRESS NOTE ADULT - ASSESSMENT
ASSESSMENT/PLAN  62 yo Mandarin speaking F with PMH of HTN, C Spine meningioma (removed 2018 in [place]) who presented with dizziness, ataxia, and emesis.  MRI with ethel-medullary. right CPA, and right temporal enhancing lesions concern for meningioma vs schwannoma, with hydro.  Now S/p Left far lateral crani for meningioma resection with subtotal sacrifice of left vertebral artery (6/1).  Noted with vocal cord paralysis, dysphagia - NPO on TF (PEG placed 6/16). Also right Hemiparesis, sensory, gait and ADL impairment.     COMORBIDITES/ACTIVE MEDICAL ISSUES     #Brain tumor - Meningioma   - S/p Left far lateral crani for meningioma resection with subtotal sacrifice of left vertebral artery (6/1)  - Dexamethasone taper - 2mg Q8h -> 2mg Q12h x 2 days, then 2mg x 2 days  - Gait Instability, ADL impairments and Functional impairments: cont Comprehensive Rehab Program of PT/OT & speech    #Transient A fib with RVR  - s/p amiodarone IVP x 1  - Sinus tach - EKG without evidence of a fib  - Metoprolol 12.5 BID    # Cranial stenosis  - CTA head showed defect in the right M1 suspicious for possible right M1 occlusion though the vessel   - Recommended to "continue ASA 81mg qd and atorvastatin 40mg qhs" on discharge paperwork, however patient not on it in prior hospital  - Lipid panel 5/21 unremarkable  -restarted atorvastatin  --Contacted Dr. Liu to clarify if pt. ok to restart ASA-- stated would hold for now if it was for carotid stenosis .      #HTN  - - cont. metoprolol 12.5 BID     #Aspiration PNA  - CTA: negative for PE  - completed augmentin course 6/5  - Zosyn IV x7 days--discontinued by hospitalist 6/22        #Vocal cord paralysis  - Self suctioning    # Urinary retention  - s/p pedro 6/16  - Pedro removed 6/21 PM for TOV 6/22  --Pt. voiding-- monitor PVRs  --toileting program q.3h while awake    # Lip sore  - ? herpes labialis. No prior hx of lip sores  - s/p valtrex 2g BID x 2 doses on 6/15.    #Pain control  - Tylenol PRN, Tramadol    #GI/Bowel Mgmt   - At risk for constipation due to neurologic diagnosis, immobility and/or medication use  - Senna,  Miralax PRN    #DVT  - Lovenox      Dysphagia  - Diet - NPO with TF  - PEG placed (6/16)  -  SLP - evaluation and treatment    Precautions / PROPHYLAXIS:   - Falls  - Lungs: Aspiration,   - Pressure injury/Skin: Turn Q2hrs while in bed, OOB to Chair, PT/OT      Follow ups:  Giovanni Liu (MD)  Neurosurgery  General  [address] [place] [address], [address]  [place] [address]  Phone: [phone]  Fax: [phone]

## 2022-06-22 NOTE — PROGRESS NOTE ADULT - SUBJECTIVE AND OBJECTIVE BOX
Patient is a 61y old  Female who presents with a chief complaint of Meningioma (22 Jun 2022 13:24)      Patient seen and examined at bedside. no events reported overnight. NAD.     ALLERGIES:  albuterol (Other)  No Known Allergies    MEDICATIONS  (STANDING):  ascorbic acid 500 milliGRAM(s) Oral daily  dexAMETHasone     Tablet   Oral   dexAMETHasone     Tablet 2 milliGRAM(s) Oral every 12 hours  enoxaparin Injectable 40 milliGRAM(s) SubCutaneous <User Schedule>  melatonin 6 milliGRAM(s) Oral at bedtime  metoprolol tartrate 12.5 milliGRAM(s) Oral every 12 hours  multivitamin 1 Tablet(s) Oral daily  petrolatum white Ointment 1 Application(s) Topical daily  polyethylene glycol 3350 17 Gram(s) Oral daily  senna 2 Tablet(s) Oral at bedtime    MEDICATIONS  (PRN):  acetaminophen     Tablet .. 650 milliGRAM(s) Oral every 6 hours PRN Temp greater or equal to 38C (100.4F), Mild Pain (1 - 3)  ondansetron    Tablet 4 milliGRAM(s) Oral every 12 hours PRN Nausea  traMADol 25 milliGRAM(s) Oral every 4 hours PRN Moderate Pain (4 - 6)  traMADol 50 milliGRAM(s) Oral every 4 hours PRN Severe Pain (7 - 10)    Vital Signs Last 24 Hrs  T(F): 98.4 (22 Jun 2022 10:49), Max: 98.4 (21 Jun 2022 21:07)  HR: 82 (22 Jun 2022 10:49) (63 - 82)  BP: 97/63 (22 Jun 2022 10:49) (97/63 - 119/79)  RR: 14 (22 Jun 2022 10:49) (14 - 14)  SpO2: 97% (22 Jun 2022 10:49) (93% - 97%)  I&O's Summary    21 Jun 2022 07:01  -  22 Jun 2022 07:00  --------------------------------------------------------  IN: 0 mL / OUT: 1400 mL / NET: -1400 mL      BMI (kg/m2): 20 (06-20-22 @ 19:57)    PHYSICAL EXAM:  General: NAD  ENT: MMM, no scleral icterus  Neck: Supple, No JVD  Lungs: Respirations unlabored. Clear to auscultation bilaterally, no wheezes, rales, rhonchi  Cardio: RRR, S1/S2  Abdomen: Soft, Nontender, Nondistended; Bowel sounds present  Extremities: No calf tenderness, No pitting edema    LABS:                        9.8    8.72  )-----------( 266      ( 21 Jun 2022 06:10 )             29.6       06-21    133  |  98  |  18  ----------------------------<  87  4.0   |  32  |  0.38    Ca    8.5      21 Jun 2022 06:10    TPro  5.8  /  Alb  2.5  /  TBili  0.8  /  DBili  x   /  AST  50  /  ALT  117  /  AlkPhos  72  06-21                05-21 Chol 159 mg/dL LDL -- HDL 50 mg/dL Trig 99 mg/dL                      Culture - Blood (collected 16 Jun 2022 11:15)  Source: .Blood Blood  Final Report (21 Jun 2022 17:01):    No Growth Final    Culture - Blood (collected 16 Jun 2022 11:10)  Source: .Blood Blood  Final Report (21 Jun 2022 17:01):    No Growth Final      COVID-19 PCR: NotDetec (06-20-22 @ 23:35)  COVID-19 PCR: NotDetec (06-20-22 @ 10:32)  COVID-19 PCR: NotDetec (06-15-22 @ 16:24)  COVID-19 PCR: NotDetec (06-13-22 @ 05:08)  COVID-19 PCR: NotDetec (05-31-22 @ 06:12)      RADIOLOGY & ADDITIONAL TESTS:    Care Discussed with Consultants/Other Providers:

## 2022-06-23 LAB
ALBUMIN SERPL ELPH-MCNC: 2.8 G/DL — LOW (ref 3.3–5)
ALP SERPL-CCNC: 83 U/L — SIGNIFICANT CHANGE UP (ref 40–120)
ALT FLD-CCNC: 97 U/L — HIGH (ref 10–45)
ANION GAP SERPL CALC-SCNC: 4 MMOL/L — LOW (ref 5–17)
AST SERPL-CCNC: 28 U/L — SIGNIFICANT CHANGE UP (ref 10–40)
BILIRUB SERPL-MCNC: 0.6 MG/DL — SIGNIFICANT CHANGE UP (ref 0.2–1.2)
BUN SERPL-MCNC: 17 MG/DL — SIGNIFICANT CHANGE UP (ref 7–23)
CALCIUM SERPL-MCNC: 8.6 MG/DL — SIGNIFICANT CHANGE UP (ref 8.4–10.5)
CHLORIDE SERPL-SCNC: 97 MMOL/L — SIGNIFICANT CHANGE UP (ref 96–108)
CO2 SERPL-SCNC: 30 MMOL/L — SIGNIFICANT CHANGE UP (ref 22–31)
CREAT SERPL-MCNC: 0.46 MG/DL — LOW (ref 0.5–1.3)
EGFR: 110 ML/MIN/1.73M2 — SIGNIFICANT CHANGE UP
GLUCOSE SERPL-MCNC: 99 MG/DL — SIGNIFICANT CHANGE UP (ref 70–99)
HCT VFR BLD CALC: 32.5 % — LOW (ref 34.5–45)
HGB BLD-MCNC: 11.3 G/DL — LOW (ref 11.5–15.5)
MCHC RBC-ENTMCNC: 33.7 PG — SIGNIFICANT CHANGE UP (ref 27–34)
MCHC RBC-ENTMCNC: 34.8 GM/DL — SIGNIFICANT CHANGE UP (ref 32–36)
MCV RBC AUTO: 97 FL — SIGNIFICANT CHANGE UP (ref 80–100)
NRBC # BLD: 0 /100 WBCS — SIGNIFICANT CHANGE UP (ref 0–0)
PLATELET # BLD AUTO: 287 K/UL — SIGNIFICANT CHANGE UP (ref 150–400)
POTASSIUM SERPL-MCNC: 3.9 MMOL/L — SIGNIFICANT CHANGE UP (ref 3.5–5.3)
POTASSIUM SERPL-SCNC: 3.9 MMOL/L — SIGNIFICANT CHANGE UP (ref 3.5–5.3)
PROT SERPL-MCNC: 6.1 G/DL — SIGNIFICANT CHANGE UP (ref 6–8.3)
RBC # BLD: 3.35 M/UL — LOW (ref 3.8–5.2)
RBC # FLD: 15.6 % — HIGH (ref 10.3–14.5)
SODIUM SERPL-SCNC: 131 MMOL/L — LOW (ref 135–145)
WBC # BLD: 5.95 K/UL — SIGNIFICANT CHANGE UP (ref 3.8–10.5)
WBC # FLD AUTO: 5.95 K/UL — SIGNIFICANT CHANGE UP (ref 3.8–10.5)

## 2022-06-23 PROCEDURE — 99232 SBSQ HOSP IP/OBS MODERATE 35: CPT

## 2022-06-23 RX ADMIN — Medication 1 APPLICATION(S): at 13:06

## 2022-06-23 RX ADMIN — Medication 1 TABLET(S): at 13:05

## 2022-06-23 RX ADMIN — Medication 6 MILLIGRAM(S): at 22:17

## 2022-06-23 RX ADMIN — Medication 12.5 MILLIGRAM(S): at 18:24

## 2022-06-23 RX ADMIN — POLYETHYLENE GLYCOL 3350 17 GRAM(S): 17 POWDER, FOR SOLUTION ORAL at 13:06

## 2022-06-23 RX ADMIN — SENNA PLUS 2 TABLET(S): 8.6 TABLET ORAL at 22:13

## 2022-06-23 RX ADMIN — ENOXAPARIN SODIUM 40 MILLIGRAM(S): 100 INJECTION SUBCUTANEOUS at 18:25

## 2022-06-23 RX ADMIN — Medication 12.5 MILLIGRAM(S): at 05:06

## 2022-06-23 RX ADMIN — Medication 2 MILLIGRAM(S): at 05:11

## 2022-06-23 RX ADMIN — Medication 500 MILLIGRAM(S): at 13:05

## 2022-06-23 RX ADMIN — FAMOTIDINE 20 MILLIGRAM(S): 10 INJECTION INTRAVENOUS at 13:05

## 2022-06-23 NOTE — PROGRESS NOTE ADULT - ASSESSMENT
60 yo Mandarin speaking F with PMH of HTN, C Spine meningioma (removed 2018 in [place]) who presented with dizziness, ataxia, and emesis.  MRI with ethel-medullary. right CPA, and right temporal enhancing lesions concern for meningioma vs schwannoma, with hydro.  Now S/p Left far lateral crani for meningioma resection with subtotal sacrifice of left vertebral artery (6/1).  Noted with vocal cord paralysis, dysphagia - NPO on TF (PEG placed 6/16). Also right Hemiparesis, sensory, gait and ADL impairment.     COMORBIDITES/ACTIVE MEDICAL ISSUES     #Brain tumor - Meningioma   - S/p Left far lateral crani for meningioma resection with subtotal sacrifice of left vertebral artery (6/1)  - Dexamethasone taper - 2mg Q8h -> 2mg Q12h x 2 days, then 2mg x 2 days  - Gait Instability, ADL impairments and Functional impairments: cont Comprehensive Rehab Program of PT/OT & speech    #Transient A fib with RVR  - s/p amiodarone IVP x 1  - Sinus tach - EKG without evidence of a fib  - Metoprolol 12.5 BID    # Cranial stenosis  - CTA head showed defect in the right M1 suspicious for possible right M1 occlusion though the vessel   - Recommended to "continue ASA 81mg qd and atorvastatin 40mg qhs" on discharge paperwork, however patient not on it in prior hospital  - Lipid panel 5/21 unremarkable  -restarted atorvastatin  --Contacted Dr. Liu to clarify if pt. ok to restart ASA-- stated would hold for now if it was for carotid stenosis .    #HTN  - - cont. metoprolol 12.5 BID     #Aspiration PNA  - CTA: negative for PE  - completed augmentin course 6/5  - Zosyn IV x7 days--discontinued by hospitalist 6/22    #Vocal cord paralysis  - Self suctioning    # Urinary retention  - s/p pedro 6/16  - Pedro removed 6/21 PM for TOV 6/22  --Pt. voiding-- monitor PVRs -> 52 on 6/22  --toileting program q.3h while awake    # Lip sore  - ? herpes labialis. No prior hx of lip sores  - s/p valtrex 2g BID x 2 doses on 6/15.    #Pain control  - Tylenol PRN, Tramadol    #GI/Bowel Mgmt   - At risk for constipation due to neurologic diagnosis, immobility and/or medication use  - Senna,  Miralax PRN    #DVT  - Lovenox      Dysphagia  - Diet - NPO with TF  - PEG placed (6/16)  -  SLP - evaluation and treatment    Precautions / PROPHYLAXIS:   - Falls  - Lungs: Aspiration,   - Pressure injury/Skin: Turn Q2hrs while in bed, OOB to Chair, PT/OT      Follow ups:  Giovanni Liu (MD)  Neurosurgery  General  [address] [place] [address], [address]  [place] [address]  Phone: [phone]  Fax: [phone]     60 yo Mandarin speaking F with PMH of HTN, C Spine meningioma (removed 2018 in [place]) who presented with dizziness, ataxia, and emesis.  MRI with ethel-medullary. right CPA, and right temporal enhancing lesions concern for meningioma vs schwannoma, with hydro.  Now S/p Left far lateral crani for meningioma resection with subtotal sacrifice of left vertebral artery (6/1).  Noted with vocal cord paralysis, dysphagia - NPO on TF (PEG placed 6/16). Also right Hemiparesis, sensory, gait and ADL impairment.     COMORBIDITES/ACTIVE MEDICAL ISSUES     #Brain tumor - Meningioma   - S/p Left far lateral crani for meningioma resection with subtotal sacrifice of left vertebral artery (6/1)  - Dexamethasone taper - 2mg Q8h -> 2mg Q12h x 2 days, then 2mg x 2 days (end 6/25)  - Gait Instability, ADL impairments and Functional impairments: cont Comprehensive Rehab Program of PT/OT & speech    #Transient A fib with RVR  - s/p amiodarone IVP x 1  - Sinus tach - EKG without evidence of a fib  - Metoprolol 12.5 BID    # Cranial stenosis  - CTA head showed defect in the right M1 suspicious for possible right M1 occlusion though the vessel   - Recommended to "continue ASA 81mg qd and atorvastatin 40mg qhs" on discharge paperwork, however patient not on it in prior hospital  - Lipid panel 5/21 unremarkable  -restarted atorvastatin  --Contacted Dr. Liu to clarify if pt. ok to restart ASA-- stated would hold for now if it was for carotid stenosis .    #HTN  - - cont. metoprolol 12.5 BID     #Aspiration PNA  - CTA: negative for PE  - completed augmentin course 6/5  - Zosyn IV x7 days--discontinued by hospitalist 6/22    #Vocal cord paralysis  - Self suctioning    # Urinary retention  - s/p pedro 6/16  - Pedro removed 6/21 PM for TOV 6/22  --Pt. voiding-- monitor PVRs -> 52 on 6/22--d/c monitoring  --toileting program q.3h while awake    # Lip sore  - ? herpes labialis. No prior hx of lip sores  - s/p valtrex 2g BID x 2 doses on 6/15.    #Pain control  - Tylenol PRN, Tramadol    #GI/Bowel Mgmt   - At risk for constipation due to neurologic diagnosis, immobility and/or medication use  - Senna,  Miralax PRN    #DVT  - Lovenox      Dysphagia  - Diet - NPO with TF  - PEG placed (6/16)  -  SLP - evaluation and treatment  --Trial vital stimulation in speech therapy    Precautions / PROPHYLAXIS:   - Falls  - Lungs: Aspiration,   - Pressure injury/Skin: Turn Q2hrs while in bed, OOB to Chair, PT/OT      IDT 6/23:  SW: lives in  with son, and DIL; 1st floor s/u  OT: Tot A e/bathing;  Mod A grooming/LBD /toilet transf; Min A UBD; TBA-- shower transfer  PT: Mod A transfer, Amb. 30ft RW, 4 steps 2 HR.    Speech: NPO/PEG; Hypophonia, secretions improved, Mod A cognition,    ELOS: 7/11/22    Follow ups:  Giovanni Liu)  Neurosurgery  General  [address] [place] [address], [address]  [place] [address]  Phone: [phone]  Fax: [phone]

## 2022-06-23 NOTE — PROGRESS NOTE ADULT - SUBJECTIVE AND OBJECTIVE BOX
Patient is a 61y old  Female who presents with a chief complaint of Meningioma (2022 14:22)      HPI:  61 year old Female with PMHx of HTN and C-spine meningioma removal in 2018 in [place]. Patient presented with dizziness, gait instability, and vomiting over the last 3 days prior to admission. MRI showed ethel-medullary, Right CPA, and Right temporal enhancing lesions.  Possible meningioma vs schwannoma.  Patient was monitored in the ICU for hydro watch. Workup included MRI of the Brain, C/T/L spine and CT of the chest, abdomen and pelvis which was negative for malignancies on .  Patient was seen by ENT for dysphagia and recommended patient be seen by speech and swallow preop and underwent MBS and was able to continue with a regular diet.  Due to location of the lesion, ENT recommended audiology consult.  She was found to have intact hearing but mildly decreased bilaterally. Underwent Left far lateral crani for meningioma resection with subtotal sacrifice of left vertebral artery on . Attempted extubation the same night after surgery, however she developed stridor and was reintubated.  Patient was successfully extubated on  to high flow.  Patient scoped by ENT and found  to have left vocal cord paralysis.  Post extubation patient had a lot of secretions and was given Mucomyst and Duoneb. There was a concern for PE given respiratory status - CTA chest  negative for PE but with bilateral consolidation. Patient was weaned off high flow (), however frequent suctioning was still required. FED via NGT and per speech and swallow recommendations, she was kept NPO. Patient was also found to have MSSA pneumonia and was treated with Augmentin.  Speech and swallow continued to follow and patient was recommended for a PEG.  PEG was placed on . Patient also had two episodes of desaturation on , follow up with another CTA chest; which was also negative for PE, but showed b/l lower lobe consolidations and IV antibiotics (Zosyn) were started.     Patient was evaluated by PM&R and therapy for functional deficits and gait/ADL impairments and recommend acute rehabilitation.  Patient was medically optimized for discharge to [facility] on 22.  (2022 13:21)        SUBJECTIVE: Patient seen and examined. No new complaints. Notes wanting to go home.       REVIEW OF SYSTEMS  +dysphagia, hypophonia  +R sided weakness  Denies pain    VITALS  61y  Vital Signs Last 24 Hrs  T(C): 36.3 (2022 08:54), Max: 36.3 (2022 08:54)  T(F): 97.3 (2022 08:54), Max: 97.3 (2022 08:54)  HR: 68 (2022 08:54) (66 - 68)  BP: 106/72 (2022 08:54) (106/72 - 131/83)  BP(mean): --  RR: 15 (2022 08:54) (15 - 15)  SpO2: 94% (2022 08:54) (94% - 94%)  Daily     Daily Weight in k.2 (2022 22:43)        PHYSICAL EXAM:     Gen - NAD, Comfortable  HEENT - NCAT,  Pulm - CTA  Cardiovascular - RRR,  Abdomen - Soft, NT/ND, +BS, (+) PEG tube site - clean  Extremities - No calf tenderness  Neuro-     Cognitive - AAOx3. --did not know hospital name     Communication - Fluent, hypophonia     Attention: Intact, able to recite days of week backwards, perform serial 5's     Memory: Recall 3 objects immediate and 3 min later         Cranial Nerves - PERRLA, EOMI, +mild diplopia, Slight impaired R shoulder shrug, . Visual fields intact. +dysphagia, +hypophonia,  Orolingual weakness     Motor -                     LEFT    UE - ShAB 5/5, EF 5/5, EE 5/5, WE 5/5,  5/5                    RIGHT UE - ShAB 4+/5, EF 3/5, EE 3/5, WE 3/5,  3/5                    LEFT    LE - HF 5/5, KE 5/5, DF 5/5, PF 5/5                    RIGHT LE - HF 4/5, KE 4/5, DF 3/5, PF 4/5        Sensory - Slightly diminished sensation to light touch in RUE and RLE     Coordination -- impaired on right     Tone - normal  Psychiatric - Mood stable, Affect WNL    RECENT LABS:                        11.3   5.95  )-----------( 287      ( 2022 06:18 )             32.5     06-23    131<L>  |  97  |  17  ----------------------------<  99  3.9   |  30  |  0.46<L>    Ca    8.6      2022 06:18    TPro  6.1  /  Alb  2.8<L>  /  TBili  0.6  /  DBili  x   /  AST  28  /  ALT  97<H>  /  AlkPhos  83  06-23    LIVER FUNCTIONS - ( 2022 06:18 )  Alb: 2.8 g/dL / Pro: 6.1 g/dL / ALK PHOS: 83 U/L / ALT: 97 U/L / AST: 28 U/L / GGT: x           CAPILLARY BLOOD GLUCOSE    MEDICATIONS:  MEDICATIONS  (STANDING):  ascorbic acid 500 milliGRAM(s) Oral daily  dexAMETHasone     Tablet   Oral   dexAMETHasone     Tablet 2 milliGRAM(s) Oral daily  enoxaparin Injectable 40 milliGRAM(s) SubCutaneous <User Schedule>  famotidine    Tablet 20 milliGRAM(s) Oral daily  melatonin 6 milliGRAM(s) Oral at bedtime  metoprolol tartrate 12.5 milliGRAM(s) Oral every 12 hours  multivitamin 1 Tablet(s) Oral daily  petrolatum white Ointment 1 Application(s) Topical daily  polyethylene glycol 3350 17 Gram(s) Oral daily  senna 2 Tablet(s) Oral at bedtime    MEDICATIONS  (PRN):  acetaminophen     Tablet .. 650 milliGRAM(s) Oral every 6 hours PRN Temp greater or equal to 38C (100.4F), Mild Pain (1 - 3)  ondansetron    Tablet 4 milliGRAM(s) Oral every 12 hours PRN Nausea  traMADol 25 milliGRAM(s) Oral every 4 hours PRN Moderate Pain (4 - 6)  traMADol 50 milliGRAM(s) Oral every 4 hours PRN Severe Pain (7 - 10)     Patient is a 61y old  Female who presents with a chief complaint of Meningioma (2022 14:22)      HPI:  61 year old Female with PMHx of HTN and C-spine meningioma removal in 2018 in [place]. Patient presented with dizziness, gait instability, and vomiting over the last 3 days prior to admission. MRI showed ethel-medullary, Right CPA, and Right temporal enhancing lesions.  Possible meningioma vs schwannoma.  Patient was monitored in the ICU for hydro watch. Workup included MRI of the Brain, C/T/L spine and CT of the chest, abdomen and pelvis which was negative for malignancies on .  Patient was seen by ENT for dysphagia and recommended patient be seen by speech and swallow preop and underwent MBS and was able to continue with a regular diet.  Due to location of the lesion, ENT recommended audiology consult.  She was found to have intact hearing but mildly decreased bilaterally. Underwent Left far lateral crani for meningioma resection with subtotal sacrifice of left vertebral artery on . Attempted extubation the same night after surgery, however she developed stridor and was reintubated.  Patient was successfully extubated on  to high flow.  Patient scoped by ENT and found  to have left vocal cord paralysis.  Post extubation patient had a lot of secretions and was given Mucomyst and Duoneb. There was a concern for PE given respiratory status - CTA chest  negative for PE but with bilateral consolidation. Patient was weaned off high flow (), however frequent suctioning was still required. FED via NGT and per speech and swallow recommendations, she was kept NPO. Patient was also found to have MSSA pneumonia and was treated with Augmentin.  Speech and swallow continued to follow and patient was recommended for a PEG.  PEG was placed on . Patient also had two episodes of desaturation on , follow up with another CTA chest; which was also negative for PE, but showed b/l lower lobe consolidations and IV antibiotics (Zosyn) were started.     Patient was evaluated by PM&R and therapy for functional deficits and gait/ADL impairments and recommend acute rehabilitation.  Patient was medically optimized for discharge to [facility] on 22.  (2022 13:21)        SUBJECTIVE: Patient seen and examined, spoke to her via telephone mandarin . No new complaints. Notes wanting to go home.       REVIEW OF SYSTEMS  +dysphagia, hypophonia  +R sided weakness  Denies pain    VITALS  61y  Vital Signs Last 24 Hrs  T(C): 36.3 (2022 08:54), Max: 36.3 (2022 08:54)  T(F): 97.3 (2022 08:54), Max: 97.3 (2022 08:54)  HR: 68 (2022 08:54) (66 - 68)  BP: 106/72 (2022 08:54) (106/72 - 131/83)  BP(mean): --  RR: 15 (2022 08:54) (15 - 15)  SpO2: 94% (2022 08:54) (94% - 94%)  Daily     Daily Weight in k.2 (2022 22:43)        PHYSICAL EXAM:     Gen - NAD, Comfortable  HEENT - NCAT,  Pulm - CTA  Cardiovascular - RRR,  Abdomen - Soft, NT/ND, +BS, (+) PEG tube site - clean  Extremities - No calf tenderness  Neuro-     Cognitive - AAOx3. --did not know hospital name     Communication - Fluent, hypophonia     Attention: Intact, able to recite days of week backwards, perform serial 5's     Memory: Recall 3 objects immediate and 3 min later         Cranial Nerves - PERRLA, EOMI, +mild diplopia, Slight impaired R shoulder shrug, . Visual fields intact. +dysphagia, +hypophonia,  Orolingual weakness     Motor -                     LEFT    UE - ShAB 5/5, EF 5/5, EE 5/5, WE 5/5,  5/5                    RIGHT UE - ShAB 4+/5, EF 3/5, EE 3/5, WE 3/5,  3/5                    LEFT    LE - HF 5/5, KE 5/5, DF 5/5, PF 5/5                    RIGHT LE - HF 4/5, KE 4/5, DF 3/5, PF 4/5        Sensory - Slightly diminished sensation to light touch in RUE and RLE     Coordination -- impaired on right     Tone - normal  Psychiatric - Mood stable, Affect WNL    RECENT LABS:                        11.3   5.95  )-----------( 287      ( 2022 06:18 )             32.5     06-23    131<L>  |  97  |  17  ----------------------------<  99  3.9   |  30  |  0.46<L>    Ca    8.6      2022 06:18    TPro  6.1  /  Alb  2.8<L>  /  TBili  0.6  /  DBili  x   /  AST  28  /  ALT  97<H>  /  AlkPhos  83  06-23    LIVER FUNCTIONS - ( 2022 06:18 )  Alb: 2.8 g/dL / Pro: 6.1 g/dL / ALK PHOS: 83 U/L / ALT: 97 U/L / AST: 28 U/L / GGT: x           CAPILLARY BLOOD GLUCOSE    MEDICATIONS:  MEDICATIONS  (STANDING):  ascorbic acid 500 milliGRAM(s) Oral daily  dexAMETHasone     Tablet   Oral   dexAMETHasone     Tablet 2 milliGRAM(s) Oral daily  enoxaparin Injectable 40 milliGRAM(s) SubCutaneous <User Schedule>  famotidine    Tablet 20 milliGRAM(s) Oral daily  melatonin 6 milliGRAM(s) Oral at bedtime  metoprolol tartrate 12.5 milliGRAM(s) Oral every 12 hours  multivitamin 1 Tablet(s) Oral daily  petrolatum white Ointment 1 Application(s) Topical daily  polyethylene glycol 3350 17 Gram(s) Oral daily  senna 2 Tablet(s) Oral at bedtime    MEDICATIONS  (PRN):  acetaminophen     Tablet .. 650 milliGRAM(s) Oral every 6 hours PRN Temp greater or equal to 38C (100.4F), Mild Pain (1 - 3)  ondansetron    Tablet 4 milliGRAM(s) Oral every 12 hours PRN Nausea  traMADol 25 milliGRAM(s) Oral every 4 hours PRN Moderate Pain (4 - 6)  traMADol 50 milliGRAM(s) Oral every 4 hours PRN Severe Pain (7 - 10)

## 2022-06-23 NOTE — PROGRESS NOTE ADULT - ATTENDING COMMENTS
Pt. seen with resident.  Agree with documentation above as per resident with amendments made as appropriate. Patient medically stable. Making progress towards rehab goals.     Left meningioma s/p resection  Pt. reports she did not sleep much but nursing reports pt slept well during the night.  Denies pain.    tolerating therapy

## 2022-06-24 PROCEDURE — 99232 SBSQ HOSP IP/OBS MODERATE 35: CPT

## 2022-06-24 PROCEDURE — 99233 SBSQ HOSP IP/OBS HIGH 50: CPT

## 2022-06-24 PROCEDURE — 71045 X-RAY EXAM CHEST 1 VIEW: CPT | Mod: 26

## 2022-06-24 RX ORDER — PANTOPRAZOLE SODIUM 20 MG/1
40 TABLET, DELAYED RELEASE ORAL
Refills: 0 | Status: DISCONTINUED | OUTPATIENT
Start: 2022-06-24 | End: 2022-06-24

## 2022-06-24 RX ORDER — PANTOPRAZOLE SODIUM 20 MG/1
40 TABLET, DELAYED RELEASE ORAL
Refills: 0 | Status: DISCONTINUED | OUTPATIENT
Start: 2022-06-24 | End: 2022-07-15

## 2022-06-24 RX ADMIN — Medication 1 TABLET(S): at 11:35

## 2022-06-24 RX ADMIN — Medication 1 APPLICATION(S): at 13:01

## 2022-06-24 RX ADMIN — ENOXAPARIN SODIUM 40 MILLIGRAM(S): 100 INJECTION SUBCUTANEOUS at 17:13

## 2022-06-24 RX ADMIN — POLYETHYLENE GLYCOL 3350 17 GRAM(S): 17 POWDER, FOR SOLUTION ORAL at 13:01

## 2022-06-24 RX ADMIN — Medication 2 MILLIGRAM(S): at 05:01

## 2022-06-24 RX ADMIN — Medication 12.5 MILLIGRAM(S): at 17:13

## 2022-06-24 RX ADMIN — FAMOTIDINE 20 MILLIGRAM(S): 10 INJECTION INTRAVENOUS at 11:36

## 2022-06-24 RX ADMIN — Medication 500 MILLIGRAM(S): at 11:36

## 2022-06-24 NOTE — PROGRESS NOTE ADULT - ASSESSMENT
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  MRI with ethel-medullary. right CPA, and right temporal enhancing lesions concern for meningioma vs schwannoma, with hydro.  Now S/p Left far lateral crani for meningioma resection with subtotal sacrifice of left vertebral artery (6/1).  Noted with vocal cord paralysis, dysphagia - NPO on TF (PEG placed 6/16). Also right Hemiparesis, sensory, gait and ADL impairment.     #Meningioma s/p L lateral crani for meningioma resection with subtotal sacrifice of L vertebral artery on 6/1  -Decadron taper as per neurosurgery  -Continue comprehensive rehab program -PT/OT/SLP per rehab team  -Pain management, bowel regimen per rehab   - fall, aspiration precautions    #Dysphagia s/p PEG placement  -cont tube feeds  - aspiration precuations  - patient ate some PO yesterday. CXR today clear  - SLP eval as per primary    #Left vocal cord paralysis  -Follow up with ENT as outpatient    #Recent MSSA PNA and aspiration pna  -Completed 5 days course of zosyn on 6/20  -Xopenex Mucomyst neb for secretion control   - Repeat CXR today: clear. reviewed by me. f/u final report    #PAF  -Pt with possible transient A fib with rvr during RRT at [facility]. EKG showed sinus tach and no evidence pafib on tele  -Cont with low dose lopressor  -TTE reviewed: EF 75%. hyperdynamic LV systolic function     #Urinary retention  - s/p pedro 6/16  - Pedro removed 6/21 PM for TOV 6/22  --Pt. voiding well. low PVRs =52    #HTN  - BP soft  -cont with lopressor   -was on losartan and amlodipine, on hold due to BP on lower side  - check orthostasis periodically. can hold lopressor if significant orthostasis and HR controlled    Transaminitis  -Monitor LFTs, avoid hepatotoxins    Anemia  - h/h stable    DVT ppx - lovenox  GI ppx - pepcid     Discussed with rehab team in [facility]

## 2022-06-24 NOTE — PROGRESS NOTE ADULT - ATTENDING COMMENTS
Pt. seen with resident.  Agree with documentation above as per resident with amendments made as appropriate. Patient medically stable. Making progress towards rehab goals.     Left meningioma resection with sacrifice of left vertebral artery  Pt. NPO but accidentally had tray delivered by dietary yesterday-- pt. likely consumed small amount of food or drink.   Spoke with Pt's son and DIL who were understandably upset.    Pt. no fever, chills, coughing.  DIL concerned pt. had some increased mucously PO secretions today.  Will check routine CXR    ** Spoke with pt's  _son & DIL___, to provide update on pt's status,  medical update, progress in therapy, rehab plan of care, ELOS of 7/11 and discharge needs/expectations.  Son and DIL work but will explore supervision options at home from family. Pt. has medicaid and would be eligible for home aide but explained this takes time to be put in place.  All questions answered.

## 2022-06-24 NOTE — PROGRESS NOTE ADULT - SUBJECTIVE AND OBJECTIVE BOX
Patient is a 61y old  Female who presents with a chief complaint of Meningioma (2022 11:08)      HPI:  61 year old Female with PMHx of HTN and C-spine meningioma removal in 2018 in [place]. Patient presented with dizziness, gait instability, and vomiting over the last 3 days prior to admission. MRI showed ethel-medullary, Right CPA, and Right temporal enhancing lesions.  Possible meningioma vs schwannoma.  Patient was monitored in the ICU for hydro watch. Workup included MRI of the Brain, C/T/L spine and CT of the chest, abdomen and pelvis which was negative for malignancies on .  Patient was seen by ENT for dysphagia and recommended patient be seen by speech and swallow preop and underwent MBS and was able to continue with a regular diet.  Due to location of the lesion, ENT recommended audiology consult.  She was found to have intact hearing but mildly decreased bilaterally. Underwent Left far lateral crani for meningioma resection with subtotal sacrifice of left vertebral artery on . Attempted extubation the same night after surgery, however she developed stridor and was reintubated.  Patient was successfully extubated on  to high flow.  Patient scoped by ENT and found  to have left vocal cord paralysis.  Post extubation patient had a lot of secretions and was given Mucomyst and Duoneb. There was a concern for PE given respiratory status - CTA chest  negative for PE but with bilateral consolidation. Patient was weaned off high flow (), however frequent suctioning was still required. FED via NGT and per speech and swallow recommendations, she was kept NPO. Patient was also found to have MSSA pneumonia and was treated with Augmentin.  Speech and swallow continued to follow and patient was recommended for a PEG.  PEG was placed on . Patient also had two episodes of desaturation on , follow up with another CTA chest; which was also negative for PE, but showed b/l lower lobe consolidations and IV antibiotics (Zosyn) were started.     Patient was evaluated by PM&R and therapy for functional deficits and gait/ADL impairments and recommend acute rehabilitation.  Patient was medically optimized for discharge to [facility] on 22.  (2022 13:21)        SUBJECTIVE: Patient seen and examined, using [employer]  ID#[id number]. Yesterday evening may have eaten some food despite NPO status. Discussed NPO status this morning. She denied any coughing.       REVIEW OF SYSTEMS  Denies cough  Denies nausea/vomiting  Denies lightheadedness/dizziness  Denies diarrhea/constipation  Denies difficulty breathing  +dysarthria, dysphagia      VITALS  61y  Vital Signs Last 24 Hrs  T(C): 36.5 (2022 08:22), Max: 36.5 (2022 08:22)  T(F): 97.7 (2022 08:22), Max: 97.7 (2022 08:22)  HR: 81 (2022 08:22) (79 - 81)  BP: 104/70 (2022 08:22) (100/65 - 105/70)  BP(mean): --  RR: 16 (2022 08:22) (15 - 16)  SpO2: 95% (2022 08:22) (95% - 97%)  Daily     Daily Weight in k.2 (2022 22:37)        PHYSICAL EXAM:     Gen - NAD, Comfortable  HEENT - NCAT,  Pulm - CTA bilaterally  Cardiovascular - RRR,  Abdomen - Soft, NT/ND, +BS, (+) PEG tube site - clean  Extremities - No calf tenderness  Neuro-     Cognitive - AAOx3. --did not know hospital name     Communication - Fluent, hypophonia     Attention: Intact, able to recite days of week backwards, perform serial 5's     Memory: Recall 3 objects immediate and 3 min later         Cranial Nerves - PERRLA, EOMI, +mild diplopia, Slight impaired R shoulder shrug, . Visual fields intact. +dysphagia, +hypophonia,  Orolingual weakness     Motor -                     LEFT    UE - ShAB 5/5, EF 5/5, EE 5/5, WE 5/5,  5/5                    RIGHT UE - ShAB 4+/5, EF 3/5, EE 3/5, WE 3/5,  3/5                    LEFT    LE - HF 5/5, KE 5/5, DF 5/5, PF 5/5                    RIGHT LE - HF 4/5, KE 4/5, DF 3/5, PF 4/5        Sensory - Slightly diminished sensation to light touch in RUE and RLE     Coordination -- impaired on right     Tone - normal  Psychiatric - Mood stable, Affect WNL        RECENT LABS:                        11.3   5.95  )-----------( 287      ( 2022 06:18 )             32.5         131<L>  |  97  |  17  ----------------------------<  99  3.9   |  30  |  0.46<L>    Ca    8.6      2022 06:18    TPro  6.1  /  Alb  2.8<L>  /  TBili  0.6  /  DBili  x   /  AST  28  /  ALT  97<H>  /  AlkPhos  83      LIVER FUNCTIONS - ( 2022 06:18 )  Alb: 2.8 g/dL / Pro: 6.1 g/dL / ALK PHOS: 83 U/L / ALT: 97 U/L / AST: 28 U/L / GGT: x                   CAPILLARY BLOOD GLUCOSE            MEDICATIONS:  MEDICATIONS  (STANDING):  ascorbic acid 500 milliGRAM(s) Oral daily  enoxaparin Injectable 40 milliGRAM(s) SubCutaneous <User Schedule>  famotidine    Tablet 20 milliGRAM(s) Oral daily  melatonin 6 milliGRAM(s) Oral at bedtime  metoprolol tartrate 12.5 milliGRAM(s) Oral every 12 hours  multivitamin 1 Tablet(s) Oral daily  petrolatum white Ointment 1 Application(s) Topical daily  polyethylene glycol 3350 17 Gram(s) Oral daily  senna 2 Tablet(s) Oral at bedtime    MEDICATIONS  (PRN):  acetaminophen     Tablet .. 650 milliGRAM(s) Oral every 6 hours PRN Temp greater or equal to 38C (100.4F), Mild Pain (1 - 3)  ondansetron    Tablet 4 milliGRAM(s) Oral every 12 hours PRN Nausea  traMADol 25 milliGRAM(s) Oral every 4 hours PRN Moderate Pain (4 - 6)  traMADol 50 milliGRAM(s) Oral every 4 hours PRN Severe Pain (7 - 10)     Patient is a 61y old  Female who presents with a chief complaint of Meningioma (2022 11:08)      HPI:  61 year old Female with PMHx of HTN and C-spine meningioma removal in 2018 in [place]. Patient presented with dizziness, gait instability, and vomiting over the last 3 days prior to admission. MRI showed ethel-medullary, Right CPA, and Right temporal enhancing lesions.  Possible meningioma vs schwannoma.  Patient was monitored in the ICU for hydro watch. Workup included MRI of the Brain, C/T/L spine and CT of the chest, abdomen and pelvis which was negative for malignancies on .  Patient was seen by ENT for dysphagia and recommended patient be seen by speech and swallow preop and underwent MBS and was able to continue with a regular diet.  Due to location of the lesion, ENT recommended audiology consult.  She was found to have intact hearing but mildly decreased bilaterally. Underwent Left far lateral crani for meningioma resection with subtotal sacrifice of left vertebral artery on . Attempted extubation the same night after surgery, however she developed stridor and was reintubated.  Patient was successfully extubated on  to high flow.  Patient scoped by ENT and found  to have left vocal cord paralysis.  Post extubation patient had a lot of secretions and was given Mucomyst and Duoneb. There was a concern for PE given respiratory status - CTA chest  negative for PE but with bilateral consolidation. Patient was weaned off high flow (), however frequent suctioning was still required. FED via NGT and per speech and swallow recommendations, she was kept NPO. Patient was also found to have MSSA pneumonia and was treated with Augmentin.  Speech and swallow continued to follow and patient was recommended for a PEG.  PEG was placed on . Patient also had two episodes of desaturation on , follow up with another CTA chest; which was also negative for PE, but showed b/l lower lobe consolidations and IV antibiotics (Zosyn) were started.     Patient was evaluated by PM&R and therapy for functional deficits and gait/ADL impairments and recommend acute rehabilitation.  Patient was medically optimized for discharge to [facility] on 22.  (2022 13:21)        SUBJECTIVE: Patient seen and examined, using [employer]  ID#[id number]. Yesterday evening may have eaten some food despite NPO status. Discussed NPO status this morning. She denied any coughing.  Appears to have increased mucus and secretions as per family        REVIEW OF SYSTEMS  Denies cough  Denies nausea/vomiting  Denies lightheadedness/dizziness  Denies diarrhea/constipation  Denies difficulty breathing  +dysarthria, dysphagia      VITALS  61y  Vital Signs Last 24 Hrs  T(C): 36.5 (2022 08:22), Max: 36.5 (2022 08:22)  T(F): 97.7 (2022 08:22), Max: 97.7 (2022 08:22)  HR: 81 (2022 08:22) (79 - 81)  BP: 104/70 (2022 08:22) (100/65 - 105/70)  BP(mean): --  RR: 16 (2022 08:22) (15 - 16)  SpO2: 95% (2022 08:22) (95% - 97%)  Daily     Daily Weight in k.2 (2022 22:37)        PHYSICAL EXAM:     Gen - NAD, Comfortable  HEENT - NCAT,  Pulm - CTA bilaterally  Cardiovascular - RRR,  Abdomen - Soft, NT/ND, +BS, (+) PEG tube site - clean  Extremities - No calf tenderness  Neuro-     Cognitive - AAOx3. --did not know hospital name     Communication - Fluent, hypophonia     Attention: Intact, able to recite days of week backwards, perform serial 5's     Memory: Recall 3 objects immediate and 3 min later         Cranial Nerves - PERRLA, EOMI, +mild diplopia, Slight impaired R shoulder shrug, . Visual fields intact. +dysphagia, +hypophonia,  Orolingual weakness     Motor -                     LEFT    UE - ShAB 5/5, EF 5/5, EE 5/5, WE 5/5,  5/5                    RIGHT UE - ShAB 4+/5, EF 3/5, EE 3/5, WE 3/5,  3/5                    LEFT    LE - HF 5/5, KE 5/5, DF 5/5, PF 5/5                    RIGHT LE - HF 4/5, KE 4/5, DF 3/5, PF 4/5        Sensory - Slightly diminished sensation to light touch in RUE and RLE     Coordination -- impaired on right     Tone - normal  Psychiatric - Mood stable, Affect WNL        RECENT LABS:                        11.3   5.95  )-----------( 287      ( 2022 06:18 )             32.5         131<L>  |  97  |  17  ----------------------------<  99  3.9   |  30  |  0.46<L>    Ca    8.6      2022 06:18    TPro  6.1  /  Alb  2.8<L>  /  TBili  0.6  /  DBili  x   /  AST  28  /  ALT  97<H>  /  AlkPhos  83      LIVER FUNCTIONS - ( 2022 06:18 )  Alb: 2.8 g/dL / Pro: 6.1 g/dL / ALK PHOS: 83 U/L / ALT: 97 U/L / AST: 28 U/L / GGT: x                   CAPILLARY BLOOD GLUCOSE            MEDICATIONS:  MEDICATIONS  (STANDING):  ascorbic acid 500 milliGRAM(s) Oral daily  enoxaparin Injectable 40 milliGRAM(s) SubCutaneous <User Schedule>  famotidine    Tablet 20 milliGRAM(s) Oral daily  melatonin 6 milliGRAM(s) Oral at bedtime  metoprolol tartrate 12.5 milliGRAM(s) Oral every 12 hours  multivitamin 1 Tablet(s) Oral daily  petrolatum white Ointment 1 Application(s) Topical daily  polyethylene glycol 3350 17 Gram(s) Oral daily  senna 2 Tablet(s) Oral at bedtime    MEDICATIONS  (PRN):  acetaminophen     Tablet .. 650 milliGRAM(s) Oral every 6 hours PRN Temp greater or equal to 38C (100.4F), Mild Pain (1 - 3)  ondansetron    Tablet 4 milliGRAM(s) Oral every 12 hours PRN Nausea  traMADol 25 milliGRAM(s) Oral every 4 hours PRN Moderate Pain (4 - 6)  traMADol 50 milliGRAM(s) Oral every 4 hours PRN Severe Pain (7 - 10)

## 2022-06-24 NOTE — PROGRESS NOTE ADULT - SUBJECTIVE AND OBJECTIVE BOX
Medicine Progress Note    Patient is a 61y old  Female who presents with a chief complaint of Meningioma (24 Jun 2022 10:42)      SUBJECTIVE / OVERNIGHT EVENTS:  seen and examined  Chart reviewed  No overnight events  Limb weakness improving with therapy  BM+  No pain  No complaints  BP low    ADDITIONAL REVIEW OF SYSTEMS:  no fever/chills/CP/sob/palpitation/dizziness/ abd pain/nausea/vomiting/diarrhea/constipation/headaches. all other ROS neg    MEDICATIONS  (STANDING):  ascorbic acid 500 milliGRAM(s) Oral daily  enoxaparin Injectable 40 milliGRAM(s) SubCutaneous <User Schedule>  melatonin 6 milliGRAM(s) Oral at bedtime  metoprolol tartrate 12.5 milliGRAM(s) Oral every 12 hours  multivitamin 1 Tablet(s) Oral daily  pantoprazole   Suspension 40 milliGRAM(s) Oral before breakfast  petrolatum white Ointment 1 Application(s) Topical daily  polyethylene glycol 3350 17 Gram(s) Oral daily  senna 2 Tablet(s) Oral at bedtime    MEDICATIONS  (PRN):  acetaminophen     Tablet .. 650 milliGRAM(s) Oral every 6 hours PRN Temp greater or equal to 38C (100.4F), Mild Pain (1 - 3)  ondansetron    Tablet 4 milliGRAM(s) Oral every 12 hours PRN Nausea  traMADol 25 milliGRAM(s) Oral every 4 hours PRN Moderate Pain (4 - 6)  traMADol 50 milliGRAM(s) Oral every 4 hours PRN Severe Pain (7 - 10)    CAPILLARY BLOOD GLUCOSE        I&O's Summary    23 Jun 2022 07:01  -  24 Jun 2022 07:00  --------------------------------------------------------  IN: 0 mL / OUT: 3 mL / NET: -3 mL    24 Jun 2022 07:01  -  24 Jun 2022 13:38  --------------------------------------------------------  IN: 780 mL / OUT: 0 mL / NET: 780 mL        PHYSICAL EXAM:  Vital Signs Last 24 Hrs  T(C): 36.5 (24 Jun 2022 08:22), Max: 36.5 (24 Jun 2022 08:22)  T(F): 97.7 (24 Jun 2022 08:22), Max: 97.7 (24 Jun 2022 08:22)  HR: 94 (24 Jun 2022 13:20) (79 - 94)  BP: 107/67 (24 Jun 2022 13:20) (100/65 - 107/67)  BP(mean): --  RR: 16 (24 Jun 2022 13:20) (15 - 16)  SpO2: 95% (24 Jun 2022 13:20) (95% - 97%)    GENERAL: Not in distress. Alert    HEENT: clear conjuctiva, MMM. no pallor or icterus  CARDIOVASCULAR: RRR S1, S2. No murmur/rubs/gallop  LUNGS: BLAE+, no rales, no wheezing, no rhonchi.    ABDOMEN: ND. Soft,  NT, no guarding / rebound / rigidity. BS normoactive  BACK: No spine tenderness.  EXTREMITIES: no edema. no leg or calf TP.  SKIN: warm and dry  PSYCHIATRIC: Calm.  No agitation.      LABS:                        11.3   5.95  )-----------( 287      ( 23 Jun 2022 06:18 )             32.5     06-23    131<L>  |  97  |  17  ----------------------------<  99  3.9   |  30  |  0.46<L>    Ca    8.6      23 Jun 2022 06:18    TPro  6.1  /  Alb  2.8<L>  /  TBili  0.6  /  DBili  x   /  AST  28  /  ALT  97<H>  /  AlkPhos  83  06-23              COVID-19 PCR: NotDetec (20 Jun 2022 23:35)  COVID-19 PCR: NotDetec (20 Jun 2022 10:32)  COVID-19 PCR: NotDetec (15 Polo 2022 16:24)  COVID-19 PCR: NotDetec (13 Jun 2022 05:08)  COVID-19 PCR: NotDetec (31 May 2022 06:12)  COVID-19 PCR: NotDetec (26 May 2022 17:34)  COVID-19 PCR: NotDete (19 May 2022 16:02)      RADIOLOGY & ADDITIONAL TESTS:  Imaging from Last 24 Hours:    Electrocardiogram/QTc Interval:    COORDINATION OF CARE:  Care Discussed with Consultants/Other Providers:

## 2022-06-24 NOTE — PROGRESS NOTE ADULT - ASSESSMENT
60 yo Mandarin speaking F with PMH of HTN, C Spine meningioma (removed 2018 in [place]) who presented with dizziness, ataxia, and emesis.  MRI with ethel-medullary. right CPA, and right temporal enhancing lesions concern for meningioma vs schwannoma, with hydro.  Now S/p Left far lateral crani for meningioma resection with subtotal sacrifice of left vertebral artery (6/1).  Noted with vocal cord paralysis, dysphagia - NPO on TF (PEG placed 6/16). Also right Hemiparesis, sensory, gait and ADL impairment.     COMORBIDITES/ACTIVE MEDICAL ISSUES     #Brain tumor - Meningioma   - S/p Left far lateral crani for meningioma resection with subtotal sacrifice of left vertebral artery (6/1)  - Dexamethasone taper - 2mg Q8h -> 2mg Q12h x 2 days, then 2mg x 2 days (end 6/25)  - Gait Instability, ADL impairments and Functional impairments: cont Comprehensive Rehab Program of PT/OT & speech    #Transient A fib with RVR  - s/p amiodarone IVP x 1  - Sinus tach - EKG without evidence of a fib  - Metoprolol 12.5 BID    # Cranial stenosis  - CTA head showed defect in the right M1 suspicious for possible right M1 occlusion though the vessel   - Recommended to "continue ASA 81mg qd and atorvastatin 40mg qhs" on discharge paperwork, however patient not on it in prior hospital  - Lipid panel 5/21 unremarkable  -restarted atorvastatin  --Contacted Dr. Liu to clarify if pt. ok to restart ASA-- stated would hold for now if it was for carotid stenosis .    #HTN  - - cont. metoprolol 12.5 BID     #Aspiration PNA  - CTA: negative for PE  - completed augmentin course 6/5  - Zosyn IV x7 days--discontinued by hospitalist 6/22      # Urinary retention  - s/p pedro 6/16  - Pedro removed 6/21 PM for TOV 6/22  --Pt. voiding-- monitor PVRs -> 52 on 6/22--d/c monitoring  --toileting program q.3h while awake    # Lip sore  - ? herpes labialis. No prior hx of lip sores  - s/p valtrex 2g BID x 2 doses on 6/15.    #Pain control  - Tylenol PRN, Tramadol    #GI/Bowel Mgmt   - At risk for constipation due to neurologic diagnosis, immobility and/or medication use  - Senna,  Miralax PRN    #DVT  - Lovenox      Dysphagia  Vocal cord paralysis  - Diet - NPO with TF  - PEG placed (6/16)  - SLP - evaluation and treatment  - Trial vital stimulation in speech therapy  - Self suctioning    Precautions / PROPHYLAXIS:   - Falls  - Lungs: Aspiration,   - Pressure injury/Skin: Turn Q2hrs while in bed, OOB to Chair, PT/OT      IDT 6/23:  SW: lives in  with son, and DIL; 1st floor s/u  OT: Tot A e/bathing;  Mod A grooming/LBD /toilet transf; Min A UBD; TBA-- shower transfer  PT: Mod A transfer, Amb. 30ft RW, 4 steps 2 HR.    Speech: NPO/PEG; Hypophonia, secretions improved, Mod A cognition,    ELOS: 7/11/22    Follow ups:  Giovanni Liu)  Neurosurgery  General  [address] [place] [address], [address]  [place] [address]  Phone: [phone]  Fax: [phone]     62 yo Mandarin speaking F with PMH of HTN, C Spine meningioma (removed 2018 in [place]) who presented with dizziness, ataxia, and emesis.  MRI with ethel-medullary. right CPA, and right temporal enhancing lesions concern for meningioma vs schwannoma, with hydro.  Now S/p Left far lateral crani for meningioma resection with subtotal sacrifice of left vertebral artery (6/1).  Noted with vocal cord paralysis, dysphagia - NPO on TF (PEG placed 6/16). Also right Hemiparesis, sensory, gait and ADL impairment.     COMORBIDITES/ACTIVE MEDICAL ISSUES     #Brain tumor - Meningioma   - S/p Left far lateral crani for meningioma resection with subtotal sacrifice of left vertebral artery (6/1)  - Dexamethasone taper - 2mg Q8h -> 2mg Q12h x 2 days, then 2mg x 2 days (end 6/25)  - Gait Instability, ADL impairments and Functional impairments: cont Comprehensive Rehab Program of PT/OT & speech    #Transient A fib with RVR  - s/p amiodarone IVP x 1  - Sinus tach - EKG without evidence of a fib  - Metoprolol 12.5 BID    # Cranial stenosis  - CTA head showed defect in the right M1 suspicious for possible right M1 occlusion though the vessel   - Recommended to "continue ASA 81mg qd and atorvastatin 40mg qhs" on discharge paperwork, however patient not on it in prior hospital  - Lipid panel 5/21 unremarkable  -restarted atorvastatin  --Contacted Dr. Liu to clarify if pt. ok to restart ASA-- stated would hold for now if it was for carotid stenosis .    #HTN  - - cont. metoprolol 12.5 BID     #Aspiration PNA  - CTA: negative for PE  - completed augmentin course 6/5  - Zosyn IV x7 days--discontinued by hospitalist 6/22  --Routine CXR to r/o PNA, given pt. may have eaten PO yesterday.       # Urinary retention  - s/p pedro 6/16  - Pedro removed 6/21 PM for TOV 6/22  --Pt. voiding with low PVRs =52  --toileting program q.3h while awake    # Lip sore  - ? herpes labialis. No prior hx of lip sores  - s/p valtrex 2g BID x 2 doses on 6/15.    #Pain control  - Tylenol PRN, Tramadol    #GI/Bowel Mgmt   - At risk for constipation due to neurologic diagnosis, immobility and/or medication use  - Senna,  Miralax PRN    #DVT  - Lovenox      Dysphagia  Vocal cord paralysis  - Diet - NPO with TF  - PEG placed (6/16)  - SLP - evaluation and treatment  - Trial vital stimulation in speech therapy  - Self suctioning    Precautions / PROPHYLAXIS:   - Falls  - Lungs: Aspiration,   - Pressure injury/Skin: Turn Q2hrs while in bed, OOB to Chair, PT/OT      IDT 6/23:  SW: lives in  with son, and DIL; 1st floor s/u  OT: Tot A e/bathing;  Mod A grooming/LBD /toilet transf; Min A UBD; TBA-- shower transfer  PT: Mod A transfer, Amb. 30ft RW, 4 steps 2 HR.    Speech: NPO/PEG; Hypophonia, secretions improved, Mod A cognition,    ELOS: 7/11/22    Follow ups:  Giovanni Liu)  Neurosurgery  General  [address] [place] [address], [address]  [place] [address]  Phone: [phone]  Fax: [phone]

## 2022-06-24 NOTE — CHART NOTE - NSCHARTNOTEFT_GEN_A_CORE
Nutrition Follow Up Note  Source: Medical Record [X] Patient [X] Family [X] pt's family present who provided Mandarin translation         Diet: NPO with tube feeding    Enteral/Parenteral Nutrition: TwoCal  ml QID (provides 1920 kcal, 80 g protein, 672 ml water)  Pt tolerating tube feeding per pt/nursing. No nausea, vomiting, diarrhea, constipation reported.    Current Weight: 106.2 lbs (6/23)  106 lbs (6/20)    Pertinent Medications: MEDICATIONS  (STANDING):  ascorbic acid 500 milliGRAM(s) Oral daily  enoxaparin Injectable 40 milliGRAM(s) SubCutaneous <User Schedule>  famotidine    Tablet 20 milliGRAM(s) Oral daily  melatonin 6 milliGRAM(s) Oral at bedtime  metoprolol tartrate 12.5 milliGRAM(s) Oral every 12 hours  multivitamin 1 Tablet(s) Oral daily  petrolatum white Ointment 1 Application(s) Topical daily  polyethylene glycol 3350 17 Gram(s) Oral daily  senna 2 Tablet(s) Oral at bedtime    MEDICATIONS  (PRN):  acetaminophen     Tablet .. 650 milliGRAM(s) Oral every 6 hours PRN Temp greater or equal to 38C (100.4F), Mild Pain (1 - 3)  ondansetron    Tablet 4 milliGRAM(s) Oral every 12 hours PRN Nausea  traMADol 25 milliGRAM(s) Oral every 4 hours PRN Moderate Pain (4 - 6)  traMADol 50 milliGRAM(s) Oral every 4 hours PRN Severe Pain (7 - 10)      Pertinent Labs:  06-23 Na131 mmol/L<L> Glu 99 mg/dL K+ 3.9 mmol/L Cr  0.46 mg/dL<L> BUN 17 mg/dL 06-23 Alb 2.8 g/dL<L>        Skin: L crani surgical incision per nursing flow sheets     Edema: No edema per nursing flow sheets     Last BM: on 6/23 Per nursing flowsheets     Estimated Needs:   [X] No Change since Previous Assessment  [ ] Recalculated:     Previous Nutrition Diagnosis:   Severe malnutrition, acute     Nutrition Diagnosis is [X] Ongoing  - addressed with tube feeding    New Nutrition Diagnosis: [X] Not Applicable  [ ] Inadequate Protein Energy Intake   [ ] Inadequate Oral Intake   [ ] Excessive Energy Intake   [ ] Increased Nutrient Needs   [ ] Obesity   [ ] Altered GI Function   [ ] Unintended Weight Loss   [ ] Food & Nutrition Related Knowledge Deficit  [ ] Limited Adherence to nutrition related recommendations   [ ] Malnutrition      Interventions:   1. Recommend continuing with current TF order  2. Monitor tolerance of tube feeding    Monitoring & Evaluation:   [X] Weights   [X] Follow Up (Per Protocol)  [X] Tolerance to Diet Prescription   [X] Other: Labs     RD Remains Available.  Sara Potts, RD Nutrition Follow Up Note  Source: Medical Record [X] Patient [X] Family [X] pt's family present who provided Mandarin translation         Diet: NPO with tube feeding    Enteral/Parenteral Nutrition: TwoCal  ml QID (provides 1920 kcal, 80 g protein, 672 ml water)  Pt tolerating tube feeding per pt/nursing. No nausea, vomiting, diarrhea, constipation reported.    Addendum- Per discussion with resident, pt was having complaints of fullness after 12 PM feed today. Suggest decreasing TF volume to 200 ml QID (will provide 1600 kcal, 66 g protein, 560 ml water) + NoCarb ProSource 1x/day (provides additional 60 kcal, 15 g protein) and monitoring tolerance of tube feeding. Pt may need increase in water flush.    Current Weight: 106.2 lbs (6/23)  106 lbs (6/20)    Pertinent Medications: MEDICATIONS  (STANDING):  ascorbic acid 500 milliGRAM(s) Oral daily  enoxaparin Injectable 40 milliGRAM(s) SubCutaneous <User Schedule>  famotidine    Tablet 20 milliGRAM(s) Oral daily  melatonin 6 milliGRAM(s) Oral at bedtime  metoprolol tartrate 12.5 milliGRAM(s) Oral every 12 hours  multivitamin 1 Tablet(s) Oral daily  petrolatum white Ointment 1 Application(s) Topical daily  polyethylene glycol 3350 17 Gram(s) Oral daily  senna 2 Tablet(s) Oral at bedtime    MEDICATIONS  (PRN):  acetaminophen     Tablet .. 650 milliGRAM(s) Oral every 6 hours PRN Temp greater or equal to 38C (100.4F), Mild Pain (1 - 3)  ondansetron    Tablet 4 milliGRAM(s) Oral every 12 hours PRN Nausea  traMADol 25 milliGRAM(s) Oral every 4 hours PRN Moderate Pain (4 - 6)  traMADol 50 milliGRAM(s) Oral every 4 hours PRN Severe Pain (7 - 10)      Pertinent Labs:  06-23 Na131 mmol/L<L> Glu 99 mg/dL K+ 3.9 mmol/L Cr  0.46 mg/dL<L> BUN 17 mg/dL 06-23 Alb 2.8 g/dL<L>        Skin: L crani surgical incision per nursing flow sheets     Edema: No edema per nursing flow sheets     Last BM: on 6/23 Per nursing flowsheets     Estimated Needs:   [X] No Change since Previous Assessment  [ ] Recalculated:     Previous Nutrition Diagnosis:   Severe malnutrition, acute     Nutrition Diagnosis is [X] Ongoing  - addressed with tube feeding    New Nutrition Diagnosis: [X] Not Applicable  [ ] Inadequate Protein Energy Intake   [ ] Inadequate Oral Intake   [ ] Excessive Energy Intake   [ ] Increased Nutrient Needs   [ ] Obesity   [ ] Altered GI Function   [ ] Unintended Weight Loss   [ ] Food & Nutrition Related Knowledge Deficit  [ ] Limited Adherence to nutrition related recommendations   [ ] Malnutrition      Interventions:   1. Recommend trial of TwoCal  ml QID via PEG + No Carb ProSource 1x/day due to c/o fullness after tube feeding  2. Monitor tolerance of tube feeding    Monitoring & Evaluation:   [X] Weights   [X] Follow Up (Per Protocol)  [X] Tolerance to Diet Prescription   [X] Other: Labs     RD Remains Available.  Sara Potts RD Nutrition Follow Up Note  Source: Medical Record [X] Patient [X] Family [X] pt's family present who provided Mandarin translation         Diet: NPO with tube feeding    Enteral/Parenteral Nutrition: TwoCal  ml QID (provides 1920 kcal, 80 g protein, 672 ml water)  Pt tolerating tube feeding per pt/nursing. No nausea, vomiting, diarrhea, constipation reported.    Addendum- Per discussion with resident, pt was having complaints of fullness after 12 PM feed today. Suggest decreasing TF volume to 200 ml QID (will provide 1600 kcal, 66 g protein, 560 ml water) + NoCarb ProSource 1x/day (provides additional 60 kcal, 15 g protein) and monitoring tolerance of tube feeding. Pt may need increase in water flush due to less formula, but pt currently has hyponatremia, therefore will defer fluids to medical/rehab doctors.    Current Weight: 106.2 lbs (6/23)  106 lbs (6/20)    Pertinent Medications: MEDICATIONS  (STANDING):  ascorbic acid 500 milliGRAM(s) Oral daily  enoxaparin Injectable 40 milliGRAM(s) SubCutaneous <User Schedule>  famotidine    Tablet 20 milliGRAM(s) Oral daily  melatonin 6 milliGRAM(s) Oral at bedtime  metoprolol tartrate 12.5 milliGRAM(s) Oral every 12 hours  multivitamin 1 Tablet(s) Oral daily  petrolatum white Ointment 1 Application(s) Topical daily  polyethylene glycol 3350 17 Gram(s) Oral daily  senna 2 Tablet(s) Oral at bedtime    MEDICATIONS  (PRN):  acetaminophen     Tablet .. 650 milliGRAM(s) Oral every 6 hours PRN Temp greater or equal to 38C (100.4F), Mild Pain (1 - 3)  ondansetron    Tablet 4 milliGRAM(s) Oral every 12 hours PRN Nausea  traMADol 25 milliGRAM(s) Oral every 4 hours PRN Moderate Pain (4 - 6)  traMADol 50 milliGRAM(s) Oral every 4 hours PRN Severe Pain (7 - 10)      Pertinent Labs:  06-23 Na131 mmol/L<L> Glu 99 mg/dL K+ 3.9 mmol/L Cr  0.46 mg/dL<L> BUN 17 mg/dL 06-23 Alb 2.8 g/dL<L>        Skin: L crani surgical incision per nursing flow sheets     Edema: No edema per nursing flow sheets     Last BM: on 6/23 Per nursing flowsheets     Estimated Needs:   [X] No Change since Previous Assessment  [ ] Recalculated:     Previous Nutrition Diagnosis:   Severe malnutrition, acute     Nutrition Diagnosis is [X] Ongoing  - addressed with tube feeding    New Nutrition Diagnosis: [X] Not Applicable  [ ] Inadequate Protein Energy Intake   [ ] Inadequate Oral Intake   [ ] Excessive Energy Intake   [ ] Increased Nutrient Needs   [ ] Obesity   [ ] Altered GI Function   [ ] Unintended Weight Loss   [ ] Food & Nutrition Related Knowledge Deficit  [ ] Limited Adherence to nutrition related recommendations   [ ] Malnutrition      Interventions:   1. Recommend trial of TwoCal  ml QID via PEG + No Carb ProSource 1x/day due to c/o fullness after tube feeding  2. Monitor tolerance of tube feeding    Monitoring & Evaluation:   [X] Weights   [X] Follow Up (Per Protocol)  [X] Tolerance to Diet Prescription   [X] Other: Labs     RD Remains Available.  Sara Potts RD

## 2022-06-25 LAB
ANION GAP SERPL CALC-SCNC: 7 MMOL/L — SIGNIFICANT CHANGE UP (ref 5–17)
BASOPHILS # BLD AUTO: 0.03 K/UL — SIGNIFICANT CHANGE UP (ref 0–0.2)
BASOPHILS NFR BLD AUTO: 0.3 % — SIGNIFICANT CHANGE UP (ref 0–2)
BUN SERPL-MCNC: 19 MG/DL — SIGNIFICANT CHANGE UP (ref 7–23)
CALCIUM SERPL-MCNC: 8.8 MG/DL — SIGNIFICANT CHANGE UP (ref 8.4–10.5)
CHLORIDE SERPL-SCNC: 97 MMOL/L — SIGNIFICANT CHANGE UP (ref 96–108)
CO2 SERPL-SCNC: 28 MMOL/L — SIGNIFICANT CHANGE UP (ref 22–31)
CREAT SERPL-MCNC: 0.43 MG/DL — LOW (ref 0.5–1.3)
EGFR: 112 ML/MIN/1.73M2 — SIGNIFICANT CHANGE UP
EOSINOPHIL # BLD AUTO: 0.24 K/UL — SIGNIFICANT CHANGE UP (ref 0–0.5)
EOSINOPHIL NFR BLD AUTO: 2.3 % — SIGNIFICANT CHANGE UP (ref 0–6)
GLUCOSE SERPL-MCNC: 108 MG/DL — HIGH (ref 70–99)
HCT VFR BLD CALC: 31.1 % — LOW (ref 34.5–45)
HGB BLD-MCNC: 10.5 G/DL — LOW (ref 11.5–15.5)
IMM GRANULOCYTES NFR BLD AUTO: 1.4 % — SIGNIFICANT CHANGE UP (ref 0–1.5)
LYMPHOCYTES # BLD AUTO: 0.84 K/UL — LOW (ref 1–3.3)
LYMPHOCYTES # BLD AUTO: 8 % — LOW (ref 13–44)
MCHC RBC-ENTMCNC: 32.6 PG — SIGNIFICANT CHANGE UP (ref 27–34)
MCHC RBC-ENTMCNC: 33.8 GM/DL — SIGNIFICANT CHANGE UP (ref 32–36)
MCV RBC AUTO: 96.6 FL — SIGNIFICANT CHANGE UP (ref 80–100)
MONOCYTES # BLD AUTO: 0.37 K/UL — SIGNIFICANT CHANGE UP (ref 0–0.9)
MONOCYTES NFR BLD AUTO: 3.5 % — SIGNIFICANT CHANGE UP (ref 2–14)
NEUTROPHILS # BLD AUTO: 8.88 K/UL — HIGH (ref 1.8–7.4)
NEUTROPHILS NFR BLD AUTO: 84.5 % — HIGH (ref 43–77)
NRBC # BLD: 0 /100 WBCS — SIGNIFICANT CHANGE UP (ref 0–0)
PLATELET # BLD AUTO: 257 K/UL — SIGNIFICANT CHANGE UP (ref 150–400)
POTASSIUM SERPL-MCNC: 4.2 MMOL/L — SIGNIFICANT CHANGE UP (ref 3.5–5.3)
POTASSIUM SERPL-SCNC: 4.2 MMOL/L — SIGNIFICANT CHANGE UP (ref 3.5–5.3)
PROCALCITONIN SERPL-MCNC: 0.14 NG/ML — HIGH
RBC # BLD: 3.22 M/UL — LOW (ref 3.8–5.2)
RBC # FLD: 16.2 % — HIGH (ref 10.3–14.5)
SODIUM SERPL-SCNC: 132 MMOL/L — LOW (ref 135–145)
WBC # BLD: 10.51 K/UL — HIGH (ref 3.8–10.5)
WBC # FLD AUTO: 10.51 K/UL — HIGH (ref 3.8–10.5)

## 2022-06-25 PROCEDURE — 99232 SBSQ HOSP IP/OBS MODERATE 35: CPT

## 2022-06-25 PROCEDURE — 99233 SBSQ HOSP IP/OBS HIGH 50: CPT

## 2022-06-25 RX ORDER — SODIUM CHLORIDE 9 MG/ML
1 INJECTION INTRAMUSCULAR; INTRAVENOUS; SUBCUTANEOUS
Refills: 0 | Status: DISCONTINUED | OUTPATIENT
Start: 2022-06-25 | End: 2022-06-25

## 2022-06-25 RX ORDER — SODIUM CHLORIDE 9 MG/ML
2 INJECTION INTRAMUSCULAR; INTRAVENOUS; SUBCUTANEOUS
Refills: 0 | Status: COMPLETED | OUTPATIENT
Start: 2022-06-25 | End: 2022-06-30

## 2022-06-25 RX ORDER — SODIUM CHLORIDE 0.65 %
1 AEROSOL, SPRAY (ML) NASAL EVERY 6 HOURS
Refills: 0 | Status: DISCONTINUED | OUTPATIENT
Start: 2022-06-25 | End: 2022-07-06

## 2022-06-25 RX ORDER — ACETYLCYSTEINE 200 MG/ML
4 VIAL (ML) MISCELLANEOUS EVERY 6 HOURS
Refills: 0 | Status: DISCONTINUED | OUTPATIENT
Start: 2022-06-25 | End: 2022-06-25

## 2022-06-25 RX ORDER — ACETYLCYSTEINE 200 MG/ML
4 VIAL (ML) MISCELLANEOUS ONCE
Refills: 0 | Status: DISCONTINUED | OUTPATIENT
Start: 2022-06-25 | End: 2022-06-25

## 2022-06-25 RX ORDER — LEVALBUTEROL 1.25 MG/.5ML
0.63 SOLUTION, CONCENTRATE RESPIRATORY (INHALATION) EVERY 6 HOURS
Refills: 0 | Status: DISCONTINUED | OUTPATIENT
Start: 2022-06-25 | End: 2022-07-05

## 2022-06-25 RX ADMIN — Medication 650 MILLIGRAM(S): at 22:28

## 2022-06-25 RX ADMIN — LEVALBUTEROL 0.63 MILLIGRAM(S): 1.25 SOLUTION, CONCENTRATE RESPIRATORY (INHALATION) at 15:06

## 2022-06-25 RX ADMIN — ENOXAPARIN SODIUM 40 MILLIGRAM(S): 100 INJECTION SUBCUTANEOUS at 18:10

## 2022-06-25 RX ADMIN — Medication 1 APPLICATION(S): at 12:45

## 2022-06-25 RX ADMIN — Medication 650 MILLIGRAM(S): at 21:35

## 2022-06-25 RX ADMIN — SENNA PLUS 2 TABLET(S): 8.6 TABLET ORAL at 00:01

## 2022-06-25 RX ADMIN — PANTOPRAZOLE SODIUM 40 MILLIGRAM(S): 20 TABLET, DELAYED RELEASE ORAL at 05:54

## 2022-06-25 RX ADMIN — Medication 1 TABLET(S): at 12:29

## 2022-06-25 RX ADMIN — Medication 1 SPRAY(S): at 12:43

## 2022-06-25 RX ADMIN — SODIUM CHLORIDE 2 MILLILITER(S): 9 INJECTION INTRAMUSCULAR; INTRAVENOUS; SUBCUTANEOUS at 21:32

## 2022-06-25 RX ADMIN — Medication 6 MILLIGRAM(S): at 00:03

## 2022-06-25 RX ADMIN — Medication 650 MILLIGRAM(S): at 02:33

## 2022-06-25 RX ADMIN — Medication 6 MILLIGRAM(S): at 22:16

## 2022-06-25 RX ADMIN — LEVALBUTEROL 0.63 MILLIGRAM(S): 1.25 SOLUTION, CONCENTRATE RESPIRATORY (INHALATION) at 21:32

## 2022-06-25 RX ADMIN — Medication 800 MILLIGRAM(S): at 12:29

## 2022-06-25 RX ADMIN — Medication 800 MILLIGRAM(S): at 20:40

## 2022-06-25 RX ADMIN — Medication 500 MILLIGRAM(S): at 12:29

## 2022-06-25 RX ADMIN — SENNA PLUS 2 TABLET(S): 8.6 TABLET ORAL at 21:35

## 2022-06-25 RX ADMIN — Medication 12.5 MILLIGRAM(S): at 18:10

## 2022-06-25 RX ADMIN — POLYETHYLENE GLYCOL 3350 17 GRAM(S): 17 POWDER, FOR SOLUTION ORAL at 12:29

## 2022-06-25 RX ADMIN — Medication 650 MILLIGRAM(S): at 03:33

## 2022-06-25 NOTE — PROGRESS NOTE ADULT - SUBJECTIVE AND OBJECTIVE BOX
Chief complaint: no acute events overnight, comfortable tolerates therapy     Patient is a 61y old  Female who presents with a chief complaint of Meningioma (25 Jun 2022 11:44)    PAST MEDICAL & SURGICAL HISTORY:  HTN (hypertension)      HLD (hyperlipidemia)      H/O cervical spine surgery      VITALS  Vital Signs Last 24 Hrs  T(C): 36.7 (25 Jun 2022 08:27), Max: 36.8 (24 Jun 2022 19:48)  T(F): 98 (25 Jun 2022 08:27), Max: 98.3 (24 Jun 2022 19:48)  HR: 89 (25 Jun 2022 08:27) (89 - 91)  BP: 105/69 (25 Jun 2022 08:27) (100/68 - 105/69)  BP(mean): --  RR: 15 (25 Jun 2022 08:27) (15 - 16)  SpO2: 93% (25 Jun 2022 08:27) (93% - 94%)      PHYSICAL EXAM  Constitutional - NAD, Comfortable  HEENT - NCAT, EOMI  Neck - Supple, No limited ROM  Cardiovascular - RRR  Abdomen - Soft, NTND  Extremities - No C/C/E,   Neurologic Exam -                    Cognitive - Awake, Alert     No new focal deficits         RECENT LABS                        10.5   10.51 )-----------( 257      ( 25 Jun 2022 12:05 )             31.1     06-25    132<L>  |  97  |  19  ----------------------------<  108<H>  4.2   |  28  |  0.43<L>    Ca    8.8      25 Jun 2022 12:05      CURRENT MEDICATIONS    MEDICATIONS  (STANDING):  amoxicillin   80 mG/mL/clavulanate Suspension 800 milliGRAM(s) Enteral Tube every 12 hours  ascorbic acid 500 milliGRAM(s) Oral daily  enoxaparin Injectable 40 milliGRAM(s) SubCutaneous <User Schedule>  levalbuterol Inhalation 0.63 milliGRAM(s) Inhalation every 6 hours  melatonin 6 milliGRAM(s) Oral at bedtime  metoprolol tartrate 12.5 milliGRAM(s) Oral every 12 hours  multivitamin 1 Tablet(s) Oral daily  pantoprazole   Suspension 40 milliGRAM(s) Oral before breakfast  petrolatum white Ointment 1 Application(s) Topical daily  polyethylene glycol 3350 17 Gram(s) Oral daily  senna 2 Tablet(s) Oral at bedtime  sodium chloride 0.65% Nasal 1 Spray(s) Both Nostrils every 6 hours  sodium chloride 3%  Inhalation 2 milliLiter(s) Inhalation two times a day    MEDICATIONS  (PRN):  acetaminophen     Tablet .. 650 milliGRAM(s) Oral every 6 hours PRN Temp greater or equal to 38C (100.4F), Mild Pain (1 - 3)  ondansetron    Tablet 4 milliGRAM(s) Oral every 12 hours PRN Nausea  traMADol 25 milliGRAM(s) Oral every 4 hours PRN Moderate Pain (4 - 6)  traMADol 50 milliGRAM(s) Oral every 4 hours PRN Severe Pain (7 - 10)    ASSESSMENT & PLAN          GI/Bowel Management - Dulcolax PRN, Fleet PRN   Management - Toilet Q2  Skin - Turn Q2  Pain - Tylenol PRN  DVT PPX - Lovenox    Case discussed with Medicine ( monitor respiratory status and WBC, afebrile now) , Pulmonary input requested - will follow recommendations        Continue comprehensive acute rehab program consisting of 3hrs/day of OT/PT and SLP.

## 2022-06-25 NOTE — CONSULT NOTE ADULT - SUBJECTIVE AND OBJECTIVE BOX
PULMONARY CONSULT  Location of Patient : [facility] 2[facility]U 0257 W1 ([facility] 2[facility])  Attending requesting Consult:Katelyn Bautista  Chief Complaint : Rehab   Reason For consult : Increased upper airway secretions      Initial HPI on admission:  HPI:  61 year old Female with PMHx of HTN and C-spine meningioma removal in 2018 in [place]. Patient presented with dizziness, gait instability, and vomiting over the last 3 days prior to admission.   MRI showed ethel-medullary, Right CPA, and Right temporal enhancing lesions.  Possible meningioma vs schwannoma.  Patient was monitored in the ICU for hydro watch. Workup included MRI of the Brain, C/T/L spine and CT of the chest, abdomen and pelvis which was negative for malignancies on 5/20.  Patient was seen by ENT for dysphagia and recommended patient be seen by speech and swallow preop and underwent MBS and was able to continue with a regular diet.  Due to location of the lesion, ENT recommended audiology consult.  She was found to have intact hearing but mildly decreased bilaterally. Underwent Left far lateral crani for meningioma resection with subtotal sacrifice of left vertebral artery on 6/1. Attempted extubation the same night after surgery, however she developed stridor and was reintubated.  Patient was successfully extubated on 6/6 to high flow.  Patient scoped by ENT and found  to have left vocal cord paralysis.  Post extubation patient had a lot of secretions and was given Mucomyst and Duoneb. There was a concern for PE given respiratory status - CTA chest 6/7 negative for PE but with bilateral consolidation. Patient was weaned off high flow (6/9), however frequent suctioning was still required. FED via NGT and per speech and swallow recommendations, she was kept NPO. Patient was also found to have MSSA pneumonia and was treated with Augmentin.  Speech and swallow continued to follow and patient was recommended for a PEG.  PEG was placed on 6/16. Patient also had two episodes of desaturation on 6/16, follow up with another CTA chest; which was also negative for PE, but showed b/l lower lobe consolidations and IV antibiotics (Zosyn) were started.     Patient was evaluated by PM&R and therapy for functional deficits and gait/ADL impairments and recommend acute rehabilitation.  Patient was medically optimized for discharge to [place] on 6/20/22.  (20 Jun 2022 13:21)      BRIEF HOSPITAL COURSE: patient has been in rehab since 6/20/22 and today noted to have increased upper airway secretions. patient protecting airway and self suctioning  Used  line [id number]      PAST MEDICAL & SURGICAL HISTORY:  HTN (hypertension)      HLD (hyperlipidemia)      H/O cervical spine surgery        Allergies    No Known Allergies    Intolerances    albuterol (Other)    FAMILY HISTORY:  No pertinent family history in first degree relatives      Social history: Social History:  SOCIAL HISTORY  Smoking - Denied  EtOH - Denied   Drugs - Denied    FUNCTIONAL HISTORY  Lives with son & daughter-in-law in a private home with 4 steps to enter & 1 flight to basement  Prior Level of Function: Independent in ADLs and ambulation    CURRENT FUNCTIONAL STATUS  - Bed Mobility: mod A  - Transfers: Mod A  - Gait: 5 steps mod A  - ADLs: Max A dressing (upper and lower body)/bathing/toileting, mod A grooming, min A eating (20 Jun 2022 13:21)     Due to current medical status patient unable to provide medical, social, family history.  History obtained from available medical record.       Medications:  MEDICATIONS  (STANDING):  acetylcysteine 20%  Inhalation 4 milliLiter(s) Inhalation every 6 hours  ascorbic acid 500 milliGRAM(s) Oral daily  enoxaparin Injectable 40 milliGRAM(s) SubCutaneous <User Schedule>  levalbuterol Inhalation 0.63 milliGRAM(s) Inhalation every 6 hours  melatonin 6 milliGRAM(s) Oral at bedtime  metoprolol tartrate 12.5 milliGRAM(s) Oral every 12 hours  multivitamin 1 Tablet(s) Oral daily  pantoprazole   Suspension 40 milliGRAM(s) Oral before breakfast  petrolatum white Ointment 1 Application(s) Topical daily  polyethylene glycol 3350 17 Gram(s) Oral daily  senna 2 Tablet(s) Oral at bedtime  sodium chloride 0.65% Nasal 1 Spray(s) Both Nostrils every 6 hours  sodium chloride 3%  Inhalation 2 milliLiter(s) Inhalation two times a day    MEDICATIONS  (PRN):  acetaminophen     Tablet .. 650 milliGRAM(s) Oral every 6 hours PRN Temp greater or equal to 38C (100.4F), Mild Pain (1 - 3)  ondansetron    Tablet 4 milliGRAM(s) Oral every 12 hours PRN Nausea  traMADol 25 milliGRAM(s) Oral every 4 hours PRN Moderate Pain (4 - 6)  traMADol 50 milliGRAM(s) Oral every 4 hours PRN Severe Pain (7 - 10)      Antibiotics History  piperacillin/tazobactam IVPB.. 3.375 Gram(s) IV Intermittent every 8 hours, 06-20-22 @ 20:00, Stop order after: 4 Days      Heme Medications   enoxaparin Injectable 40 milliGRAM(s) SubCutaneous <User Schedule>, 06-21-22 @ 00:00      GI Medications  pantoprazole   Suspension 40 milliGRAM(s) Oral before breakfast, 06-24-22 @ 13:37, Routine  polyethylene glycol 3350 17 Gram(s) Oral daily, 06-20-22 @ 20:00,   senna 2 Tablet(s) Oral at bedtime, 06-20-22 @ 19:59, Routine        Home Medications:  Last Order Reconciliation Date: Not Done  acetaminophen 325 mg oral tablet: 2 tab(s) orally every 6 hours, As needed, Temp greater or equal to 38C (100.4F), Mild Pain (1 - 3) (06-20-22 @ 15:40)  acetylcysteine 20% inhalation solution: 3 milliliter(s) inhaled every 6 hours (06-20-22 @ 15:44)  ascorbic acid 500 mg oral tablet: 1 tab(s) orally once a day (06-20-22 @ 15:40)  Aspirin Enteric Coated 81 mg oral delayed release tablet: 1 tab(s) orally once a day (Indication: Cartoid Stenosis) (06-02-22 @ 15:20)  atorvastatin 40 mg oral tablet: 1 tab(s) orally once a day (06-02-22 @ 15:20)  dexamethasone 2 mg oral tablet: 1 tab(s) orally every 12 hours x 2 days, 1 tab daily x 2 days and stop.  (06-20-22 @ 15:40)  enoxaparin: 40 milligram(s) subcutaneous once a day (at bedtime) (06-20-22 @ 15:40)  Fish Oil 1000 mg oral capsule: 1 cap(s) orally once a day (06-02-22 @ 15:20)  levalbuterol 0.63 mg/3 mL inhalation solution: 3 milliliter(s) inhaled every 6 hours (06-20-22 @ 15:40)  melatonin 5 mg oral tablet: 1 tab(s) orally once a day (at bedtime) (06-20-22 @ 15:40)  metoprolol: 12.5 milligram(s) orally 2 times a day via PEG (06-20-22 @ 15:44)  Multiple Vitamins oral tablet: 1 tab(s) orally once a day (06-20-22 @ 15:40)  omeprazole 40 mg oral delayed release capsule: 1 cap(s) orally once a day (06-02-22 @ 15:20)  ondansetron 4 mg oral tablet: 1 tab(s) orally every 12 hours, As needed, Nausea (06-20-22 @ 15:44)  polyethylene glycol 3350 oral powder for reconstitution: 17 gram(s) orally once a day (06-20-22 @ 15:40)  senna oral tablet: 2 tab(s) orally once a day (at bedtime) (06-20-22 @ 15:40)  traMADol 50 mg oral tablet: 0.5 tab(s) orally every 4 hours, As needed, Moderate Pain (4 - 6) (06-20-22 @ 15:40)  traMADol 50 mg oral tablet: 1 tab(s) orally every 4 hours, As needed, Severe Pain (7 - 10) (06-20-22 @ 15:40)  Vitamin D2 1.25 mg (50,000 intl units) oral capsule: 1 cap(s) orally once a week (06-02-22 @ 15:20)      LABS:                              CULTURES: (if applicable)    Culture - Blood (collected 06-16-22 @ 11:15)  Source: .Blood Blood  Final Report (06-21-22 @ 17:01):    No Growth Final    Culture - Blood (collected 06-16-22 @ 11:10)  Source: .Blood Blood  Final Report (06-21-22 @ 17:01):    No Growth Final            CAPILLARY BLOOD GLUCOSE          RADIOLOGY  CXR:      CT:    ECHO:      VITALS:  T(C): 36.7 (06-25-22 @ 08:27), Max: 36.8 (06-24-22 @ 19:48)  T(F): 98 (06-25-22 @ 08:27), Max: 98.3 (06-24-22 @ 19:48)  HR: 89 (06-25-22 @ 08:27) (89 - 98)  BP: 105/69 (06-25-22 @ 08:27) (100/68 - 109/76)  BP(mean): --  ABP: --  ABP(mean): --  RR: 15 (06-25-22 @ 08:27) (15 - 16)  SpO2: 93% (06-25-22 @ 08:27) (93% - 95%)  CVP(mm Hg): --  CVP(cm H2O): --    Ins and Outs     06-24-22 @ 07:01  -  06-25-22 @ 07:00  --------------------------------------------------------  IN: 1130 mL / OUT: 0 mL / NET: 1130 mL                I&O's Detail    24 Jun 2022 07:01  -  25 Jun 2022 07:00  --------------------------------------------------------  IN:    Free Water: 450 mL    TwoCal HN: 680 mL  Total IN: 1130 mL    OUT:  Total OUT: 0 mL    Total NET: 1130 mL          Physical Examination:  GENERAL:               Alert, Oriented, No acute distress.    HEENT:                    Pupils equal, reactive to light.  Symmetric. No JVD, Moist MM  PULM:                     Bilateral air entry, Clear to auscultation bilaterally, no significant sputum production, No Rales, No Rhonchi, No Wheezing  CVS:                         S1, S2,  No Murmur  ABD:                        Soft, nondistended, nontender, normoactive bowel sounds,   EXT:                         No edema, nontender, No Cyanosis or Clubbing   Vascular:                Warm Extremities, Normal Capillary refill, Normal Distal Pulses  SKIN:                       Warm and well perfused, no rashes noted.   NEURO:                  Alert, oriented, interactive, nonfocal, follows commands  PSYC:                      Calm, + Insight.     PULMONARY CONSULT  Location of Patient : [facility] 2[facility]U 0257 W1 ([facility] 2BIU)  Attending requesting Consult:Katelyn Bautista  Chief Complaint : Rehab   Reason For consult : Increased upper airway secretions      Initial HPI on admission:  HPI:  61 year old Female with PMHx of HTN and C-spine meningioma removal in 2018 in [place]. Patient presented with dizziness, gait instability, and vomiting over the last 3 days prior to admission.   MRI showed ethel-medullary, Right CPA, and Right temporal enhancing lesions.  Possible meningioma vs schwannoma.  Patient was monitored in the ICU for hydro watch. Workup included MRI of the Brain, C/T/L spine and CT of the chest, abdomen and pelvis which was negative for malignancies on 5/20.  Patient was seen by ENT for dysphagia and recommended patient be seen by speech and swallow preop and underwent MBS and was able to continue with a regular diet.  Due to location of the lesion, ENT recommended audiology consult.  She was found to have intact hearing but mildly decreased bilaterally. Underwent Left far lateral crani for meningioma resection with subtotal sacrifice of left vertebral artery on 6/1. Attempted extubation the same night after surgery, however she developed stridor and was reintubated.  Patient was successfully extubated on 6/6 to high flow.  Patient scoped by ENT and found  to have left vocal cord paralysis.  Post extubation patient had a lot of secretions and was given Mucomyst and Duoneb. There was a concern for PE given respiratory status - CTA chest 6/7 negative for PE but with bilateral consolidation. Patient was weaned off high flow (6/9), however frequent suctioning was still required. FED via NGT and per speech and swallow recommendations, she was kept NPO. Patient was also found to have MSSA pneumonia and was treated with Augmentin.  Speech and swallow continued to follow and patient was recommended for a PEG.  PEG was placed on 6/16. Patient also had two episodes of desaturation on 6/16, follow up with another CTA chest; which was also negative for PE, but showed b/l lower lobe consolidations and IV antibiotics (Zosyn) were started.     Patient was evaluated by PM&R and therapy for functional deficits and gait/ADL impairments and recommend acute rehabilitation.  Patient was medically optimized for discharge to [place] on 6/20/22.  (20 Jun 2022 13:21)      BRIEF HOSPITAL COURSE: patient has been in rehab since 6/20/22 and today noted to have increased upper airway secretions. patient protecting airway and self suctioning  Used  line [id number]    she is unable to communicate with  line  states secretions increasing.      PAST MEDICAL & SURGICAL HISTORY:  HTN (hypertension)  HLD (hyperlipidemia)  H/O cervical spine surgery        Allergies  No Known Allergies    Intolerances    albuterol (Other)    FAMILY HISTORY:  No pertinent family history in first degree relatives      Social history: Social History:  SOCIAL HISTORY  Smoking - Denied  EtOH - Denied   Drugs - Denied    FUNCTIONAL HISTORY  Lives with son & daughter-in-law in a private home with 4 steps to enter & 1 flight to basement  Prior Level of Function: Independent in ADLs and ambulation    CURRENT FUNCTIONAL STATUS  - Bed Mobility: mod A  - Transfers: Mod A  - Gait: 5 steps mod A  - ADLs: Max A dressing (upper and lower body)/bathing/toileting, mod A grooming, min A eating (20 Jun 2022 13:21)     Due to current medical status patient unable to provide medical, social, family history.  History obtained from available medical record.       Medications:  MEDICATIONS  (STANDING):  acetylcysteine 20%  Inhalation 4 milliLiter(s) Inhalation every 6 hours  ascorbic acid 500 milliGRAM(s) Oral daily  enoxaparin Injectable 40 milliGRAM(s) SubCutaneous <User Schedule>  levalbuterol Inhalation 0.63 milliGRAM(s) Inhalation every 6 hours  melatonin 6 milliGRAM(s) Oral at bedtime  metoprolol tartrate 12.5 milliGRAM(s) Oral every 12 hours  multivitamin 1 Tablet(s) Oral daily  pantoprazole   Suspension 40 milliGRAM(s) Oral before breakfast  petrolatum white Ointment 1 Application(s) Topical daily  polyethylene glycol 3350 17 Gram(s) Oral daily  senna 2 Tablet(s) Oral at bedtime  sodium chloride 0.65% Nasal 1 Spray(s) Both Nostrils every 6 hours  sodium chloride 3%  Inhalation 2 milliLiter(s) Inhalation two times a day    MEDICATIONS  (PRN):  acetaminophen     Tablet .. 650 milliGRAM(s) Oral every 6 hours PRN Temp greater or equal to 38C (100.4F), Mild Pain (1 - 3)  ondansetron    Tablet 4 milliGRAM(s) Oral every 12 hours PRN Nausea  traMADol 25 milliGRAM(s) Oral every 4 hours PRN Moderate Pain (4 - 6)  traMADol 50 milliGRAM(s) Oral every 4 hours PRN Severe Pain (7 - 10)      Antibiotics History  piperacillin/tazobactam IVPB.. 3.375 Gram(s) IV Intermittent every 8 hours, 06-20-22 @ 20:00, Stop order after: 4 Days      Heme Medications   enoxaparin Injectable 40 milliGRAM(s) SubCutaneous <User Schedule>, 06-21-22 @ 00:00      GI Medications  pantoprazole   Suspension 40 milliGRAM(s) Oral before breakfast, 06-24-22 @ 13:37, Routine  polyethylene glycol 3350 17 Gram(s) Oral daily, 06-20-22 @ 20:00,   senna 2 Tablet(s) Oral at bedtime, 06-20-22 @ 19:59, Routine        Home Medications:  Last Order Reconciliation Date: Not Done  acetaminophen 325 mg oral tablet: 2 tab(s) orally every 6 hours, As needed, Temp greater or equal to 38C (100.4F), Mild Pain (1 - 3) (06-20-22 @ 15:40)  acetylcysteine 20% inhalation solution: 3 milliliter(s) inhaled every 6 hours (06-20-22 @ 15:44)  ascorbic acid 500 mg oral tablet: 1 tab(s) orally once a day (06-20-22 @ 15:40)  Aspirin Enteric Coated 81 mg oral delayed release tablet: 1 tab(s) orally once a day (Indication: Cartoid Stenosis) (06-02-22 @ 15:20)  atorvastatin 40 mg oral tablet: 1 tab(s) orally once a day (06-02-22 @ 15:20)  dexamethasone 2 mg oral tablet: 1 tab(s) orally every 12 hours x 2 days, 1 tab daily x 2 days and stop.  (06-20-22 @ 15:40)  enoxaparin: 40 milligram(s) subcutaneous once a day (at bedtime) (06-20-22 @ 15:40)  Fish Oil 1000 mg oral capsule: 1 cap(s) orally once a day (06-02-22 @ 15:20)  levalbuterol 0.63 mg/3 mL inhalation solution: 3 milliliter(s) inhaled every 6 hours (06-20-22 @ 15:40)  melatonin 5 mg oral tablet: 1 tab(s) orally once a day (at bedtime) (06-20-22 @ 15:40)  metoprolol: 12.5 milligram(s) orally 2 times a day via PEG (06-20-22 @ 15:44)  Multiple Vitamins oral tablet: 1 tab(s) orally once a day (06-20-22 @ 15:40)  omeprazole 40 mg oral delayed release capsule: 1 cap(s) orally once a day (06-02-22 @ 15:20)  ondansetron 4 mg oral tablet: 1 tab(s) orally every 12 hours, As needed, Nausea (06-20-22 @ 15:44)  polyethylene glycol 3350 oral powder for reconstitution: 17 gram(s) orally once a day (06-20-22 @ 15:40)  senna oral tablet: 2 tab(s) orally once a day (at bedtime) (06-20-22 @ 15:40)  traMADol 50 mg oral tablet: 0.5 tab(s) orally every 4 hours, As needed, Moderate Pain (4 - 6) (06-20-22 @ 15:40)  traMADol 50 mg oral tablet: 1 tab(s) orally every 4 hours, As needed, Severe Pain (7 - 10) (06-20-22 @ 15:40)  Vitamin D2 1.25 mg (50,000 intl units) oral capsule: 1 cap(s) orally once a week (06-02-22 @ 15:20)         Culture - Blood (collected 06-16-22 @ 11:15)  Source: .Blood Blood  Final Report (06-21-22 @ 17:01):    No Growth Final    Culture - Blood (collected 06-16-22 @ 11:10)  Source: .Blood Blood  Final Report (06-21-22 @ 17:01):    No Growth Final         RADIOLOGY  CXR:  < from: Xray Chest 1 View- PORTABLE-Urgent (Xray Chest 1 View- PORTABLE-Urgent .) (06.24.22 @ 12:58) >  INTERPRETATION:  Cough.  AP chest. Prior 6/16/2022.  Normal heart mediastinum.  patchy left basilar infiltrate/atelectasis. Correlate clinically for infection. No pleural collection.  IMPRESSION: Patchy infiltrate/atelectasis left base. Correlate clinically for active infection.  --- End of Report ---  < end of copied text >      CT:  < from: CT Angio Chest PE Protocol w/ IV Cont (06.17.22 @ 13:25) >  ACC: [id number] EXAM:  CT ANGIO CHEST PULM ART [facility]                      PROCEDURE DATE:  06/17/2022    Comparison: June 7, 2022    Tubes/Lines: None    Mediastinum and Heart:Aorta and pulmonary arteries are normal in size. Thyroid gland is unremarkable. No lymphadenopathy. No pericardial effusion.  Lungs, Pleura, and Airways: There is no pulmonary embolus. Again seen are bibasilar consolidations. Previously seen patchy areas of groundglass abnormality of slightly decreased. Innumerable tree-in-bud nodules in the lower lobes are again noted. Bilateral lower lobe thickening of the   bronchovascular interstitium suggestive of small airways inflammation.    Visualized Abdomen: Unremarkable.    Bones and soft tissues: Unremarkable.    IMPRESSION:    No pulmonary embolus.    Unchanged bibasilar consolidations. Interval decrease in patchy bilateral groundglass opacities seen on previous study which may have been due to infection or aspiration. Bilateral lower lobe tree-in-bud nodules are unchanged.    Small airways inflammation.    --- End of Report ---    < end of copied text >  ECHO:      VITALS:  T(C): 36.7 (06-25-22 @ 08:27), Max: 36.8 (06-24-22 @ 19:48)  T(F): 98 (06-25-22 @ 08:27), Max: 98.3 (06-24-22 @ 19:48)  HR: 89 (06-25-22 @ 08:27) (89 - 98)  BP: 105/69 (06-25-22 @ 08:27) (100/68 - 109/76)  BP(mean): --  ABP: --  ABP(mean): --  RR: 15 (06-25-22 @ 08:27) (15 - 16)  SpO2: 93% (06-25-22 @ 08:27) (93% - 95%)  CVP(mm Hg): --  CVP(cm H2O): --    Ins and Outs     06-24-22 @ 07:01  -  06-25-22 @ 07:00  --------------------------------------------------------  IN: 1130 mL / OUT: 0 mL / NET: 1130 mL       I&O's Detail    24 Jun 2022 07:01  -  25 Jun 2022 07:00  --------------------------------------------------------  IN:    Free Water: 450 mL    TwoCal HN: 680 mL  Total IN: 1130 mL    OUT:  Total OUT: 0 mL    Total NET: 1130 mL          Physical Examination:  GENERAL:               Alert, Oriented, No acute distress.    HEENT:                   No JVD, Moist MM oral airway secretions.   PULM:                     Bilateral air entry, Clear to auscultation bilaterally, no significant sputum production, No Rales, No Rhonchi, No Wheezing  CVS:                         S1, S2,  No Murmur  ABD:                        Soft, nondistended, nontender, normoactive bowel sounds,   EXT:                         No edema, nontender, No Cyanosis or Clubbing   SKIN:                       Warm and well perfused, no rashes noted.   NEURO:                  Alert, oriented, interactive,  follows commands  PSYC:                      Calm, + Insight.     None known

## 2022-06-25 NOTE — CONSULT NOTE ADULT - ASSESSMENT
60 yo Mandarin speaking F with PMH of HTN, C Spine meningioma (removed 2018 in [place]) who presented with dizziness, ataxia, and emesis.  MRI with ethel-medullary. right CPA, and right temporal enhancing lesions concern for meningioma vs schwannoma, with hydro.  Now S/p Left far lateral crani for meningioma resection with subtotal sacrifice of left vertebral artery (6/1).  Noted with vocal cord paralysis, dysphagia - NPO on TF (PEG placed 6/16). Also right Hemiparesis, sensory, gait and ADL impairment.       # Meningioma s/p L lateral crani for meningioma resection with subtotal sacrifice of L vertebral artery on 6/1  - cont decadron taper as per neurosurgery  - pain control pre primary team    # Dsyphagia s/p PEG placement  - cont tube feeds  - SLP eval     # Left vocal cord paralysis  - follow up with ENT as outpatient    #recent MSSA PNA and aspiration pna  - completed 5 days course of zosyn on 6/20  - Xopenex Mucomyst neb for secretion control     # PAF  - pt with possible transient A fib with rvr during RRT at [facility]. EKG showed sinus tach and no evidence pof a fib on tle  - cont with low dose lopressor  - TTE reviewed: EF 75%. hyperdynamic LV systolic function     # urinary retention  - TOV timing as per primary team    # HTN  - cont with lopressor   - was on losartan and amlodipine, on hold due to BP on lower side    dvt ppx: lovneox subq      IMPROVE VTE Individual Risk Assessment    RISK                                                                Points    [  ] Previous VTE                                                  3    [  ] Thrombophilia                                               2    [ x ] Lower limb paralysis                                      2        (unable to hold up >15 seconds)      [  ] Current Cancer                                              2         (within 6 months)    [x  ] Immobilization > 24 hrs                                1    [  ] ICU/CCU stay > 24 hours                              1    [ x ] Age > 60                                                      1    IMPROVE VTE Score ___4______    IMPROVE Score 0-1: Low Risk, No VTE prophylaxis required for most patients, encourage ambulation.   IMPROVE Score 2-3: At risk, pharmacologic VTE prophylaxis is indicated for most patients (in the absence of a contraindication)  IMPROVE Score > or = 4: High Risk, pharmacologic VTE prophylaxis is indicated for most patients (in the absence of a contraindication)    
Assessment  1. Upper Airway Secretions in a patient with Dysphagia and Vocal Cord Paralysis   2. Abnormal CXR possible PNA  3. Meningioma s/p craniotomy with Left foramen magnum meningioma with brainstem compression and edema. on 6/1/22  4. Neurofibromatosis type 2  5. H/o HTN      Plan  NPO  Keep HOB elevated  Tube feeding  Check Sputum Culture  Start Augmenting as had MSSA in past in sputum  Check MRSA Swab  Nasal saline spray  allow pt to self suction.  ENT Eval to be considered to look at vocal cords as as well as upper airway edema if pfresent.  Hypertonic saline neb Q 12 hrs.

## 2022-06-25 NOTE — PROGRESS NOTE ADULT - SUBJECTIVE AND OBJECTIVE BOX
Medicine Progress Note    Patient is a 61y old  Female who presents with a chief complaint of Meningioma (24 Jun 2022 10:42)      SUBJECTIVE / OVERNIGHT EVENTS:  seen and examined  Chart reviewed  No overnight events  Limb weakness improving with therapy  BM+  No pain  No complaints  BP low. sodium trending down    ADDITIONAL REVIEW OF SYSTEMS:  no fever/chills/CP/sob/palpitation/dizziness/ abd pain/nausea/vomiting/diarrhea/constipation/headaches. all other ROS neg    MEDICATIONS  (STANDING):  ascorbic acid 500 milliGRAM(s) Oral daily  enoxaparin Injectable 40 milliGRAM(s) SubCutaneous <User Schedule>  melatonin 6 milliGRAM(s) Oral at bedtime  metoprolol tartrate 12.5 milliGRAM(s) Oral every 12 hours  multivitamin 1 Tablet(s) Oral daily  pantoprazole   Suspension 40 milliGRAM(s) Oral before breakfast  petrolatum white Ointment 1 Application(s) Topical daily  polyethylene glycol 3350 17 Gram(s) Oral daily  senna 2 Tablet(s) Oral at bedtime    MEDICATIONS  (PRN):  acetaminophen     Tablet .. 650 milliGRAM(s) Oral every 6 hours PRN Temp greater or equal to 38C (100.4F), Mild Pain (1 - 3)  ondansetron    Tablet 4 milliGRAM(s) Oral every 12 hours PRN Nausea  traMADol 25 milliGRAM(s) Oral every 4 hours PRN Moderate Pain (4 - 6)  traMADol 50 milliGRAM(s) Oral every 4 hours PRN Severe Pain (7 - 10)    I&O's Summary    24 Jun 2022 07:01  -  25 Jun 2022 07:00  --------------------------------------------------------  IN: 1130 mL / OUT: 0 mL / NET: 1130 mL    CAPILLARY BLOOD GLUCOSE        PHYSICAL EXAM:    Vital Signs Last 24 Hrs  T(C): 36.8 (24 Jun 2022 19:48), Max: 36.8 (24 Jun 2022 19:48)  T(F): 98.3 (24 Jun 2022 19:48), Max: 98.3 (24 Jun 2022 19:48)  HR: 91 (24 Jun 2022 19:48) (81 - 98)  BP: 100/68 (25 Jun 2022 05:55) (100/68 - 109/76)  BP(mean): --  RR: 16 (24 Jun 2022 19:48) (16 - 16)  SpO2: 94% (24 Jun 2022 19:48) (94% - 95%)    GENERAL: Not in distress. Alert    HEENT: clear conjuctiva, MMM. no pallor or icterus, neck supple  CARDIOVASCULAR: RRR S1, S2. No murmur/rubs/gallop  LUNGS: BLAE+, no rales, no wheezing, no rhonchi.    ABDOMEN: ND. Soft,  NT, no guarding / rebound / rigidity. BS normoactive  BACK: No spine tenderness.  EXTREMITIES: no edema. no leg or calf TP.  SKIN: warm and dry. PEG+  PSYCHIATRIC: Calm.  No agitation.      LABS:                        11.3   5.95  )-----------( 287      ( 23 Jun 2022 06:18 )             32.5     06-23    131<L>  |  97  |  17  ----------------------------<  99  3.9   |  30  |  0.46<L>    Ca    8.6      23 Jun 2022 06:18    TPro  6.1  /  Alb  2.8<L>  /  TBili  0.6  /  DBili  x   /  AST  28  /  ALT  97<H>  /  AlkPhos  83  06-23              COVID-19 PCR: NotDetec (20 Jun 2022 23:35)  COVID-19 PCR: NotDetec (20 Jun 2022 10:32)  COVID-19 PCR: NotDetec (15 Polo 2022 16:24)  COVID-19 PCR: NotDetec (13 Jun 2022 05:08)  COVID-19 PCR: NotDetec (31 May 2022 06:12)  COVID-19 PCR: NotDetec (26 May 2022 17:34)  COVID-19 PCR: NotDetec (19 May 2022 16:02)      RADIOLOGY & ADDITIONAL TESTS:  Imaging from Last 24 Hours:    Electrocardiogram/QTc Interval:    COORDINATION OF CARE:  Care Discussed with Consultants/Other Providers:   Medicine Progress Note    Patient is a 61y old  Female who presents with a chief complaint of Meningioma (24 Jun 2022 10:42)      SUBJECTIVE / OVERNIGHT EVENTS:  seen and examined  Chart reviewed  No overnight events  Limb weakness improving with therapy  BM+  No pain  Reported copious secretion since last night needed frequent suction + throat irritation and noise. no CP. mild SOB better with suctioning. on PEG. day before yesterday, took some food by mouth. secretion more at night    ADDITIONAL REVIEW OF SYSTEMS:  no fever/chills/CP/palpitation/dizziness/ abd pain/nausea/vomiting/diarrhea/constipation/headaches. all other ROS neg    MEDICATIONS  (STANDING):  acetylcysteine 20%  Inhalation 4 milliLiter(s) Inhalation every 6 hours  ascorbic acid 500 milliGRAM(s) Oral daily  enoxaparin Injectable 40 milliGRAM(s) SubCutaneous <User Schedule>  levalbuterol Inhalation 0.63 milliGRAM(s) Inhalation every 6 hours  melatonin 6 milliGRAM(s) Oral at bedtime  metoprolol tartrate 12.5 milliGRAM(s) Oral every 12 hours  multivitamin 1 Tablet(s) Oral daily  pantoprazole   Suspension 40 milliGRAM(s) Oral before breakfast  petrolatum white Ointment 1 Application(s) Topical daily  polyethylene glycol 3350 17 Gram(s) Oral daily  senna 2 Tablet(s) Oral at bedtime    MEDICATIONS  (PRN):  acetaminophen     Tablet .. 650 milliGRAM(s) Oral every 6 hours PRN Temp greater or equal to 38C (100.4F), Mild Pain (1 - 3)  ondansetron    Tablet 4 milliGRAM(s) Oral every 12 hours PRN Nausea  traMADol 25 milliGRAM(s) Oral every 4 hours PRN Moderate Pain (4 - 6)  traMADol 50 milliGRAM(s) Oral every 4 hours PRN Severe Pain (7 - 10)      I&O's Summary    24 Jun 2022 07:01  -  25 Jun 2022 07:00  --------------------------------------------------------  IN: 1130 mL / OUT: 0 mL / NET: 1130 mL    CAPILLARY BLOOD GLUCOSE        PHYSICAL EXAM:    Vital Signs Last 24 Hrs  T(C): 36.7 (25 Jun 2022 08:27), Max: 36.8 (24 Jun 2022 19:48)  T(F): 98 (25 Jun 2022 08:27), Max: 98.3 (24 Jun 2022 19:48)  HR: 89 (25 Jun 2022 08:27) (89 - 98)  BP: 105/69 (25 Jun 2022 08:27) (100/68 - 109/76)  BP(mean): --  RR: 15 (25 Jun 2022 08:27) (15 - 16)  SpO2: 93% (25 Jun 2022 08:27) (93% - 95%)    GENERAL: Not in distress. Alert    HEENT: clear conjunctiva. MMM. no pallor or icterus, neck supple. large amount of thick purulent secretion coming out with oropharyngeal suctioning.  upper airway gurgling noise while coughing  CARDIOVASCULAR: RRR S1, S2. No murmur/rubs/gallop  LUNGS: BLAE+, no rales, no wheezing, no rhonchi. seems transmitted Upper airway noise  ABDOMEN: ND. Soft,  NT, no guarding / rebound / rigidity. BS normoactive  BACK: No spine tenderness.  EXTREMITIES: no edema. no leg or calf TP.  SKIN: warm and dry. PEG+  PSYCHIATRIC: Calm.  No agitation.      LABS:                        11.3   5.95  )-----------( 287      ( 23 Jun 2022 06:18 )             32.5     06-23    131<L>  |  97  |  17  ----------------------------<  99  3.9   |  30  |  0.46<L>    Ca    8.6      23 Jun 2022 06:18    TPro  6.1  /  Alb  2.8<L>  /  TBili  0.6  /  DBili  x   /  AST  28  /  ALT  97<H>  /  AlkPhos  83  06-23              COVID-19 PCR: NotDetec (20 Jun 2022 23:35)  COVID-19 PCR: NotDetec (20 Jun 2022 10:32)  COVID-19 PCR: NotDetec (15 Polo 2022 16:24)  COVID-19 PCR: NotDetec (13 Jun 2022 05:08)  COVID-19 PCR: NotDetec (31 May 2022 06:12)  COVID-19 PCR: NotDetec (26 May 2022 17:34)  COVID-19 PCR: NotDetec (19 May 2022 16:02)      RADIOLOGY & ADDITIONAL TESTS:  Imaging from Last 24 Hours:    Electrocardiogram/QTc Interval:    COORDINATION OF CARE:  Care Discussed with Consultants/Other Providers:

## 2022-06-25 NOTE — PROGRESS NOTE ADULT - ASSESSMENT
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  MRI with ethel-medullary. right CPA, and right temporal enhancing lesions concern for meningioma vs schwannoma, with hydro.  Now S/p Left far lateral crani for meningioma resection with subtotal sacrifice of left vertebral artery (6/1).  Noted with vocal cord paralysis, dysphagia - NPO on TF (PEG placed 6/16). Also right Hemiparesis, sensory, gait and ADL impairment.     #Meningioma s/p L lateral crani for meningioma resection with subtotal sacrifice of L vertebral artery on 6/1  -Decadron taper as per neurosurgery  -Continue comprehensive rehab program -PT/OT/SLP per rehab team  -Pain management, bowel regimen per rehab   - fall, aspiration precautions    #Dysphagia s/p PEG placement  - cont tube feeds  - aspiration precautions  - SLP eval as per primary    #Left vocal cord paralysis  -Follow up with ENT as outpatient    #Recent MSSA PNA and aspiration pna  -Completed 5 days course of zosyn on 6/20  -Xopenex Mucomyst neb for secretion control   - Repeat CXR 6/23: patchy infiltrate vs atelectesis. likely old infection/atelectesis as patient remains afebrile and clinically no ssx of PNA  - incentive spirometry  - monitor temp/cbc. send pan cx and start abx if febrile    #PAF  -Pt with possible transient A fib with rvr during RRT at [facility]. EKG showed sinus tach and no evidence pafib on tele  -Cont with low dose lopressor  -TTE reviewed: EF 75%. hyperdynamic LV systolic function     #Urinary retention  - s/p pedro 6/16  - Pedro removed 6/21 PM for TOV 6/22  --Pt. voiding well. low PVRs    #HTN  - BP soft  -cont with lopressor   -was on losartan and amlodipine, on hold due to BP on lower side  - check orthostasis periodically. can hold lopressor if significant orthostasis and HR controlled    Transaminitis  -Monitor LFTs, avoid hepatotoxins    Anemia  - h/h stable    Hyponatremia  - sodium trending down  - likely SIADH  - restrict fluid to 0521-3901 mls for now. reduced free water flush from 150 mls Q6hrs to 100 mls Q6hrs. restrict fluid to 1000 mls and add sodium chloride tab if sodium continues to be trending down  - BMP in am ordered    DVT ppx - lovenox  GI ppx - pepcid    62 yo Mandarin speaking F with PMH of HTN, C Spine meningioma (removed 2018 in [place]) who presented with dizziness, ataxia, and emesis.  MRI with ethel-medullary. right CPA, and right temporal enhancing lesions concern for meningioma vs schwannoma, with hydro.  Now S/p Left far lateral crani for meningioma resection with subtotal sacrifice of left vertebral artery (6/1).  Noted with vocal cord paralysis, dysphagia - NPO on TF (PEG placed 6/16). Also right Hemiparesis, sensory, gait and ADL impairment.     #Meningioma s/p L lateral crani for meningioma resection with subtotal sacrifice of L vertebral artery on 6/1  -Decadron taper as per neurosurgery  -Continue comprehensive rehab program -PT/OT/SLP per rehab team  -Pain management, bowel regimen per rehab   - fall, aspiration precautions    #Dysphagia s/p PEG placement  - cont tube feeds  - aspiration precautions  - SLP eval as per primary    #Left vocal cord paralysis  # ? upper airway transmitted noise   - pulm and ENT eval    #Recent MSSA PNA and aspiration pna  -Completed 5 days course of zosyn on 6/20  -Xopenex Mucomyst neb for secretion control   - Repeat CXR 6/23: patchy infiltrate vs atelectasis.   - incentive spirometry  - Chest PT/PD  - Repeat CBC  - monitor temp/cbc. send pan cx and start abx if febrile  - suction PRN  - pulmonary eval    #PAF  -Pt with possible transient A fib with rvr during RRT at [facility]. EKG showed sinus tach and no evidence pafib on tele  -Cont with low dose lopressor  -TTE reviewed: EF 75%. hyperdynamic LV systolic function     #Urinary retention  - s/p pedro 6/16  - Pedro removed 6/21 PM for TOV 6/22  --Pt. voiding well. low PVRs    #HTN  - BP soft  -cont with lopressor   -was on losartan and amlodipine, on hold due to BP on lower side  - check orthostasis periodically. can hold lopressor if significant orthostasis and HR controlled    Transaminitis  -Monitor LFTs, avoid hepatotoxins    Anemia  - h/h stable    Hyponatremia  - sodium trending down  - likely SIADH  - restrict fluid to 9124-1960 mls for now. reduced free water flush from 150 mls Q6hrs to 100 mls Q6hrs. restrict fluid to 1000 mls and add sodium chloride tab if sodium continues to be trending down  - Monitor BMP     DVT ppx - lovenox  GI ppx - pepcid

## 2022-06-26 LAB
ANION GAP SERPL CALC-SCNC: 5 MMOL/L — SIGNIFICANT CHANGE UP (ref 5–17)
BASOPHILS # BLD AUTO: 0.02 K/UL — SIGNIFICANT CHANGE UP (ref 0–0.2)
BASOPHILS NFR BLD AUTO: 0.4 % — SIGNIFICANT CHANGE UP (ref 0–2)
BUN SERPL-MCNC: 12 MG/DL — SIGNIFICANT CHANGE UP (ref 7–23)
CALCIUM SERPL-MCNC: 8.7 MG/DL — SIGNIFICANT CHANGE UP (ref 8.4–10.5)
CHLORIDE SERPL-SCNC: 98 MMOL/L — SIGNIFICANT CHANGE UP (ref 96–108)
CO2 SERPL-SCNC: 32 MMOL/L — HIGH (ref 22–31)
CREAT SERPL-MCNC: 0.42 MG/DL — LOW (ref 0.5–1.3)
EGFR: 113 ML/MIN/1.73M2 — SIGNIFICANT CHANGE UP
EOSINOPHIL # BLD AUTO: 0.23 K/UL — SIGNIFICANT CHANGE UP (ref 0–0.5)
EOSINOPHIL NFR BLD AUTO: 4.1 % — SIGNIFICANT CHANGE UP (ref 0–6)
GLUCOSE SERPL-MCNC: 116 MG/DL — HIGH (ref 70–99)
GRAM STN FLD: SIGNIFICANT CHANGE UP
HCT VFR BLD CALC: 28.8 % — LOW (ref 34.5–45)
HGB BLD-MCNC: 9.4 G/DL — LOW (ref 11.5–15.5)
IMM GRANULOCYTES NFR BLD AUTO: 3.9 % — HIGH (ref 0–1.5)
LYMPHOCYTES # BLD AUTO: 0.66 K/UL — LOW (ref 1–3.3)
LYMPHOCYTES # BLD AUTO: 11.8 % — LOW (ref 13–44)
MCHC RBC-ENTMCNC: 31.9 PG — SIGNIFICANT CHANGE UP (ref 27–34)
MCHC RBC-ENTMCNC: 32.6 GM/DL — SIGNIFICANT CHANGE UP (ref 32–36)
MCV RBC AUTO: 97.6 FL — SIGNIFICANT CHANGE UP (ref 80–100)
MONOCYTES # BLD AUTO: 0.23 K/UL — SIGNIFICANT CHANGE UP (ref 0–0.9)
MONOCYTES NFR BLD AUTO: 4.1 % — SIGNIFICANT CHANGE UP (ref 2–14)
MRSA PCR RESULT.: DETECTED
NEUTROPHILS # BLD AUTO: 4.22 K/UL — SIGNIFICANT CHANGE UP (ref 1.8–7.4)
NEUTROPHILS NFR BLD AUTO: 75.7 % — SIGNIFICANT CHANGE UP (ref 43–77)
NRBC # BLD: 0 /100 WBCS — SIGNIFICANT CHANGE UP (ref 0–0)
PLATELET # BLD AUTO: 216 K/UL — SIGNIFICANT CHANGE UP (ref 150–400)
POTASSIUM SERPL-MCNC: 4.3 MMOL/L — SIGNIFICANT CHANGE UP (ref 3.5–5.3)
POTASSIUM SERPL-SCNC: 4.3 MMOL/L — SIGNIFICANT CHANGE UP (ref 3.5–5.3)
RBC # BLD: 2.95 M/UL — LOW (ref 3.8–5.2)
RBC # FLD: 16.4 % — HIGH (ref 10.3–14.5)
S AUREUS DNA NOSE QL NAA+PROBE: DETECTED
SODIUM SERPL-SCNC: 135 MMOL/L — SIGNIFICANT CHANGE UP (ref 135–145)
SPECIMEN SOURCE: SIGNIFICANT CHANGE UP
WBC # BLD: 5.58 K/UL — SIGNIFICANT CHANGE UP (ref 3.8–10.5)
WBC # FLD AUTO: 5.58 K/UL — SIGNIFICANT CHANGE UP (ref 3.8–10.5)

## 2022-06-26 PROCEDURE — 99233 SBSQ HOSP IP/OBS HIGH 50: CPT

## 2022-06-26 PROCEDURE — 99232 SBSQ HOSP IP/OBS MODERATE 35: CPT

## 2022-06-26 RX ORDER — CHLORHEXIDINE GLUCONATE 213 G/1000ML
15 SOLUTION TOPICAL
Refills: 0 | Status: DISCONTINUED | OUTPATIENT
Start: 2022-06-26 | End: 2022-07-15

## 2022-06-26 RX ORDER — MUPIROCIN 20 MG/G
1 OINTMENT TOPICAL
Refills: 0 | Status: COMPLETED | OUTPATIENT
Start: 2022-06-26 | End: 2022-07-01

## 2022-06-26 RX ORDER — MUPIROCIN 20 MG/G
1 OINTMENT TOPICAL
Refills: 0 | Status: DISCONTINUED | OUTPATIENT
Start: 2022-06-26 | End: 2022-06-26

## 2022-06-26 RX ADMIN — LEVALBUTEROL 0.63 MILLIGRAM(S): 1.25 SOLUTION, CONCENTRATE RESPIRATORY (INHALATION) at 03:56

## 2022-06-26 RX ADMIN — Medication 12.5 MILLIGRAM(S): at 06:31

## 2022-06-26 RX ADMIN — Medication 800 MILLIGRAM(S): at 06:25

## 2022-06-26 RX ADMIN — SODIUM CHLORIDE 2 MILLILITER(S): 9 INJECTION INTRAMUSCULAR; INTRAVENOUS; SUBCUTANEOUS at 08:27

## 2022-06-26 RX ADMIN — Medication 1 SPRAY(S): at 17:13

## 2022-06-26 RX ADMIN — LEVALBUTEROL 0.63 MILLIGRAM(S): 1.25 SOLUTION, CONCENTRATE RESPIRATORY (INHALATION) at 15:18

## 2022-06-26 RX ADMIN — CHLORHEXIDINE GLUCONATE 15 MILLILITER(S): 213 SOLUTION TOPICAL at 17:11

## 2022-06-26 RX ADMIN — Medication 6 MILLIGRAM(S): at 21:42

## 2022-06-26 RX ADMIN — Medication 500 MILLIGRAM(S): at 11:24

## 2022-06-26 RX ADMIN — SENNA PLUS 2 TABLET(S): 8.6 TABLET ORAL at 21:42

## 2022-06-26 RX ADMIN — LEVALBUTEROL 0.63 MILLIGRAM(S): 1.25 SOLUTION, CONCENTRATE RESPIRATORY (INHALATION) at 08:26

## 2022-06-26 RX ADMIN — Medication 1 SPRAY(S): at 11:24

## 2022-06-26 RX ADMIN — ENOXAPARIN SODIUM 40 MILLIGRAM(S): 100 INJECTION SUBCUTANEOUS at 17:12

## 2022-06-26 RX ADMIN — PANTOPRAZOLE SODIUM 40 MILLIGRAM(S): 20 TABLET, DELAYED RELEASE ORAL at 06:32

## 2022-06-26 RX ADMIN — Medication 12.5 MILLIGRAM(S): at 17:11

## 2022-06-26 RX ADMIN — Medication 1 TABLET(S): at 11:24

## 2022-06-26 RX ADMIN — MUPIROCIN 1 APPLICATION(S): 20 OINTMENT TOPICAL at 17:30

## 2022-06-26 RX ADMIN — Medication 1 SPRAY(S): at 06:31

## 2022-06-26 RX ADMIN — POLYETHYLENE GLYCOL 3350 17 GRAM(S): 17 POWDER, FOR SOLUTION ORAL at 11:24

## 2022-06-26 RX ADMIN — SODIUM CHLORIDE 2 MILLILITER(S): 9 INJECTION INTRAMUSCULAR; INTRAVENOUS; SUBCUTANEOUS at 20:23

## 2022-06-26 RX ADMIN — Medication 1 SPRAY(S): at 23:49

## 2022-06-26 RX ADMIN — LEVALBUTEROL 0.63 MILLIGRAM(S): 1.25 SOLUTION, CONCENTRATE RESPIRATORY (INHALATION) at 20:27

## 2022-06-26 RX ADMIN — Medication 800 MILLIGRAM(S): at 17:30

## 2022-06-26 RX ADMIN — Medication 1 APPLICATION(S): at 11:20

## 2022-06-26 NOTE — PROGRESS NOTE ADULT - ASSESSMENT
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  MRI with ethel-medullary. right CPA, and right temporal enhancing lesions concern for meningioma vs schwannoma, with hydro.  Now S/p Left far lateral crani for meningioma resection with subtotal sacrifice of left vertebral artery (6/1).  Noted with vocal cord paralysis, dysphagia - NPO on TF (PEG placed 6/16). Also right Hemiparesis, sensory, gait and ADL impairment.     #Meningioma s/p L lateral crani for meningioma resection with subtotal sacrifice of L vertebral artery on 6/1  -Decadron taper as per neurosurgery  -Continue comprehensive rehab program -PT/OT/SLP per rehab team  -Pain management, bowel regimen per rehab   - fall, aspiration precautions    #Dysphagia s/p PEG placement  - cont tube feeds  - aspiration precautions  - SLP eval as per primary    #Left vocal cord paralysis  # Upper airway secretion  # abnormal  CXR , leucocytosis with increased secretion 6/25: likely aspiration pneumonia   # h/o MSSA PNA and aspiration PNA  - Repeat CBC today: normal  - on Augmentin since 6/25  - Completed 5 days course of zosyn on 6/20  - Xopenex, Mucomyst neb, 3% saline INH, saline nasal spray, suction for secretion control   - MRSA PCR+ 6/26  - mupirocin 2% TP, chlorhexidine  - on contact precautions  - sputum culture pending  - incentive spirometry  - Chest PT/PD  - monitor temp/cbc. send pan cx and start abx if febrile  - suction PRN. patient prefers to do suction by herself  - pulmonary f/u    #PAF  -Pt with possible transient A fib with rvr during RRT at [facility]. EKG showed sinus tach and no evidence pafib on tele  -Cont with low dose lopressor  -TTE reviewed: EF 75%. hyperdynamic LV systolic function     #Urinary retention  - s/p pedro 6/16  - Pedro removed 6/21 PM for TOV 6/22  --Pt. voiding well. low PVRs    #HTN  - BP soft  -cont with lopressor   -was on losartan and amlodipine, on hold due to BP on lower side  - check orthostasis periodically. can hold lopressor if significant orthostasis and HR controlled    Transaminitis  -Monitor LFTs, avoid hepatotoxins    Anemia  - h/h stable    Hyponatremia  - sodium normalized  - likely SIADH  - c/w fluid restriction 1200 mls/day.   - Monitor BMP     DVT ppx - lovenox  GI ppx - pepcid     d/w RN at bedside. 60 yo Mandarin speaking F with PMH of HTN, C Spine meningioma (removed 2018 in [place]) who presented with dizziness, ataxia, and emesis.  MRI with ethel-medullary. right CPA, and right temporal enhancing lesions concern for meningioma vs schwannoma, with hydro.  Now S/p Left far lateral crani for meningioma resection with subtotal sacrifice of left vertebral artery (6/1).  Noted with vocal cord paralysis, dysphagia - NPO on TF (PEG placed 6/16). Also right Hemiparesis, sensory, gait and ADL impairment.     #Meningioma s/p L lateral crani for meningioma resection with subtotal sacrifice of L vertebral artery on 6/1  -Decadron taper as per neurosurgery  -Continue comprehensive rehab program -PT/OT/SLP per rehab team  -Pain management, bowel regimen per rehab   - fall, aspiration precautions    #Dysphagia s/p PEG placement  - cont tube feeds  - aspiration precautions  - SLP eval as per primary    #Left vocal cord paralysis  # Upper airway secretion  # abnormal  CXR , leucocytosis with increased secretion 6/25: likely aspiration pneumonia   # h/o MSSA PNA and aspiration PNA  - Repeat CBC today: normal  - on Augmentin since 6/25  - Completed 5 days course of zosyn on 6/20  - Xopenex, Mucomyst neb, 3% saline INH, saline nasal spray, suction for secretion control   - MRSA PCR+ 6/26  - mupirocin 2% TP, chlorhexidine  - on contact precautions  - sputum culture pending  - incentive spirometry  - Chest PT/PD  - monitor temp/cbc. send pan cx and start abx if febrile  - suction PRN. patient prefers to do suction by herself  - pulmonary f/u  - ENT cx.     #PAF  -Pt with possible transient A fib with rvr during RRT at [facility]. EKG showed sinus tach and no evidence pafib on tele  -Cont with low dose lopressor  -TTE reviewed: EF 75%. hyperdynamic LV systolic function     #Urinary retention  - s/p pedro 6/16  - Pedro removed 6/21 PM for TOV 6/22  --Pt. voiding well. low PVRs    #HTN  - BP soft  -cont with lopressor   -was on losartan and amlodipine, on hold due to BP on lower side  - check orthostasis periodically. can hold lopressor if significant orthostasis and HR controlled    Transaminitis  -Monitor LFTs, avoid hepatotoxins    Anemia  - h/h stable    Hyponatremia  - sodium normalized  - likely SIADH  - c/w fluid restriction 1200 mls/day.   - Monitor BMP     DVT ppx - lovenox  GI ppx - pepcid     d/w RN at bedside.

## 2022-06-26 NOTE — PROGRESS NOTE ADULT - SUBJECTIVE AND OBJECTIVE BOX
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        pt seen and examined  Secretions continue, no cp, palp, n/v        Allergies :albuterol (Other)  No Known Allergies      PAST MEDICAL & SURGICAL HISTORY:  HTN (hypertension)    HLD (hyperlipidemia)    H/O cervical spine surgery        Medications:  MEDICATIONS  (STANDING):  amoxicillin   80 mG/mL/clavulanate Suspension 800 milliGRAM(s) Enteral Tube every 12 hours  ascorbic acid 500 milliGRAM(s) Oral daily  chlorhexidine 0.12% Liquid 15 milliLiter(s) Swish and Spit two times a day  enoxaparin Injectable 40 milliGRAM(s) SubCutaneous <User Schedule>  levalbuterol Inhalation 0.63 milliGRAM(s) Inhalation every 6 hours  melatonin 6 milliGRAM(s) Oral at bedtime  metoprolol tartrate 12.5 milliGRAM(s) Oral every 12 hours  multivitamin 1 Tablet(s) Oral daily  mupirocin 2% Nasal 1 Application(s) Both Nostrils two times a day  pantoprazole   Suspension 40 milliGRAM(s) Oral before breakfast  petrolatum white Ointment 1 Application(s) Topical daily  polyethylene glycol 3350 17 Gram(s) Oral daily  senna 2 Tablet(s) Oral at bedtime  sodium chloride 0.65% Nasal 1 Spray(s) Both Nostrils every 6 hours  sodium chloride 3%  Inhalation 2 milliLiter(s) Inhalation two times a day    MEDICATIONS  (PRN):  acetaminophen     Tablet .. 650 milliGRAM(s) Oral every 6 hours PRN Temp greater or equal to 38C (100.4F), Mild Pain (1 - 3)  ondansetron    Tablet 4 milliGRAM(s) Oral every 12 hours PRN Nausea  traMADol 25 milliGRAM(s) Oral every 4 hours PRN Moderate Pain (4 - 6)  traMADol 50 milliGRAM(s) Oral every 4 hours PRN Severe Pain (7 - 10)      Antibiotics History  amoxicillin   80 mG/mL/clavulanate Suspension 800 milliGRAM(s) Enteral Tube every 12 hours, 06-25-22 @ 12:12, Stop order after: 10 Days  piperacillin/tazobactam IVPB.. 3.375 Gram(s) IV Intermittent every 8 hours, 06-20-22 @ 20:00, Stop order after: 4 Days      Heme Medications   enoxaparin Injectable 40 milliGRAM(s) SubCutaneous <User Schedule>, 06-21-22 @ 00:00      GI Medications  pantoprazole   Suspension 40 milliGRAM(s) Oral before breakfast, 06-24-22 @ 13:37, Routine  polyethylene glycol 3350 17 Gram(s) Oral daily, 06-20-22 @ 20:00,   senna 2 Tablet(s) Oral at bedtime, 06-20-22 @ 19:59, Routine        LABS:                        9.4    5.58  )-----------( 216      ( 26 Jun 2022 06:00 )             28.8     06-26    135  |  98  |  12  ----------------------------<  116<H>  4.3   |  32<H>  |  0.42<L>    Ca    8.7      26 Jun 2022 06:00           CULTURES: (if applicable)    Culture - Blood (collected 06-16-22 @ 11:15)  Source: .Blood Blood  Final Report (06-21-22 @ 17:01):    No Growth Final    Culture - Blood (collected 06-16-22 @ 11:10)  Source: .Blood Blood  Final Report (06-21-22 @ 17:01):    No Growth Final         VITALS:  T(C): 36.3 (06-26-22 @ 08:23), Max: 36.7 (06-25-22 @ 21:52)  T(F): 97.4 (06-26-22 @ 08:23), Max: 98.1 (06-25-22 @ 21:52)  HR: 79 (06-26-22 @ 08:25) (72 - 100)  BP: 118/79 (06-26-22 @ 08:23) (104/65 - 119/79)  BP(mean): --  ABP: --  ABP(mean): --  RR: 15 (06-26-22 @ 08:23) (14 - 15)  SpO2: 98% (06-26-22 @ 08:25) (98% - 99%)  CVP(mm Hg): --  CVP(cm H2O): --    Ins and Outs     06-25-22 @ 07:01  -  06-26-22 @ 07:00  --------------------------------------------------------  IN: 1200 mL / OUT: 0 mL / NET: 1200 mL    06-26-22 @ 07:01  -  06-26-22 @ 13:09  --------------------------------------------------------  IN: 600 mL / OUT: 0 mL / NET: 600 mL       I&O's Detail    25 Jun 2022 07:01  -  26 Jun 2022 07:00  --------------------------------------------------------  IN:    Free Water: 400 mL    TwoCal HN: 800 mL  Total IN: 1200 mL    OUT:  Total OUT: 0 mL    Total NET: 1200 mL      26 Jun 2022 07:01 - 26 Jun 2022 13:09  --------------------------------------------------------  IN:    Free Water: 200 mL    TwoCal HN: 400 mL  Total IN: 600 mL    OUT:  Total OUT: 0 mL    Total NET: 600 mL

## 2022-06-26 NOTE — PROGRESS NOTE ADULT - SUBJECTIVE AND OBJECTIVE BOX
Chief complaint: uneventful night, afebrile, no new complaints     Patient is a 61y old  Female who presents with a chief complaint of Meningioma (26 Jun 2022 13:09)    PAST MEDICAL & SURGICAL HISTORY:  HTN (hypertension)      HLD (hyperlipidemia)      H/O cervical spine surgery        VITALS  Vital Signs Last 24 Hrs  T(C): 36.3 (26 Jun 2022 08:23), Max: 36.7 (25 Jun 2022 21:52)  T(F): 97.4 (26 Jun 2022 08:23), Max: 98.1 (25 Jun 2022 21:52)  HR: 79 (26 Jun 2022 08:25) (72 - 100)  BP: 118/79 (26 Jun 2022 08:23) (104/65 - 119/79)  BP(mean): --  RR: 15 (26 Jun 2022 08:23) (14 - 15)  SpO2: 98% (26 Jun 2022 08:25) (98% - 99%)      PHYSICAL EXAM  Constitutional - NAD, Comfortable  HEENT - NCAT, EOMI  Neck - Supple, No limited RO  Cardiovascular - RRR  Abdomen -  Soft, NTND  Extremities -  No calf tenderness   Neurologic Exam -                    Cognitive - Awake, Alert     No new focal deficits         RECENT LABS                        9.4    5.58  )-----------( 216      ( 26 Jun 2022 06:00 )             28.8     06-26    135  |  98  |  12  ----------------------------<  116<H>  4.3   |  32<H>  |  0.42<L>    Ca    8.7      26 Jun 2022 06:00                CURRENT MEDICATIONS    MEDICATIONS  (STANDING):  amoxicillin   80 mG/mL/clavulanate Suspension 800 milliGRAM(s) Enteral Tube every 12 hours  ascorbic acid 500 milliGRAM(s) Oral daily  chlorhexidine 0.12% Liquid 15 milliLiter(s) Swish and Spit two times a day  enoxaparin Injectable 40 milliGRAM(s) SubCutaneous <User Schedule>  levalbuterol Inhalation 0.63 milliGRAM(s) Inhalation every 6 hours  melatonin 6 milliGRAM(s) Oral at bedtime  metoprolol tartrate 12.5 milliGRAM(s) Oral every 12 hours  multivitamin 1 Tablet(s) Oral daily  mupirocin 2% Nasal 1 Application(s) Both Nostrils two times a day  pantoprazole   Suspension 40 milliGRAM(s) Oral before breakfast  petrolatum white Ointment 1 Application(s) Topical daily  polyethylene glycol 3350 17 Gram(s) Oral daily  senna 2 Tablet(s) Oral at bedtime  sodium chloride 0.65% Nasal 1 Spray(s) Both Nostrils every 6 hours  sodium chloride 3%  Inhalation 2 milliLiter(s) Inhalation two times a day    MEDICATIONS  (PRN):  acetaminophen     Tablet .. 650 milliGRAM(s) Oral every 6 hours PRN Temp greater or equal to 38C (100.4F), Mild Pain (1 - 3)  ondansetron    Tablet 4 milliGRAM(s) Oral every 12 hours PRN Nausea  traMADol 25 milliGRAM(s) Oral every 4 hours PRN Moderate Pain (4 - 6)  traMADol 50 milliGRAM(s) Oral every 4 hours PRN Severe Pain (7 - 10)    ASSESSMENT & PLAN          GI/Bowel Management - Dulcolax PRN, Fleet PRN   Management - Toilet Q2  Skin - Turn Q2  Pain - Tylenol PRN  DVT PPX - Lovenox  Case discussed with Medicine and Pulmonary - Augmentin and Bactroban started, follow up cultures further       Continue comprehensive acute rehab program consisting of 3hrs/day of OT/PT and SLP.

## 2022-06-26 NOTE — PROGRESS NOTE ADULT - ASSESSMENT
Physical Examination:  GENERAL:               Alert, Oriented, No acute distress.    HEENT:                   No JVD, Moist MM oral airway secretions.   PULM:                     Bilateral air entry, Clear to auscultation bilaterally, no significant sputum production, No Rales, No Rhonchi, No Wheezing  CVS:                         S1, S2,  No Murmur  ABD:                        Soft, nondistended, nontender, normoactive bowel sounds,   NEURO:                  Alert, oriented, interactive,  follows commands  PSYC:                      Calm, + Insight.      Assessment  1. Upper Airway Secretions in a patient with Dysphagia and Vocal Cord Paralysis   2. Abnormal CXR possible PNA  3. Meningioma s/p craniotomy with Left foramen magnum meningioma with brainstem compression and edema. on 6/1/22  4. Neurofibromatosis type 2  5. H/o HTN      Plan  NPO, Tube feeding  Keep HOB elevated    F/u Sputum Culture  Start Augmenting as had MSSA in past in sputum culture    will await repeat  MRSA Swab positive.     Nasal saline spray  allow pt to self suction.  ENT Eval to be considered to look at vocal cords as as well as upper airway edema if present  Hypertonic saline neb Q 12 hrs.     will follow  d/w bedside RN state less secretions from yesterday

## 2022-06-27 LAB
ALBUMIN SERPL ELPH-MCNC: 2.5 G/DL — LOW (ref 3.3–5)
ALP SERPL-CCNC: 84 U/L — SIGNIFICANT CHANGE UP (ref 40–120)
ALT FLD-CCNC: 46 U/L — HIGH (ref 10–45)
ANION GAP SERPL CALC-SCNC: 4 MMOL/L — LOW (ref 5–17)
AST SERPL-CCNC: 30 U/L — SIGNIFICANT CHANGE UP (ref 10–40)
BILIRUB SERPL-MCNC: 1 MG/DL — SIGNIFICANT CHANGE UP (ref 0.2–1.2)
BUN SERPL-MCNC: 13 MG/DL — SIGNIFICANT CHANGE UP (ref 7–23)
CALCIUM SERPL-MCNC: 8.8 MG/DL — SIGNIFICANT CHANGE UP (ref 8.4–10.5)
CHLORIDE SERPL-SCNC: 98 MMOL/L — SIGNIFICANT CHANGE UP (ref 96–108)
CO2 SERPL-SCNC: 33 MMOL/L — HIGH (ref 22–31)
CREAT SERPL-MCNC: 0.36 MG/DL — LOW (ref 0.5–1.3)
EGFR: 116 ML/MIN/1.73M2 — SIGNIFICANT CHANGE UP
GLUCOSE SERPL-MCNC: 102 MG/DL — HIGH (ref 70–99)
HCT VFR BLD CALC: 29.4 % — LOW (ref 34.5–45)
HGB BLD-MCNC: 9.6 G/DL — LOW (ref 11.5–15.5)
MCHC RBC-ENTMCNC: 32.7 GM/DL — SIGNIFICANT CHANGE UP (ref 32–36)
MCHC RBC-ENTMCNC: 32.8 PG — SIGNIFICANT CHANGE UP (ref 27–34)
MCV RBC AUTO: 100.3 FL — HIGH (ref 80–100)
NRBC # BLD: 0 /100 WBCS — SIGNIFICANT CHANGE UP (ref 0–0)
PLATELET # BLD AUTO: 243 K/UL — SIGNIFICANT CHANGE UP (ref 150–400)
POTASSIUM SERPL-MCNC: 4.3 MMOL/L — SIGNIFICANT CHANGE UP (ref 3.5–5.3)
POTASSIUM SERPL-SCNC: 4.3 MMOL/L — SIGNIFICANT CHANGE UP (ref 3.5–5.3)
PROT SERPL-MCNC: 6 G/DL — SIGNIFICANT CHANGE UP (ref 6–8.3)
RBC # BLD: 2.93 M/UL — LOW (ref 3.8–5.2)
RBC # FLD: 16.5 % — HIGH (ref 10.3–14.5)
SODIUM SERPL-SCNC: 135 MMOL/L — SIGNIFICANT CHANGE UP (ref 135–145)
WBC # BLD: 4.75 K/UL — SIGNIFICANT CHANGE UP (ref 3.8–10.5)
WBC # FLD AUTO: 4.75 K/UL — SIGNIFICANT CHANGE UP (ref 3.8–10.5)

## 2022-06-27 PROCEDURE — 99233 SBSQ HOSP IP/OBS HIGH 50: CPT

## 2022-06-27 PROCEDURE — 74019 RADEX ABDOMEN 2 VIEWS: CPT | Mod: 26

## 2022-06-27 RX ORDER — CHLORHEXIDINE GLUCONATE 213 G/1000ML
1 SOLUTION TOPICAL
Refills: 0 | Status: DISCONTINUED | OUTPATIENT
Start: 2022-06-27 | End: 2022-07-15

## 2022-06-27 RX ADMIN — LEVALBUTEROL 0.63 MILLIGRAM(S): 1.25 SOLUTION, CONCENTRATE RESPIRATORY (INHALATION) at 16:09

## 2022-06-27 RX ADMIN — CHLORHEXIDINE GLUCONATE 15 MILLILITER(S): 213 SOLUTION TOPICAL at 17:35

## 2022-06-27 RX ADMIN — Medication 6 MILLIGRAM(S): at 21:51

## 2022-06-27 RX ADMIN — CHLORHEXIDINE GLUCONATE 15 MILLILITER(S): 213 SOLUTION TOPICAL at 06:35

## 2022-06-27 RX ADMIN — ENOXAPARIN SODIUM 40 MILLIGRAM(S): 100 INJECTION SUBCUTANEOUS at 17:35

## 2022-06-27 RX ADMIN — Medication 500 MILLIGRAM(S): at 11:38

## 2022-06-27 RX ADMIN — SODIUM CHLORIDE 2 MILLILITER(S): 9 INJECTION INTRAMUSCULAR; INTRAVENOUS; SUBCUTANEOUS at 21:37

## 2022-06-27 RX ADMIN — Medication 800 MILLIGRAM(S): at 17:44

## 2022-06-27 RX ADMIN — Medication 1 SPRAY(S): at 23:48

## 2022-06-27 RX ADMIN — POLYETHYLENE GLYCOL 3350 17 GRAM(S): 17 POWDER, FOR SOLUTION ORAL at 11:38

## 2022-06-27 RX ADMIN — Medication 1 TABLET(S): at 11:38

## 2022-06-27 RX ADMIN — Medication 1 SPRAY(S): at 11:38

## 2022-06-27 RX ADMIN — MUPIROCIN 1 APPLICATION(S): 20 OINTMENT TOPICAL at 17:45

## 2022-06-27 RX ADMIN — Medication 1 SPRAY(S): at 17:37

## 2022-06-27 RX ADMIN — PANTOPRAZOLE SODIUM 40 MILLIGRAM(S): 20 TABLET, DELAYED RELEASE ORAL at 06:36

## 2022-06-27 RX ADMIN — LEVALBUTEROL 0.63 MILLIGRAM(S): 1.25 SOLUTION, CONCENTRATE RESPIRATORY (INHALATION) at 05:00

## 2022-06-27 RX ADMIN — Medication 1 APPLICATION(S): at 12:24

## 2022-06-27 RX ADMIN — MUPIROCIN 1 APPLICATION(S): 20 OINTMENT TOPICAL at 06:55

## 2022-06-27 RX ADMIN — Medication 1 SPRAY(S): at 06:35

## 2022-06-27 RX ADMIN — LEVALBUTEROL 0.63 MILLIGRAM(S): 1.25 SOLUTION, CONCENTRATE RESPIRATORY (INHALATION) at 21:36

## 2022-06-27 RX ADMIN — SENNA PLUS 2 TABLET(S): 8.6 TABLET ORAL at 21:50

## 2022-06-27 RX ADMIN — Medication 800 MILLIGRAM(S): at 06:54

## 2022-06-27 NOTE — PROGRESS NOTE ADULT - ASSESSMENT
60 yo Mandarin speaking F with PMH of HTN, C Spine meningioma (removed 2018 in [place]) who presented with dizziness, ataxia, and emesis.  MRI with ethel-medullary. right CPA, and right temporal enhancing lesions concern for meningioma vs schwannoma, with hydro.  Now S/p Left far lateral crani for meningioma resection with subtotal sacrifice of left vertebral artery (6/1).  Noted with vocal cord paralysis, dysphagia - NPO on TF (PEG placed 6/16). Also right Hemiparesis, sensory, gait and ADL impairment.     COMORBIDITES/ACTIVE MEDICAL ISSUES     #Brain tumor - Meningioma   - S/p Left far lateral crani for meningioma resection with subtotal sacrifice of left vertebral artery (6/1)  - Dexamethasone taper - 2mg Q8h -> 2mg Q12h x 2 days, then 2mg x 2 days (end 6/25)  - Gait Instability, ADL impairments and Functional impairments: cont Comprehensive Rehab Program of PT/OT & speech    #Transient A fib with RVR  - s/p amiodarone IVP x 1  - Sinus tach - EKG without evidence of a fib  - Metoprolol 12.5 BID    # Cranial stenosis  - CTA head showed defect in the right M1 suspicious for possible right M1 occlusion though the vessel   - Recommended to "continue ASA 81mg qd and atorvastatin 40mg qhs" on discharge paperwork, however patient not on it in prior hospital  - Lipid panel 5/21 unremarkable  -restarted atorvastatin  --Contacted Dr. Liu to clarify if pt. ok to restart ASA-- stated would hold for now if it was for carotid stenosis .    #HTN  - - cont. metoprolol 12.5 BID     #Aspiration PNA  - CTA: negative for PE  - completed augmentin course 6/5  - Zosyn IV x7 days--discontinued by hospitalist 6/22  --CXR w/ L lower lobe infiltrate/atelectasis , no consolidation noted   - restarted on augmentin  - management per hospitalist      # Urinary retention  - s/p pedro 6/16  - Pedro removed 6/21 PM for TOV 6/22  --Pt. voiding with PVR = 75 6/27  --toileting program q.3h while awake    # Lip sore  - ? herpes labialis. No prior hx of lip sores  - s/p valtrex 2g BID x 2 doses on 6/15.    #Pain control  - Tylenol PRN, Tramadol    #GI/Bowel Mgmt   - At risk for constipation due to neurologic diagnosis, immobility and/or medication use  - Senna,  Miralax ,  last BM 6/27   - obtain abdominal XR 6/27 as not tolerating full volume feeds    #DVT  - Lovenox      Dysphagia  Vocal cord paralysis  - Diet - NPO with TF  - PEG placed (6/16)  - SLP - evaluation and treatment  - Trial vital stimulation in speech therapy  - Self suctioning    Precautions / PROPHYLAXIS:   - Falls  - Lungs: Aspiration,   - Pressure injury/Skin: Turn Q2hrs while in bed, OOB to Chair, PT/OT      IDT 6/23:  SW: lives in  with son, and DIL; 1st floor s/u  OT: Tot A e/bathing;  Mod A grooming/LBD /toilet transf; Min A UBD; TBA-- shower transfer  PT: Mod A transfer, Amb. 30ft RW, 4 steps 2 HR.    Speech: NPO/PEG; Hypophonia, secretions improved, Mod A cognition,    ELOS: 7/11/22    Follow ups:  Giovanni Liu)  Neurosurgery  General  [address] [place] [address], [address]  [place] [address]  Phone: [phone]  Fax: [phone]   60 yo Mandarin speaking F with PMH of HTN, C Spine meningioma (removed 2018 in [place]) who presented with dizziness, ataxia, and emesis.  MRI with ethel-medullary. right CPA, and right temporal enhancing lesions concern for meningioma vs schwannoma, with hydro.  Now S/p Left far lateral crani for meningioma resection with subtotal sacrifice of left vertebral artery (6/1).  Noted with vocal cord paralysis, dysphagia - NPO on TF (PEG placed 6/16). Also right Hemiparesis, sensory, gait and ADL impairment.     COMORBIDITES/ACTIVE MEDICAL ISSUES     #Brain tumor - Meningioma   - S/p Left far lateral crani for meningioma resection with subtotal sacrifice of left vertebral artery (6/1)  - Dexamethasone taper - 2mg Q8h -> 2mg Q12h x 2 days, then 2mg x 2 days (end 6/25)  - Gait Instability, ADL impairments and Functional impairments: cont Comprehensive Rehab Program of PT/OT & speech    #Transient A fib with RVR  - s/p amiodarone IVP x 1  - Sinus tach - EKG without evidence of a fib  - Metoprolol 12.5 BID    # Cranial stenosis  - CTA head showed defect in the right M1 suspicious for possible right M1 occlusion though the vessel   - Recommended to "continue ASA 81mg qd and atorvastatin 40mg qhs" on discharge paperwork, however patient not on it in prior hospital  - Lipid panel 5/21 unremarkable  -restarted atorvastatin  --Contacted Dr. Liu to clarify if pt. ok to restart ASA-- stated would hold for now if it was for carotid stenosis .    #HTN  - - cont. metoprolol 12.5 BID     #Aspiration PNA  - CTA: negative for PE  - completed augmentin course 6/5  - Zosyn IV x7 days--discontinued by hospitalist 6/22  --CXR w/ L lower lobe infiltrate/atelectasis , no consolidation noted   - restarted on augmentin  -MRSA nares + > started on bactroban   -sputum culture moderate gram positive cocci in clusters  - management per hospitalist      # Urinary retention  - s/p pedro 6/16  - Pedro removed 6/21 PM for TOV 6/22  --Pt. voiding with PVR = 75 6/27  --toileting program q.3h while awake    # Lip sore  - ? herpes labialis. No prior hx of lip sores  - s/p valtrex 2g BID x 2 doses on 6/15.    #Pain control  - Tylenol PRN, Tramadol    #GI/Bowel Mgmt   - At risk for constipation due to neurologic diagnosis, immobility and/or medication use  - Senna,  Miralax ,  last BM 6/27   - obtain abdominal XR 6/27 as not tolerating full volume feeds    #DVT  - Lovenox      Dysphagia  Vocal cord paralysis  - Diet - NPO with TF  - PEG placed (6/16)  - SLP - evaluation and treatment  - Trial vital stimulation in speech therapy  - Self suctioning    Precautions / PROPHYLAXIS:   - Falls  - Lungs: Aspiration,   - Pressure injury/Skin: Turn Q2hrs while in bed, OOB to Chair, PT/OT      IDT 6/23:  SW: lives in  with son, and DIL; 1st floor s/u  OT: Tot A e/bathing;  Mod A grooming/LBD /toilet transf; Min A UBD; TBA-- shower transfer  PT: Mod A transfer, Amb. 30ft RW, 4 steps 2 HR.    Speech: NPO/PEG; Hypophonia, secretions improved, Mod A cognition,    ELOS: 7/11/22    Follow ups:  Giovanni Liu)  Neurosurgery  General  [address] [place] [address], [address]  [place] [address]  Phone: [phone]  Fax: [phone]   60 yo Mandarin speaking F with PMH of HTN, C Spine meningioma (removed 2018 in [place]) who presented with dizziness, ataxia, and emesis.  MRI with ethel-medullary. right CPA, and right temporal enhancing lesions concern for meningioma vs schwannoma, with hydro.  Now S/p Left far lateral crani for meningioma resection with subtotal sacrifice of left vertebral artery (6/1).  Noted with vocal cord paralysis, dysphagia - NPO on TF (PEG placed 6/16). Also right Hemiparesis, sensory, gait and ADL impairment.     COMORBIDITES/ACTIVE MEDICAL ISSUES     #Brain tumor - Meningioma   - S/p Left far lateral crani for meningioma resection with subtotal sacrifice of left vertebral artery (6/1)  - Dexamethasone taper -completed (end 6/25)  - Gait Instability, ADL impairments and Functional impairments: cont Comprehensive Rehab Program of PT/OT & speech    #Transient A fib with RVR  - s/p amiodarone IVP x 1  - Sinus tach - EKG without evidence of a fib  - Metoprolol 12.5 BID    # Cranial stenosis  - CTA head showed defect in the right M1 suspicious for possible right M1 occlusion though the vessel   - Recommended to "continue ASA 81mg qd and atorvastatin 40mg qhs" on discharge paperwork, however patient not on it in prior hospital  - Lipid panel 5/21 unremarkable  -restarted atorvastatin  --Contacted Dr. Liu to clarify if pt. ok to restart ASA-- stated would hold for now if it was for carotid stenosis .    #HTN  - - cont. metoprolol 12.5 BID     #Aspiration PNA  - CTA: negative for PE  - completed augmentin course 6/5  - Zosyn IV x7 days--discontinued by hospitalist 6/22  --CXR w/ L lower lobe infiltrate/atelectasis , no consolidation noted   - restarted on augmentin  -MRSA nares + > started on bactroban   -sputum culture moderate gram positive cocci in clusters  - management per hospitalist      # Urinary retention  - s/p pedro 6/16  - Pedro removed 6/21 PM for TOV 6/22  --Pt. voiding with PVR = 75 6/27  --toileting program q.3h while awake    # Lip sore  - ? herpes labialis. No prior hx of lip sores  - s/p valtrex 2g BID x 2 doses on 6/15.    #Pain control  - Tylenol PRN, Tramadol    #GI/Bowel Mgmt   - At risk for constipation due to neurologic diagnosis, immobility and/or medication use  - Senna,  Miralax ,  last BM 6/27   - obtain abdominal XR 6/27 as not tolerating full volume feeds    #DVT  - Lovenox      Dysphagia  Vocal cord paralysis  - Diet - NPO with TF  - PEG placed (6/16)  - SLP - evaluation and treatment  --ENT consult  - cont. vital stimulation in speech therapy  - Self suctioning    Precautions / PROPHYLAXIS:   - Falls  - Lungs: Aspiration,   - Pressure injury/Skin: Turn Q2hrs while in bed, OOB to Chair, PT/OT      IDT 6/23:  SW: lives in  with son, and DIL; 1st floor s/u  OT: Tot A e/bathing;  Mod A grooming/LBD /toilet transf; Min A UBD; TBA-- shower transfer  PT: Mod A transfer, Amb. 30ft RW, 4 steps 2 HR.    Speech: NPO/PEG; Hypophonia, secretions improved, Mod A cognition,    ELOS: 7/11/22    Follow ups:  Giovanni Liu)  Neurosurgery  General  [address] [place] [address], [address]  [place] [address]  Phone: [phone]  Fax: [phone]

## 2022-06-27 NOTE — PROGRESS NOTE ADULT - SUBJECTIVE AND OBJECTIVE BOX
Patient is a 61y old  Female who presents with a chief complaint of Meningioma (27 Jun 2022 13:13)      HPI:  61 year old Female with PMHx of HTN and C-spine meningioma removal in 2018 in [place]. Patient presented with dizziness, gait instability, and vomiting over the last 3 days prior to admission. MRI showed ethel-medullary, Right CPA, and Right temporal enhancing lesions.  Possible meningioma vs schwannoma.  Patient was monitored in the ICU for hydro watch. Workup included MRI of the Brain, C/T/L spine and CT of the chest, abdomen and pelvis which was negative for malignancies on 5/20.  Patient was seen by ENT for dysphagia and recommended patient be seen by speech and swallow preop and underwent MBS and was able to continue with a regular diet.  Due to location of the lesion, ENT recommended audiology consult.  She was found to have intact hearing but mildly decreased bilaterally. Underwent Left far lateral crani for meningioma resection with subtotal sacrifice of left vertebral artery on 6/1. Attempted extubation the same night after surgery, however she developed stridor and was reintubated.  Patient was successfully extubated on 6/6 to high flow.  Patient scoped by ENT and found  to have left vocal cord paralysis.  Post extubation patient had a lot of secretions and was given Mucomyst and Duoneb. There was a concern for PE given respiratory status - CTA chest 6/7 negative for PE but with bilateral consolidation. Patient was weaned off high flow (6/9), however frequent suctioning was still required. FED via NGT and per speech and swallow recommendations, she was kept NPO. Patient was also found to have MSSA pneumonia and was treated with Augmentin.  Speech and swallow continued to follow and patient was recommended for a PEG.  PEG was placed on 6/16. Patient also had two episodes of desaturation on 6/16, follow up with another CTA chest; which was also negative for PE, but showed b/l lower lobe consolidations and IV antibiotics (Zosyn) were started.     Patient was evaluated by PM&R and therapy for functional deficits and gait/ADL impairments and recommend acute rehabilitation.  Patient was medically optimized for discharge to [facility] on 6/20/22.  (20 Jun 2022 13:21)        SUBJECTIVE: Patient seen and examined. Patient started on augmentin over the weekend for increased secretions, suspected aspiration, increased oropharyngeal secretions. In addition started on nebulized hypertonic saline. Patient seen w/ family at bedside able to provide translation. Patient notes fullness/nausea after feeds and requested low volume feeds - previously discussed w/ nutrition. Had 2 BM today. Complains of frequent secretions currently. Also notes increased coughing overnight causing poor sleep.      REVIEW OF SYSTEMS  +cough  +nausea  Denies diarrhea/constipation  Denies difficulty breathing  +dysarthria, dysphagia      VITALS  61y  Vital Signs Last 24 Hrs  T(C): 36.6 (27 Jun 2022 08:54), Max: 36.7 (26 Jun 2022 20:34)  T(F): 97.8 (27 Jun 2022 08:54), Max: 98.1 (26 Jun 2022 20:34)  HR: 80 (27 Jun 2022 08:54) (80 - 90)  BP: 112/75 (27 Jun 2022 08:54) (100/72 - 141/78)  BP(mean): --  RR: 16 (27 Jun 2022 08:54) (16 - 16)  SpO2: 93% (27 Jun 2022 08:54) (93% - 99%)  Daily     Daily         PHYSICAL EXAM:     Gen - NAD, Comfortable  HEENT - NCAT,  Pulm - CTA bilaterally  Cardiovascular - RRR,  Abdomen - Soft, NT/ND, +BS, (+) PEG tube site - clean  Extremities - No calf tenderness  Neuro-     Cognitive - AAOx3. --did not know hospital name     Communication - Fluent, hypophonia     Attention: Intact, able to recite days of week backwards, perform serial 5's     Memory: Recall 3 objects immediate and 3 min later         Cranial Nerves - PERRLA, EOMI, +mild diplopia, Slight impaired R shoulder shrug, . Visual fields intact. +dysphagia, +hypophonia,  Orolingual weakness     Motor -                     LEFT    UE - ShAB 5/5, EF 5/5, EE 5/5, WE 5/5,  5/5                    RIGHT UE - ShAB 4+/5, EF 3/5, EE 3/5, WE 3/5,  3/5                    LEFT    LE - HF 5/5, KE 5/5, DF 5/5, PF 5/5                    RIGHT LE - HF 4/5, KE 4/5, DF 3/5, PF 4/5        Sensory - Slightly diminished sensation to light touch in RUE and RLE     Coordination -- impaired on right     Tone - normal  Psychiatric - Mood stable, Affect WNL    RECENT LABS:                        9.6    4.75  )-----------( 243      ( 27 Jun 2022 06:45 )             29.4     06-27    135  |  98  |  13  ----------------------------<  102<H>  4.3   |  33<H>  |  0.36<L>    Ca    8.8      27 Jun 2022 06:45    TPro  6.0  /  Alb  2.5<L>  /  TBili  1.0  /  DBili  x   /  AST  30  /  ALT  46<H>  /  AlkPhos  84  06-27    LIVER FUNCTIONS - ( 27 Jun 2022 06:45 )  Alb: 2.5 g/dL / Pro: 6.0 g/dL / ALK PHOS: 84 U/L / ALT: 46 U/L / AST: 30 U/L / GGT: x                 Culture - Sputum (collected 06-26-22 @ 09:59)  Source: .Sputum Sputum  Gram Stain (06-26-22 @ 14:54):    Few polymorphonuclear leukocytes per low power field    Rare Squamous epithelial cells per low power field    Moderate Gram Positive Cocci in Clusters per oil power field    Rare Gram Positive Rods per oil power field  Preliminary Report (06-27-22 @ 10:30):    Numerous Staphylococcus aureus        CAPILLARY BLOOD GLUCOSE            MEDICATIONS:  MEDICATIONS  (STANDING):  amoxicillin   80 mG/mL/clavulanate Suspension 800 milliGRAM(s) Enteral Tube every 12 hours  ascorbic acid 500 milliGRAM(s) Oral daily  chlorhexidine 0.12% Liquid 15 milliLiter(s) Swish and Spit two times a day  chlorhexidine 2% Cloths 1 Application(s) Topical <User Schedule>  enoxaparin Injectable 40 milliGRAM(s) SubCutaneous <User Schedule>  levalbuterol Inhalation 0.63 milliGRAM(s) Inhalation every 6 hours  melatonin 6 milliGRAM(s) Oral at bedtime  metoprolol tartrate 12.5 milliGRAM(s) Oral every 12 hours  multivitamin 1 Tablet(s) Oral daily  mupirocin 2% Nasal 1 Application(s) Both Nostrils two times a day  pantoprazole   Suspension 40 milliGRAM(s) Oral before breakfast  petrolatum white Ointment 1 Application(s) Topical daily  polyethylene glycol 3350 17 Gram(s) Oral daily  senna 2 Tablet(s) Oral at bedtime  sodium chloride 0.65% Nasal 1 Spray(s) Both Nostrils every 6 hours  sodium chloride 3%  Inhalation 2 milliLiter(s) Inhalation two times a day    MEDICATIONS  (PRN):  acetaminophen     Tablet .. 650 milliGRAM(s) Oral every 6 hours PRN Temp greater or equal to 38C (100.4F), Mild Pain (1 - 3)  ondansetron    Tablet 4 milliGRAM(s) Oral every 12 hours PRN Nausea  traMADol 25 milliGRAM(s) Oral every 4 hours PRN Moderate Pain (4 - 6)  traMADol 50 milliGRAM(s) Oral every 4 hours PRN Severe Pain (7 - 10)     Patient is a 61y old  Female who presents with a chief complaint of Meningioma (27 Jun 2022 13:13)      HPI:  61 year old Female with PMHx of HTN and C-spine meningioma removal in 2018 in [place]. Patient presented with dizziness, gait instability, and vomiting over the last 3 days prior to admission. MRI showed ethel-medullary, Right CPA, and Right temporal enhancing lesions.  Possible meningioma vs schwannoma.  Patient was monitored in the ICU for hydro watch. Workup included MRI of the Brain, C/T/L spine and CT of the chest, abdomen and pelvis which was negative for malignancies on 5/20.  Patient was seen by ENT for dysphagia and recommended patient be seen by speech and swallow preop and underwent MBS and was able to continue with a regular diet.  Due to location of the lesion, ENT recommended audiology consult.  She was found to have intact hearing but mildly decreased bilaterally. Underwent Left far lateral crani for meningioma resection with subtotal sacrifice of left vertebral artery on 6/1. Attempted extubation the same night after surgery, however she developed stridor and was reintubated.  Patient was successfully extubated on 6/6 to high flow.  Patient scoped by ENT and found  to have left vocal cord paralysis.  Post extubation patient had a lot of secretions and was given Mucomyst and Duoneb. There was a concern for PE given respiratory status - CTA chest 6/7 negative for PE but with bilateral consolidation. Patient was weaned off high flow (6/9), however frequent suctioning was still required. FED via NGT and per speech and swallow recommendations, she was kept NPO. Patient was also found to have MSSA pneumonia and was treated with Augmentin.  Speech and swallow continued to follow and patient was recommended for a PEG.  PEG was placed on 6/16. Patient also had two episodes of desaturation on 6/16, follow up with another CTA chest; which was also negative for PE, but showed b/l lower lobe consolidations and IV antibiotics (Zosyn) were started.     Patient was evaluated by PM&R and therapy for functional deficits and gait/ADL impairments and recommend acute rehabilitation.  Patient was medically optimized for discharge to [facility] on 6/20/22.  (20 Jun 2022 13:21)        SUBJECTIVE: Patient seen and examined. Patient started on augmentin over the weekend for increased secretions, suspected aspiration, increased oropharyngeal secretions. In addition started on nebulized hypertonic saline. Patient seen w/ family at bedside able to provide translation. Patient notes fullness/nausea after feeds and requested low volume feeds - previously discussed w/ nutrition. Had 2 BM today after AM feed--states abdomen feels better after BM. Secretions improved somewhat. Also notes increased coughing overnight causing poor sleep.      REVIEW OF SYSTEMS  +cough  +nausea  Denies diarrhea/constipation  Denies difficulty breathing  +dysarthria, dysphagia      VITALS  61y  Vital Signs Last 24 Hrs  T(C): 36.6 (27 Jun 2022 08:54), Max: 36.7 (26 Jun 2022 20:34)  T(F): 97.8 (27 Jun 2022 08:54), Max: 98.1 (26 Jun 2022 20:34)  HR: 80 (27 Jun 2022 08:54) (80 - 90)  BP: 112/75 (27 Jun 2022 08:54) (100/72 - 141/78)  BP(mean): --  RR: 16 (27 Jun 2022 08:54) (16 - 16)  SpO2: 93% (27 Jun 2022 08:54) (93% - 99%)  Daily     Daily         PHYSICAL EXAM:     Gen - NAD, Comfortable  HEENT - NCAT,  Pulm - CTA bilaterally  Cardiovascular - RRR,  Abdomen - Soft, NT/ND, +BS, (+) PEG tube site - clean  Extremities - No calf tenderness  Neuro-     Cognitive - AAOx3. --did not know hospital name     Communication - Fluent, hypophonia     Attention: Intact, able to recite days of week backwards, perform serial 5's     Memory: Recall 3 objects immediate and 3 min later         Cranial Nerves - PERRLA, EOMI, +mild diplopia, Slight impaired R shoulder shrug, . Visual fields intact. +dysphagia, +hypophonia,  Orolingual weakness     Motor -                     LEFT    UE - ShAB 5/5, EF 5/5, EE 5/5, WE 5/5,  5/5                    RIGHT UE - ShAB 4+/5, EF 3/5, EE 3/5, WE 3/5,  3/5                    LEFT    LE - HF 5/5, KE 5/5, DF 5/5, PF 5/5                    RIGHT LE - HF 4/5, KE 4/5, DF 3/5, PF 4/5        Sensory - Slightly diminished sensation to light touch in RUE and RLE     Coordination -- impaired on right     Tone - normal  Psychiatric - Mood stable, Affect WNL    RECENT LABS:                        9.6    4.75  )-----------( 243      ( 27 Jun 2022 06:45 )             29.4     06-27    135  |  98  |  13  ----------------------------<  102<H>  4.3   |  33<H>  |  0.36<L>    Ca    8.8      27 Jun 2022 06:45    TPro  6.0  /  Alb  2.5<L>  /  TBili  1.0  /  DBili  x   /  AST  30  /  ALT  46<H>  /  AlkPhos  84  06-27    LIVER FUNCTIONS - ( 27 Jun 2022 06:45 )  Alb: 2.5 g/dL / Pro: 6.0 g/dL / ALK PHOS: 84 U/L / ALT: 46 U/L / AST: 30 U/L / GGT: x                 Culture - Sputum (collected 06-26-22 @ 09:59)  Source: .Sputum Sputum  Gram Stain (06-26-22 @ 14:54):    Few polymorphonuclear leukocytes per low power field    Rare Squamous epithelial cells per low power field    Moderate Gram Positive Cocci in Clusters per oil power field    Rare Gram Positive Rods per oil power field  Preliminary Report (06-27-22 @ 10:30):    Numerous Staphylococcus aureus        CAPILLARY BLOOD GLUCOSE            MEDICATIONS:  MEDICATIONS  (STANDING):  amoxicillin   80 mG/mL/clavulanate Suspension 800 milliGRAM(s) Enteral Tube every 12 hours  ascorbic acid 500 milliGRAM(s) Oral daily  chlorhexidine 0.12% Liquid 15 milliLiter(s) Swish and Spit two times a day  chlorhexidine 2% Cloths 1 Application(s) Topical <User Schedule>  enoxaparin Injectable 40 milliGRAM(s) SubCutaneous <User Schedule>  levalbuterol Inhalation 0.63 milliGRAM(s) Inhalation every 6 hours  melatonin 6 milliGRAM(s) Oral at bedtime  metoprolol tartrate 12.5 milliGRAM(s) Oral every 12 hours  multivitamin 1 Tablet(s) Oral daily  mupirocin 2% Nasal 1 Application(s) Both Nostrils two times a day  pantoprazole   Suspension 40 milliGRAM(s) Oral before breakfast  petrolatum white Ointment 1 Application(s) Topical daily  polyethylene glycol 3350 17 Gram(s) Oral daily  senna 2 Tablet(s) Oral at bedtime  sodium chloride 0.65% Nasal 1 Spray(s) Both Nostrils every 6 hours  sodium chloride 3%  Inhalation 2 milliLiter(s) Inhalation two times a day    MEDICATIONS  (PRN):  acetaminophen     Tablet .. 650 milliGRAM(s) Oral every 6 hours PRN Temp greater or equal to 38C (100.4F), Mild Pain (1 - 3)  ondansetron    Tablet 4 milliGRAM(s) Oral every 12 hours PRN Nausea  traMADol 25 milliGRAM(s) Oral every 4 hours PRN Moderate Pain (4 - 6)  traMADol 50 milliGRAM(s) Oral every 4 hours PRN Severe Pain (7 - 10)

## 2022-06-27 NOTE — PROGRESS NOTE ADULT - ATTENDING COMMENTS
Pt. seen with resident.  Agree with documentation above as per resident with amendments made as appropriate. Patient medically stable. Making progress towards rehab goals.       Left meningioma s/p resection with sacrifice of vertebral artery  Pt. with increased secretions on weekend,  she states she was awake coughing during the night.   Started on mucomurst,  secretions have improved somewhat.  no fever, chills,    +nausea and fullness when had 8am feed this AM.  on weekend had some issues with this.  Last BM 6/24.   Had 2 BMs this AM after TF-- feels better after.       Abd. xray today-- reviewed with hospitalist-- non-specific bowel gas pattern.  no large amount of stool

## 2022-06-27 NOTE — PROGRESS NOTE ADULT - SUBJECTIVE AND OBJECTIVE BOX
Medicine Progress Note    Patient is a 61y old  Female who presents with a chief complaint of Meningioma (26 Jun 2022 15:13)      SUBJECTIVE / OVERNIGHT EVENTS:  seen and examined  Chart reviewed  No overnight events  Limb weakness improving with therapy  BM+  No pain  reported throat irritation, secretion+, nausea+. no vomiting    ADDITIONAL REVIEW OF SYSTEMS:  no fever/chills/CP/sob/palpitation/dizziness/ abd pain/diarrhea/constipation/headaches. all other ROS neg    MEDICATIONS  (STANDING):  amoxicillin   80 mG/mL/clavulanate Suspension 800 milliGRAM(s) Enteral Tube every 12 hours  ascorbic acid 500 milliGRAM(s) Oral daily  chlorhexidine 0.12% Liquid 15 milliLiter(s) Swish and Spit two times a day  chlorhexidine 2% Cloths 1 Application(s) Topical <User Schedule>  enoxaparin Injectable 40 milliGRAM(s) SubCutaneous <User Schedule>  levalbuterol Inhalation 0.63 milliGRAM(s) Inhalation every 6 hours  melatonin 6 milliGRAM(s) Oral at bedtime  metoprolol tartrate 12.5 milliGRAM(s) Oral every 12 hours  multivitamin 1 Tablet(s) Oral daily  mupirocin 2% Nasal 1 Application(s) Both Nostrils two times a day  pantoprazole   Suspension 40 milliGRAM(s) Oral before breakfast  petrolatum white Ointment 1 Application(s) Topical daily  polyethylene glycol 3350 17 Gram(s) Oral daily  senna 2 Tablet(s) Oral at bedtime  sodium chloride 0.65% Nasal 1 Spray(s) Both Nostrils every 6 hours  sodium chloride 3%  Inhalation 2 milliLiter(s) Inhalation two times a day    MEDICATIONS  (PRN):  acetaminophen     Tablet .. 650 milliGRAM(s) Oral every 6 hours PRN Temp greater or equal to 38C (100.4F), Mild Pain (1 - 3)  ondansetron    Tablet 4 milliGRAM(s) Oral every 12 hours PRN Nausea  traMADol 25 milliGRAM(s) Oral every 4 hours PRN Moderate Pain (4 - 6)  traMADol 50 milliGRAM(s) Oral every 4 hours PRN Severe Pain (7 - 10)    CAPILLARY BLOOD GLUCOSE        I&O's Summary    26 Jun 2022 07:01  -  27 Jun 2022 07:00  --------------------------------------------------------  IN: 1100 mL / OUT: 0 mL / NET: 1100 mL        PHYSICAL EXAM:  Vital Signs Last 24 Hrs  T(C): 36.6 (27 Jun 2022 08:54), Max: 36.7 (26 Jun 2022 20:34)  T(F): 97.8 (27 Jun 2022 08:54), Max: 98.1 (26 Jun 2022 20:34)  HR: 80 (27 Jun 2022 08:54) (80 - 90)  BP: 112/75 (27 Jun 2022 08:54) (100/72 - 141/78)  BP(mean): --  RR: 16 (27 Jun 2022 08:54) (16 - 16)  SpO2: 93% (27 Jun 2022 08:54) (93% - 99%)    GENERAL: Not in distress. Alert    HEENT: clear conjunctiva. MMM. no pallor or icterus, neck supple. secretion+ improving and much less. upper airway gurgling noise while coughing. husky voice  CARDIOVASCULAR: RRR S1, S2. No murmur/rubs/gallop  LUNGS: BLAE+, no rales, no wheezing, no rhonchi. seems transmitted Upper airway noise  ABDOMEN: ND. Soft,  NT, no guarding / rebound / rigidity. BS normoactive  BACK: No spine tenderness.  EXTREMITIES: no edema. no leg or calf TP.  SKIN: warm and dry. PEG+  PSYCHIATRIC: Calm.  No agitation.  CNS: AAO*3. moving limbs      LABS:                        9.6    4.75  )-----------( 243      ( 27 Jun 2022 06:45 )             29.4     06-27    135  |  98  |  13  ----------------------------<  102<H>  4.3   |  33<H>  |  0.36<L>    Ca    8.8      27 Jun 2022 06:45    TPro  6.0  /  Alb  2.5<L>  /  TBili  1.0  /  DBili  x   /  AST  30  /  ALT  46<H>  /  AlkPhos  84  06-27              Culture - Sputum (collected 26 Jun 2022 09:59)  Source: .Sputum Sputum  Gram Stain (26 Jun 2022 14:54):    Few polymorphonuclear leukocytes per low power field    Rare Squamous epithelial cells per low power field    Moderate Gram Positive Cocci in Clusters per oil power field    Rare Gram Positive Rods per oil power field  Preliminary Report (27 Jun 2022 10:30):    Numerous Staphylococcus aureus      COVID-19 PCR: NotDetec (20 Jun 2022 23:35)  COVID-19 PCR: NotDetec (20 Jun 2022 10:32)  COVID-19 PCR: NotDetec (15 Polo 2022 16:24)  COVID-19 PCR: NotDetec (13 Jun 2022 05:08)  COVID-19 PCR: NotDetec (31 May 2022 06:12)  COVID-19 PCR: NotDetec (26 May 2022 17:34)  COVID-19 PCR: NotDetec (19 May 2022 16:02)      RADIOLOGY & ADDITIONAL TESTS:  Imaging from Last 24 Hours:    Electrocardiogram/QTc Interval:    COORDINATION OF CARE:  Care Discussed with Consultants/Other Providers:

## 2022-06-27 NOTE — PROGRESS NOTE ADULT - ASSESSMENT
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  MRI with ethel-medullary. right CPA, and right temporal enhancing lesions concern for meningioma vs schwannoma, with hydro.  Now S/p Left far lateral crani for meningioma resection with subtotal sacrifice of left vertebral artery (6/1).  Noted with vocal cord paralysis, dysphagia - NPO on TF (PEG placed 6/16). Also right Hemiparesis, sensory, gait and ADL impairment.     #Meningioma s/p L lateral crani for meningioma resection with subtotal sacrifice of L vertebral artery on 6/1  - completed Decadron taper as per neurosurgery  -Continue comprehensive rehab program -PT/OT/SLP per rehab team  -Pain management, bowel regimen per rehab   - fall, aspiration precautions    #Dysphagia s/p PEG placement  #nausea  - cont tube feeds. slower rate  - aspiration precautions  - SLP eval as per primary    #Left vocal cord paralysis  # Upper airway secretion  # abnormal  CXR , leucocytosis with increased secretion 6/25: likely aspiration pneumonia   # h/o MSSA PNA and aspiration PNA  - Repeat CBC today: normal  - on Augmentin since 6/25  - Completed 5 days course of zosyn on 6/20  - Xopenex, Mucomyst neb, 3% saline INH, saline nasal spray, suction for secretion control   - MRSA PCR+ 6/26  - mupirocin 2% TP, chlorhexidine  - on contact precautions  - sputum culture: GPC in cluster, ID pending  - incentive spirometry  - Chest PT/PD  - monitor temp/cbc. send pan cx and start abx if febrile  - suction PRN. patient prefers to do suction by herself  - pulmonary f/u  - ENT cx. [ informed ENT MAGGIE Frey over the weekend]    #PAF  -Pt with possible transient A fib with rvr during RRT at [facility]. EKG showed sinus tach and no evidence pafib on tele  -Cont with low dose lopressor  -TTE reviewed: EF 75%. hyperdynamic LV systolic function     #Urinary retention  - s/p pedro 6/16  - Pedro removed 6/21 PM for TOV 6/22  --Pt. voiding well. low PVRs    #HTN  - BP soft  -cont with lopressor   -was on losartan and amlodipine, on hold due to BP on lower side  - check orthostasis periodically. can hold lopressor if significant orthostasis and HR controlled    Transaminitis  -Monitor LFTs, avoid hepatotoxins    Anemia  - h/h stable    Hyponatremia  - sodium normalized  - likely SIADH  - c/w fluid restriction 1200 mls/day.   - Monitor BMP     DVT ppx - lovenox  GI ppx - pepcid     d/w IDR team

## 2022-06-28 LAB
-  AMPICILLIN/SULBACTAM: SIGNIFICANT CHANGE UP
-  CEFAZOLIN: SIGNIFICANT CHANGE UP
-  CLINDAMYCIN: SIGNIFICANT CHANGE UP
-  ERYTHROMYCIN: SIGNIFICANT CHANGE UP
-  GENTAMICIN: SIGNIFICANT CHANGE UP
-  LINEZOLID: SIGNIFICANT CHANGE UP
-  OXACILLIN: SIGNIFICANT CHANGE UP
-  PENICILLIN: SIGNIFICANT CHANGE UP
-  RIFAMPIN: SIGNIFICANT CHANGE UP
-  TETRACYCLINE: SIGNIFICANT CHANGE UP
-  TRIMETHOPRIM/SULFAMETHOXAZOLE: SIGNIFICANT CHANGE UP
-  VANCOMYCIN: SIGNIFICANT CHANGE UP
CULTURE RESULTS: SIGNIFICANT CHANGE UP
METHOD TYPE: SIGNIFICANT CHANGE UP
ORGANISM # SPEC MICROSCOPIC CNT: SIGNIFICANT CHANGE UP
ORGANISM # SPEC MICROSCOPIC CNT: SIGNIFICANT CHANGE UP
SARS-COV-2 RNA SPEC QL NAA+PROBE: SIGNIFICANT CHANGE UP
SPECIMEN SOURCE: SIGNIFICANT CHANGE UP

## 2022-06-28 PROCEDURE — 99232 SBSQ HOSP IP/OBS MODERATE 35: CPT

## 2022-06-28 PROCEDURE — 99233 SBSQ HOSP IP/OBS HIGH 50: CPT

## 2022-06-28 RX ADMIN — Medication 1 SPRAY(S): at 17:02

## 2022-06-28 RX ADMIN — MUPIROCIN 1 APPLICATION(S): 20 OINTMENT TOPICAL at 17:02

## 2022-06-28 RX ADMIN — PANTOPRAZOLE SODIUM 40 MILLIGRAM(S): 20 TABLET, DELAYED RELEASE ORAL at 05:23

## 2022-06-28 RX ADMIN — LEVALBUTEROL 0.63 MILLIGRAM(S): 1.25 SOLUTION, CONCENTRATE RESPIRATORY (INHALATION) at 21:32

## 2022-06-28 RX ADMIN — Medication 1 SPRAY(S): at 05:06

## 2022-06-28 RX ADMIN — Medication 500 MILLIGRAM(S): at 12:18

## 2022-06-28 RX ADMIN — Medication 6 MILLIGRAM(S): at 21:48

## 2022-06-28 RX ADMIN — LEVALBUTEROL 0.63 MILLIGRAM(S): 1.25 SOLUTION, CONCENTRATE RESPIRATORY (INHALATION) at 04:46

## 2022-06-28 RX ADMIN — LEVALBUTEROL 0.63 MILLIGRAM(S): 1.25 SOLUTION, CONCENTRATE RESPIRATORY (INHALATION) at 15:14

## 2022-06-28 RX ADMIN — Medication 1 APPLICATION(S): at 11:57

## 2022-06-28 RX ADMIN — Medication 1 TABLET(S): at 12:18

## 2022-06-28 RX ADMIN — Medication 1 SPRAY(S): at 12:18

## 2022-06-28 RX ADMIN — SENNA PLUS 2 TABLET(S): 8.6 TABLET ORAL at 21:48

## 2022-06-28 RX ADMIN — Medication 800 MILLIGRAM(S): at 05:21

## 2022-06-28 RX ADMIN — SODIUM CHLORIDE 2 MILLILITER(S): 9 INJECTION INTRAMUSCULAR; INTRAVENOUS; SUBCUTANEOUS at 21:33

## 2022-06-28 RX ADMIN — MUPIROCIN 1 APPLICATION(S): 20 OINTMENT TOPICAL at 05:08

## 2022-06-28 RX ADMIN — CHLORHEXIDINE GLUCONATE 15 MILLILITER(S): 213 SOLUTION TOPICAL at 17:01

## 2022-06-28 RX ADMIN — ENOXAPARIN SODIUM 40 MILLIGRAM(S): 100 INJECTION SUBCUTANEOUS at 17:01

## 2022-06-28 RX ADMIN — Medication 12.5 MILLIGRAM(S): at 17:01

## 2022-06-28 RX ADMIN — CHLORHEXIDINE GLUCONATE 15 MILLILITER(S): 213 SOLUTION TOPICAL at 05:03

## 2022-06-28 RX ADMIN — Medication 100 MILLIGRAM(S): at 17:02

## 2022-06-28 RX ADMIN — POLYETHYLENE GLYCOL 3350 17 GRAM(S): 17 POWDER, FOR SOLUTION ORAL at 12:19

## 2022-06-28 RX ADMIN — CHLORHEXIDINE GLUCONATE 1 APPLICATION(S): 213 SOLUTION TOPICAL at 05:06

## 2022-06-28 RX ADMIN — LEVALBUTEROL 0.63 MILLIGRAM(S): 1.25 SOLUTION, CONCENTRATE RESPIRATORY (INHALATION) at 08:22

## 2022-06-28 RX ADMIN — SODIUM CHLORIDE 2 MILLILITER(S): 9 INJECTION INTRAMUSCULAR; INTRAVENOUS; SUBCUTANEOUS at 08:22

## 2022-06-28 NOTE — PROGRESS NOTE ADULT - ASSESSMENT
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  MRI with ethel-medullary. right CPA, and right temporal enhancing lesions concern for meningioma vs schwannoma, with hydro.  Now S/p Left far lateral crani for meningioma resection with subtotal sacrifice of left vertebral artery (6/1).  Noted with vocal cord paralysis, dysphagia - NPO on TF (PEG placed 6/16). Also right Hemiparesis, sensory, gait and ADL impairment.     #Meningioma s/p L lateral crani for meningioma resection with subtotal sacrifice of L vertebral artery on 6/1  - completed Decadron taper as per neurosurgery  -Continue comprehensive rehab program -PT/OT/SLP per rehab team  -Pain management, bowel regimen per rehab   - fall, aspiration precautions    #Dysphagia s/p PEG placement  #nausea  - cont tube feeds. slower rate  - aspiration precautions  - SLP eval as per primary    #Left vocal cord paralysis  # Upper airway secretion  # probable MRSA pneumonia   # h/o MSSA PNA and aspiration PNA  - WBC normalized.   - SPUTUM CX: MRSA  - on Augmentin since 6/25. changed to doxycycline to cover MRSA  - Completed 5 days course of zosyn on 6/20  - Xopenex, Mucomyst neb, 3% saline INH, saline nasal spray, suction for secretion control   - MRSA PCR+ 6/26  - mupirocin 2% TP, chlorhexidine  - off contact precautions as per Infection control  - incentive spirometry  - Chest PT/PD  - monitor temp/cbc. send pan cx and start abx if febrile  - suction PRN. patient prefers to do suction by herself. consider xanax PRN for anxiety. informed about risk/benefit/alter of sucitoning  - pulmonary f/u noted and appreciated  - ENT cx. [ informed ENT MAGGIE Frey over the weekend, will be seen on wednesday]      #PAF  -Pt with possible transient A fib with rvr during RRT at [facility]. EKG showed sinus tach and no evidence pafib on tele  -Cont with low dose lopressor  -TTE reviewed: EF 75%. hyperdynamic LV systolic function     #Urinary retention  - s/p pedro 6/16  - Pedro removed 6/21 PM for TOV 6/22  --Pt. voiding well. low PVRs    #HTN  - BP soft  -cont with lopressor   -was on losartan and amlodipine, on hold due to BP on lower side  - check orthostasis periodically. can hold lopressor if BP low or significant orthostasis and HR controlled    Transaminitis  -Monitor LFTs, avoid hepatotoxins    Anemia  - h/h stable    Hyponatremia  - sodium normalized  - likely SIADH  - c/w fluid restriction 1200 mls/day.   - Monitor BMP     DVT ppx - lovenox  GI ppx - pepcid     d/w Dr. Bautista and Jaqueline JOHNSON in IDR

## 2022-06-28 NOTE — PROGRESS NOTE ADULT - SUBJECTIVE AND OBJECTIVE BOX
Patient is a 61y old  Female who presents with a chief complaint of Meningioma (27 Jun 2022 13:13)      HPI:  61 year old Female with PMHx of HTN and C-spine meningioma removal in 2018 in [place]. Patient presented with dizziness, gait instability, and vomiting over the last 3 days prior to admission. MRI showed ethel-medullary, Right CPA, and Right temporal enhancing lesions.  Possible meningioma vs schwannoma.  Patient was monitored in the ICU for hydro watch. Workup included MRI of the Brain, C/T/L spine and CT of the chest, abdomen and pelvis which was negative for malignancies on 5/20.  Patient was seen by ENT for dysphagia and recommended patient be seen by speech and swallow preop and underwent MBS and was able to continue with a regular diet.  Due to location of the lesion, ENT recommended audiology consult.  She was found to have intact hearing but mildly decreased bilaterally. Underwent Left far lateral crani for meningioma resection with subtotal sacrifice of left vertebral artery on 6/1. Attempted extubation the same night after surgery, however she developed stridor and was reintubated.  Patient was successfully extubated on 6/6 to high flow.  Patient scoped by ENT and found  to have left vocal cord paralysis.  Post extubation patient had a lot of secretions and was given Mucomyst and Duoneb. There was a concern for PE given respiratory status - CTA chest 6/7 negative for PE but with bilateral consolidation. Patient was weaned off high flow (6/9), however frequent suctioning was still required. FED via NGT and per speech and swallow recommendations, she was kept NPO. Patient was also found to have MSSA pneumonia and was treated with Augmentin.  Speech and swallow continued to follow and patient was recommended for a PEG.  PEG was placed on 6/16. Patient also had two episodes of desaturation on 6/16, follow up with another CTA chest; which was also negative for PE, but showed b/l lower lobe consolidations and IV antibiotics (Zosyn) were started.     Patient was evaluated by PM&R and therapy for functional deficits and gait/ADL impairments and recommend acute rehabilitation.  Patient was medically optimized for discharge to [facility] on 6/20/22.    SUBJECTIVE:   Sveta  ID # [id number]  Patient seen and evaluated in  working with OT - usually tachy 109-114 with therapy.  Can be 115-118 at times. No CP or palitation  Tolerating tube feed better - still having sialorrhea using bedside suction  states R extremities without power - increased movement but still feels weak    REVIEW OF SYSTEMS  +cough, +sialorrhea - using bedside yankauer  + abd pain over peg site  Denies diarrhea/constipation  Denies difficulty breathing  +dysarthria, dysphagia      VITALS  61y  Vital Signs Last 24 Hrs  T(C): 36.4 (27 Jun 2022 21:48), Max: 36.4 (27 Jun 2022 21:48)  T(F): 97.5 (27 Jun 2022 21:48), Max: 97.5 (27 Jun 2022 21:48)  HR: 92 (28 Jun 2022 08:25) (80 - 110)  BP: 122/83 (28 Jun 2022 08:25) (102/60 - 122/83)  RR: 16 (28 Jun 2022 08:25) (15 - 16)  SpO2: 98% (28 Jun 2022 10:04) (94% - 100%)      PHYSICAL EXAM:   Gen - NAD, Comfortable, on 2L NC  HEENT - NCAT,  Pulm - CTA bilaterally  Cardiovascular - RRR  Abdomen - Soft, NT/ND, +BS, (+) PEG tube site - clean (abdominal binder to secure PEG)  Extremities - No calf tenderness  Neuro-     Cognitive - Awake and alert     Communication - Fluent, hypophonia     Cranial Nerves - PERRLA, EOMI, +mild diplopia, Slight impaired R shoulder shrug, . Visual fields intact. +dysphagia, +hypophonia,  Orolingual weakness     Motor -                     LEFT    UE - ShAB 5/5, EF 5/5, EE 5/5, WE 5/5,  5/5                    RIGHT UE - ShAB 4+/5, EF 3/5, EE 3/5, WE 3/5,  3/5                    LEFT    LE - HF 5/5, KE 5/5, DF 5/5, PF 5/5                    RIGHT LE - HF 4/5, KE 4/5, DF 3/5, PF 4/5        Sensory - Slightly diminished sensation to light touch in RUE and RLE     Coordination -- impaired on right     Tone - normal  Psychiatric - Mood stable, Affect WNL    RECENT LABS:                         9.6    4.75  )-----------( 243      ( 27 Jun 2022 06:45 )             29.4     06-27    135  |  98  |  13  ----------------------------<  102<H>  4.3   |  33<H>  |  0.36<L>    Ca    8.8      27 Jun 2022 06:45    TPro  6.0  /  Alb  2.5<L>  /  TBili  1.0  /  DBili  x   /  AST  30  /  ALT  46<H>  /  AlkPhos  84  06-27    LIVER FUNCTIONS - ( 27 Jun 2022 06:45 )  Alb: 2.5 g/dL / Pro: 6.0 g/dL / ALK PHOS: 84 U/L / ALT: 46 U/L / AST: 30 U/L / GGT: x             Culture - Sputum (collected 26 Jun 2022 09:59)  Source: .Sputum Sputum  Gram Stain (26 Jun 2022 14:54):    Few polymorphonuclear leukocytes per low power field    Rare Squamous epithelial cells per low power field    Moderate Gram Positive Cocci in Clusters per oil power field    Rare Gram Positive Rods per oil power field  Final Report (28 Jun 2022 10:52):    Numerous Methicillin Resistant Staphylococcus aureus    Normal Respiratory Kim present  Organism: Methicillin resistant Staphylococcus aureus (28 Jun 2022 10:52)  Organism: Methicillin resistant Staphylococcus aureus (28 Jun 2022 10:52)  C/S returned      Xray Abdomen 2 Views (06.27.22 @ 11:52) >  No evidence of bowel obstruction or definite free air.    MEDICATIONS  (STANDING):  amoxicillin   80 mG/mL/clavulanate Suspension 800 milliGRAM(s) Enteral Tube every 12 hours  ascorbic acid 500 milliGRAM(s) Oral daily  chlorhexidine 0.12% Liquid 15 milliLiter(s) Swish and Spit two times a day  chlorhexidine 2% Cloths 1 Application(s) Topical <User Schedule>  enoxaparin Injectable 40 milliGRAM(s) SubCutaneous <User Schedule>  levalbuterol Inhalation 0.63 milliGRAM(s) Inhalation every 6 hours  melatonin 6 milliGRAM(s) Oral at bedtime  metoprolol tartrate 12.5 milliGRAM(s) Oral every 12 hours  multivitamin 1 Tablet(s) Oral daily  mupirocin 2% Nasal 1 Application(s) Both Nostrils two times a day  pantoprazole   Suspension 40 milliGRAM(s) Oral before breakfast  petrolatum white Ointment 1 Application(s) Topical daily  polyethylene glycol 3350 17 Gram(s) Oral daily  senna 2 Tablet(s) Oral at bedtime  sodium chloride 0.65% Nasal 1 Spray(s) Both Nostrils every 6 hours  sodium chloride 3%  Inhalation 2 milliLiter(s) Inhalation two times a day    MEDICATIONS  (PRN):  acetaminophen     Tablet .. 650 milliGRAM(s) Oral every 6 hours PRN Temp greater or equal to 38C (100.4F), Mild Pain (1 - 3)  ondansetron    Tablet 4 milliGRAM(s) Oral every 12 hours PRN Nausea   Patient is a 61y old  Female who presents with a chief complaint of Meningioma (27 Jun 2022 13:13)      HPI:  61 year old Female with PMHx of HTN and C-spine meningioma removal in 2018 in [place]. Patient presented with dizziness, gait instability, and vomiting over the last 3 days prior to admission. MRI showed ethel-medullary, Right CPA, and Right temporal enhancing lesions.  Possible meningioma vs schwannoma.  Patient was monitored in the ICU for hydro watch. Workup included MRI of the Brain, C/T/L spine and CT of the chest, abdomen and pelvis which was negative for malignancies on 5/20.  Patient was seen by ENT for dysphagia and recommended patient be seen by speech and swallow preop and underwent MBS and was able to continue with a regular diet.  Due to location of the lesion, ENT recommended audiology consult.  She was found to have intact hearing but mildly decreased bilaterally. Underwent Left far lateral crani for meningioma resection with subtotal sacrifice of left vertebral artery on 6/1. Attempted extubation the same night after surgery, however she developed stridor and was reintubated.  Patient was successfully extubated on 6/6 to high flow.  Patient scoped by ENT and found  to have left vocal cord paralysis.  Post extubation patient had a lot of secretions and was given Mucomyst and Duoneb. There was a concern for PE given respiratory status - CTA chest 6/7 negative for PE but with bilateral consolidation. Patient was weaned off high flow (6/9), however frequent suctioning was still required. FED via NGT and per speech and swallow recommendations, she was kept NPO. Patient was also found to have MSSA pneumonia and was treated with Augmentin.  Speech and swallow continued to follow and patient was recommended for a PEG.  PEG was placed on 6/16. Patient also had two episodes of desaturation on 6/16, follow up with another CTA chest; which was also negative for PE, but showed b/l lower lobe consolidations and IV antibiotics (Zosyn) were started.     Patient was evaluated by PM&R and therapy for functional deficits and gait/ADL impairments and recommend acute rehabilitation.  Patient was medically optimized for discharge to [facility] on 6/20/22.    SUBJECTIVE:   Sveta  ID # [id number]  Patient seen and evaluated in  working with OT - usually tachy 109-114 with therapy.  Can be 115-118 at times. No CP or palpitation  Tolerating tube feed better - still having sialorrhea using bedside suction  states R extremities without power - increased movement but still feels weak,  Pt. has improved in OT from last week    REVIEW OF SYSTEMS  +cough, +sialorrhea - using bedside yankauer  + abd pain over peg site  Denies diarrhea/constipation  Denies difficulty breathing  +dysarthria, dysphagia      VITALS  61y  Vital Signs Last 24 Hrs  T(C): 36.4 (27 Jun 2022 21:48), Max: 36.4 (27 Jun 2022 21:48)  T(F): 97.5 (27 Jun 2022 21:48), Max: 97.5 (27 Jun 2022 21:48)  HR: 92 (28 Jun 2022 08:25) (80 - 110)  BP: 122/83 (28 Jun 2022 08:25) (102/60 - 122/83)  RR: 16 (28 Jun 2022 08:25) (15 - 16)  SpO2: 98% (28 Jun 2022 10:04) (94% - 100%)      PHYSICAL EXAM:   Gen - NAD, Comfortable, on 2L NC  HEENT - NCAT,  Pulm - CTA bilaterally  Cardiovascular - RRR  Abdomen - Soft, NT/ND, +BS, (+) PEG tube site - clean (abdominal binder to secure PEG)  Extremities - No calf tenderness  Neuro-     Cognitive - Awake and alert     Communication - Fluent, hypophonia     Cranial Nerves - PERRLA, EOMI, +mild diplopia, Slight impaired R shoulder shrug, . Visual fields intact. +dysphagia, +hypophonia,  Orolingual weakness     Motor -                     LEFT    UE - ShAB 5/5, EF 5/5, EE 5/5, WE 5/5,  5/5                    RIGHT UE - ShAB 4+/5, EF 3/5, EE 3/5, WE 3/5,  3/5                    LEFT    LE - HF 5/5, KE 5/5, DF 5/5, PF 5/5                    RIGHT LE - HF 4/5, KE 4/5, DF 3/5, PF 4/5        Sensory - Slightly diminished sensation to light touch in RUE and RLE     Coordination -- impaired on right     Tone - normal  Psychiatric - Mood stable, Affect WNL    RECENT LABS:                         9.6    4.75  )-----------( 243      ( 27 Jun 2022 06:45 )             29.4     06-27    135  |  98  |  13  ----------------------------<  102<H>  4.3   |  33<H>  |  0.36<L>    Ca    8.8      27 Jun 2022 06:45    TPro  6.0  /  Alb  2.5<L>  /  TBili  1.0  /  DBili  x   /  AST  30  /  ALT  46<H>  /  AlkPhos  84  06-27    LIVER FUNCTIONS - ( 27 Jun 2022 06:45 )  Alb: 2.5 g/dL / Pro: 6.0 g/dL / ALK PHOS: 84 U/L / ALT: 46 U/L / AST: 30 U/L / GGT: x             Culture - Sputum (collected 26 Jun 2022 09:59)  Source: .Sputum Sputum  Gram Stain (26 Jun 2022 14:54):    Few polymorphonuclear leukocytes per low power field    Rare Squamous epithelial cells per low power field    Moderate Gram Positive Cocci in Clusters per oil power field    Rare Gram Positive Rods per oil power field  Final Report (28 Jun 2022 10:52):    Numerous Methicillin Resistant Staphylococcus aureus    Normal Respiratory Kim present  Organism: Methicillin resistant Staphylococcus aureus (28 Jun 2022 10:52)  Organism: Methicillin resistant Staphylococcus aureus (28 Jun 2022 10:52)  C/S returned      Xray Abdomen 2 Views (06.27.22 @ 11:52) >  No evidence of bowel obstruction or definite free air.    MEDICATIONS  (STANDING):  amoxicillin   80 mG/mL/clavulanate Suspension 800 milliGRAM(s) Enteral Tube every 12 hours  ascorbic acid 500 milliGRAM(s) Oral daily  chlorhexidine 0.12% Liquid 15 milliLiter(s) Swish and Spit two times a day  chlorhexidine 2% Cloths 1 Application(s) Topical <User Schedule>  enoxaparin Injectable 40 milliGRAM(s) SubCutaneous <User Schedule>  levalbuterol Inhalation 0.63 milliGRAM(s) Inhalation every 6 hours  melatonin 6 milliGRAM(s) Oral at bedtime  metoprolol tartrate 12.5 milliGRAM(s) Oral every 12 hours  multivitamin 1 Tablet(s) Oral daily  mupirocin 2% Nasal 1 Application(s) Both Nostrils two times a day  pantoprazole   Suspension 40 milliGRAM(s) Oral before breakfast  petrolatum white Ointment 1 Application(s) Topical daily  polyethylene glycol 3350 17 Gram(s) Oral daily  senna 2 Tablet(s) Oral at bedtime  sodium chloride 0.65% Nasal 1 Spray(s) Both Nostrils every 6 hours  sodium chloride 3%  Inhalation 2 milliLiter(s) Inhalation two times a day    MEDICATIONS  (PRN):  acetaminophen     Tablet .. 650 milliGRAM(s) Oral every 6 hours PRN Temp greater or equal to 38C (100.4F), Mild Pain (1 - 3)  ondansetron    Tablet 4 milliGRAM(s) Oral every 12 hours PRN Nausea

## 2022-06-28 NOTE — PROGRESS NOTE ADULT - ASSESSMENT
Physical Examination:  GENERAL:               Alert, Oriented, No acute distress.    HEENT:                   No JVD, Moist MM no obvious secretions noted   PULM:                     Bilateral air entry, Clear to auscultation bilaterally, No Rales, No Rhonchi, No Wheezing  CVS:                         S1, S2,  No Murmur  ABD:                        Soft, nondistended, nontender, normoactive bowel sounds,   NEURO:                  Alert, oriented, interactive,  follows commands  PSYC:                      Calm, + Insight.      Assessment  1. Upper Airway Secretions in a patient with Dysphagia and Vocal Cord Paralysis   2. Abnormal CXR possible PNA  3. Meningioma s/p craniotomy with Left foramen magnum meningioma with brainstem compression and edema. on 6/1/22  4. Neurofibromatosis type 2  5. H/o HTN      Plan  NPO, Tube feeding  Keep HOB elevated    Sputum growing MRSA  Consider changing antibiotics to include MRSA coverage    Nasal saline spray  allow pt to self suction.  ENT Eval to be considered to look at vocal cords as as well as upper airway edema if present  Hypertonic saline neb Q 12 hrs.

## 2022-06-28 NOTE — PROGRESS NOTE ADULT - NS ATTEND AMEND GEN_ALL_CORE FT
Pt. seen with NP. Agree with documentation above as per NP with amendments made as appropriate. Patient medically stable. Making progress towards rehab goals.     left Meningioma s/p resection with sacrifice of vertebral artery.   Hospitalist changed augmentin to doxycycline to cover MRSA in sputum culture      Tachycardia  -increase H2O flushes via PEG-- 200cc QID-  - monitor

## 2022-06-28 NOTE — PROGRESS NOTE ADULT - SUBJECTIVE AND OBJECTIVE BOX
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    Appears comfortable, endorsing soreness of throat    Allergies :albuterol (Other)  No Known Allergies      PAST MEDICAL & SURGICAL HISTORY:  HTN (hypertension)    HLD (hyperlipidemia)    H/O cervical spine surgery        Medications:  MEDICATIONS  (STANDING):  amoxicillin   80 mG/mL/clavulanate Suspension 800 milliGRAM(s) Enteral Tube every 12 hours  ascorbic acid 500 milliGRAM(s) Oral daily  chlorhexidine 0.12% Liquid 15 milliLiter(s) Swish and Spit two times a day  chlorhexidine 2% Cloths 1 Application(s) Topical <User Schedule>  enoxaparin Injectable 40 milliGRAM(s) SubCutaneous <User Schedule>  levalbuterol Inhalation 0.63 milliGRAM(s) Inhalation every 6 hours  melatonin 6 milliGRAM(s) Oral at bedtime  metoprolol tartrate 12.5 milliGRAM(s) Oral every 12 hours  multivitamin 1 Tablet(s) Oral daily  mupirocin 2% Nasal 1 Application(s) Both Nostrils two times a day  pantoprazole   Suspension 40 milliGRAM(s) Oral before breakfast  petrolatum white Ointment 1 Application(s) Topical daily  polyethylene glycol 3350 17 Gram(s) Oral daily  senna 2 Tablet(s) Oral at bedtime  sodium chloride 0.65% Nasal 1 Spray(s) Both Nostrils every 6 hours  sodium chloride 3%  Inhalation 2 milliLiter(s) Inhalation two times a day    MEDICATIONS  (PRN):  acetaminophen     Tablet .. 650 milliGRAM(s) Oral every 6 hours PRN Temp greater or equal to 38C (100.4F), Mild Pain (1 - 3)  ondansetron    Tablet 4 milliGRAM(s) Oral every 12 hours PRN Nausea      Antibiotics History  amoxicillin   80 mG/mL/clavulanate Suspension 800 milliGRAM(s) Enteral Tube every 12 hours, 06-25-22 @ 12:12, Stop order after: 10 Days  piperacillin/tazobactam IVPB.. 3.375 Gram(s) IV Intermittent every 8 hours, 06-20-22 @ 20:00, Stop order after: 4 Days      Heme Medications   enoxaparin Injectable 40 milliGRAM(s) SubCutaneous <User Schedule>, 06-21-22 @ 00:00      GI Medications  pantoprazole   Suspension 40 milliGRAM(s) Oral before breakfast, 06-24-22 @ 13:37, Routine  polyethylene glycol 3350 17 Gram(s) Oral daily, 06-20-22 @ 20:00,   senna 2 Tablet(s) Oral at bedtime, 06-20-22 @ 19:59, Routine        LABS:                        9.6    4.75  )-----------( 243      ( 27 Jun 2022 06:45 )             29.4     06-27    135  |  98  |  13  ----------------------------<  102<H>  4.3   |  33<H>  |  0.36<L>    Ca    8.8      27 Jun 2022 06:45    TPro  6.0  /  Alb  2.5<L>  /  TBili  1.0  /  DBili  x   /  AST  30  /  ALT  46<H>  /  AlkPhos  84  06-27    Procalcitonin, Serum: 0.14 ng/mL (06-25-22 @ 12:05)    CULTURES: (if applicable)    Culture - Sputum (collected 06-26-22 @ 09:59)  Source: .Sputum Sputum  Gram Stain (06-26-22 @ 14:54):    Few polymorphonuclear leukocytes per low power field    Rare Squamous epithelial cells per low power field    Moderate Gram Positive Cocci in Clusters per oil power field    Rare Gram Positive Rods per oil power field  Final Report (06-28-22 @ 10:52):    Numerous Methicillin Resistant Staphylococcus aureus    Normal Respiratory Kim present  Organism: Methicillin resistant Staphylococcus aureus (06-28-22 @ 10:52)  Organism: Methicillin resistant Staphylococcus aureus (06-28-22 @ 10:52)      -  Ampicillin/Sulbactam: R <=8/4      -  Cefazolin: R <=4      -  Clindamycin: S <=0.25      -  Erythromycin: S <=0.25      -  Gentamicin: S <=1 Should not be used as monotherapy      -  Linezolid: S 4      -  Oxacillin: R >2      -  Penicillin: R >8      -  Rifampin: S <=1 Should not be used as monotherapy      -  Tetra/Doxy: S <=1      -  Trimethoprim/Sulfamethoxazole: S <=0.5/9.5      -  Vancomycin: S 1      Method Type: LAURA    Culture - Blood (collected 06-16-22 @ 11:15)  Source: .Blood Blood  Final Report (06-21-22 @ 17:01):    No Growth Final    Culture - Blood (collected 06-16-22 @ 11:10)  Source: .Blood Blood  Final Report (06-21-22 @ 17:01):    No Growth Final    VITALS:  T(C): 36.4 (06-27-22 @ 21:48), Max: 36.4 (06-27-22 @ 21:48)  T(F): 97.5 (06-27-22 @ 21:48), Max: 97.5 (06-27-22 @ 21:48)  HR: 92 (06-28-22 @ 08:25) (80 - 110)  BP: 122/83 (06-28-22 @ 08:25) (102/60 - 122/83)  BP(mean): --  ABP: --  ABP(mean): --  RR: 16 (06-28-22 @ 08:25) (15 - 16)  SpO2: 98% (06-28-22 @ 10:04) (94% - 100%)  CVP(mm Hg): --  CVP(cm H2O): --    Ins and Outs     06-27-22 @ 07:01  -  06-28-22 @ 07:00  --------------------------------------------------------  IN: 1200 mL / OUT: 0 mL / NET: 1200 mL    I&O's Detail    27 Jun 2022 07:01  -  28 Jun 2022 07:00  --------------------------------------------------------  IN:    Free Water: 400 mL    TwoCal HN: 800 mL  Total IN: 1200 mL    OUT:  Total OUT: 0 mL    Total NET: 1200 mL

## 2022-06-28 NOTE — PROGRESS NOTE ADULT - ASSESSMENT
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  MRI with ethel-medullary. right CPA, and right temporal enhancing lesions concern for meningioma vs schwannoma, with hydro.  Now S/p Left far lateral crani for meningioma resection with subtotal sacrifice of left vertebral artery (6/1).  Noted with vocal cord paralysis, dysphagia - NPO on TF (PEG placed 6/16). Also right Hemiparesis, sensory, gait and ADL impairment.     COMORBIDITES/ACTIVE MEDICAL ISSUES     #Brain tumor - Meningioma   - S/p Left far lateral crani for meningioma resection with subtotal sacrifice of left vertebral artery (6/1)  - Dexamethasone taper -completed (6/25)  - Gait Instability, ADL impairments and Functional impairments: cont Comprehensive Rehab Program of PT/OT & speech    #Transient A fib with RVR  - s/p amiodarone IVP x 1  - Sinus tach - EKG without evidence of a fib  - Metoprolol 12.5 BID    # Cranial stenosis  - CTA head showed defect in the right M1 suspicious for possible right M1 occlusion though the vessel   - Recommended to "continue ASA 81mg qd and atorvastatin 40mg qhs" on discharge paperwork, however patient not on it in prior hospital  - Lipid panel 5/21 unremarkable  -restarted atorvastatin  --Contacted Dr. Liu to clarify if pt. ok to restart ASA-- stated would hold for now if it was for carotid stenosis .    #HTN  - - cont. metoprolol 12.5 BID     #Aspiration PNA  - CTA: negative for PE  - completed augmentin course 6/5  - Zosyn IV x7 days--discontinued by hospitalist 6/22  --CXR w/ L lower lobe infiltrate/atelectasis , no consolidation noted   - restarted on augmentin  -MRSA nares + > started on bactroban   -sputum culture moderate gram positive cocci in clusters  - management per hospitalist  - Encourage incentive spirometry     # Urinary retention  - s/p pedro 6/16  - Pedro removed 6/21 PM for TOV 6/22  --Pt. voiding with low - dc  --toileting program q.3h while awake    # Lip sore  - ? herpes labialis. No prior hx of lip sores  - s/p valtrex 2g BID x 2 doses on 6/15.    #Pain control  - Tylenol PRN, Tramadol    #GI/Bowel Mgmt   - At risk for constipation due to neurologic diagnosis, immobility and/or medication use  - Senna,  Miralax ,  last BM 6/28  - Abd xray 6/27 - unremarkable    #DVT  - Lovenox    Dysphagia  Vocal cord paralysis  - Diet - NPO with TF - increase water flush from 150 to 200cc  - PEG placed (6/16)  - SLP - evaluation and treatment  --ENT consult  - cont. vital stimulation in speech therapy  - Self suctioning    Precautions / PROPHYLAXIS:   - Falls  - Lungs: Aspiration,   - Pressure injury/Skin: Turn Q2hrs while in bed, OOB to Chair, PT/OT      IDT 6/23:  SW: lives in  with son, and DIL; 1st floor s/u  OT: Tot A e/bathing;  Mod A grooming/LBD /toilet transf; Min A UBD; TBA-- shower transfer  PT: Mod A transfer, Amb. 30ft RW, 4 steps 2 HR.    Speech: NPO/PEG; Hypophonia, secretions improved, Mod A cognition,    ELOS: 7/11/22    Follow ups:  Giovanni Liu)  Neurosurgery  General  [address] [facility], [address]  [place] [address]  Phone: [phone]  Fax: [phone]   62 yo Mandarin speaking F with PMH of HTN, C Spine meningioma (removed 2018 in [place]) who presented with dizziness, ataxia, and emesis.  MRI with ethel-medullary. right CPA, and right temporal enhancing lesions concern for meningioma vs schwannoma, with hydro.  Now S/p Left far lateral crani for meningioma resection with subtotal sacrifice of left vertebral artery (6/1).  Noted with vocal cord paralysis, dysphagia - NPO on TF (PEG placed 6/16). Also right Hemiparesis, sensory, gait and ADL impairment.     COMORBIDITES/ACTIVE MEDICAL ISSUES     #Brain tumor - Meningioma   - S/p Left far lateral crani for meningioma resection with subtotal sacrifice of left vertebral artery (6/1)  - Dexamethasone taper -completed (6/25)  - Gait Instability, ADL impairments and Functional impairments: cont Comprehensive Rehab Program of PT/OT & speech    #Transient A fib with RVR  - s/p amiodarone IVP x 1  - Sinus tach - EKG without evidence of a fib  - Metoprolol 12.5 BID    # Cranial stenosis  - CTA head showed defect in the right M1 suspicious for possible right M1 occlusion though the vessel   - Recommended to "continue ASA 81mg qd and atorvastatin 40mg qhs" on discharge paperwork, however patient not on it in prior hospital  - Lipid panel 5/21 unremarkable  -restarted atorvastatin  --Contacted Dr. Liu to clarify if pt. ok to restart ASA-- stated would hold for now if it was for carotid stenosis .    #HTN  - - cont. metoprolol 12.5 BID     #URI  ---CXR w/ L lower lobe infiltrate/atelectasis , no consolidation noted   - Hospitalist changed augmentin to doxycycline to cover MRSA in sputum culture  -MRSA nares + > started on bactroban   - management per hospitalist  - Encourage incentive spirometry     Tachycardia  -increase H2O flushes via PEG-- 200cc QID-  - monitor    # Urinary retention  - --Pt. voiding with low PVRs  --toileting program q.3h while awake    # Lip sore  - ? herpes labialis. No prior hx of lip sores  - s/p valtrex 2g BID x 2 doses on 6/15.    #Pain control  - Tylenol PRN, Tramadol    #GI/Bowel Mgmt   - At risk for constipation due to neurologic diagnosis, immobility and/or medication use  - Senna,  Miralax ,  last BM 6/28  - Abd xray 6/27 - unremarkable    #DVT  - Lovenox    Dysphagia  Vocal cord paralysis  - Diet - NPO with TF - increase water flush from 150 to 200cc  - PEG placed (6/16)  - SLP - evaluation and treatment  --ENT consult  - cont. vital stimulation in speech therapy  - Self suctioning    Precautions / PROPHYLAXIS:   - Falls  - Lungs: Aspiration,   - Pressure injury/Skin: Turn Q2hrs while in bed, OOB to Chair, PT/OT      IDT 6/23:  SW: lives in  with son, and DIL; 1st floor s/u  OT: Tot A e/bathing;  Mod A grooming/LBD /toilet transf; Min A UBD; TBA-- shower transfer  PT: Mod A transfer, Amb. 30ft RW, 4 steps 2 HR.    Speech: NPO/PEG; Hypophonia, secretions improved, Mod A cognition,    ELOS: 7/11/22    Follow ups:  Giovanni Liu)  Neurosurgery  General  [address] [place] [address], [address]  [place] [address]  Phone: [phone]  Fax: [phone]

## 2022-06-28 NOTE — PROGRESS NOTE ADULT - SUBJECTIVE AND OBJECTIVE BOX
Medicine Progress Note    Patient is a 61y old  Female who presents with a chief complaint of Meningioma (28 Jun 2022 11:59)      SUBJECTIVE / OVERNIGHT EVENTS:  seen and examined  Chart reviewed  No overnight events  Limb weakness improving with therapy  BM+  No pain  +throat irritation  RN reported anxiety about secretion and patient keep suctioning though no secretion comes out    ADDITIONAL REVIEW OF SYSTEMS:  no fever/chills/CP/sob/palpitation/dizziness/ abd pain/nausea/vomiting/diarrhea/constipation/headaches. all other ROS neg    MEDICATIONS  (STANDING):  ascorbic acid 500 milliGRAM(s) Oral daily  chlorhexidine 0.12% Liquid 15 milliLiter(s) Swish and Spit two times a day  chlorhexidine 2% Cloths 1 Application(s) Topical <User Schedule>  doxycycline monohydrate Capsule 100 milliGRAM(s) Oral every 12 hours  enoxaparin Injectable 40 milliGRAM(s) SubCutaneous <User Schedule>  levalbuterol Inhalation 0.63 milliGRAM(s) Inhalation every 6 hours  melatonin 6 milliGRAM(s) Oral at bedtime  metoprolol tartrate 12.5 milliGRAM(s) Oral every 12 hours  multivitamin 1 Tablet(s) Oral daily  mupirocin 2% Nasal 1 Application(s) Both Nostrils two times a day  pantoprazole   Suspension 40 milliGRAM(s) Oral before breakfast  petrolatum white Ointment 1 Application(s) Topical daily  polyethylene glycol 3350 17 Gram(s) Oral daily  senna 2 Tablet(s) Oral at bedtime  sodium chloride 0.65% Nasal 1 Spray(s) Both Nostrils every 6 hours  sodium chloride 3%  Inhalation 2 milliLiter(s) Inhalation two times a day    MEDICATIONS  (PRN):  acetaminophen     Tablet .. 650 milliGRAM(s) Oral every 6 hours PRN Temp greater or equal to 38C (100.4F), Mild Pain (1 - 3)  ondansetron    Tablet 4 milliGRAM(s) Oral every 12 hours PRN Nausea    CAPILLARY BLOOD GLUCOSE        I&O's Summary    27 Jun 2022 07:01  -  28 Jun 2022 07:00  --------------------------------------------------------  IN: 1200 mL / OUT: 0 mL / NET: 1200 mL    28 Jun 2022 07:01  -  28 Jun 2022 13:38  --------------------------------------------------------  IN: 575 mL / OUT: 0 mL / NET: 575 mL        PHYSICAL EXAM:  Vital Signs Last 24 Hrs  T(C): 36.4 (27 Jun 2022 21:48), Max: 36.4 (27 Jun 2022 21:48)  T(F): 97.5 (27 Jun 2022 21:48), Max: 97.5 (27 Jun 2022 21:48)  HR: 92 (28 Jun 2022 08:25) (80 - 110)  BP: 122/83 (28 Jun 2022 08:25) (102/60 - 122/83)  BP(mean): --  RR: 16 (28 Jun 2022 08:25) (15 - 16)  SpO2: 98% (28 Jun 2022 10:04) (94% - 100%)    GENERAL: Not in distress. Alert    HEENT: clear conjuctiva, MMM. no pallor or icterus  CARDIOVASCULAR: RRR S1, S2. No murmur/rubs/gallop  LUNGS: BLAE+, no rales, no wheezing, no rhonchi.    ABDOMEN: ND. Soft,  NT, no guarding / rebound / rigidity. BS normoactive  BACK: No spine tenderness.  EXTREMITIES: no edema. no leg or calf TP.  SKIN: warm and dry  PSYCHIATRIC: Calm.  No agitation.    LABS:                        9.6    4.75  )-----------( 243      ( 27 Jun 2022 06:45 )             29.4     06-27    135  |  98  |  13  ----------------------------<  102<H>  4.3   |  33<H>  |  0.36<L>    Ca    8.8      27 Jun 2022 06:45    TPro  6.0  /  Alb  2.5<L>  /  TBili  1.0  /  DBili  x   /  AST  30  /  ALT  46<H>  /  AlkPhos  84  06-27              Culture - Sputum (collected 26 Jun 2022 09:59)  Source: .Sputum Sputum  Gram Stain (26 Jun 2022 14:54):    Few polymorphonuclear leukocytes per low power field    Rare Squamous epithelial cells per low power field    Moderate Gram Positive Cocci in Clusters per oil power field    Rare Gram Positive Rods per oil power field  Final Report (28 Jun 2022 10:52):    Numerous Methicillin Resistant Staphylococcus aureus    Normal Respiratory Kim present  Organism: Methicillin resistant Staphylococcus aureus (28 Jun 2022 10:52)  Organism: Methicillin resistant Staphylococcus aureus (28 Jun 2022 10:52)      COVID-19 PCR: NotDetec (27 Jun 2022 06:49)  COVID-19 PCR: NotDetec (20 Jun 2022 23:35)  COVID-19 PCR: NotDetec (20 Jun 2022 10:32)  COVID-19 PCR: NotDetec (15 Polo 2022 16:24)  COVID-19 PCR: NotDetec (13 Jun 2022 05:08)  COVID-19 PCR: NotDetec (31 May 2022 06:12)  COVID-19 PCR: NotDetec (26 May 2022 17:34)  COVID-19 PCR: NotDetec (19 May 2022 16:02)      RADIOLOGY & ADDITIONAL TESTS:  Imaging from Last 24 Hours:    Electrocardiogram/QTc Interval:    COORDINATION OF CARE:  Care Discussed with Consultants/Other Providers:

## 2022-06-29 PROCEDURE — 99232 SBSQ HOSP IP/OBS MODERATE 35: CPT

## 2022-06-29 RX ADMIN — Medication 100 MILLIGRAM(S): at 20:28

## 2022-06-29 RX ADMIN — MUPIROCIN 1 APPLICATION(S): 20 OINTMENT TOPICAL at 05:10

## 2022-06-29 RX ADMIN — Medication 1 TABLET(S): at 18:11

## 2022-06-29 RX ADMIN — Medication 100 MILLIGRAM(S): at 05:16

## 2022-06-29 RX ADMIN — SENNA PLUS 2 TABLET(S): 8.6 TABLET ORAL at 21:33

## 2022-06-29 RX ADMIN — PANTOPRAZOLE SODIUM 40 MILLIGRAM(S): 20 TABLET, DELAYED RELEASE ORAL at 05:09

## 2022-06-29 RX ADMIN — CHLORHEXIDINE GLUCONATE 15 MILLILITER(S): 213 SOLUTION TOPICAL at 05:08

## 2022-06-29 RX ADMIN — CHLORHEXIDINE GLUCONATE 15 MILLILITER(S): 213 SOLUTION TOPICAL at 18:09

## 2022-06-29 RX ADMIN — Medication 6 MILLIGRAM(S): at 21:33

## 2022-06-29 RX ADMIN — Medication 12.5 MILLIGRAM(S): at 05:09

## 2022-06-29 RX ADMIN — SODIUM CHLORIDE 2 MILLILITER(S): 9 INJECTION INTRAMUSCULAR; INTRAVENOUS; SUBCUTANEOUS at 08:32

## 2022-06-29 RX ADMIN — CHLORHEXIDINE GLUCONATE 1 APPLICATION(S): 213 SOLUTION TOPICAL at 05:09

## 2022-06-29 RX ADMIN — SODIUM CHLORIDE 2 MILLILITER(S): 9 INJECTION INTRAMUSCULAR; INTRAVENOUS; SUBCUTANEOUS at 22:24

## 2022-06-29 RX ADMIN — LEVALBUTEROL 0.63 MILLIGRAM(S): 1.25 SOLUTION, CONCENTRATE RESPIRATORY (INHALATION) at 08:31

## 2022-06-29 RX ADMIN — LEVALBUTEROL 0.63 MILLIGRAM(S): 1.25 SOLUTION, CONCENTRATE RESPIRATORY (INHALATION) at 22:24

## 2022-06-29 RX ADMIN — LEVALBUTEROL 0.63 MILLIGRAM(S): 1.25 SOLUTION, CONCENTRATE RESPIRATORY (INHALATION) at 15:35

## 2022-06-29 RX ADMIN — Medication 500 MILLIGRAM(S): at 18:12

## 2022-06-29 RX ADMIN — ENOXAPARIN SODIUM 40 MILLIGRAM(S): 100 INJECTION SUBCUTANEOUS at 18:11

## 2022-06-29 NOTE — CHART NOTE - NSCHARTNOTEFT_GEN_A_CORE
Nutrition Follow Up Note  Source: Medical Record [X] Patient [X] Family [ ] Nursing [X]    Diet: NPO with tube feeding    Enteral/Parenteral Nutrition: TwoCal  ml QID (provides 1600 kcal, 66 g protein, 560 ml water) + NoCarb ProSource 1x/day (provides additional 60 kcal, 15 g protein)  Tube feeding volume was decreased from 240 ml QID to 200 ml QID on 6/24 due to pt's complains of fullness/nausea. Pt tolerating tube feeding without issue per nursing. Pt had a cough, reports that it is improving. Denies nausea, vomiting, diarrhea, constipation. Free water flush increased to 200 ml QID as hyponatremia has improved.    Current Weight: 106.2 lbs (6/28)  106.2 lbs (6/27)  106.2 lbs (6/24)  106.2 lbs (6/23)  106 lbs (6/20)      Pertinent Medications: MEDICATIONS  (STANDING):  ascorbic acid 500 milliGRAM(s) Oral daily  chlorhexidine 0.12% Liquid 15 milliLiter(s) Swish and Spit two times a day  chlorhexidine 2% Cloths 1 Application(s) Topical <User Schedule>  doxycycline monohydrate Capsule 100 milliGRAM(s) Oral every 12 hours  enoxaparin Injectable 40 milliGRAM(s) SubCutaneous <User Schedule>  levalbuterol Inhalation 0.63 milliGRAM(s) Inhalation every 6 hours  melatonin 6 milliGRAM(s) Oral at bedtime  metoprolol tartrate 12.5 milliGRAM(s) Oral every 12 hours  multivitamin 1 Tablet(s) Oral daily  mupirocin 2% Nasal 1 Application(s) Both Nostrils two times a day  pantoprazole   Suspension 40 milliGRAM(s) Oral before breakfast  petrolatum white Ointment 1 Application(s) Topical daily  polyethylene glycol 3350 17 Gram(s) Oral daily  senna 2 Tablet(s) Oral at bedtime  sodium chloride 0.65% Nasal 1 Spray(s) Both Nostrils every 6 hours  sodium chloride 3%  Inhalation 2 milliLiter(s) Inhalation two times a day    MEDICATIONS  (PRN):  acetaminophen     Tablet .. 650 milliGRAM(s) Oral every 6 hours PRN Temp greater or equal to 38C (100.4F), Mild Pain (1 - 3)  ondansetron    Tablet 4 milliGRAM(s) Oral every 12 hours PRN Nausea      Pertinent Labs:  06-27 Na135 mmol/L Glu 102 mg/dL<H> K+ 4.3 mmol/L Cr  0.36 mg/dL<L> BUN 13 mg/dL 06-27 Alb 2.5 g/dL<L>        Skin: surgical incision per nursing flow sheets     Edema: No edema per nursing flow sheets     Last BM: on 6/27 Per nursing flowsheets     Estimated Needs:   [X] No Change since Previous Assessment  [ ] Recalculated:     Previous Nutrition Diagnosis:   Severe malnutrition, acute    Nutrition Diagnosis is [X] Ongoing  - addressed with TF, No Carb ProSource TF        New Nutrition Diagnosis: [X] Not Applicable  [ ] Inadequate Protein Energy Intake   [ ] Inadequate Oral Intake   [ ] Excessive Energy Intake   [ ] Increased Nutrient Needs   [ ] Obesity   [ ] Altered GI Function   [ ] Unintended Weight Loss   [ ] Food & Nutrition Related Knowledge Deficit  [ ] Limited Adherence to nutrition related recommendations   [ ] Malnutrition      Interventions:   1. Recommend continuing with current TF order  2. Monitor tolerance of tube feeding  3. Follow SLP recommendations    Monitoring & Evaluation:   [X] Weights   [X] Follow Up (Per Protocol)  [X] Tolerance to Diet Prescription   [X] Other: Labs     RD Remains Available.  Sara Potts RD

## 2022-06-29 NOTE — PROGRESS NOTE ADULT - NS ATTEND AMEND GEN_ALL_CORE FT
Pt. seen with NP.  Agree with documentation above as per NP with amendments made as appropriate. Patient medically stable. Making progress towards rehab goals.     meningioma s/p resection with sacrifice of left vertebral artery.    secretions improved.  No pain.  tolerating therapy .  Tolerating TFs.   Spoke with Dr. Owen, Pulmonology  ENT consult pending-- NP has been away-- will return tomorrow

## 2022-06-29 NOTE — PROGRESS NOTE ADULT - ASSESSMENT
Physical Examination:  GENERAL:               Alert, Oriented, No acute distress.    HEENT:                   No JVD, Moist MM oral airway secretions.   PULM:                     Bilateral air entry, Clear to auscultation bilaterally, no significant sputum production, No Rales, No Rhonchi, No Wheezing  CVS:                         S1, S2,  No Murmur  ABD:                        Soft, nondistended, nontender, normoactive bowel sounds,   NEURO:                  Alert, oriented, interactive,  follows commands  PSYC:                      Calm, + Insight.      Assessment  1. Upper Airway Secretions in a patient with Dysphagia and Vocal Cord Paralysis   2. Abnormal CXR possible PNA  3. Meningioma s/p craniotomy with Left foramen magnum meningioma with brainstem compression and edema. on 6/1/22  4. Neurofibromatosis type 2  5. H/o HTN      Plan  NPO, Tube feeding  Keep HOB elevated    F/u Sputum Culture  Noted MRSA in sputum - antibiotics changed to doxy, clinically improving  can d/c n/c   Nasal saline spray  allow pt to self suction if needed    ENT Eval to be considered to look at vocal cords as as well as upper airway edema if present    Hypertonic saline neb Q 12 hrs can stop in few days     will follow  d/w bedside RN and Dr. Bautista

## 2022-06-29 NOTE — PROGRESS NOTE ADULT - ASSESSMENT
60 yo Mandarin speaking F with PMH of HTN, C Spine meningioma (removed 2018 in [place]) who presented with dizziness, ataxia, and emesis.  MRI with ethel-medullary. right CPA, and right temporal enhancing lesions concern for meningioma vs schwannoma, with hydro.  Now S/p Left far lateral crani for meningioma resection with subtotal sacrifice of left vertebral artery (6/1).  Noted with vocal cord paralysis, dysphagia - NPO on TF (PEG placed 6/16). Also right Hemiparesis, sensory, gait and ADL impairment.     COMORBIDITES/ACTIVE MEDICAL ISSUES     #Brain tumor - Meningioma   - S/p Left far lateral crani for meningioma resection with subtotal sacrifice of left vertebral artery (6/1)  - Dexamethasone taper -completed (6/25)  - Gait Instability, ADL impairments and Functional impairments: cont Comprehensive Rehab Program of PT/OT & speech    #Transient A fib with RVR  - s/p amiodarone IVP x 1  - Sinus tach - EKG without evidence of a fib  - Metoprolol 12.5 BID    # Cranial stenosis  - CTA head showed defect in the right M1 suspicious for possible right M1 occlusion though the vessel   - Recommended to "continue ASA 81mg qd and atorvastatin 40mg qhs" on discharge paperwork, however patient not on it in prior hospital  - Lipid panel 5/21 unremarkable  -restarted atorvastatin  --Contacted Dr. Liu to clarify if pt. ok to restart ASA-- stated would hold for now if it was for carotid stenosis .    #HTN  - - cont. metoprolol 12.5 BID     #URI  ---CXR w/ L lower lobe infiltrate/atelectasis , no consolidation noted   - Hospitalist changed augmentin to doxycycline to cover MRSA in sputum culture  -MRSA nares + > started on bactroban Switched to doxycycline after c/s returned (6/28)  - management per hospitalist  - Encourage incentive spirometry     Tachycardia  -increase H2O flushes via PEG-- 200cc QID-tolerating  - monitor    # Urinary retention  - --Pt. voiding with low PVRs (dc bladder scan)  --toileting program q.3h while awake    # Lip sore  - ? herpes labialis. No prior hx of lip sores  - s/p valtrex 2g BID x 2 doses on 6/15.    #Pain control  - Tylenol PRN, Tramadol    #GI/Bowel Mgmt   - At risk for constipation due to neurologic diagnosis, immobility and/or medication use  - Senna,  Miralax   - Abd xray 6/27 - unremarkable    #DVT  - Lovenox    Dysphagia  Vocal cord paralysis  - Diet - NPO with TF + free water flush: 200cc  - PEG placed (6/16)  - SLP - evaluation and treatment  --ENT consult pending   - cont. vital stimulation in speech therapy  - Self suctioning    Precautions / PROPHYLAXIS:   - Falls  - Lungs: Aspiration,   - Pressure injury/Skin: Turn Q2hrs while in bed, OOB to Chair, PT/OT      IDT 6/23:  SW: lives in  with son, and DIL; 1st floor s/u  OT: Tot A e/bathing;  Mod A grooming/LBD /toilet transf; Min A UBD; TBA-- shower transfer  PT: Mod A transfer, Amb. 30ft RW, 4 steps 2 HR.    Speech: NPO/PEG; Hypophonia, secretions improved, Mod A cognition,    ELOS: 7/11/22    Follow ups:  Giovanni Liu)  Neurosurgery  General  [address] [place] [address], [address]  [place] [address]  Phone: [phone]  Fax: [phone]   60 yo Mandarin speaking F with PMH of HTN, C Spine meningioma (removed 2018 in [place]) who presented with dizziness, ataxia, and emesis.  MRI with ethel-medullary. right CPA, and right temporal enhancing lesions concern for meningioma vs schwannoma, with hydro.  Now S/p Left far lateral crani for meningioma resection with subtotal sacrifice of left vertebral artery (6/1).  Noted with vocal cord paralysis, dysphagia - NPO on TF (PEG placed 6/16). Also right Hemiparesis, sensory, gait and ADL impairment.     COMORBIDITES/ACTIVE MEDICAL ISSUES     #Brain tumor - Meningioma   - S/p Left far lateral crani for meningioma resection with subtotal sacrifice of left vertebral artery (6/1)  - Dexamethasone taper -completed (6/25)  - Gait Instability, ADL impairments and Functional impairments: cont Comprehensive Rehab Program of PT/OT & speech    #Transient A fib with RVR  - s/p amiodarone IVP x 1  - Sinus tach - EKG without evidence of a fib  - Metoprolol 12.5 BID    # Cranial stenosis  - CTA head showed defect in the right M1 suspicious for possible right M1 occlusion though the vessel   - Recommended to "continue ASA 81mg qd and atorvastatin 40mg qhs" on discharge paperwork, however patient not on it in prior hospital  - Lipid panel 5/21 unremarkable  -restarted atorvastatin  --Contacted Dr. Liu to clarify if pt. ok to restart ASA-- stated would hold for now if it was for carotid stenosis .    #HTN  - - cont. metoprolol 12.5 BID     #URI  ---CXR w/ L lower lobe infiltrate/atelectasis , no consolidation noted   - Hospitalist changed augmentin to doxycycline to cover MRSA in sputum culture  -MRSA nares + > started on bactroban Switched to doxycycline after c/s returned (6/28)  - management per hospitalist  - Encourage incentive spirometry     Tachycardia  -increase H2O flushes via PEG-- 200cc QID-tolerating  - monitor    # Urinary retention  - --Pt. voiding with low PVRs  --toileting program q.3h while awake        #Pain control  - Tylenol PRN, Tramadol    #GI/Bowel Mgmt   - At risk for constipation due to neurologic diagnosis, immobility and/or medication use  - Senna,  Miralax   - Abd xray 6/27 - unremarkable    #DVT  - Lovenox    Dysphagia  Vocal cord paralysis  - Diet - NPO with TF + free water flush: 200cc  - PEG placed (6/16)  - SLP - evaluation and treatment  --ENT consult pending --will likely be seen tomorrow as NP returns then  - cont. vital stimulation in speech therapy  - Self suctioning    Precautions / PROPHYLAXIS:   - Falls  - Lungs: Aspiration,   - Pressure injury/Skin: Turn Q2hrs while in bed, OOB to Chair, PT/OT      IDT 6/23:  SW: lives in  with son, and DIL; 1st floor s/u  OT: Tot A e/bathing;  Mod A grooming/LBD /toilet transf; Min A UBD; TBA-- shower transfer  PT: Mod A transfer, Amb. 30ft RW, 4 steps 2 HR.    Speech: NPO/PEG; Hypophonia, secretions improved, Mod A cognition,    ELOS: 7/11/22    Follow ups:  Giovanni Liu)  Neurosurgery  General  [address] [place] [address], [address]  [place] [address]  Phone: [phone]  Fax: [phone]

## 2022-06-29 NOTE — PROGRESS NOTE ADULT - ASSESSMENT
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  MRI with ethel-medullary. right CPA, and right temporal enhancing lesions concern for meningioma vs schwannoma, with hydro.  Now S/p Left far lateral crani for meningioma resection with subtotal sacrifice of left vertebral artery (6/1).  Noted with vocal cord paralysis, dysphagia - NPO on TF (PEG placed 6/16). Also right Hemiparesis, sensory, gait and ADL impairment.     #Meningioma s/p L lateral crani for meningioma resection with subtotal sacrifice of L vertebral artery on 6/1  - completed Decadron taper as per neurosurgery  -Continue comprehensive rehab program -PT/OT/SLP per rehab team  -Pain management, bowel regimen per rehab   - fall, aspiration precautions    #Dysphagia s/p PEG placement  #nausea  - cont tube feeds. slower rate  - aspiration precautions  - SLP eval as per primary    #Left vocal cord paralysis  # Upper airway secretion  # probable MRSA pneumonia   # h/o MSSA PNA and aspiration PNA  - WBC normalized.   - SPUTUM CX: MRSA  - on Augmentin since 6/25. changed to doxycycline on 6/28 to cover MRSA  - Completed 5 days course of zosyn on 6/20  - Xopenex, Mucomyst neb, 3% saline INH, saline nasal spray, suction for secretion control   - MRSA PCR+ 6/26  - mupirocin 2% TP, chlorhexidine  - off contact precautions as per Infection control  - incentive spirometry  - Chest PT/PD  - monitor temp/cbc. send pan cx and start abx if febrile  - suction PRN. patient prefers to do suction by herself. consider xanax PRN for anxiety. informed about risk/benefit/alter of sucitoning  - pulmonary f/u noted and appreciated  - ENT cx. [ informed ENT MAGGIE Frey over the weekend and today, will be seen today]      #PAF  -Pt with possible transient A fib with rvr during RRT at [facility]. EKG showed sinus tach and no evidence pafib on tele  -Cont with low dose lopressor  -TTE reviewed: EF 75%. hyperdynamic LV systolic function     #Urinary retention  - s/p epdro 6/16  - Pedro removed 6/21 PM for TOV 6/22  --Pt. voiding well. low PVRs    #HTN  - BP soft  -cont with lopressor   -was on losartan and amlodipine, on hold due to BP on lower side  - check orthostasis periodically. can hold lopressor if BP low or significant orthostasis and HR controlled    Transaminitis  -Monitor LFTs, avoid hepatotoxins    Anemia  - h/h stable    Hyponatremia  - sodium normalized  - likely SIADH  - c/w fluid restriction 1200 mls/day.   - Monitor BMP     DVT ppx - lovenox  GI ppx - pepcid     d/w Dr. Bautista and Jaqueline NP and bedside RN in [facility]

## 2022-06-29 NOTE — PROGRESS NOTE ADULT - SUBJECTIVE AND OBJECTIVE BOX
Patient is a 61y old  Female who presents with a chief complaint of Meningioma (27 Jun 2022 13:13)      HPI:  61 year old Female with PMHx of HTN and C-spine meningioma removal in 2018 in [place]. Patient presented with dizziness, gait instability, and vomiting over the last 3 days prior to admission. MRI showed ethel-medullary, Right CPA, and Right temporal enhancing lesions.  Possible meningioma vs schwannoma.  Patient was monitored in the ICU for hydro watch. Workup included MRI of the Brain, C/T/L spine and CT of the chest, abdomen and pelvis which was negative for malignancies on 5/20.  Patient was seen by ENT for dysphagia and recommended patient be seen by speech and swallow preop and underwent MBS and was able to continue with a regular diet.  Due to location of the lesion, ENT recommended audiology consult.  She was found to have intact hearing but mildly decreased bilaterally. Underwent Left far lateral crani for meningioma resection with subtotal sacrifice of left vertebral artery on 6/1. Attempted extubation the same night after surgery, however she developed stridor and was reintubated.  Patient was successfully extubated on 6/6 to high flow.  Patient scoped by ENT and found  to have left vocal cord paralysis.  Post extubation patient had a lot of secretions and was given Mucomyst and Duoneb. There was a concern for PE given respiratory status - CTA chest 6/7 negative for PE but with bilateral consolidation. Patient was weaned off high flow (6/9), however frequent suctioning was still required. FED via NGT and per speech and swallow recommendations, she was kept NPO. Patient was also found to have MSSA pneumonia and was treated with Augmentin.  Speech and swallow continued to follow and patient was recommended for a PEG.  PEG was placed on 6/16. Patient also had two episodes of desaturation on 6/16, follow up with another CTA chest; which was also negative for PE, but showed b/l lower lobe consolidations and IV antibiotics (Zosyn) were started.     Patient was evaluated by PM&R and therapy for functional deficits and gait/ADL impairments and recommend acute rehabilitation.  Patient was medically optimized for discharge to [facility] on 6/20/22.    SUBJECTIVE:   Elizabethjannette  ID # [id number]  Patient seen and evaluated while resting in bed  Cough is better  Tolerating tube feed - nausea x 30 min after feeding, no actual vomitting  Still feels lack of power to R extremities - but tolerating therapy with some SOB    REVIEW OF SYSTEMS  +cough (better but still present), +sialorrhea  +Intermittent SOB, none present now - but sometimes with therapy  + abd discomfort over peg site, +mild nausea with tube feeding  Denies diarrhea/constipation  Denies difficulty breathing  +dysarthria, dysphagia  +throat hoarseness     VITALS  61y  Vital Signs Last 24 Hrs  T(C): 36.7 (29 Jun 2022 09:37), Max: 36.7 (28 Jun 2022 16:59)  T(F): 98.1 (29 Jun 2022 09:37), Max: 98.1 (29 Jun 2022 09:37)  HR: 86 (29 Jun 2022 09:37) (73 - 97)  BP: 100/70 (29 Jun 2022 09:37) (100/70 - 121/82)  RR: 16 (29 Jun 2022 09:37) (16 - 18)  SpO2: 100% (29 Jun 2022 09:37) (98% - 100%)    PHYSICAL EXAM:   Gen - NAD, Comfortable, on 2L NC  HEENT - NCAT,  Pulm - CTA bilaterally with adventitious sounds from upper airway  Cardiovascular - RRR  Abdomen - Soft, NT/ND, +BS, (+) PEG tube site - clean, mildly tender (abdominal binder on securing PEG)  Extremities - No calf tenderness  Neuro-     Cognitive - Awake and alert     Communication - Fluent, hypophonia     Cranial Nerves - PERRLA, EOMI, +mild diplopia, Slight impaired R shoulder shrug, . Visual fields intact. +dysphagia, +hypophonia,  Orolingual weakness     Motor -                     LEFT    UE - ShAB 5/5, EF 5/5, EE 5/5, WE 5/5,  5/5                    RIGHT UE - ShAB 4+/5, EF 3/5, EE 3/5, WE 3/5,  3/5                    LEFT    LE - HF 5/5, KE 5/5, DF 5/5, PF 5/5                    RIGHT LE - HF 4/5, KE 4/5, DF 3/5, PF 4/5        Sensory - Slightly diminished sensation to light touch in RUE and RLE     Coordination -- impaired on right     Tone - normal  Psychiatric - Mood stable, Affect WNL    RECENT LABS:                         9.6    4.75  )-----------( 243      ( 27 Jun 2022 06:45 )             29.4     06-27    135  |  98  |  13  ----------------------------<  102<H>  4.3   |  33<H>  |  0.36<L>    Ca    8.8      27 Jun 2022 06:45    TPro  6.0  /  Alb  2.5<L>  /  TBili  1.0  /  DBili  x   /  AST  30  /  ALT  46<H>  /  AlkPhos  84  06-27    LIVER FUNCTIONS - ( 27 Jun 2022 06:45 )  Alb: 2.5 g/dL / Pro: 6.0 g/dL / ALK PHOS: 84 U/L / ALT: 46 U/L / AST: 30 U/L / GGT: x             Culture - Sputum (collected 26 Jun 2022 09:59)  Source: .Sputum Sputum  Gram Stain (26 Jun 2022 14:54):    Few polymorphonuclear leukocytes per low power field    Rare Squamous epithelial cells per low power field    Moderate Gram Positive Cocci in Clusters per oil power field    Rare Gram Positive Rods per oil power field  Final Report (28 Jun 2022 10:52):    Numerous Methicillin Resistant Staphylococcus aureus    Normal Respiratory Kim present  Organism: Methicillin resistant Staphylococcus aureus (28 Jun 2022 10:52)  Organism: Methicillin resistant Staphylococcus aureus (28 Jun 2022 10:52)  C/S returned      Xray Abdomen 2 Views (06.27.22 @ 11:52) >  No evidence of bowel obstruction or definite free air.    MEDICATIONS  (STANDING):  amoxicillin   80 mG/mL/clavulanate Suspension 800 milliGRAM(s) Enteral Tube every 12 hours  ascorbic acid 500 milliGRAM(s) Oral daily  chlorhexidine 0.12% Liquid 15 milliLiter(s) Swish and Spit two times a day  chlorhexidine 2% Cloths 1 Application(s) Topical <User Schedule>  enoxaparin Injectable 40 milliGRAM(s) SubCutaneous <User Schedule>  levalbuterol Inhalation 0.63 milliGRAM(s) Inhalation every 6 hours  melatonin 6 milliGRAM(s) Oral at bedtime  metoprolol tartrate 12.5 milliGRAM(s) Oral every 12 hours  multivitamin 1 Tablet(s) Oral daily  mupirocin 2% Nasal 1 Application(s) Both Nostrils two times a day  pantoprazole   Suspension 40 milliGRAM(s) Oral before breakfast  petrolatum white Ointment 1 Application(s) Topical daily  polyethylene glycol 3350 17 Gram(s) Oral daily  senna 2 Tablet(s) Oral at bedtime  sodium chloride 0.65% Nasal 1 Spray(s) Both Nostrils every 6 hours  sodium chloride 3%  Inhalation 2 milliLiter(s) Inhalation two times a day    MEDICATIONS  (PRN):  acetaminophen     Tablet .. 650 milliGRAM(s) Oral every 6 hours PRN Temp greater or equal to 38C (100.4F), Mild Pain (1 - 3)  ondansetron    Tablet 4 milliGRAM(s) Oral every 12 hours PRN Nausea

## 2022-06-29 NOTE — PROGRESS NOTE ADULT - SUBJECTIVE AND OBJECTIVE BOX
Medicine Progress Note    Patient is a 61y old  Female who presents with a chief complaint of Meningioma (29 Jun 2022 12:26)      SUBJECTIVE / OVERNIGHT EVENTS:  seen and examined  Chart reviewed  No overnight events  Limb weakness improving with therapy  BM+  No pain  No complaints  secretion, throat irritation, nausea much better. voice better.  communication via mandarin .  was able to hear her voice clear today    ADDITIONAL REVIEW OF SYSTEMS:  no fever/chills/CP/sob/palpitation/dizziness/ abd pain/nausea/vomiting/diarrhea/constipation/headaches. all other ROS neg    MEDICATIONS  (STANDING):  ascorbic acid 500 milliGRAM(s) Oral daily  chlorhexidine 0.12% Liquid 15 milliLiter(s) Swish and Spit two times a day  chlorhexidine 2% Cloths 1 Application(s) Topical <User Schedule>  doxycycline monohydrate Capsule 100 milliGRAM(s) Oral every 12 hours  enoxaparin Injectable 40 milliGRAM(s) SubCutaneous <User Schedule>  levalbuterol Inhalation 0.63 milliGRAM(s) Inhalation every 6 hours  melatonin 6 milliGRAM(s) Oral at bedtime  metoprolol tartrate 12.5 milliGRAM(s) Oral every 12 hours  multivitamin 1 Tablet(s) Oral daily  mupirocin 2% Nasal 1 Application(s) Both Nostrils two times a day  pantoprazole   Suspension 40 milliGRAM(s) Oral before breakfast  petrolatum white Ointment 1 Application(s) Topical daily  polyethylene glycol 3350 17 Gram(s) Oral daily  senna 2 Tablet(s) Oral at bedtime  sodium chloride 0.65% Nasal 1 Spray(s) Both Nostrils every 6 hours  sodium chloride 3%  Inhalation 2 milliLiter(s) Inhalation two times a day    MEDICATIONS  (PRN):  acetaminophen     Tablet .. 650 milliGRAM(s) Oral every 6 hours PRN Temp greater or equal to 38C (100.4F), Mild Pain (1 - 3)  ondansetron    Tablet 4 milliGRAM(s) Oral every 12 hours PRN Nausea    CAPILLARY BLOOD GLUCOSE        I&O's Summary    28 Jun 2022 07:01  -  29 Jun 2022 07:00  --------------------------------------------------------  IN: 1275 mL / OUT: 0 mL / NET: 1275 mL        PHYSICAL EXAM:  Vital Signs Last 24 Hrs  T(C): 36.7 (29 Jun 2022 09:37), Max: 36.7 (28 Jun 2022 16:59)  T(F): 98.1 (29 Jun 2022 09:37), Max: 98.1 (29 Jun 2022 09:37)  HR: 86 (29 Jun 2022 09:37) (73 - 97)  BP: 100/70 (29 Jun 2022 09:37) (100/70 - 121/82)  BP(mean): --  RR: 16 (29 Jun 2022 09:37) (16 - 18)  SpO2: 100% (29 Jun 2022 09:37) (98% - 100%)     GENERAL: Not in distress. Alert . voice husky but much clear today. no secretion came out with suctioning  HEENT: clear conjuctiva, MMM. no pallor or icterus  CARDIOVASCULAR: RRR S1, S2. No murmur/rubs/gallop  LUNGS: BLAE+, no rales, no wheezing, no rhonchi.    ABDOMEN: ND. Soft,  NT, no guarding / rebound / rigidity. BS normoactive  BACK: No spine tenderness.  EXTREMITIES: no edema. no leg or calf TP.  SKIN: warm and dry. PEG site clean  PSYCHIATRIC: Calm.  No agitation.    LABS:                    COVID-19 PCR: NotDetec (27 Jun 2022 06:49)  COVID-19 PCR: NotDetec (20 Jun 2022 23:35)  COVID-19 PCR: NotDetec (20 Jun 2022 10:32)  COVID-19 PCR: NotDetec (15 Polo 2022 16:24)  COVID-19 PCR: NotDetec (13 Jun 2022 05:08)  COVID-19 PCR: NotDetec (31 May 2022 06:12)  COVID-19 PCR: NotDetec (26 May 2022 17:34)  COVID-19 PCR: NotDetec (19 May 2022 16:02)      RADIOLOGY & ADDITIONAL TESTS:  Imaging from Last 24 Hours:    Electrocardiogram/QTc Interval:    COORDINATION OF CARE:  Care Discussed with Consultants/Other Providers:

## 2022-06-29 NOTE — PROGRESS NOTE ADULT - SUBJECTIVE AND OBJECTIVE BOX
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      secretions much better  not on o2  no cp, palp, n/v  not self suctioning at this time  ENT pending  d/w Dr. Bautista    Allergies :albuterol (Other)  No Known Allergies      PAST MEDICAL & SURGICAL HISTORY:  HTN (hypertension)    HLD (hyperlipidemia)    H/O cervical spine surgery        Medications:  MEDICATIONS  (STANDING):  ascorbic acid 500 milliGRAM(s) Oral daily  chlorhexidine 0.12% Liquid 15 milliLiter(s) Swish and Spit two times a day  chlorhexidine 2% Cloths 1 Application(s) Topical <User Schedule>  doxycycline monohydrate Capsule 100 milliGRAM(s) Oral every 12 hours  enoxaparin Injectable 40 milliGRAM(s) SubCutaneous <User Schedule>  levalbuterol Inhalation 0.63 milliGRAM(s) Inhalation every 6 hours  melatonin 6 milliGRAM(s) Oral at bedtime  metoprolol tartrate 12.5 milliGRAM(s) Oral every 12 hours  multivitamin 1 Tablet(s) Oral daily  mupirocin 2% Nasal 1 Application(s) Both Nostrils two times a day  pantoprazole   Suspension 40 milliGRAM(s) Oral before breakfast  petrolatum white Ointment 1 Application(s) Topical daily  polyethylene glycol 3350 17 Gram(s) Oral daily  senna 2 Tablet(s) Oral at bedtime  sodium chloride 0.65% Nasal 1 Spray(s) Both Nostrils every 6 hours  sodium chloride 3%  Inhalation 2 milliLiter(s) Inhalation two times a day    MEDICATIONS  (PRN):  acetaminophen     Tablet .. 650 milliGRAM(s) Oral every 6 hours PRN Temp greater or equal to 38C (100.4F), Mild Pain (1 - 3)  ondansetron    Tablet 4 milliGRAM(s) Oral every 12 hours PRN Nausea      Antibiotics History  amoxicillin   80 mG/mL/clavulanate Suspension 800 milliGRAM(s) Enteral Tube every 12 hours, 06-25-22 @ 12:12, Stop order after: 10 Days  doxycycline monohydrate Capsule 100 milliGRAM(s) Oral every 12 hours, 06-28-22 @ 12:40  piperacillin/tazobactam IVPB.. 3.375 Gram(s) IV Intermittent every 8 hours, 06-20-22 @ 20:00, Stop order after: 4 Days  trimethoprim  160 mG/sulfamethoxazole 800 mG 1 Tablet(s) Oral every 12 hours, 06-28-22 @ 12:38      Heme Medications   enoxaparin Injectable 40 milliGRAM(s) SubCutaneous <User Schedule>, 06-21-22 @ 00:00      GI Medications  pantoprazole   Suspension 40 milliGRAM(s) Oral before breakfast, 06-24-22 @ 13:37, Routine  polyethylene glycol 3350 17 Gram(s) Oral daily, 06-20-22 @ 20:00,   senna 2 Tablet(s) Oral at bedtime, 06-20-22 @ 19:59, Routine           CULTURES: (if applicable)    Culture - Sputum (collected 06-26-22 @ 09:59)  Source: .Sputum Sputum  Gram Stain (06-26-22 @ 14:54):    Few polymorphonuclear leukocytes per low power field    Rare Squamous epithelial cells per low power field    Moderate Gram Positive Cocci in Clusters per oil power field    Rare Gram Positive Rods per oil power field  Final Report (06-28-22 @ 10:52):    Numerous Methicillin Resistant Staphylococcus aureus    Normal Respiratory Kim present  Organism: Methicillin resistant Staphylococcus aureus (06-28-22 @ 10:52)  Organism: Methicillin resistant Staphylococcus aureus (06-28-22 @ 10:52)      -  Ampicillin/Sulbactam: R <=8/4      -  Cefazolin: R <=4      -  Clindamycin: S <=0.25      -  Erythromycin: S <=0.25      -  Gentamicin: S <=1 Should not be used as monotherapy      -  Linezolid: S 4      -  Oxacillin: R >2      -  Penicillin: R >8      -  Rifampin: S <=1 Should not be used as monotherapy      -  Tetra/Doxy: S <=1      -  Trimethoprim/Sulfamethoxazole: S <=0.5/9.5      -  Vancomycin: S 1      Method Type: LAURA    Culture - Blood (collected 06-16-22 @ 11:15)  Source: .Blood Blood  Final Report (06-21-22 @ 17:01):    No Growth Final    Culture - Blood (collected 06-16-22 @ 11:10)  Source: .Blood Blood  Final Report (06-21-22 @ 17:01):    No Growth Final         VITALS:  T(C): 36.7 (06-29-22 @ 09:37), Max: 36.7 (06-28-22 @ 16:59)  T(F): 98.1 (06-29-22 @ 09:37), Max: 98.1 (06-29-22 @ 09:37)  HR: 86 (06-29-22 @ 09:37) (73 - 97)  BP: 100/70 (06-29-22 @ 09:37) (100/70 - 121/82)  BP(mean): --  ABP: --  ABP(mean): --  RR: 16 (06-29-22 @ 09:37) (16 - 18)  SpO2: 100% (06-29-22 @ 09:37) (98% - 100%)  CVP(mm Hg): --  CVP(cm H2O): --    Ins and Outs     06-28-22 @ 07:01  -  06-29-22 @ 07:00  --------------------------------------------------------  IN: 1275 mL / OUT: 0 mL / NET: 1275 mL       I&O's Detail    28 Jun 2022 07:01  -  29 Jun 2022 07:00  --------------------------------------------------------  IN:    Free Water: 525 mL    TwoCal HN: 750 mL  Total IN: 1275 mL    OUT:  Total OUT: 0 mL    Total NET: 1275 mL

## 2022-06-30 LAB
ALBUMIN SERPL ELPH-MCNC: 2.8 G/DL — LOW (ref 3.3–5)
ALP SERPL-CCNC: 86 U/L — SIGNIFICANT CHANGE UP (ref 40–120)
ALT FLD-CCNC: 51 U/L — HIGH (ref 10–45)
ANION GAP SERPL CALC-SCNC: 8 MMOL/L — SIGNIFICANT CHANGE UP (ref 5–17)
AST SERPL-CCNC: 34 U/L — SIGNIFICANT CHANGE UP (ref 10–40)
BILIRUB SERPL-MCNC: 0.8 MG/DL — SIGNIFICANT CHANGE UP (ref 0.2–1.2)
BUN SERPL-MCNC: 12 MG/DL — SIGNIFICANT CHANGE UP (ref 7–23)
CALCIUM SERPL-MCNC: 8.7 MG/DL — SIGNIFICANT CHANGE UP (ref 8.4–10.5)
CHLORIDE SERPL-SCNC: 98 MMOL/L — SIGNIFICANT CHANGE UP (ref 96–108)
CO2 SERPL-SCNC: 30 MMOL/L — SIGNIFICANT CHANGE UP (ref 22–31)
CREAT SERPL-MCNC: 0.43 MG/DL — LOW (ref 0.5–1.3)
EGFR: 112 ML/MIN/1.73M2 — SIGNIFICANT CHANGE UP
GLUCOSE SERPL-MCNC: 106 MG/DL — HIGH (ref 70–99)
HCT VFR BLD CALC: 29.9 % — LOW (ref 34.5–45)
HGB BLD-MCNC: 9.7 G/DL — LOW (ref 11.5–15.5)
MCHC RBC-ENTMCNC: 32.4 GM/DL — SIGNIFICANT CHANGE UP (ref 32–36)
MCHC RBC-ENTMCNC: 32.8 PG — SIGNIFICANT CHANGE UP (ref 27–34)
MCV RBC AUTO: 101 FL — HIGH (ref 80–100)
NRBC # BLD: 0 /100 WBCS — SIGNIFICANT CHANGE UP (ref 0–0)
PLATELET # BLD AUTO: 260 K/UL — SIGNIFICANT CHANGE UP (ref 150–400)
POTASSIUM SERPL-MCNC: 4.1 MMOL/L — SIGNIFICANT CHANGE UP (ref 3.5–5.3)
POTASSIUM SERPL-SCNC: 4.1 MMOL/L — SIGNIFICANT CHANGE UP (ref 3.5–5.3)
PROT SERPL-MCNC: 6.4 G/DL — SIGNIFICANT CHANGE UP (ref 6–8.3)
RBC # BLD: 2.96 M/UL — LOW (ref 3.8–5.2)
RBC # FLD: 17.5 % — HIGH (ref 10.3–14.5)
SODIUM SERPL-SCNC: 136 MMOL/L — SIGNIFICANT CHANGE UP (ref 135–145)
WBC # BLD: 4.01 K/UL — SIGNIFICANT CHANGE UP (ref 3.8–10.5)
WBC # FLD AUTO: 4.01 K/UL — SIGNIFICANT CHANGE UP (ref 3.8–10.5)

## 2022-06-30 PROCEDURE — 99232 SBSQ HOSP IP/OBS MODERATE 35: CPT

## 2022-06-30 RX ADMIN — MUPIROCIN 1 APPLICATION(S): 20 OINTMENT TOPICAL at 05:13

## 2022-06-30 RX ADMIN — MUPIROCIN 1 APPLICATION(S): 20 OINTMENT TOPICAL at 17:44

## 2022-06-30 RX ADMIN — Medication 1 SPRAY(S): at 05:12

## 2022-06-30 RX ADMIN — CHLORHEXIDINE GLUCONATE 1 APPLICATION(S): 213 SOLUTION TOPICAL at 05:10

## 2022-06-30 RX ADMIN — ENOXAPARIN SODIUM 40 MILLIGRAM(S): 100 INJECTION SUBCUTANEOUS at 17:32

## 2022-06-30 RX ADMIN — Medication 6 MILLIGRAM(S): at 21:27

## 2022-06-30 RX ADMIN — Medication 100 MILLIGRAM(S): at 05:12

## 2022-06-30 RX ADMIN — Medication 500 MILLIGRAM(S): at 12:24

## 2022-06-30 RX ADMIN — LEVALBUTEROL 0.63 MILLIGRAM(S): 1.25 SOLUTION, CONCENTRATE RESPIRATORY (INHALATION) at 08:37

## 2022-06-30 RX ADMIN — Medication 1 SPRAY(S): at 17:31

## 2022-06-30 RX ADMIN — LEVALBUTEROL 0.63 MILLIGRAM(S): 1.25 SOLUTION, CONCENTRATE RESPIRATORY (INHALATION) at 21:12

## 2022-06-30 RX ADMIN — Medication 1 SPRAY(S): at 21:29

## 2022-06-30 RX ADMIN — Medication 1 APPLICATION(S): at 12:38

## 2022-06-30 RX ADMIN — CHLORHEXIDINE GLUCONATE 15 MILLILITER(S): 213 SOLUTION TOPICAL at 17:32

## 2022-06-30 RX ADMIN — PANTOPRAZOLE SODIUM 40 MILLIGRAM(S): 20 TABLET, DELAYED RELEASE ORAL at 05:11

## 2022-06-30 RX ADMIN — Medication 1 SPRAY(S): at 12:24

## 2022-06-30 RX ADMIN — CHLORHEXIDINE GLUCONATE 15 MILLILITER(S): 213 SOLUTION TOPICAL at 05:11

## 2022-06-30 RX ADMIN — Medication 1 TABLET(S): at 12:24

## 2022-06-30 RX ADMIN — SODIUM CHLORIDE 2 MILLILITER(S): 9 INJECTION INTRAMUSCULAR; INTRAVENOUS; SUBCUTANEOUS at 08:36

## 2022-06-30 RX ADMIN — SENNA PLUS 2 TABLET(S): 8.6 TABLET ORAL at 21:28

## 2022-06-30 RX ADMIN — LEVALBUTEROL 0.63 MILLIGRAM(S): 1.25 SOLUTION, CONCENTRATE RESPIRATORY (INHALATION) at 15:51

## 2022-06-30 RX ADMIN — Medication 100 MILLIGRAM(S): at 17:44

## 2022-06-30 RX ADMIN — POLYETHYLENE GLYCOL 3350 17 GRAM(S): 17 POWDER, FOR SOLUTION ORAL at 12:24

## 2022-06-30 NOTE — PROGRESS NOTE ADULT - ASSESSMENT
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  MRI with ethel-medullary. right CPA, and right temporal enhancing lesions concern for meningioma vs schwannoma, with hydro.  Now S/p Left far lateral crani for meningioma resection with subtotal sacrifice of left vertebral artery (6/1).  Noted with vocal cord paralysis, dysphagia - NPO on TF (PEG placed 6/16). Also right Hemiparesis, sensory, gait and ADL impairment.     COMORBIDITES/ACTIVE MEDICAL ISSUES     #Brain tumor - Meningioma   - S/p Left far lateral crani for meningioma resection with subtotal sacrifice of left vertebral artery (6/1)  - Dexamethasone taper -completed (6/25)  - Gait Instability, ADL impairments and Functional impairments: cont Comprehensive Rehab Program of PT/OT & speech    #Transient A fib with RVR  - s/p amiodarone IVP x 1  - Sinus tach - EKG without evidence of a fib  - Metoprolol 12.5 BID    # Cranial stenosis  - CTA head showed defect in the right M1 suspicious for possible right M1 occlusion though the vessel   - Recommended to "continue ASA 81mg qd and atorvastatin 40mg qhs" on discharge paperwork, however patient not on it in prior hospital  - Lipid panel 5/21 unremarkable  -restarted atorvastatin  --Contacted Dr. Liu to clarify if pt. ok to restart ASA-- stated would hold for now if it was for carotid stenosis .    #HTN  - - cont. metoprolol 12.5 BID     #URI  ---CXR w/ L lower lobe infiltrate/atelectasis , no consolidation noted   - Hospitalist changed augmentin to doxycycline to cover MRSA in sputum culture  -MRSA nares + > started on bactroban Switched to doxycycline after c/s returned (6/28)  - management per hospitalist  - Encourage incentive spirometry     Tachycardia  -increase H2O flushes via PEG-- 200cc QID-tolerating  - monitor    # Urinary retention  - --Pt. voiding with low PVRs  --toileting program q.3h while awake    #Pain control  - Tylenol PRN, Tramadol    #GI/Bowel Mgmt   - At risk for constipation due to neurologic diagnosis, immobility and/or medication use  - Senna,  Miralax   - Abd xray 6/27 - unremarkable    #DVT  - Lovenox    Dysphagia  Vocal cord paralysis  - Diet - NPO with TF + free water flush: 200cc  - PEG placed (6/16)  - SLP - evaluation and treatment  --ENT evaluated but pt denied scope - asked to re-call ENT when deemed ready for MBS by SLP to attempt scope.  - cont. vital stimulation in speech therapy  - Self suctioning    Precautions / PROPHYLAXIS:   - Falls  - Lungs: Aspiration,   - Pressure injury/Skin: Turn Q2hrs while in bed, OOB to Chair, PT/OT      IDT 6/30:  SW: lives in  with son, and DIL; 1st floor s/u. son and DIL work, may not be able to provide necessary assistance.  OT: TotA Eating, up grooming, min UBD, mod LBD, min toileting to commode, modA transfer to tub bench, total bathing  PT: Antoni transfer, Amb. 50ft RW modA, 4 steps modA    Speech: NPO/PEG; Hypophonia, secretions improved, Mod A cognition,  mod cog deficits/PS/memory  ELOS: 7/11/22 BOB likely    Follow ups:  Giovanni Liu)  Neurosurgery  General  [address] [place] [address], [address]  [place] [address]  Phone: [phone]  Fax: [phone] 62 yo Fuzhounese (and some Mandarin) speaking F with PMH of HTN, C Spine meningioma (removed 2018 in [place]) who presented with dizziness, ataxia, and emesis.  MRI with ethel-medullary. right CPA, and right temporal enhancing lesions concern for meningioma vs schwannoma, with hydro.  Now S/p Left far lateral crani for meningioma resection with subtotal sacrifice of left vertebral artery (6/1).  Noted with vocal cord paralysis, dysphagia - NPO on TF (PEG placed 6/16). Also right Hemiparesis, sensory, gait and ADL impairment.     COMORBIDITES/ACTIVE MEDICAL ISSUES     #Brain tumor - Meningioma   - S/p Left far lateral crani for meningioma resection with subtotal sacrifice of left vertebral artery (6/1)  - Dexamethasone taper -completed (6/25)  - Gait Instability, ADL impairments and Functional impairments: cont Comprehensive Rehab Program of PT/OT & speech    #Transient A fib with RVR  - s/p amiodarone IVP x 1  - Sinus tach - EKG without evidence of a fib  - Metoprolol 12.5 BID    # Cranial stenosis  - CTA head showed defect in the right M1 suspicious for possible right M1 occlusion though the vessel   - Recommended to "continue ASA 81mg qd and atorvastatin 40mg qhs" on discharge paperwork, however patient not on it in prior hospital  - Lipid panel 5/21 unremarkable  -restarted atorvastatin  --Contacted Dr. Liu to clarify if pt. ok to restart ASA-- stated would hold for now if it was for carotid stenosis .    #HTN  - - cont. metoprolol 12.5 BID     #URI  ---CXR w/ L lower lobe infiltrate/atelectasis , no consolidation noted   - Hospitalist changed augmentin to doxycycline to cover MRSA in sputum culture  -MRSA nares + > started on bactroban Switched to doxycycline after c/s returned (6/28)  - management per hospitalist  - Encourage incentive spirometry     Tachycardia  -increase H2O flushes via PEG-- 200cc QID-tolerating  - monitor    # Urinary retention  - --Pt. voiding with low PVRs  --toileting program q.3h while awake    #Pain control  - Tylenol PRN, Tramadol    #GI/Bowel Mgmt   - At risk for constipation due to neurologic diagnosis, immobility and/or medication use  - Senna,  Miralax   - Abd xray 6/27 - unremarkable    #DVT  - Lovenox    Dysphagia  Vocal cord paralysis  - Diet - NPO with TF + free water flush: 200cc  - PEG placed (6/16)  - SLP - evaluation and treatment  --ENT evaluated but pt denied scope - asked to re-call ENT when deemed ready for MBS by SLP to attempt scope.  - cont. vital stimulation in speech therapy  - Self suctioning    Precautions / PROPHYLAXIS:   - Falls  - Lungs: Aspiration,   - Pressure injury/Skin: Turn Q2hrs while in bed, OOB to Chair, PT/OT      IDT 6/30:  SW: lives in  with son, and DIL; 1st floor s/u. son and DIL work, may not be able to provide necessary assistance.  OT: TotA Eating, Sup-- grooming, min UBD, mod LBD, min toileting to commode, modA transfer to tub bench, total bathing  PT: Antoni transfer, Amb. 50ft RW modA, 4 steps modA    Speech: NPO/PEG; Hypophonia, secretions improved, Mod A cognition,  mod cog deficits/PS/memory  ELOS: 7/11/22 BOB likely    Follow ups:  Giovanni Liu)  Neurosurgery  General  [address] [place] [address], [address]  [place] [address]  Phone: [phone]  Fax: [phone]

## 2022-06-30 NOTE — PROGRESS NOTE ADULT - SUBJECTIVE AND OBJECTIVE BOX
Patient is a 61y old  Female who presents with a chief complaint of Meningioma (2022 13:06)      HPI:  61 year old Female with PMHx of HTN and C-spine meningioma removal in 2018 in [place]. Patient presented with dizziness, gait instability, and vomiting over the last 3 days prior to admission. MRI showed ethel-medullary, Right CPA, and Right temporal enhancing lesions.  Possible meningioma vs schwannoma.  Patient was monitored in the ICU for hydro watch. Workup included MRI of the Brain, C/T/L spine and CT of the chest, abdomen and pelvis which was negative for malignancies on .  Patient was seen by ENT for dysphagia and recommended patient be seen by speech and swallow preop and underwent MBS and was able to continue with a regular diet.  Due to location of the lesion, ENT recommended audiology consult.  She was found to have intact hearing but mildly decreased bilaterally. Underwent Left far lateral crani for meningioma resection with subtotal sacrifice of left vertebral artery on . Attempted extubation the same night after surgery, however she developed stridor and was reintubated.  Patient was successfully extubated on  to high flow.  Patient scoped by ENT and found  to have left vocal cord paralysis.  Post extubation patient had a lot of secretions and was given Mucomyst and Duoneb. There was a concern for PE given respiratory status - CTA chest  negative for PE but with bilateral consolidation. Patient was weaned off high flow (), however frequent suctioning was still required. FED via NGT and per speech and swallow recommendations, she was kept NPO. Patient was also found to have MSSA pneumonia and was treated with Augmentin.  Speech and swallow continued to follow and patient was recommended for a PEG.  PEG was placed on . Patient also had two episodes of desaturation on , follow up with another CTA chest; which was also negative for PE, but showed b/l lower lobe consolidations and IV antibiotics (Zosyn) were started.     Patient was evaluated by PM&R and therapy for functional deficits and gait/ADL impairments and recommend acute rehabilitation.  Patient was medically optimized for discharge to [facility] on 22.  (2022 13:21)        SUBJECTIVE: Patient seen and examined. No acute overnight events. Complains of her current deficits (weakness and dysphagia as well as NPO status). Reinforced continuing therapy and time needed for recovery.       REVIEW OF SYSTEMS    +cough (better but still present), +sialorrhea  + abd discomfort over peg site, +mild nausea with tube feeding  Denies diarrhea/constipation  Denies difficulty breathing  +dysarthria, dysphagia  +throat hoarseness     VITALS  61y  Vital Signs Last 24 Hrs  T(C): 36.7 (2022 08:49), Max: 36.8 (2022 20:44)  T(F): 98 (2022 08:49), Max: 98.2 (2022 20:44)  HR: 90 (2022 08:49) (71 - 90)  BP: 113/71 (2022 08:49) (101/71 - 113/71)  BP(mean): --  RR: 15 (2022 08:49) (15 - 16)  SpO2: 99% (2022 08:49) (94% - 99%)  Daily     Daily Weight in k.2 (2022 22:59)        PHYSICAL EXAM:     Gen - NAD, Comfortable,   HEENT - NCAT,  Pulm - CTA bilaterally with adventitious sounds from upper airway  Cardiovascular - RRR  Abdomen - Soft, NT/ND, +BS, (+) PEG tube site - clean, mildly tender (abdominal binder on securing PEG)  Extremities - No calf tenderness  Neuro-     Cognitive - Awake and alert     Communication - Fluent, hypophonia     Cranial Nerves - PERRLA, EOMI, +mild diplopia, Slight impaired R shoulder shrug, . Visual fields intact. +dysphagia, +hypophonia,  Orolingual weakness     Motor -                     LEFT    UE - ShAB 5/5, EF 5/5, EE 5/5, WE 5/5,  5/5                    RIGHT UE - ShAB 4+/5, EF 3/5, EE 3/5, WE 3/5,  3/5                    LEFT    LE - HF 5/5, KE 5/5, DF 5/5, PF 5/5                    RIGHT LE - HF 4/5, KE 4/5, DF 3/5, PF 4/5        Sensory - Slightly diminished sensation to light touch in RUE and RLE     Coordination -- impaired on right     Tone - normal  Psychiatric - Mood stable, Affect WNL          RECENT LABS:                        9.7    4.01  )-----------( 260      ( 2022 06:35 )             29.9     30    136  |  98  |  12  ----------------------------<  106<H>  4.1   |  30  |  0.43<L>    Ca    8.7      2022 06:35    TPro  6.4  /  Alb  2.8<L>  /  TBili  0.8  /  DBili  x   /  AST  34  /  ALT  51<H>  /  AlkPhos  86  30    LIVER FUNCTIONS - ( 2022 06:35 )  Alb: 2.8 g/dL / Pro: 6.4 g/dL / ALK PHOS: 86 U/L / ALT: 51 U/L / AST: 34 U/L / GGT: x                 Culture - Sputum (collected 22 @ 09:59)  Source: .Sputum Sputum  Gram Stain (22 @ 14:54):    Few polymorphonuclear leukocytes per low power field    Rare Squamous epithelial cells per low power field    Moderate Gram Positive Cocci in Clusters per oil power field    Rare Gram Positive Rods per oil power field  Final Report (22 @ 10:52):    Numerous Methicillin Resistant Staphylococcus aureus    Normal Respiratory Kim present  Organism: Methicillin resistant Staphylococcus aureus (22 @ 10:52)  Organism: Methicillin resistant Staphylococcus aureus (22 @ 10:52)      -  Ampicillin/Sulbactam: R <=8/4      -  Cefazolin: R <=4      -  Clindamycin: S <=0.25      -  Erythromycin: S <=0.25      -  Gentamicin: S <=1 Should not be used as monotherapy      -  Linezolid: S 4      -  Oxacillin: R >2      -  Penicillin: R >8      -  Rifampin: S <=1 Should not be used as monotherapy      -  Tetra/Doxy: S <=1      -  Trimethoprim/Sulfamethoxazole: S <=0.5/9.5      -  Vancomycin: S 1      Method Type: LAURA        CAPILLARY BLOOD GLUCOSE            MEDICATIONS:  MEDICATIONS  (STANDING):  ascorbic acid 500 milliGRAM(s) Oral daily  chlorhexidine 0.12% Liquid 15 milliLiter(s) Swish and Spit two times a day  chlorhexidine 2% Cloths 1 Application(s) Topical <User Schedule>  doxycycline monohydrate Capsule 100 milliGRAM(s) Oral every 12 hours  enoxaparin Injectable 40 milliGRAM(s) SubCutaneous <User Schedule>  levalbuterol Inhalation 0.63 milliGRAM(s) Inhalation every 6 hours  melatonin 6 milliGRAM(s) Oral at bedtime  metoprolol tartrate 12.5 milliGRAM(s) Oral every 12 hours  multivitamin 1 Tablet(s) Oral daily  mupirocin 2% Nasal 1 Application(s) Both Nostrils two times a day  pantoprazole   Suspension 40 milliGRAM(s) Oral before breakfast  petrolatum white Ointment 1 Application(s) Topical daily  polyethylene glycol 3350 17 Gram(s) Oral daily  senna 2 Tablet(s) Oral at bedtime  sodium chloride 0.65% Nasal 1 Spray(s) Both Nostrils every 6 hours    MEDICATIONS  (PRN):  acetaminophen     Tablet .. 650 milliGRAM(s) Oral every 6 hours PRN Temp greater or equal to 38C (100.4F), Mild Pain (1 - 3)  ondansetron    Tablet 4 milliGRAM(s) Oral every 12 hours PRN Nausea

## 2022-07-01 ENCOUNTER — TRANSCRIPTION ENCOUNTER (OUTPATIENT)
Age: 62
End: 2022-07-01

## 2022-07-01 PROCEDURE — 99232 SBSQ HOSP IP/OBS MODERATE 35: CPT

## 2022-07-01 RX ADMIN — LEVALBUTEROL 0.63 MILLIGRAM(S): 1.25 SOLUTION, CONCENTRATE RESPIRATORY (INHALATION) at 08:07

## 2022-07-01 RX ADMIN — PANTOPRAZOLE SODIUM 40 MILLIGRAM(S): 20 TABLET, DELAYED RELEASE ORAL at 06:03

## 2022-07-01 RX ADMIN — Medication 12.5 MILLIGRAM(S): at 05:35

## 2022-07-01 RX ADMIN — LEVALBUTEROL 0.63 MILLIGRAM(S): 1.25 SOLUTION, CONCENTRATE RESPIRATORY (INHALATION) at 20:28

## 2022-07-01 RX ADMIN — MUPIROCIN 1 APPLICATION(S): 20 OINTMENT TOPICAL at 17:30

## 2022-07-01 RX ADMIN — LEVALBUTEROL 0.63 MILLIGRAM(S): 1.25 SOLUTION, CONCENTRATE RESPIRATORY (INHALATION) at 15:37

## 2022-07-01 RX ADMIN — Medication 1 TABLET(S): at 11:49

## 2022-07-01 RX ADMIN — Medication 6 MILLIGRAM(S): at 21:13

## 2022-07-01 RX ADMIN — SENNA PLUS 2 TABLET(S): 8.6 TABLET ORAL at 21:13

## 2022-07-01 RX ADMIN — Medication 1 APPLICATION(S): at 11:49

## 2022-07-01 RX ADMIN — Medication 100 MILLIGRAM(S): at 17:59

## 2022-07-01 RX ADMIN — LEVALBUTEROL 0.63 MILLIGRAM(S): 1.25 SOLUTION, CONCENTRATE RESPIRATORY (INHALATION) at 03:48

## 2022-07-01 RX ADMIN — Medication 500 MILLIGRAM(S): at 11:49

## 2022-07-01 RX ADMIN — Medication 100 MILLIGRAM(S): at 05:36

## 2022-07-01 RX ADMIN — MUPIROCIN 1 APPLICATION(S): 20 OINTMENT TOPICAL at 05:43

## 2022-07-01 RX ADMIN — CHLORHEXIDINE GLUCONATE 15 MILLILITER(S): 213 SOLUTION TOPICAL at 17:27

## 2022-07-01 RX ADMIN — CHLORHEXIDINE GLUCONATE 1 APPLICATION(S): 213 SOLUTION TOPICAL at 06:05

## 2022-07-01 RX ADMIN — ENOXAPARIN SODIUM 40 MILLIGRAM(S): 100 INJECTION SUBCUTANEOUS at 17:28

## 2022-07-01 NOTE — PROGRESS NOTE ADULT - SUBJECTIVE AND OBJECTIVE BOX
HPI:  61 year old Female with PMHx of HTN and C-spine meningioma removal in 2018 in [place]. Patient presented with dizziness, gait instability, and vomiting over the last 3 days prior to admission. MRI showed ethel-medullary, Right CPA, and Right temporal enhancing lesions.  Possible meningioma vs schwannoma.  Patient was monitored in the ICU for hydro watch. Workup included MRI of the Brain, C/T/L spine and CT of the chest, abdomen and pelvis which was negative for malignancies on 5/20.  Patient was seen by ENT for dysphagia and recommended patient be seen by speech and swallow preop and underwent MBS and was able to continue with a regular diet.  Due to location of the lesion, ENT recommended audiology consult.  She was found to have intact hearing but mildly decreased bilaterally. Underwent Left far lateral crani for meningioma resection with subtotal sacrifice of left vertebral artery on 6/1. Attempted extubation the same night after surgery, however she developed stridor and was reintubated.  Patient was successfully extubated on 6/6 to high flow.  Patient scoped by ENT and found  to have left vocal cord paralysis.  Post extubation patient had a lot of secretions and was given Mucomyst and Duoneb. There was a concern for PE given respiratory status - CTA chest 6/7 negative for PE but with bilateral consolidation. Patient was weaned off high flow (6/9), however frequent suctioning was still required. FED via NGT and per speech and swallow recommendations, she was kept NPO. Patient was also found to have MSSA pneumonia and was treated with Augmentin.  Speech and swallow continued to follow and patient was recommended for a PEG.  PEG was placed on 6/16. Patient also had two episodes of desaturation on 6/16, follow up with another CTA chest; which was also negative for PE, but showed b/l lower lobe consolidations and IV antibiotics (Zosyn) were started.     Patient was evaluated by PM&R and therapy for functional deficits and gait/ADL impairments and recommend acute rehabilitation.  Patient was medically optimized for discharge to [facility] on 6/20/22.  (20 Jun 2022 13:21)          Subjective:  slept during the night.  No pain.  tolerating TFs, no nausea.  had BM this AM.  has some Upper respiratory phelm.        VITALS  Vital Signs Last 24 Hrs  T(C): 36.7 (01 Jul 2022 09:52), Max: 36.7 (30 Jun 2022 20:14)  T(F): 98 (01 Jul 2022 09:52), Max: 98 (30 Jun 2022 20:14)  HR: 102 (01 Jul 2022 09:52) (80 - 102)  BP: 102/70 (01 Jul 2022 09:52) (102/70 - 119/84)  BP(mean): --  RR: 16 (01 Jul 2022 09:52) (14 - 16)  SpO2: 97% (01 Jul 2022 09:52) (93% - 97%)    REVIEW OF SYMPTOMS  Neurological deficits--dysphagia, dysarthria, hypophonia,     PHYSICAL EXAM:  Gen - NAD, Comfortable,   HEENT - NCAT,  Pulm -breathing comfortably on RA  Cardiovascular - RRR  Abdomen - Soft, NT/ND, +BS, (+) PEG tube site - clean,   Extremities - No calf tenderness  Neuro-     Cognitive - Awake and alert     Communication - Fluent, hypophonia     Cranial Nerves - PERRLA, EOMI, +mild diplopia, Slight impaired R shoulder shrug, . Visual fields intact. +dysphagia, +hypophonia,  Orolingual weakness     Motor -                     LEFT    UE - ShAB 5/5, EF 5/5, EE 5/5, WE 5/5,  5/5                    RIGHT UE - ShAB 4+/5, EF 3/5, EE 3/5, WE 3/5,  3/5                    LEFT    LE - HF 5/5, KE 5/5, DF 5/5, PF 5/5                    RIGHT LE - HF 4/5, KE 4/5, DF 3/5, PF 4/5        Sensory - Slightly diminished sensation to light touch in RUE and RLE     Coordination -- impaired on right     Tone - normal  Psychiatric - Mood stable, Affect WNL        RECENT LABS                        9.7    4.01  )-----------( 260      ( 30 Jun 2022 06:35 )             29.9     06-30    136  |  98  |  12  ----------------------------<  106<H>  4.1   |  30  |  0.43<L>    Ca    8.7      30 Jun 2022 06:35    TPro  6.4  /  Alb  2.8<L>  /  TBili  0.8  /  DBili  x   /  AST  34  /  ALT  51<H>  /  AlkPhos  86  06-30            RADIOLOGY/OTHER RESULTS      MEDICATIONS  (STANDING):  ascorbic acid 500 milliGRAM(s) Oral daily  chlorhexidine 0.12% Liquid 15 milliLiter(s) Swish and Spit two times a day  chlorhexidine 2% Cloths 1 Application(s) Topical <User Schedule>  doxycycline monohydrate Suspension 100 milliGRAM(s) Oral every 12 hours  enoxaparin Injectable 40 milliGRAM(s) SubCutaneous <User Schedule>  levalbuterol Inhalation 0.63 milliGRAM(s) Inhalation every 6 hours  melatonin 6 milliGRAM(s) Oral at bedtime  metoprolol tartrate 12.5 milliGRAM(s) Oral every 12 hours  multivitamin 1 Tablet(s) Oral daily  mupirocin 2% Nasal 1 Application(s) Both Nostrils two times a day  pantoprazole   Suspension 40 milliGRAM(s) Oral before breakfast  petrolatum white Ointment 1 Application(s) Topical daily  polyethylene glycol 3350 17 Gram(s) Oral daily  senna 2 Tablet(s) Oral at bedtime  sodium chloride 0.65% Nasal 1 Spray(s) Both Nostrils every 6 hours    MEDICATIONS  (PRN):  acetaminophen     Tablet .. 650 milliGRAM(s) Oral every 6 hours PRN Temp greater or equal to 38C (100.4F), Mild Pain (1 - 3)  ondansetron    Tablet 4 milliGRAM(s) Oral every 12 hours PRN Nausea

## 2022-07-01 NOTE — DISCHARGE NOTE PROVIDER - NSDCCAREPROVSEEN_GEN_ALL_CORE_FT
Lily, Katelyn Palmer, Zari Marroquin, Jayy Owen, Juan Lily, Katelyn Palmer, Zari Marroquin, Jayy Owen, Juan Graves, Lamont

## 2022-07-01 NOTE — PROGRESS NOTE ADULT - SUBJECTIVE AND OBJECTIVE BOX
Medicine Progress Note    Patient is a 61y old  Female who presents with a chief complaint of Meningioma (30 Jun 2022 13:56)      SUBJECTIVE / OVERNIGHT EVENTS:  seen and examined  Chart reviewed  No overnight events  Limb weakness improving with therapy  BM+  reported some discomfort on PEG site. continued nausea during PEG feeding. no vomiting. continued throat irritation. reported no secretion    ADDITIONAL REVIEW OF SYSTEMS:  no fever/chills/CP/sob/palpitation/dizziness/ abd pain/nausea/vomiting/diarrhea/constipation/headaches. all other ROS neg    MEDICATIONS  (STANDING):  ascorbic acid 500 milliGRAM(s) Oral daily  chlorhexidine 0.12% Liquid 15 milliLiter(s) Swish and Spit two times a day  chlorhexidine 2% Cloths 1 Application(s) Topical <User Schedule>  doxycycline monohydrate Suspension 100 milliGRAM(s) Oral every 12 hours  enoxaparin Injectable 40 milliGRAM(s) SubCutaneous <User Schedule>  levalbuterol Inhalation 0.63 milliGRAM(s) Inhalation every 6 hours  melatonin 6 milliGRAM(s) Oral at bedtime  metoprolol tartrate 12.5 milliGRAM(s) Oral every 12 hours  multivitamin 1 Tablet(s) Oral daily  mupirocin 2% Nasal 1 Application(s) Both Nostrils two times a day  pantoprazole   Suspension 40 milliGRAM(s) Oral before breakfast  petrolatum white Ointment 1 Application(s) Topical daily  polyethylene glycol 3350 17 Gram(s) Oral daily  senna 2 Tablet(s) Oral at bedtime  sodium chloride 0.65% Nasal 1 Spray(s) Both Nostrils every 6 hours    MEDICATIONS  (PRN):  acetaminophen     Tablet .. 650 milliGRAM(s) Oral every 6 hours PRN Temp greater or equal to 38C (100.4F), Mild Pain (1 - 3)  ondansetron    Tablet 4 milliGRAM(s) Oral every 12 hours PRN Nausea    CAPILLARY BLOOD GLUCOSE        I&O's Summary    30 Jun 2022 07:01  -  01 Jul 2022 07:00  --------------------------------------------------------  IN: 920 mL / OUT: 0 mL / NET: 920 mL        PHYSICAL EXAM:  Vital Signs Last 24 Hrs  T(C): 36.7 (01 Jul 2022 09:52), Max: 36.7 (30 Jun 2022 20:14)  T(F): 98 (01 Jul 2022 09:52), Max: 98 (30 Jun 2022 20:14)  HR: 102 (01 Jul 2022 09:52) (80 - 102)  BP: 102/70 (01 Jul 2022 09:52) (102/70 - 119/84)  BP(mean): --  RR: 16 (01 Jul 2022 09:52) (14 - 16)  SpO2: 97% (01 Jul 2022 09:52) (93% - 97%)     GENERAL: Not in distress. Alert . voice husky but much more clear today. no secretion came out with suctioning  HEENT: clear conjuctiva, MMM. no pallor or icterus  CARDIOVASCULAR: RRR S1, S2. No murmur/rubs/gallop  LUNGS: BLAE+, no rales, no wheezing, no rhonchi.    ABDOMEN: ND. Soft,  mild TP over PEG site, no guarding / rebound / rigidity. BS normoactive  EXTREMITIES: no edema. no leg or calf TP.  SKIN: warm and dry. PEG site has mild erythema. no TP  PSYCHIATRIC: Calm.  No agitation.      LABS:                        9.7    4.01  )-----------( 260      ( 30 Jun 2022 06:35 )             29.9     06-30    136  |  98  |  12  ----------------------------<  106<H>  4.1   |  30  |  0.43<L>    Ca    8.7      30 Jun 2022 06:35    TPro  6.4  /  Alb  2.8<L>  /  TBili  0.8  /  DBili  x   /  AST  34  /  ALT  51<H>  /  AlkPhos  86  06-30              COVID-19 PCR: NotDetec (27 Jun 2022 06:49)  COVID-19 PCR: NotDetec (20 Jun 2022 23:35)  COVID-19 PCR: NotDetec (20 Jun 2022 10:32)  COVID-19 PCR: NotDetec (15 Polo 2022 16:24)  COVID-19 PCR: NotDetec (13 Jun 2022 05:08)  COVID-19 PCR: NotDetec (31 May 2022 06:12)  COVID-19 PCR: NotDetec (26 May 2022 17:34)  COVID-19 PCR: NotDetec (19 May 2022 16:02)      RADIOLOGY & ADDITIONAL TESTS:  Imaging from Last 24 Hours:    Electrocardiogram/QTc Interval:    COORDINATION OF CARE:  Care Discussed with Consultants/Other Providers:

## 2022-07-01 NOTE — PROGRESS NOTE ADULT - ASSESSMENT
60 yo Mandarin speaking F with PMH of HTN, C Spine meningioma (removed 2018 in [place]) who presented with dizziness, ataxia, and emesis.  MRI with ethel-medullary. right CPA, and right temporal enhancing lesions concern for meningioma vs schwannoma, with hydro.  Now S/p Left far lateral crani for meningioma resection with subtotal sacrifice of left vertebral artery (6/1).  Noted with vocal cord paralysis, dysphagia - NPO on TF (PEG placed 6/16). Also right Hemiparesis, sensory, gait and ADL impairment.     #Meningioma s/p L lateral crani for meningioma resection with subtotal sacrifice of L vertebral artery on 6/1  - completed Decadron taper as per neurosurgery  -Continue comprehensive rehab program -PT/OT/SLP per rehab team  -Pain management, bowel regimen per rehab   - fall, aspiration precautions    #Dysphagia s/p PEG placement  #nausea  - cont tube feeds. slower rate  - aspiration precautions  - SLP eval as per primary    #Left vocal cord paralysis  # Upper airway secretion  # probable MRSA pneumonia   # h/o MSSA PNA and aspiration PNA  - WBC normalized.   - SPUTUM CX: MRSA  - on Augmentin since 6/25. changed to doxycycline on 6/28 to cover MRSA. continue 5-7 days total  - Completed 5 days course of zosyn on 6/20  - Xopenex, Mucomyst neb, 3% saline INH, saline nasal spray, suction for secretion control   - MRSA PCR+ 6/26  - mupirocin 2% TP, chlorhexidine  - off contact precautions as per Infection control  - incentive spirometry  - Chest PT/PD  - monitor temp/cbc. send pan cx and start abx if febrile  - suction PRN  - pulmonary f/u noted and appreciated  - ENT cx. as per MAGGIE Frey patient has bee refusing laryngoscopy. no note. suggested to call back once patient is ready for MBS      #PAF  -Pt with possible transient A fib with rvr during RRT at [facility]. EKG showed sinus tach and no evidence pafib on tele  -Cont with low dose lopressor  -TTE reviewed: EF 75%. hyperdynamic LV systolic function     #Urinary retention  - s/p pedro 6/16  - Pedro removed 6/21 PM for TOV 6/22  --Pt. voiding well. low PVRs    #HTN  - BP soft  -cont with lopressor   -was on losartan and amlodipine, on hold due to BP on lower side  - check orthostasis periodically. can hold lopressor if BP low or significant orthostasis and HR controlled    Transaminitis  -Monitor LFTs, avoid hepatotoxins    Anemia  - h/h stable    Hyponatremia  - sodium normalized  - likely SIADH  - c/w fluid restriction 1200 mls/day.   - Monitor BMP     DVT ppx - lovenox  GI ppx - pepcid     d/w Dr. Bautista and bedside RN in [facility]    preferred language: Deanae, Mandarin

## 2022-07-01 NOTE — DISCHARGE NOTE PROVIDER - HOSPITAL COURSE
61 year old Female with PMHx of HTN and C-spine meningioma removal in 2018 in [place]. Patient presented with dizziness, gait instability, and vomiting over the last 3 days prior to admission. MRI showed ethel-medullary, Right CPA, and Right temporal enhancing lesions.  Possible meningioma vs schwannoma.  Patient was monitored in the ICU for hydro watch. Workup included MRI of the Brain, C/T/L spine and CT of the chest, abdomen and pelvis which was negative for malignancies on 5/20.  Patient was seen by ENT for dysphagia and recommended patient be seen by speech and swallow preop and underwent MBS and was able to continue with a regular diet.  Due to location of the lesion, ENT recommended audiology consult.  She was found to have intact hearing but mildly decreased bilaterally. Underwent Left far lateral crani for meningioma resection with subtotal sacrifice of left vertebral artery on 6/1. Attempted extubation the same night after surgery, however she developed stridor and was reintubated.  Patient was successfully extubated on 6/6 to high flow.  Patient scoped by ENT and found  to have left vocal cord paralysis.  Post extubation patient had a lot of secretions and was given Mucomyst and Duoneb. There was a concern for PE given respiratory status - CTA chest 6/7 negative for PE but with bilateral consolidation. Patient was weaned off high flow (6/9), however frequent suctioning was still required. FED via NGT and per speech and swallow recommendations, she was kept NPO. Patient was also found to have MSSA pneumonia and was treated with Augmentin.  Speech and swallow continued to follow and patient was recommended for a PEG.  PEG was placed on 6/16. Patient also had two episodes of desaturation on 6/16, follow up with another CTA chest; which was also negative for PE, but showed b/l lower lobe consolidations and IV antibiotics (Zosyn) were started.     Patient was evaluated by PM&R and therapy for functional deficits and gait/ADL impairments and recommend acute rehabilitation.  Patient was medically optimized for discharge to [facility] on 6/20/22.  (20 Jun 2022 13:21)    Pt was stable upon rehab admission to  [facility]. Admitted with gait instabilty, ADL, and functional impairments.     Rehab Course significant for dysphagia, increased secretions, aspiration pneumonia suspected (MRSA on sputum culture) treated w/ doxycycline. Reflux w/ TF, volumes adjusted and started on PPI.     All other medical co-morbidities were stable.     Pt tolerated course of inpatient PT/OT/SLP rehab with significant functional improvements and met rehab goals prior to discharge.  Functional status:  OT: TotA Eating, Sup-- grooming, min UBD, mod LBD, min toileting to commode, modA transfer to tub bench, total bathing  PT: Antoni transfer, Amb. 50ft RW modA, 4 steps modA    Speech: NPO/PEG; Hypophonia, secretions improved, Mod A cognition,  mod cog deficits/PS/memory      Pt was medically cleared on 7/11 for discharged to [facility]    Pt will follow up with the following: neurosurgery, PCP,    61 year old Female with PMHx of HTN and C-spine meningioma removal in 2018 in [place]. Patient presented with dizziness, gait instability, and vomiting over the last 3 days prior to admission. MRI showed ethel-medullary, Right CPA, and Right temporal enhancing lesions.  Possible meningioma vs schwannoma.  Patient was monitored in the ICU for hydro watch. Workup included MRI of the Brain, C/T/L spine and CT of the chest, abdomen and pelvis which was negative for malignancies on 5/20.  Patient was seen by ENT for dysphagia and recommended patient be seen by speech and swallow preop and underwent MBS and was able to continue with a regular diet.  Due to location of the lesion, ENT recommended audiology consult.  She was found to have intact hearing but mildly decreased bilaterally. Underwent Left far lateral crani for meningioma resection with subtotal sacrifice of left vertebral artery on 6/1. Attempted extubation the same night after surgery, however she developed stridor and was reintubated.  Patient was successfully extubated on 6/6 to high flow.  Patient scoped by ENT and found  to have left vocal cord paralysis.  Post extubation patient had a lot of secretions and was given Mucomyst and Duoneb. There was a concern for PE given respiratory status - CTA chest 6/7 negative for PE but with bilateral consolidation. Patient was weaned off high flow (6/9), however frequent suctioning was still required. FED via NGT and per speech and swallow recommendations, she was kept NPO. Patient was also found to have MSSA pneumonia and was treated with Augmentin.  Speech and swallow continued to follow and patient was recommended for a PEG.  PEG was placed on 6/16. Patient also had two episodes of desaturation on 6/16, follow up with another CTA chest; which was also negative for PE, but showed b/l lower lobe consolidations and IV antibiotics (Zosyn) were started.     Patient was evaluated by PM&R and therapy for functional deficits and gait/ADL impairments and recommend acute rehabilitation.  Patient was medically optimized for discharge to [facility] on 6/20/22.  (20 Jun 2022 13:21)    Pt was stable upon rehab admission to  [facility]. Admitted with gait instabilty, ADL, and functional impairments.     Rehab Course significant for dysphagia, increased secretions, aspiration pneumonia suspected (MRSA on sputum culture) treated w/ doxycycline. Reflux w/ TF, volumes adjusted and started on PPI.     All other medical co-morbidities were stable.     Pt tolerated course of inpatient PT/OT/SLP rehab with significant functional improvements and met rehab goals prior to discharge.  Functional status:  OT: TotA Eating, Sup-- grooming, min UBD, mod LBD, min toileting to commode, modA transfer to tub bench, total bathing  PT: Antoni transfer, Amb. 50ft RW modA, 4 steps modA    Speech: NPO/PEG; Hypophonia, secretions improved, Mod A cognition,  mod cog deficits/PS/memory      Pt was medically cleared on 7/11 for discharged to [facility]    Pt will follow up with the following: neurosurgery, PCP, ENT   61 year old Female with PMHx of HTN and C-spine meningioma removal in 2018 in [place]. Patient presented with dizziness, gait instability, and vomiting over the last 3 days prior to admission. MRI showed ethel-medullary, Right CPA, and Right temporal enhancing lesions.  Possible meningioma vs schwannoma.  Patient was monitored in the ICU for hydro watch. Workup included MRI of the Brain, C/T/L spine and CT of the chest, abdomen and pelvis which was negative for malignancies on 5/20.  Patient was seen by ENT for dysphagia and recommended patient be seen by speech and swallow preop and underwent MBS and was able to continue with a regular diet.  Due to location of the lesion, ENT recommended audiology consult.  She was found to have intact hearing but mildly decreased bilaterally. Underwent Left far lateral crani for meningioma resection with subtotal sacrifice of left vertebral artery on 6/1. Attempted extubation the same night after surgery, however she developed stridor and was reintubated.  Patient was successfully extubated on 6/6 to high flow.  Patient scoped by ENT and found  to have left vocal cord paralysis.  Post extubation patient had a lot of secretions and was given Mucomyst and Duoneb. There was a concern for PE given respiratory status - CTA chest 6/7 negative for PE but with bilateral consolidation. Patient was weaned off high flow (6/9), however frequent suctioning was still required. FED via NGT and per speech and swallow recommendations, she was kept NPO. Patient was also found to have MSSA pneumonia and was treated with Augmentin.  Speech and swallow continued to follow and patient was recommended for a PEG.  PEG was placed on 6/16. Patient also had two episodes of desaturation on 6/16, follow up with another CTA chest; which was also negative for PE, but showed b/l lower lobe consolidations and IV antibiotics (Zosyn) were started.     Patient was evaluated by PM&R and therapy for functional deficits and gait/ADL impairments and recommend acute rehabilitation.  Patient was medically optimized for discharge to [facility] on 6/20/22.  (20 Jun 2022 13:21)    Pt was stable upon rehab admission to  [facility]. Admitted with gait instabilty, ADL, and functional impairments.     Rehab Course significant for dysphagia, increased secretions, aspiration pneumonia suspected (MRSA on sputum culture) treated w/ doxycycline. Reflux w/ TF, volumes adjusted and started on PPI.     All other medical co-morbidities were stable.     Pt tolerated course of inpatient PT/OT/SLP rehab with significant functional improvements and met rehab goals prior to discharge.  Functional status:  OT: TotA Eating, Sup-- grooming, min A--dressing and bathroom transfers, mod A-- bathing and toileting;    PT: CG-- transfer, & Amb. 100ft RW modA, 8 steps --CG    Speech: NPO/PEG; Focus on Dysphagia- and Hypophonia from VC paralysis, Mild cog deficits/PS/memory--but will be assisted by her family at home      Pt was medically cleared on 7/15 for discharged to home    Pt will follow up with the following: neurosurgery, PCP, ENT   61 year old Female with PMHx of HTN and C-spine meningioma removal in 2018 in [place]. Patient presented with dizziness, gait instability, and vomiting over the last 3 days prior to admission. MRI showed ethel-medullary, Right CPA, and Right temporal enhancing lesions.  Possible meningioma vs schwannoma.  Patient was monitored in the ICU for hydro watch. Workup included MRI of the Brain, C/T/L spine and CT of the chest, abdomen and pelvis which was negative for malignancies on 5/20.  Patient was seen by ENT for dysphagia and recommended patient be seen by speech and swallow preop and underwent MBS and was able to continue with a regular diet.  Due to location of the lesion, ENT recommended audiology consult.  She was found to have intact hearing but mildly decreased bilaterally. Underwent Left far lateral crani for meningioma resection with subtotal sacrifice of left vertebral artery on 6/1. Attempted extubation the same night after surgery, however she developed stridor and was reintubated.  Patient was successfully extubated on 6/6 to high flow.  Patient scoped by ENT and found  to have left vocal cord paralysis.  Post extubation patient had a lot of secretions and was given Mucomyst and Duoneb. There was a concern for PE given respiratory status - CTA chest 6/7 negative for PE but with bilateral consolidation. Patient was weaned off high flow (6/9), however frequent suctioning was still required. FED via NGT and per speech and swallow recommendations, she was kept NPO. Patient was also found to have MSSA pneumonia and was treated with Augmentin.  Speech and swallow continued to follow and patient was recommended for a PEG.  PEG was placed on 6/16. Patient also had two episodes of desaturation on 6/16, follow up with another CTA chest; which was also negative for PE, but showed b/l lower lobe consolidations and IV antibiotics (Zosyn) were started.     Patient was evaluated by PM&R and therapy for functional deficits and gait/ADL impairments and recommend acute rehabilitation.  Patient was medically optimized for discharge to [facility] on 6/20/22.  (20 Jun 2022 13:21)    Pt was stable upon rehab admission to  [facility]. Admitted with gait instabilty, ADL, and functional impairments.     Rehab Course significant for dysphagia, increased secretions, aspiration pneumonia suspected (MRSA on sputum culture) treated w/ doxycycline. Reflux w/ TF, volumes adjusted and started on PPI.   Patient with persistent cough switched from Robitussin to Mucinex.  All other medical co-morbidities were stable.     IDT 7/14:  SW: lives in  with son, and DIL; 1st floor s/u. son and DIL work, may not be able to provide necessary assistance.  OT: TotA Eating, Sup-- grooming & UBD; CG--LB dressing and bathroom transfers, min A-- bathing and toileting;    PT: CG/CS-- transfer, & Amb. 150ft RW, 8 steps --Min A/CG  with no HR  Speech: NPO/PEG; Severe Dysphagia- and Hypophonia from VC paralysis, Mild cog deficits/PS/memory-, Impulsive    Pt was medically cleared on 7/15 for discharged to home    Pt will follow up with the following: neurosurgery, PCP, ENT, GI    GI follow with Dr. Daniele Wesley for routine PEG check-up   61 year old Female with PMHx of HTN and C-spine meningioma removal in 2018 in [place]. Patient presented with dizziness, gait instability, and vomiting over the last 3 days prior to admission. MRI showed ethel-medullary, Right CPA, and Right temporal enhancing lesions.  Possible meningioma vs schwannoma.  Patient was monitored in the ICU for hydro watch. Workup included MRI of the Brain, C/T/L spine and CT of the chest, abdomen and pelvis which was negative for malignancies on 5/20.  Patient was seen by ENT for dysphagia and recommended patient be seen by speech and swallow preop and underwent MBS and was able to continue with a regular diet.  Due to location of the lesion, ENT recommended audiology consult.  She was found to have intact hearing but mildly decreased bilaterally. Underwent Left far lateral crani for meningioma resection with subtotal sacrifice of left vertebral artery on 6/1. Attempted extubation the same night after surgery, however she developed stridor and was reintubated.  Patient was successfully extubated on 6/6 to high flow.  Patient scoped by ENT and found  to have left vocal cord paralysis.  Post extubation patient had a lot of secretions and was given Mucomyst and Duoneb. There was a concern for PE given respiratory status - CTA chest 6/7 negative for PE but with bilateral consolidation. Patient was weaned off high flow (6/9), however frequent suctioning was still required. FED via NGT and per speech and swallow recommendations, she was kept NPO. Patient was also found to have MSSA pneumonia and was treated with Augmentin.  Speech and swallow continued to follow and patient was recommended for a PEG.  PEG was placed on 6/16. Patient also had two episodes of desaturation on 6/16, follow up with another CTA chest; which was also negative for PE, but showed b/l lower lobe consolidations and IV antibiotics (Zosyn) were started.     Patient was evaluated by PM&R and therapy for functional deficits and gait/ADL impairments and recommend acute rehabilitation.  Patient was medically optimized for discharge to [facility] on 6/20/22.  (20 Jun 2022 13:21)    Pt was stable upon rehab admission to  [facility]. Admitted with gait instabilty, ADL, and functional impairments.     Rehab Course significant for dysphagia, increased secretions, aspiration pneumonia suspected (MRSA on sputum culture) treated w/ doxycycline. Reflux w/ TF, volumes adjusted and started on PPI.   Patient with persistent cough switched from Robitussin to Mucinex.  All other medical co-morbidities were stable.     IDT 7/14:  SW: lives in  with son, and DIL; 1st floor s/u. son and DIL work, may not be able to provide necessary assistance.  OT: TotA Eating, Sup-- grooming & UBD; CG--LB dressing and bathroom transfers, min A-- bathing and toileting;    PT: CG/CS-- transfer, & Amb. 150ft RW, 8 steps --Min A/CG  with no HR  Speech: NPO/PEG; Severe Dysphagia- and Hypophonia from VC paralysis, Mild cog deficits/PS/memory-, Impulsive    Pt was medically cleared on 7/15 for discharged to home.     Pt will follow up with the following: neurosurgery, PCP, ENT, GI    GI follow with Dr. Daniele Wesley for routine PEG check-up.   61 year old Female with PMHx of HTN and C-spine meningioma removal in 2018 in [place]. Patient presented with dizziness, gait instability, and vomiting over the last 3 days prior to admission. MRI showed ethel-medullary, Right CPA, and Right temporal enhancing lesions.  Possible meningioma vs schwannoma.  Patient was monitored in the ICU for hydro watch. Workup included MRI of the Brain, C/T/L spine and CT of the chest, abdomen and pelvis which was negative for malignancies on 5/20.  Patient was seen by ENT for dysphagia and recommended patient be seen by speech and swallow preop and underwent MBS and was able to continue with a regular diet.  Due to location of the lesion, ENT recommended audiology consult.  She was found to have intact hearing but mildly decreased bilaterally. Underwent Left far lateral crani for meningioma resection with subtotal sacrifice of left vertebral artery on 6/1. Attempted extubation the same night after surgery, however she developed stridor and was reintubated.  Patient was successfully extubated on 6/6 to high flow.  Patient scoped by ENT and found  to have left vocal cord paralysis.  Post extubation patient had a lot of secretions and was given Mucomyst and Duoneb. There was a concern for PE given respiratory status - CTA chest 6/7 negative for PE but with bilateral consolidation. Patient was weaned off high flow (6/9), however frequent suctioning was still required. FED via NGT and per speech and swallow recommendations, she was kept NPO. Patient was also found to have MSSA pneumonia and was treated with Augmentin.  Speech and swallow continued to follow and patient was recommended for a PEG.  PEG was placed on 6/16. Patient also had two episodes of desaturation on 6/16, follow up with another CTA chest; which was also negative for PE, but showed b/l lower lobe consolidations and IV antibiotics (Zosyn) were started.     Patient was evaluated by PM&R and therapy for functional deficits and gait/ADL impairments and recommend acute rehabilitation.  Patient was medically optimized for discharge to [facility] on 6/20/22.  (20 Jun 2022 13:21)    Pt was stable upon rehab admission to  [facility]. Admitted with gait instabilty, ADL, and functional impairments.     Rehab Course significant for dysphagia, increased secretions, aspiration pneumonia suspected (MRSA on sputum culture) treated w/ doxycycline. Reflux w/ TF, volumes adjusted and started on PPI.   Patient with persistent cough switched from Robitussin to Mucinex.  All other medical co-morbidities were stable.     IDT 7/14:  SW: lives in  with son, and DIL; 1st floor s/u. son and DIL work, may not be able to provide necessary assistance.  OT: TotA Eating, Sup-- grooming & UBD; CG--LB dressing and bathroom transfers, min A-- bathing and toileting;    PT: CG/CS-- transfer, & Amb. 150ft RW, 8 steps --Min A/CG  with no HR  Speech: NPO/PEG; Severe Dysphagia- and Hypophonia from VC paralysis, Mild cog deficits/PS/memory-, Impulsive    Pt was medically cleared on 7/15 for discharged to home.     Patient w/ RLE weakness, foot drop, inversion, genu recurvatum post surgery for removal of brain mass. Due to these, patient is at risk for falls and has medical necessity for a right custom articulating AFO with varus flange. She is unable to use a prefabricated brace as use of the orthosis will be for longer than six months and because the patient's foot requires control in more than one plane. Patient would otherwise continue to be at risk for falls without a custom AFO.   Motor strength exam: R hip flexion/ext 3/5, Knee flex 2/5, knee ext 4/5, dorsiflexion 2/5,  plantarflexion 2/5.     Pt will follow up with the following: neurosurgery, PCP, ENT, GI    GI follow with Dr. Daniele Wesley for routine PEG check-up.

## 2022-07-01 NOTE — PROGRESS NOTE ADULT - ASSESSMENT
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  MRI with ethel-medullary. right CPA, and right temporal enhancing lesions concern for meningioma vs schwannoma, with hydro.  Now S/p Left far lateral crani for meningioma resection with subtotal sacrifice of left vertebral artery (6/1).  Noted with vocal cord paralysis, dysphagia - NPO on TF (PEG placed 6/16). Also right Hemiparesis, sensory, gait and ADL impairment.     COMORBIDITES/ACTIVE MEDICAL ISSUES     #Brain tumor - Meningioma   - S/p Left far lateral crani for meningioma resection with subtotal sacrifice of left vertebral artery (6/1)  - Dexamethasone taper -completed (6/25)  - Gait Instability, ADL impairments and Functional impairments: cont Comprehensive Rehab Program of PT/OT & speech    #Transient A fib with RVR  - s/p amiodarone IVP x 1  - Sinus tach - EKG without evidence of a fib  - Metoprolol 12.5 BID    # Cranial stenosis  - CTA head showed defect in the right M1 suspicious for possible right M1 occlusion though the vessel   - Recommended to "continue ASA 81mg qd and atorvastatin 40mg qhs" on discharge paperwork, however patient not on it in prior hospital  - Lipid panel 5/21 unremarkable  -restarted atorvastatin  --Contacted Dr. Liu to clarify if pt. ok to restart ASA-- stated would hold for now if it was for carotid stenosis .    #HTN  - - cont. metoprolol 12.5 BID     #URI  ---CXR w/ L lower lobe infiltrate/atelectasis , no consolidation noted   - Hospitalist changed augmentin to doxycycline to cover MRSA in sputum culture  -MRSA nares + > started on bactroban Switched to doxycycline after c/s returned (6/28)  - management per hospitalist  - Encourage incentive spirometry     Tachycardia  -increase H2O flushes via PEG-- 200cc QID-tolerating  - monitor    # Urinary retention  - --Pt. voiding with low PVRs  --toileting program q.3h while awake    #Pain control  - Tylenol PRN, Tramadol    #GI/Bowel Mgmt   - At risk for constipation due to neurologic diagnosis, immobility and/or medication use  - Senna,  Miralax   - Abd xray 6/27 - unremarkable    #DVT  - Lovenox    Dysphagia  Vocal cord paralysis  - Diet - NPO with TF + free water flush: 200cc  - PEG placed (6/16)  - SLP - evaluation and treatment  --ENT evaluated but pt denied scope - asked to re-call ENT when deemed ready for MBS by SLP to attempt scope.  - cont. vital stimulation in speech therapy  - Self suctioning    Precautions / PROPHYLAXIS:   - Falls  - Lungs: Aspiration,   - Pressure injury/Skin: Turn Q2hrs while in bed, OOB to Chair, PT/OT      IDT 6/30:  SW: lives in  with son, and DIL; 1st floor s/u. son and DIL work, may not be able to provide necessary assistance.  OT: TotA Eating, Sup-- grooming, min UBD, mod LBD, min toileting to commode, modA transfer to tub bench, total bathing  PT: Antoni transfer, Amb. 50ft RW modA, 4 steps modA    Speech: NPO/PEG; Hypophonia, secretions improved, Mod A cognition,  mod cog deficits/PS/memory  ELOS: 7/11/22 BOB likely    Follow ups:  Giovanni Liu)  Neurosurgery  General  [address] [place] [address], [address]  [place] [address]  Phone: [phone]  Fax: [phone]

## 2022-07-01 NOTE — DISCHARGE NOTE PROVIDER - CARE PROVIDER_API CALL
Giovanni Liu)  Neurosurgery  General  [address] [place] [address], [address]  [place] [address]  Phone: [phone]  Fax: [phone]  Follow Up Time: 2 weeks   Giovanni Liu)  Neurosurgery  General  [address] [facility], [address]  [place] [address]  Phone: [phone]  Fax: [phone]  Follow Up Time: 2 weeks    Sabina Nolasco)  Otolaryngology Facial Plastics  [address] [facility] [facility] [address]  [place] [address]  Phone: [phone]  Fax: [phone]  Follow Up Time: 2 weeks   Giovanni Liu)  Neurosurgery  General  [address] [facility], [address]  [place] [address]  Phone: [phone]  Fax: [phone]  Follow Up Time: 2 weeks    Katelyn Bautista (DO)  Brain Injury Medicine; PhysicalRehab Medicine  [address] [place] [address]  Phone: [phone]  Fax: [phone]  Follow Up Time: 1 month    Daniele Gurrola  OTOLARYNGOLOGY  [address] [place] [address]  Phone: [phone]  Fax: [phone]  Follow Up Time: 2 weeks    Daniele Wesley)  Internal Medicine  [address] [place] [address]  Phone: [phone]  Fax: [phone]  Follow Up Time: 2 weeks

## 2022-07-01 NOTE — DISCHARGE NOTE PROVIDER - PROVIDER TOKENS
PROVIDER:[TOKEN:[[id number]],FOLLOWUP:[2 weeks]] PROVIDER:[TOKEN:[[id number]],FOLLOWUP:[2 weeks]],PROVIDER:[TOKEN:[[id number]],FOLLOWUP:[2 weeks]] PROVIDER:[TOKEN:[[id number]],FOLLOWUP:[2 weeks]],PROVIDER:[TOKEN:[[id number]],FOLLOWUP:[1 month]],PROVIDER:[TOKEN:[[id number]],FOLLOWUP:[2 weeks]],PROVIDER:[TOKEN:[[id number]],FOLLOWUP:[2 weeks]]

## 2022-07-01 NOTE — DISCHARGE NOTE PROVIDER - NSDCMRMEDTOKEN_GEN_ALL_CORE_FT
acetaminophen 325 mg oral tablet: 2 tab(s) orally every 6 hours, As needed, Temp greater or equal to 38C (100.4F), Mild Pain (1 - 3)  acetylcysteine 20% inhalation solution: 3 milliliter(s) inhaled every 6 hours  ascorbic acid 500 mg oral tablet: 1 tab(s) orally once a day  Aspirin Enteric Coated 81 mg oral delayed release tablet: 1 tab(s) orally once a day (Indication: Cartoid Stenosis)  atorvastatin 40 mg oral tablet: 1 tab(s) orally once a day  dexamethasone 2 mg oral tablet: 1 tab(s) orally every 12 hours x 2 days, 1 tab daily x 2 days and stop.   enoxaparin: 40 milligram(s) subcutaneous once a day (at bedtime)  Fish Oil 1000 mg oral capsule: 1 cap(s) orally once a day  levalbuterol 0.63 mg/3 mL inhalation solution: 3 milliliter(s) inhaled every 6 hours  melatonin 5 mg oral tablet: 1 tab(s) orally once a day (at bedtime)  metoprolol: 12.5 milligram(s) orally 2 times a day via PEG  Multiple Vitamins oral tablet: 1 tab(s) orally once a day  omeprazole 40 mg oral delayed release capsule: 1 cap(s) orally once a day  ondansetron 4 mg oral tablet: 1 tab(s) orally every 12 hours, As needed, Nausea  polyethylene glycol 3350 oral powder for reconstitution: 17 gram(s) orally once a day  senna oral tablet: 2 tab(s) orally once a day (at bedtime)  traMADol 50 mg oral tablet: 0.5 tab(s) orally every 4 hours, As needed, Moderate Pain (4 - 6)  traMADol 50 mg oral tablet: 1 tab(s) orally every 4 hours, As needed, Severe Pain (7 - 10)  Vitamin D2 1.25 mg (50,000 intl units) oral capsule: 1 cap(s) orally once a week   acetaminophen 325 mg oral tablet: 2 tab(s) orally every 6 hours, As needed, Temp greater or equal to 38C (100.4F), Mild Pain (1 - 3)  acetylcysteine 20% inhalation solution: 3 milliliter(s) inhaled every 6 hours  ascorbic acid 500 mg oral tablet: 1 tab(s) orally once a day  Aspirin Enteric Coated 81 mg oral delayed release tablet: 1 tab(s) orally once a day (Indication: Cartoid Stenosis)  atorvastatin 40 mg oral tablet: 1 tab(s) orally once a day  dexamethasone 2 mg oral tablet: 1 tab(s) orally every 12 hours x 2 days, 1 tab daily x 2 days and stop.   enoxaparin: 40 milligram(s) subcutaneous once a day (at bedtime)  Fish Oil 1000 mg oral capsule: 1 cap(s) orally once a day  levalbuterol 0.63 mg/3 mL inhalation solution: 3 milliliter(s) inhaled every 6 hours  melatonin 5 mg oral tablet: 1 tab(s) orally once a day (at bedtime)  metoprolol: 12.5 milligram(s) orally 2 times a day via PEG  Multiple Vitamins oral tablet: 1 tab(s) orally once a day  omeprazole 40 mg oral delayed release capsule: 1 cap(s) orally once a day  ondansetron 4 mg oral tablet: 1 tab(s) orally every 12 hours, As needed, Nausea  PEG supplies: 60 cc Syringe x 1  Drainage Gauze x 100   polyethylene glycol 3350 oral powder for reconstitution: 17 gram(s) orally once a day  senna oral tablet: 2 tab(s) orally once a day (at bedtime)  traMADol 50 mg oral tablet: 0.5 tab(s) orally every 4 hours, As needed, Moderate Pain (4 - 6)  traMADol 50 mg oral tablet: 1 tab(s) orally every 4 hours, As needed, Severe Pain (7 - 10)  Tube feeding via PEG : 2 dori  cc via PEG QID 8am, 12pm, 4pm &amp; 8pm.   H2O flushes 200 cc via PEG 4 x day at least 1 hour apart from Tube feeding   Vitamin D2 1.25 mg (50,000 intl units) oral capsule: 1 cap(s) orally once a week   acetaminophen 325 mg oral tablet: 2 tab(s) orally every 6 hours, As needed, Temp greater or equal to 38C (100.4F), Mild Pain (1 - 3)  ascorbic acid 500 mg oral tablet: 1 tab(s) orally once a day  Fish Oil 1000 mg oral capsule: 1 cap(s) orally once a day  levalbuterol 0.63 mg/3 mL inhalation solution: 3 milliliter(s) inhaled every 6 hours  Multiple Vitamins oral tablet: 1 tab(s) orally once a day  omeprazole 40 mg oral delayed release capsule: 1 cap(s) orally once a day  PEG supplies: 60 cc Syringe x 1  Drainage Gauze x 100   polyethylene glycol 3350 oral powder for reconstitution: 17 gram(s) orally once a day  senna oral tablet: 2 tab(s) orally once a day (at bedtime)  Tube feeding via PEG : 2 dori  cc via PEG QID 8am, 12pm, 4pm &amp; 8pm.   H2O flushes 200 cc via PEG 4 x day at least 1 hour apart from Tube feeding   Vitamin D2 1.25 mg (50,000 intl units) oral capsule: 1 cap(s) orally once a week   acetaminophen 325 mg oral tablet: 2 tab(s) orally every 6 hours, As needed, Temp greater or equal to 38C (100.4F), Mild Pain (1 - 3)  ascorbic acid 500 mg oral tablet: 1 tab(s) orally once a day  metoprolol tartrate 25 mg oral tablet: 0.5 tab(s) orally 2 times a day   Mucinex 600 mg oral tablet, extended release: 1 tab(s) orally every 12 hours, As Needed -for cough   Occupational therapy: Occupational Therapy BIW x 8 weeks  Dx: left meningioma s/p resection with left vertebral artery sacrifice  Eval and Treat, ADLs, IADLs, right UE strengthening and coordination, HEP  omeprazole 40 mg oral delayed release capsule: 1 cap(s) orally once a day  PEG supplies: 60 cc Syringe x 1  Drainage Gauze x 100   Physical Therapy: Physical Therapy BIW x 8 weeks  Dx: left meningioma s/p resection with left vertebral artery sacrifice  Eval and Treat, Balance, Community ambulation, stairs, right sided strengthening, Endurance, HEP  polyethylene glycol 3350 oral powder for reconstitution: 17 gram(s) orally once a day  right custom molded AFO: right custom molded AFO  Dx: left meningioma  senna leaf extract oral tablet: 2 tab(s) orally once a day (at bedtime)  speech therapy: Speech Therapy BIW x 8 weeks  Dx: left meningioma s/p resection with left vertebral artery sacrifice, Dysphagia s/p PEG, Hypophonia--left Vocal cord paralysis    Eval and Treat, Oromotor exercises, Vital stimulation, Swallowing, Cognitive linguistic skills, HEP  Tube feeding via PEG : 2 dori  cc via PEG QID 8am, 12pm, 4pm &amp; 8pm.   H2O flushes 200 cc via PEG 4 x day at least 1 hour apart from Tube feeding   Xopenex 0.63 mg/3 mL inhalation solution: 3 milliliter(s) inhaled every 6 hours, As needed, Shortness of Breath   acetaminophen 325 mg oral tablet: 2 tab(s) orally every 6 hours, As needed, Temp greater or equal to 38C (100.4F), Mild Pain (1 - 3)  ascorbic acid 500 mg oral tablet: 1 tab(s) orally once a day  metoprolol tartrate 25 mg oral tablet: 0.5 tab(s) orally 2 times a day   Mucinex 600 mg oral tablet, extended release: 1 tab(s) orally every 12 hours, As Needed -for cough   Occupational therapy: Occupational Therapy BIW x 8 weeks  Dx: left meningioma s/p resection with left vertebral artery sacrifice  Eval and Treat, ADLs, IADLs, right UE strengthening and coordination, HEP  pantoprazole 40 mg oral granule, delayed release: 40 milligram(s) orally once a day   PEG supplies: 60 cc Syringe x 1  Drainage Gauze x 100   Physical Therapy: Physical Therapy BIW x 8 weeks  Dx: left meningioma s/p resection with left vertebral artery sacrifice  Eval and Treat, Balance, Community ambulation, stairs, right sided strengthening, Endurance, HEP  polyethylene glycol 3350 oral powder for reconstitution: 17 gram(s) orally once a day  right custom molded AFO: right custom molded AFO  Dx: left meningioma  senna leaf extract oral tablet: 2 tab(s) orally once a day (at bedtime)  speech therapy: Speech Therapy BIW x 8 weeks  Dx: left meningioma s/p resection with left vertebral artery sacrifice, Dysphagia s/p PEG, Hypophonia--left Vocal cord paralysis    Eval and Treat, Oromotor exercises, Vital stimulation, Swallowing, Cognitive linguistic skills, HEP  Tube feeding via PEG : 2 dori  cc via PEG QID 8am, 12pm, 4pm &amp; 8pm.   H2O flushes 200 cc via PEG 4 x day at least 1 hour apart from Tube feeding   Xopenex 0.63 mg/3 mL inhalation solution: 3 milliliter(s) inhaled every 6 hours, As needed, Shortness of Breath   acetaminophen 325 mg oral tablet: 2 tab(s) orally every 6 hours, As needed, Temp greater or equal to 38C (100.4F), Mild Pain (1 - 3)  ascorbic acid 500 mg oral tablet: 1 tab(s) orally once a day  metoprolol tartrate 25 mg oral tablet: 0.5 tab(s) orally 2 times a day   Mucinex 600 mg oral tablet, extended release: 1 tab(s) orally every 12 hours, As Needed -for cough   Occupational therapy: Occupational Therapy BIW x 8 weeks  Dx: left meningioma s/p resection with left vertebral artery sacrifice  Eval and Treat, ADLs, IADLs, right UE strengthening and coordination, HEP  omeprazole 40 mg oral delayed release capsule: 1 cap(s) orally once a day  PEG supplies: 60 cc Syringe x 1  Drainage Gauze x 100   Physical Therapy: Physical Therapy BIW x 8 weeks  Dx: left meningioma s/p resection with left vertebral artery sacrifice  Eval and Treat, Balance, Community ambulation, stairs, right sided strengthening, Endurance, HEP  polyethylene glycol 3350 oral powder for reconstitution: 17 gram(s) orally once a day  right custom molded AFO: right custom molded AFO  Dx: left meningioma  senna leaf extract oral tablet: 2 tab(s) orally once a day (at bedtime)  speech therapy: Speech Therapy BIW x 8 weeks  Dx: left meningioma s/p resection with left vertebral artery sacrifice, Dysphagia s/p PEG, Hypophonia--left Vocal cord paralysis    Eval and Treat, Oromotor exercises, Vital stimulation, Swallowing, Cognitive linguistic skills, HEP  Tube feeding via PEG : 2 dori  cc via PEG QID 8am, 12pm, 4pm &amp; 8pm.   H2O flushes 200 cc via PEG 4 x day at least 1 hour apart from Tube feeding

## 2022-07-01 NOTE — DISCHARGE NOTE PROVIDER - CARE PROVIDERS DIRECT ADDRESSES
,[email or web address] ,[email or web address],[email or web address]

## 2022-07-01 NOTE — DISCHARGE NOTE PROVIDER - NSDCFUSCHEDAPPT_GEN_ALL_CORE_FT
Dinorah Camacho  [facility] Physician [facility]  NEUROSURG [address] [facility]  Scheduled Appointment: 07/27/2022    Carlton Ardon  [facility]  OTOLARYNG [address] [facility]  Scheduled Appointment: 07/28/2022    Katelyn Bautista  [facility]  PHYSMED [address]  Scheduled Appointment: 09/08/2022

## 2022-07-01 NOTE — DISCHARGE NOTE PROVIDER - NSDCCPCAREPLAN_GEN_ALL_CORE_FT
PRINCIPAL DISCHARGE DIAGNOSIS  Diagnosis: S/P resection of meningioma  Assessment and Plan of Treatment: You had meningioma tumor requiring resection on 6/1/22 at previous hospital. You were placed on steroids for brain mass which were tapered off and completed during rehab. You completed acute rehab stay. You need to follow-up with neurosurgery, and primary care doctor.      SECONDARY DISCHARGE DIAGNOSES  Diagnosis: Pneumonia, aspiration  Assessment and Plan of Treatment: You had aspiration pneumonia , MRSA, and were treated with antibiotics. It is important to adhere to recommended diet to assist in preventing further episodes of aspiration. You were seen by pulmonology and recommended repeat ENT evaluation.     PRINCIPAL DISCHARGE DIAGNOSIS  Diagnosis: S/P resection of meningioma  Assessment and Plan of Treatment: You had meningioma tumor requiring resection on 6/1/22 at previous hospital. You were placed on steroids for brain mass which were tapered off and completed during rehab. You completed acute rehab stay. You need to follow-up with neurosurgery, and primary care doctor.      SECONDARY DISCHARGE DIAGNOSES  Diagnosis: Pneumonia, aspiration  Assessment and Plan of Treatment: You had aspiration pneumonia , MRSA, and were treated with antibiotics. It is important to adhere to recommended diet to assist in preventing further episodes of aspiration. You were seen by pulmonology and recommended repeat ENT evaluation.    Diagnosis: Hypophonia  Assessment and Plan of Treatment: You had vocal cord palsy per ENT at [facility]. Follow-up with ENT for continued management and treatment in addition to speech therapy.

## 2022-07-01 NOTE — DISCHARGE NOTE PROVIDER - INSTRUCTIONS
TwoCal HN  Bolus feeding 200ml x4, w/ free water flush 200cc q6hrs 1 hour apart from tube feedings. + no carb prosource TF x1 daily TwoCal HN  Bolus feeding 200ml x4 times/day, w/ free water flush 200cc q6hrs 1 hour apart from tube feedings. + no carb prosource TF x1 daily

## 2022-07-01 NOTE — DISCHARGE NOTE PROVIDER - NSDCHHBASESERVICE_GEN_ALL_CORE
Occupational therapy/Physical therapy Occupational therapy/Physical therapy/Speech language pathology

## 2022-07-02 PROCEDURE — 99232 SBSQ HOSP IP/OBS MODERATE 35: CPT

## 2022-07-02 RX ADMIN — Medication 12.5 MILLIGRAM(S): at 06:02

## 2022-07-02 RX ADMIN — Medication 500 MILLIGRAM(S): at 12:30

## 2022-07-02 RX ADMIN — Medication 6 MILLIGRAM(S): at 22:15

## 2022-07-02 RX ADMIN — Medication 1 TABLET(S): at 12:31

## 2022-07-02 RX ADMIN — SENNA PLUS 2 TABLET(S): 8.6 TABLET ORAL at 22:15

## 2022-07-02 RX ADMIN — LEVALBUTEROL 0.63 MILLIGRAM(S): 1.25 SOLUTION, CONCENTRATE RESPIRATORY (INHALATION) at 15:50

## 2022-07-02 RX ADMIN — PANTOPRAZOLE SODIUM 40 MILLIGRAM(S): 20 TABLET, DELAYED RELEASE ORAL at 06:02

## 2022-07-02 RX ADMIN — Medication 1 APPLICATION(S): at 12:31

## 2022-07-02 RX ADMIN — Medication 100 MILLIGRAM(S): at 17:59

## 2022-07-02 RX ADMIN — CHLORHEXIDINE GLUCONATE 15 MILLILITER(S): 213 SOLUTION TOPICAL at 17:43

## 2022-07-02 RX ADMIN — LEVALBUTEROL 0.63 MILLIGRAM(S): 1.25 SOLUTION, CONCENTRATE RESPIRATORY (INHALATION) at 08:17

## 2022-07-02 RX ADMIN — ENOXAPARIN SODIUM 40 MILLIGRAM(S): 100 INJECTION SUBCUTANEOUS at 17:44

## 2022-07-02 RX ADMIN — CHLORHEXIDINE GLUCONATE 15 MILLILITER(S): 213 SOLUTION TOPICAL at 06:02

## 2022-07-02 RX ADMIN — CHLORHEXIDINE GLUCONATE 1 APPLICATION(S): 213 SOLUTION TOPICAL at 05:56

## 2022-07-02 RX ADMIN — LEVALBUTEROL 0.63 MILLIGRAM(S): 1.25 SOLUTION, CONCENTRATE RESPIRATORY (INHALATION) at 21:46

## 2022-07-02 NOTE — PROGRESS NOTE ADULT - SUBJECTIVE AND OBJECTIVE BOX
No overnight events.      REVIEW OF SYSTEMS  Constitutional - No fever,  No fatigue  Neurological - No headaches, No loss of strength  Musculoskeletal - No joint pain, No joint swelling, No muscle pain    VITALS  T(C): 36.6 (07-02-22 @ 08:35), Max: 36.6 (07-01-22 @ 22:06)  HR: 94 (07-02-22 @ 09:12) (85 - 97)  BP: 106/72 (07-02-22 @ 08:35) (101/70 - 106/72)  RR: 15 (07-02-22 @ 08:35) (15 - 16)  SpO2: 95% (07-02-22 @ 09:12) (94% - 98%)  Wt(kg): --       MEDICATIONS   acetaminophen     Tablet .. 650 milliGRAM(s) every 6 hours PRN  ascorbic acid 500 milliGRAM(s) daily  chlorhexidine 0.12% Liquid 15 milliLiter(s) two times a day  chlorhexidine 2% Cloths 1 Application(s) <User Schedule>  doxycycline monohydrate Suspension 100 milliGRAM(s) every 12 hours  enoxaparin Injectable 40 milliGRAM(s) <User Schedule>  levalbuterol Inhalation 0.63 milliGRAM(s) every 6 hours  melatonin 6 milliGRAM(s) at bedtime  metoprolol tartrate 12.5 milliGRAM(s) every 12 hours  multivitamin 1 Tablet(s) daily  ondansetron    Tablet 4 milliGRAM(s) every 12 hours PRN  pantoprazole   Suspension 40 milliGRAM(s) before breakfast  petrolatum white Ointment 1 Application(s) daily  polyethylene glycol 3350 17 Gram(s) daily  senna 2 Tablet(s) at bedtime  sodium chloride 0.65% Nasal 1 Spray(s) every 6 hours      RECENT LABS/IMAGING                        ---------  PHYSICAL EXAM  Constitutional - NAD, Comfortable  Pulm - Breathing comfortably, No wheezing  Abd - Soft, NTND, PEG   Extremities - No edema, No calf tenderness  Neurologic Exam -                    Cognitive - Awake, Alert     Communication - Fluent     Motor - right sided weakness      Sensory - Intact to LT  Psychiatric - Mood WNL, Affect WNL    ASSESSMENT/PLAN  61y Female h/o HTN, spinal meningioma with functional deficits after S/p Left far lateral crani for meningioma resection with subtotal sacrifice of left vertebral artery (6/1)  vocal cord paralysis, dysphagia, s/p PEG, water flushes QID   Continue current medical management  Pain - Tylenol PRN  DVT PPX - lovenox   Continue 3hrs a day of comprehensive rehab program.

## 2022-07-03 LAB — SARS-COV-2 RNA SPEC QL NAA+PROBE: SIGNIFICANT CHANGE UP

## 2022-07-03 PROCEDURE — 99232 SBSQ HOSP IP/OBS MODERATE 35: CPT

## 2022-07-03 RX ADMIN — Medication 100 MILLIGRAM(S): at 05:45

## 2022-07-03 RX ADMIN — Medication 6 MILLIGRAM(S): at 21:34

## 2022-07-03 RX ADMIN — LEVALBUTEROL 0.63 MILLIGRAM(S): 1.25 SOLUTION, CONCENTRATE RESPIRATORY (INHALATION) at 21:27

## 2022-07-03 RX ADMIN — CHLORHEXIDINE GLUCONATE 1 APPLICATION(S): 213 SOLUTION TOPICAL at 06:05

## 2022-07-03 RX ADMIN — Medication 100 MILLIGRAM(S): at 17:33

## 2022-07-03 RX ADMIN — CHLORHEXIDINE GLUCONATE 15 MILLILITER(S): 213 SOLUTION TOPICAL at 17:34

## 2022-07-03 RX ADMIN — PANTOPRAZOLE SODIUM 40 MILLIGRAM(S): 20 TABLET, DELAYED RELEASE ORAL at 05:46

## 2022-07-03 RX ADMIN — Medication 500 MILLIGRAM(S): at 11:14

## 2022-07-03 RX ADMIN — Medication 12.5 MILLIGRAM(S): at 17:34

## 2022-07-03 RX ADMIN — SENNA PLUS 2 TABLET(S): 8.6 TABLET ORAL at 21:34

## 2022-07-03 RX ADMIN — LEVALBUTEROL 0.63 MILLIGRAM(S): 1.25 SOLUTION, CONCENTRATE RESPIRATORY (INHALATION) at 16:42

## 2022-07-03 RX ADMIN — POLYETHYLENE GLYCOL 3350 17 GRAM(S): 17 POWDER, FOR SOLUTION ORAL at 11:13

## 2022-07-03 RX ADMIN — Medication 12.5 MILLIGRAM(S): at 05:45

## 2022-07-03 RX ADMIN — Medication 1 TABLET(S): at 11:14

## 2022-07-03 RX ADMIN — ENOXAPARIN SODIUM 40 MILLIGRAM(S): 100 INJECTION SUBCUTANEOUS at 17:33

## 2022-07-03 RX ADMIN — Medication 1 APPLICATION(S): at 15:29

## 2022-07-03 RX ADMIN — LEVALBUTEROL 0.63 MILLIGRAM(S): 1.25 SOLUTION, CONCENTRATE RESPIRATORY (INHALATION) at 08:41

## 2022-07-03 NOTE — PROGRESS NOTE ADULT - SUBJECTIVE AND OBJECTIVE BOX
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      pt seen and examined  secretions less  not self suctioning anymore    in good spirits    Allergies :albuterol (Other)  No Known Allergies      PAST MEDICAL & SURGICAL HISTORY:  HTN (hypertension)    HLD (hyperlipidemia)    H/O cervical spine surgery        Medications:  MEDICATIONS  (STANDING):  ascorbic acid 500 milliGRAM(s) Oral daily  chlorhexidine 0.12% Liquid 15 milliLiter(s) Swish and Spit two times a day  chlorhexidine 2% Cloths 1 Application(s) Topical <User Schedule>  doxycycline monohydrate Suspension 100 milliGRAM(s) Oral every 12 hours  enoxaparin Injectable 40 milliGRAM(s) SubCutaneous <User Schedule>  levalbuterol Inhalation 0.63 milliGRAM(s) Inhalation every 6 hours  melatonin 6 milliGRAM(s) Oral at bedtime  metoprolol tartrate 12.5 milliGRAM(s) Oral every 12 hours  multivitamin 1 Tablet(s) Oral daily  pantoprazole   Suspension 40 milliGRAM(s) Oral before breakfast  petrolatum white Ointment 1 Application(s) Topical daily  polyethylene glycol 3350 17 Gram(s) Oral daily  senna 2 Tablet(s) Oral at bedtime  sodium chloride 0.65% Nasal 1 Spray(s) Both Nostrils every 6 hours    MEDICATIONS  (PRN):  acetaminophen     Tablet .. 650 milliGRAM(s) Oral every 6 hours PRN Temp greater or equal to 38C (100.4F), Mild Pain (1 - 3)  ondansetron    Tablet 4 milliGRAM(s) Oral every 12 hours PRN Nausea      Antibiotics History  amoxicillin   80 mG/mL/clavulanate Suspension 800 milliGRAM(s) Enteral Tube every 12 hours, 06-25-22 @ 12:12, Stop order after: 10 Days  doxycycline monohydrate Capsule 100 milliGRAM(s) Oral every 12 hours, 06-28-22 @ 12:40  doxycycline monohydrate Suspension 100 milliGRAM(s) Oral every 12 hours, 06-30-22 @ 23:11  piperacillin/tazobactam IVPB.. 3.375 Gram(s) IV Intermittent every 8 hours, 06-20-22 @ 20:00, Stop order after: 4 Days  trimethoprim  160 mG/sulfamethoxazole 800 mG 1 Tablet(s) Oral every 12 hours, 06-28-22 @ 12:38      Heme Medications   enoxaparin Injectable 40 milliGRAM(s) SubCutaneous <User Schedule>, 06-21-22 @ 00:00      GI Medications  pantoprazole   Suspension 40 milliGRAM(s) Oral before breakfast, 06-24-22 @ 13:37, Routine  polyethylene glycol 3350 17 Gram(s) Oral daily, 06-20-22 @ 20:00,   senna 2 Tablet(s) Oral at bedtime, 06-20-22 @ 19:59, Routine     CULTURES: (if applicable)    Culture - Sputum (collected 06-26-22 @ 09:59)  Source: .Sputum Sputum  Gram Stain (06-26-22 @ 14:54):    Few polymorphonuclear leukocytes per low power field    Rare Squamous epithelial cells per low power field    Moderate Gram Positive Cocci in Clusters per oil power field    Rare Gram Positive Rods per oil power field  Final Report (06-28-22 @ 10:52):    Numerous Methicillin Resistant Staphylococcus aureus    Normal Respiratory Kim present  Organism: Methicillin resistant Staphylococcus aureus (06-28-22 @ 10:52)  Organism: Methicillin resistant Staphylococcus aureus (06-28-22 @ 10:52)      -  Ampicillin/Sulbactam: R <=8/4      -  Cefazolin: R <=4      -  Clindamycin: S <=0.25      -  Erythromycin: S <=0.25      -  Gentamicin: S <=1 Should not be used as monotherapy      -  Linezolid: S 4      -  Oxacillin: R >2      -  Penicillin: R >8      -  Rifampin: S <=1 Should not be used as monotherapy      -  Tetra/Doxy: S <=1      -  Trimethoprim/Sulfamethoxazole: S <=0.5/9.5      -  Vancomycin: S 1      Method Type: LAURA         VITALS:  T(C): 36.4 (07-03-22 @ 07:29), Max: 36.5 (07-02-22 @ 22:22)  T(F): 97.5 (07-03-22 @ 07:29), Max: 97.7 (07-02-22 @ 22:22)  HR: 84 (07-03-22 @ 09:10) (84 - 99)  BP: 104/74 (07-03-22 @ 07:29) (100/65 - 104/74)  BP(mean): --  ABP: --  ABP(mean): --  RR: 16 (07-03-22 @ 07:29) (16 - 16)  SpO2: 95% (07-03-22 @ 09:10) (94% - 97%)  CVP(mm Hg): --  CVP(cm H2O): --    Ins and Outs     07-02-22 @ 07:01  -  07-03-22 @ 07:00  --------------------------------------------------------  IN: 1200 mL / OUT: 0 mL / NET: 1200 mL                I&O's Detail    02 Jul 2022 07:01  -  03 Jul 2022 07:00  --------------------------------------------------------  IN:    Free Water: 600 mL    TwoCal HN: 600 mL  Total IN: 1200 mL    OUT:  Total OUT: 0 mL    Total NET: 1200 mL

## 2022-07-03 NOTE — PROGRESS NOTE ADULT - ASSESSMENT
Physical Examination:  GENERAL:               Alert, Oriented, No acute distress.    HEENT:                   No JVD, Moist MM oral airway  .   PULM:                     Bilateral air entry, Clear to auscultation bilaterally, no significant sputum production, No Rales, No Rhonchi, No Wheezing  CVS:                         S1, S2,  No Murmur  ABD:                        Soft, nondistended, nontender, normoactive bowel sounds,   NEURO:                  Alert, oriented, interactive,  follows commands  PSYC:                      Calm, + Insight.      Assessment  1. Upper Airway Secretions in a patient with Dysphagia and Vocal Cord Paralysis   2. Abnormal CXR possible PNA  3. Meningioma s/p craniotomy with Left foramen magnum meningioma with brainstem compression and edema. on 6/1/22  4. Neurofibromatosis type 2  5. H/o HTN      Plan  Diet, NPO with Tube Feed:   Keep HOB elevated  Finish course of doxy  Nasal saline spray       ENT Eval to be considered to look at vocal cords as as well as upper airway edema if present    Off Hypertonic saline neb Q 12 hrs   d/w Bedside RN

## 2022-07-03 NOTE — PROGRESS NOTE ADULT - SUBJECTIVE AND OBJECTIVE BOX
No overnight events.      REVIEW OF SYSTEMS  Constitutional - No fever,  No fatigue  Neurological - No headaches, No loss of strength  Musculoskeletal - No joint pain, No joint swelling, No muscle pain    VITALS  T(C): 36.4 (07-03-22 @ 07:29), Max: 36.5 (07-02-22 @ 22:22)  HR: 84 (07-03-22 @ 09:10) (84 - 99)  BP: 104/74 (07-03-22 @ 07:29) (100/65 - 104/74)  RR: 16 (07-03-22 @ 07:29) (16 - 16)  SpO2: 95% (07-03-22 @ 09:10) (94% - 97%)  Wt(kg): --       MEDICATIONS   acetaminophen     Tablet .. 650 milliGRAM(s) every 6 hours PRN  ascorbic acid 500 milliGRAM(s) daily  chlorhexidine 0.12% Liquid 15 milliLiter(s) two times a day  chlorhexidine 2% Cloths 1 Application(s) <User Schedule>  doxycycline monohydrate Suspension 100 milliGRAM(s) every 12 hours  enoxaparin Injectable 40 milliGRAM(s) <User Schedule>  levalbuterol Inhalation 0.63 milliGRAM(s) every 6 hours  melatonin 6 milliGRAM(s) at bedtime  metoprolol tartrate 12.5 milliGRAM(s) every 12 hours  multivitamin 1 Tablet(s) daily  ondansetron    Tablet 4 milliGRAM(s) every 12 hours PRN  pantoprazole   Suspension 40 milliGRAM(s) before breakfast  petrolatum white Ointment 1 Application(s) daily  polyethylene glycol 3350 17 Gram(s) daily  senna 2 Tablet(s) at bedtime  sodium chloride 0.65% Nasal 1 Spray(s) every 6 hours      RECENT LABS/IMAGING                  PHYSICAL EXAM  Constitutional - NAD, Comfortable  Pulm - Breathing comfortably  Abd - Soft, NTND, PEG   Extremities - No edema, No calf tenderness  Neurologic Exam -                    Cognitive - Awake, Alert     Communication - Fluent     Motor - right sided weakness      Sensory - Intact to LT  Psychiatric - Mood WNL, Affect WNL    ASSESSMENT/PLAN  61y Female h/o HTN, spinal meningioma with functional deficits after S/p Left far lateral crani for meningioma resection with subtotal sacrifice of left vertebral artery (6/1)  vocal cord paralysis, dysphagia, s/p PEG, water flushes QID   Continue current medical management  pulm follow up appreciated   Pain - Tylenol PRN  DVT PPX - lovenox   Continue 3hrs a day of comprehensive rehab program.

## 2022-07-04 PROCEDURE — 99232 SBSQ HOSP IP/OBS MODERATE 35: CPT

## 2022-07-04 RX ADMIN — LEVALBUTEROL 0.63 MILLIGRAM(S): 1.25 SOLUTION, CONCENTRATE RESPIRATORY (INHALATION) at 08:11

## 2022-07-04 RX ADMIN — Medication 12.5 MILLIGRAM(S): at 17:01

## 2022-07-04 RX ADMIN — ENOXAPARIN SODIUM 40 MILLIGRAM(S): 100 INJECTION SUBCUTANEOUS at 17:01

## 2022-07-04 RX ADMIN — POLYETHYLENE GLYCOL 3350 17 GRAM(S): 17 POWDER, FOR SOLUTION ORAL at 11:32

## 2022-07-04 RX ADMIN — Medication 100 MILLIGRAM(S): at 06:08

## 2022-07-04 RX ADMIN — Medication 6 MILLIGRAM(S): at 21:56

## 2022-07-04 RX ADMIN — PANTOPRAZOLE SODIUM 40 MILLIGRAM(S): 20 TABLET, DELAYED RELEASE ORAL at 06:08

## 2022-07-04 RX ADMIN — Medication 500 MILLIGRAM(S): at 11:32

## 2022-07-04 RX ADMIN — SENNA PLUS 2 TABLET(S): 8.6 TABLET ORAL at 21:57

## 2022-07-04 RX ADMIN — CHLORHEXIDINE GLUCONATE 1 APPLICATION(S): 213 SOLUTION TOPICAL at 06:27

## 2022-07-04 RX ADMIN — Medication 100 MILLIGRAM(S): at 19:22

## 2022-07-04 RX ADMIN — LEVALBUTEROL 0.63 MILLIGRAM(S): 1.25 SOLUTION, CONCENTRATE RESPIRATORY (INHALATION) at 22:22

## 2022-07-04 RX ADMIN — LEVALBUTEROL 0.63 MILLIGRAM(S): 1.25 SOLUTION, CONCENTRATE RESPIRATORY (INHALATION) at 15:04

## 2022-07-04 RX ADMIN — Medication 1 TABLET(S): at 11:32

## 2022-07-04 RX ADMIN — Medication 12.5 MILLIGRAM(S): at 06:08

## 2022-07-04 NOTE — PROGRESS NOTE ADULT - SUBJECTIVE AND OBJECTIVE BOX
No overnight events.      REVIEW OF SYSTEMS  Constitutional - No fever,  No fatigue  Neurological - No headaches, No loss of strength  Musculoskeletal - No joint pain, No joint swelling, No muscle pain    VITALS  T(C): 36.5 (07-04-22 @ 07:20), Max: 36.7 (07-03-22 @ 22:00)  HR: 86 (07-04-22 @ 08:11) (68 - 88)  BP: 109/75 (07-04-22 @ 07:20) (100/66 - 113/68)  RR: 16 (07-04-22 @ 07:20) (16 - 16)  SpO2: 96% (07-04-22 @ 08:11) (94% - 97%)  Wt(kg): --       MEDICATIONS   acetaminophen     Tablet .. 650 milliGRAM(s) every 6 hours PRN  ascorbic acid 500 milliGRAM(s) daily  chlorhexidine 0.12% Liquid 15 milliLiter(s) two times a day  chlorhexidine 2% Cloths 1 Application(s) <User Schedule>  doxycycline monohydrate Suspension 100 milliGRAM(s) every 12 hours  enoxaparin Injectable 40 milliGRAM(s) <User Schedule>  levalbuterol Inhalation 0.63 milliGRAM(s) every 6 hours  melatonin 6 milliGRAM(s) at bedtime  metoprolol tartrate 12.5 milliGRAM(s) every 12 hours  multivitamin 1 Tablet(s) daily  ondansetron    Tablet 4 milliGRAM(s) every 12 hours PRN  pantoprazole   Suspension 40 milliGRAM(s) before breakfast  petrolatum white Ointment 1 Application(s) daily  polyethylene glycol 3350 17 Gram(s) daily  senna 2 Tablet(s) at bedtime  sodium chloride 0.65% Nasal 1 Spray(s) every 6 hours      RECENT LABS/IMAGING                        PHYSICAL EXAM  Constitutional - NAD, Comfortable  Pulm - Breathing comfortably  Abd - Soft, NTND, PEG   Extremities - No edema, No calf tenderness  Neurologic Exam -                    Cognitive - Awake, Alert     Communication - Fluent     Motor - right sided weakness      Sensory - Intact to LT  Psychiatric - Mood WNL, Affect WNL    ASSESSMENT/PLAN  61y Female h/o HTN, spinal meningioma with functional deficits after S/p Left far lateral crani for meningioma resection with subtotal sacrifice of left vertebral artery (6/1)  vocal cord paralysis, dysphagia, s/p PEG, water flushes QID   Continue current medical management  pulm follow up appreciated   Pain - Tylenol PRN  DVT PPX - lovenox   Continue 3hrs a day of comprehensive rehab program.

## 2022-07-05 DIAGNOSIS — R49.0 DYSPHONIA: ICD-10-CM

## 2022-07-05 DIAGNOSIS — J38.01 PARALYSIS OF VOCAL CORDS AND LARYNX, UNILATERAL: ICD-10-CM

## 2022-07-05 LAB
ALBUMIN SERPL ELPH-MCNC: 3 G/DL — LOW (ref 3.3–5)
ALP SERPL-CCNC: 89 U/L — SIGNIFICANT CHANGE UP (ref 40–120)
ALT FLD-CCNC: 71 U/L — HIGH (ref 10–45)
ANION GAP SERPL CALC-SCNC: 8 MMOL/L — SIGNIFICANT CHANGE UP (ref 5–17)
AST SERPL-CCNC: 33 U/L — SIGNIFICANT CHANGE UP (ref 10–40)
BILIRUB SERPL-MCNC: 0.8 MG/DL — SIGNIFICANT CHANGE UP (ref 0.2–1.2)
BUN SERPL-MCNC: 15 MG/DL — SIGNIFICANT CHANGE UP (ref 7–23)
CALCIUM SERPL-MCNC: 8.8 MG/DL — SIGNIFICANT CHANGE UP (ref 8.4–10.5)
CHLORIDE SERPL-SCNC: 98 MMOL/L — SIGNIFICANT CHANGE UP (ref 96–108)
CO2 SERPL-SCNC: 32 MMOL/L — HIGH (ref 22–31)
CREAT SERPL-MCNC: 0.48 MG/DL — LOW (ref 0.5–1.3)
EGFR: 109 ML/MIN/1.73M2 — SIGNIFICANT CHANGE UP
GLUCOSE SERPL-MCNC: 103 MG/DL — HIGH (ref 70–99)
HCT VFR BLD CALC: 30.5 % — LOW (ref 34.5–45)
HGB BLD-MCNC: 9.7 G/DL — LOW (ref 11.5–15.5)
MCHC RBC-ENTMCNC: 31.8 GM/DL — LOW (ref 32–36)
MCHC RBC-ENTMCNC: 34 PG — SIGNIFICANT CHANGE UP (ref 27–34)
MCV RBC AUTO: 107 FL — HIGH (ref 80–100)
NRBC # BLD: 0 /100 WBCS — SIGNIFICANT CHANGE UP (ref 0–0)
PLATELET # BLD AUTO: 256 K/UL — SIGNIFICANT CHANGE UP (ref 150–400)
POTASSIUM SERPL-MCNC: 4.1 MMOL/L — SIGNIFICANT CHANGE UP (ref 3.5–5.3)
POTASSIUM SERPL-SCNC: 4.1 MMOL/L — SIGNIFICANT CHANGE UP (ref 3.5–5.3)
PROT SERPL-MCNC: 6.5 G/DL — SIGNIFICANT CHANGE UP (ref 6–8.3)
RBC # BLD: 2.85 M/UL — LOW (ref 3.8–5.2)
RBC # FLD: 19.3 % — HIGH (ref 10.3–14.5)
SODIUM SERPL-SCNC: 138 MMOL/L — SIGNIFICANT CHANGE UP (ref 135–145)
WBC # BLD: 3.85 K/UL — SIGNIFICANT CHANGE UP (ref 3.8–10.5)
WBC # FLD AUTO: 3.85 K/UL — SIGNIFICANT CHANGE UP (ref 3.8–10.5)

## 2022-07-05 PROCEDURE — 99232 SBSQ HOSP IP/OBS MODERATE 35: CPT

## 2022-07-05 PROCEDURE — 99222 1ST HOSP IP/OBS MODERATE 55: CPT | Mod: 25

## 2022-07-05 PROCEDURE — 31575 DIAGNOSTIC LARYNGOSCOPY: CPT

## 2022-07-05 RX ADMIN — ENOXAPARIN SODIUM 40 MILLIGRAM(S): 100 INJECTION SUBCUTANEOUS at 17:37

## 2022-07-05 RX ADMIN — CHLORHEXIDINE GLUCONATE 1 APPLICATION(S): 213 SOLUTION TOPICAL at 06:18

## 2022-07-05 RX ADMIN — SENNA PLUS 2 TABLET(S): 8.6 TABLET ORAL at 21:55

## 2022-07-05 RX ADMIN — Medication 1 TABLET(S): at 12:05

## 2022-07-05 RX ADMIN — Medication 12.5 MILLIGRAM(S): at 06:03

## 2022-07-05 RX ADMIN — Medication 500 MILLIGRAM(S): at 12:05

## 2022-07-05 RX ADMIN — Medication 100 MILLIGRAM(S): at 06:03

## 2022-07-05 RX ADMIN — Medication 12.5 MILLIGRAM(S): at 17:37

## 2022-07-05 RX ADMIN — Medication 6 MILLIGRAM(S): at 21:55

## 2022-07-05 RX ADMIN — PANTOPRAZOLE SODIUM 40 MILLIGRAM(S): 20 TABLET, DELAYED RELEASE ORAL at 06:04

## 2022-07-05 NOTE — CONSULT NOTE ADULT - PROBLEM SELECTOR RECOMMENDATION 2
- +Right vocal cord compensation  - MBS as scheduled tomorrow  - Speech and Swallow therapy - Advance diet as necessary   - Aspiration precautions.  - Patient would need to follow up with ENT/Laryngology - Dr. Daniele Gurrola after discharge at [facility].  Please call to schedule an appointment at [phone] 1-2 weeks after discharge.

## 2022-07-05 NOTE — CONSULT NOTE ADULT - SUBJECTIVE AND OBJECTIVE BOX
Patient is a 61y old  Female who presents with a chief complaint of Meningioma (05 Jul 2022 11:12)      HPI:  61 year old Female with PMHx of HTN and C-spine meningioma removal in 2018 in [place]. Patient presented with dizziness, gait instability, and vomiting over the last 3 days prior to admission. MRI showed ethel-medullary, Right CPA, and Right temporal enhancing lesions.  Possible meningioma vs schwannoma.  Patient was monitored in the ICU for hydro watch. Workup included MRI of the Brain, C/T/L spine and CT of the chest, abdomen and pelvis which was negative for malignancies on 5/20.  Patient was seen by ENT for dysphagia and recommended patient be seen by speech and swallow preop and underwent MBS and was able to continue with a regular diet.  Due to location of the lesion, ENT recommended audiology consult.  She was found to have intact hearing but mildly decreased bilaterally. Underwent Left far lateral crani for meningioma resection with subtotal sacrifice of left vertebral artery on 6/1. Attempted extubation the same night after surgery, however she developed stridor and was reintubated.  Patient was successfully extubated on 6/6 to high flow.  Patient scoped by ENT and found  to have left vocal cord paralysis.  Post extubation patient had a lot of secretions and was given Mucomyst and Duoneb. There was a concern for PE given respiratory status - CTA chest 6/7 negative for PE but with bilateral consolidation. Patient was weaned off high flow (6/9), however frequent suctioning was still required. FED via NGT and per speech and swallow recommendations, she was kept NPO. Patient was also found to have MSSA pneumonia and was treated with Augmentin.  Speech and swallow continued to follow and patient was recommended for a PEG.  PEG was placed on 6/16. Patient also had two episodes of desaturation on 6/16, follow up with another CTA chest; which was also negative for PE, but showed b/l lower lobe consolidations and IV antibiotics (Zosyn) were started.     Patient was evaluated by PM&R and therapy for functional deficits and gait/ADL impairments and recommend acute rehabilitation.  Patient was medically optimized for discharge to [facility] on 6/20/22.  (20 Jun 2022 13:21)      Interval Events:  Called to see and evaluate patient with hoarseness of voice.  Her family member was at bedside who was able to translate from [facility].  She noted that her voice started getting hoarse immediately after her surgery on June 1, but reports that her voice has since improved significantly, but still slightly hoarse.  She is currently not taking anything PO and is scheduled for an swallow evaluation/MBS tomorrow with Speech and Swallow therapy.  She is otherwise without any other complaints except right upper extremity weakness.  No shortness of breath, decreased secretions.    PAST MEDICAL & SURGICAL HISTORY:  HTN (hypertension)  HLD (hyperlipidemia)  H/O cervical spine surgery    Allergies  No Known Allergies    Intolerances  albuterol (Other)    Social History:  SOCIAL HISTORY  Smoking - Denied  EtOH - Denied   Drugs - Denied    FAMILY HISTORY:  No pertinent family history in first degree relatives    REVIEW OF SYSTEMS:     CONSTITUTIONAL: No weakness, fevers or chills     EYES/ENT: No visual changes;  No vertigo or throat pain, hoarse voice     NECK: No pain or stiffness     RESPIRATORY: No cough, wheezing, hemoptysis; No shortness of breath     CARDIOVASCULAR: No chest pain or palpitations     GASTROINTESTINAL: No abdominal or epigastric pain. No nausea, vomiting, or hematemesis; No diarrhea or constipation. No melena or hematochezia.     GENITOURINARY: No dysuria, frequency or hematuria     NEUROLOGICAL: No numbness or weakness     SKIN: No itching, burning, rashes, or lesions   All other review of systems is negative unless indicated above.    MEDICATIONS  (STANDING):  ascorbic acid 500 milliGRAM(s) Oral daily  chlorhexidine 0.12% Liquid 15 milliLiter(s) Swish and Spit two times a day  chlorhexidine 2% Cloths 1 Application(s) Topical <User Schedule>  enoxaparin Injectable 40 milliGRAM(s) SubCutaneous <User Schedule>  melatonin 6 milliGRAM(s) Oral at bedtime  metoprolol tartrate 12.5 milliGRAM(s) Oral every 12 hours  multivitamin 1 Tablet(s) Oral daily  pantoprazole   Suspension 40 milliGRAM(s) Oral before breakfast  petrolatum white Ointment 1 Application(s) Topical daily  polyethylene glycol 3350 17 Gram(s) Oral daily  senna 2 Tablet(s) Oral at bedtime  sodium chloride 0.65% Nasal 1 Spray(s) Both Nostrils every 6 hours    MEDICATIONS  (PRN):  acetaminophen     Tablet .. 650 milliGRAM(s) Oral every 6 hours PRN Temp greater or equal to 38C (100.4F), Mild Pain (1 - 3)  ondansetron    Tablet 4 milliGRAM(s) Oral every 12 hours PRN Nausea                            9.7    3.85  )-----------( 256      ( 05 Jul 2022 06:20 )             30.5     07-05    138  |  98  |  15  ----------------------------<  103<H>  4.1   |  32<H>  |  0.48<L>    Ca    8.8      05 Jul 2022 06:20    TPro  6.5  /  Alb  3.0<L>  /  TBili  0.8  /  DBili  x   /  AST  33  /  ALT  71<H>  /  AlkPhos  89  07-05    I&O's Detail    04 Jul 2022 07:01  -  05 Jul 2022 07:00  --------------------------------------------------------  IN:    Free Water: 200 mL    TwoCal HN: 200 mL  Total IN: 400 mL    OUT:  Total OUT: 0 mL    Total NET: 400 mL      Vital Signs Last 24 Hrs  T(C): 36.4 (05 Jul 2022 09:04), Max: 36.6 (04 Jul 2022 22:00)  T(F): 97.6 (05 Jul 2022 09:04), Max: 97.8 (04 Jul 2022 22:00)  HR: 84 (05 Jul 2022 09:04) (71 - 89)  BP: 111/76 (05 Jul 2022 09:04) (102/72 - 111/76)  BP(mean): --  RR: 16 (05 Jul 2022 09:04) (16 - 16)  SpO2: 95% (05 Jul 2022 09:04) (94% - 97%)  CBC Full  -  ( 05 Jul 2022 06:20 )  WBC Count : 3.85 K/uL  RBC Count : 2.85 M/uL  Hemoglobin : 9.7 g/dL  Hematocrit : 30.5 %  Platelet Count - Automated : 256 K/uL  Mean Cell Volume : 107.0 fl  Mean Cell Hemoglobin : 34.0 pg  Mean Cell Hemoglobin Concentration : 31.8 gm/dL      PHYSICAL EXAM:     Constitutional Normal: well nourished, well developed, non-dysmorphic, no acute distress     Psychiatric: age appropriate behavior, cooperative     Skin: no obvious skin lesions     Lymphatic: no cervical lymphadenopathy     Eyes: EOM Intact, non-icteric     ENT:        External EAR: Without any obvious lesions.        External Nose:  Normal, no structural deformities		        Anterior Nasal Cavity: Normal mucosa, no turbinate hypertrophy, straight septum     Oral Cavity:  Good dentition, tongue midline, no lesions or ulcerations, uvula midline     Neck: No palpable lymphadenopathy     Pulmonary: No Acute Distress.      Neurologic: awake and alert      LARYNGOSCOPY EXAM (Scope #G6):      -Verbal consent was obtained from patient prior to procedure.       Indication: Hoarseness, vocal cord paresis.         Flexible laryngoscopy was performed and revealed the following:       -- Nasopharynx had no mass or exudate.       -- Base of tongue was symmetric and not enlarged.       -- Vallecula was clear       -- Epiglottis, both aryepiglottic folds and both false vocal folds were normal       -- Arytenoids both without edema and erythema        -- Left vocal cord paresis at the paramedian position       -- Right vocal cord moving well with compensation with some closure       -- Minimal secretions around VC       -- Post cricoid area was clear.       -- Interarytenoid edema was absent       The patient tolerated the procedure well.

## 2022-07-05 NOTE — PROGRESS NOTE ADULT - ASSESSMENT
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  MRI with ethel-medullary. right CPA, and right temporal enhancing lesions concern for meningioma vs schwannoma, with hydro.  Now S/p Left far lateral crani for meningioma resection with subtotal sacrifice of left vertebral artery (6/1).  Noted with vocal cord paralysis, dysphagia - NPO on TF (PEG placed 6/16). Also right Hemiparesis, sensory, gait and ADL impairment.     COMORBIDITES/ACTIVE MEDICAL ISSUES     #Brain tumor - Meningioma   - S/p Left far lateral crani for meningioma resection with subtotal sacrifice of left vertebral artery (6/1)  - Dexamethasone taper -completed (6/25)  - Gait Instability, ADL impairments and Functional impairments: cont Comprehensive Rehab Program of PT/OT & speech    #Transient A fib with RVR  - s/p amiodarone IVP x 1  - Sinus tach - EKG without evidence of a fib  - Metoprolol 12.5 BID    # Cranial stenosis  - CTA head showed defect in the right M1 suspicious for possible right M1 occlusion though the vessel   - Recommended to "continue ASA 81mg qd and atorvastatin 40mg qhs" on discharge paperwork, however patient not on it in prior hospital  - Lipid panel 5/21 unremarkable  -restarted atorvastatin  --Contacted Dr. Liu to clarify if pt. ok to restart ASA-- stated would hold for now if it was for carotid stenosis .    #HTN  - - cont. metoprolol 12.5 BID     #URI  ---CXR w/ L lower lobe infiltrate/atelectasis , no consolidation noted   - Hospitalist changed augmentin to doxycycline to cover MRSA in sputum culture  -MRSA nares + > started on bactroban Switched to doxycycline after c/s returned (6/28) f/u hospitalist for length of abx, 7 days completed.  - management per hospitalist  - Encourage incentive spirometry     Tachycardia  -increase H2O flushes via PEG-- 200cc QID-tolerating  - monitor    # Urinary retention  - --Pt. voiding with low PVRs  --toileting program q.3h while awake    #Pain control  - Tylenol PRN, Tramadol    #GI/Bowel Mgmt   - At risk for constipation due to neurologic diagnosis, immobility and/or medication use  - Senna,  Miralax   - Abd xray 6/27 - unremarkable    #DVT  - Lovenox    Dysphagia  Vocal cord paralysis  - Diet - NPO with TF + free water flush: 200cc  - PEG placed (6/16)  - SLP - evaluation and treatment  --ENT evaluated but pt denied scope - asked to re-call ENT when deemed ready for MBS by SLP to attempt scope.  - cont. vital stimulation in speech therapy  - Self suctioning    Precautions / PROPHYLAXIS:   - Falls  - Lungs: Aspiration,   - Pressure injury/Skin: Turn Q2hrs while in bed, OOB to Chair, PT/OT      IDT 6/30:  SW: lives in  with son, and DIL; 1st floor s/u. son and DIL work, may not be able to provide necessary assistance.  OT: TotA Eating, Sup-- grooming, min UBD, mod LBD, min toileting to commode, modA transfer to tub bench, total bathing  PT: Antoni transfer, Amb. 50ft RW modA, 4 steps modA    Speech: NPO/PEG; Hypophonia, secretions improved, Mod A cognition,  mod cog deficits/PS/memory  ELOS: 7/11/22 BOB likely    Follow ups:  Giovanni Liu)  Neurosurgery  General  [address] [place] [address], [address]  [place] [address]  Phone: [phone]  Fax: [phone] 62 yo Fuzhounese (and some Mandarin) speaking F with PMH of HTN, C Spine meningioma (removed 2018 in [place]) who presented with dizziness, ataxia, and emesis.  MRI with ethel-medullary. right CPA, and right temporal enhancing lesions concern for meningioma vs schwannoma, with hydro.  Now S/p Left far lateral crani for meningioma resection with subtotal sacrifice of left vertebral artery (6/1).  Noted with vocal cord paralysis, dysphagia - NPO on TF (PEG placed 6/16). Also right Hemiparesis, sensory, gait and ADL impairment.     COMORBIDITES/ACTIVE MEDICAL ISSUES     #Brain tumor - Meningioma   - S/p Left far lateral crani for meningioma resection with subtotal sacrifice of left vertebral artery (6/1)  - Dexamethasone taper -completed (6/25)  - Gait Instability, ADL impairments and Functional impairments: cont Comprehensive Rehab Program of PT/OT & speech    #Transient A fib with RVR  - s/p amiodarone IVP x 1  - Sinus tach - EKG without evidence of a fib  - Metoprolol 12.5 BID    # Cranial stenosis  - CTA head showed defect in the right M1 suspicious for possible right M1 occlusion though the vessel   - Recommended to "continue ASA 81mg qd and atorvastatin 40mg qhs" on discharge paperwork, however patient not on it in prior hospital  - Lipid panel 5/21 unremarkable  -restarted atorvastatin  --Contacted Dr. Liu to clarify if pt. ok to restart ASA-- stated would hold for now if it was for carotid stenosis .    #HTN  - - cont. metoprolol 12.5 BID     #URI  ---CXR w/ L lower lobe infiltrate/atelectasis , no consolidation noted   - Hospitalist changed augmentin to doxycycline to cover MRSA in sputum culture  -MRSA nares + > started on bactroban Switched to doxycycline after c/s returned (6/28) f/u hospitalist for length of abx, 7 days completed.  - management per hospitalist  - Encourage incentive spirometry     Tachycardia--resolved  -increased H2O flushes via PEG-- 200cc QID-tolerating  - monitor    # Urinary retention  - --Pt. voiding with low PVRs  --toileting program q.3h while awake    #Pain control  - Tylenol PRN, Tramadol    #GI/Bowel Mgmt   - At risk for constipation due to neurologic diagnosis, immobility and/or medication use  - Senna,  Miralax   - Abd xray 6/27 - unremarkable    #DVT  - Lovenox    Dysphagia  Vocal cord paralysis  - Diet - NPO with TF + free water flush: 200cc  - PEG placed (6/16)  - SLP - evaluation and treatment  --Plan for MBS tomorrow or following day  --ENT evaluated but pt denied scope - asked to re-call ENT when deemed ready for MBS by SLP to attempt scope.  - cont. vital stimulation in speech therapy  - Self suctioning    Precautions / PROPHYLAXIS:   - Falls  - Lungs: Aspiration,   - Pressure injury/Skin: Turn Q2hrs while in bed, OOB to Chair, PT/OT      IDT 6/30:  SW: lives in  with son, and DIL; 1st floor s/u. son and DIL work, may not be able to provide necessary assistance.  OT: TotA Eating, Sup-- grooming, min UBD, mod LBD, min toileting to commode, modA transfer to tub bench, total bathing  PT: Antoni transfer, Amb. 50ft RW modA, 4 steps modA    Speech: NPO/PEG; Hypophonia, secretions improved, Mod A cognition,  mod cog deficits/PS/memory  ELOS: 7/11/22 BOB likely    Follow ups:  Giovanni Liu (MD)  Neurosurgery  General  [address] [facility], [address]  [place] [address]  Phone: [phone]  Fax: [phone]

## 2022-07-05 NOTE — PROGRESS NOTE ADULT - SUBJECTIVE AND OBJECTIVE BOX
Patient is a 61y old  Female who presents with a chief complaint of Meningioma (04 Jul 2022 11:08)      HPI:  61 year old Female with PMHx of HTN and C-spine meningioma removal in 2018 in [place]. Patient presented with dizziness, gait instability, and vomiting over the last 3 days prior to admission. MRI showed ethel-medullary, Right CPA, and Right temporal enhancing lesions.  Possible meningioma vs schwannoma.  Patient was monitored in the ICU for hydro watch. Workup included MRI of the Brain, C/T/L spine and CT of the chest, abdomen and pelvis which was negative for malignancies on 5/20.  Patient was seen by ENT for dysphagia and recommended patient be seen by speech and swallow preop and underwent MBS and was able to continue with a regular diet.  Due to location of the lesion, ENT recommended audiology consult.  She was found to have intact hearing but mildly decreased bilaterally. Underwent Left far lateral crani for meningioma resection with subtotal sacrifice of left vertebral artery on 6/1. Attempted extubation the same night after surgery, however she developed stridor and was reintubated.  Patient was successfully extubated on 6/6 to high flow.  Patient scoped by ENT and found  to have left vocal cord paralysis.  Post extubation patient had a lot of secretions and was given Mucomyst and Duoneb. There was a concern for PE given respiratory status - CTA chest 6/7 negative for PE but with bilateral consolidation. Patient was weaned off high flow (6/9), however frequent suctioning was still required. FED via NGT and per speech and swallow recommendations, she was kept NPO. Patient was also found to have MSSA pneumonia and was treated with Augmentin.  Speech and swallow continued to follow and patient was recommended for a PEG.  PEG was placed on 6/16. Patient also had two episodes of desaturation on 6/16, follow up with another CTA chest; which was also negative for PE, but showed b/l lower lobe consolidations and IV antibiotics (Zosyn) were started.     Patient was evaluated by PM&R and therapy for functional deficits and gait/ADL impairments and recommend acute rehabilitation.  Patient was medically optimized for discharge to [facility] on 6/20/22.  (20 Jun 2022 13:21)        SUBJECTIVE: Patient seen and examined using [facility] phone . No acute overnight events, slept well.   Tolerating feeds. No nausea. Has no complaints.      REVIEW OF SYSTEMS  No fevers  no nausea  Neurological deficits--dysphagia, dysarthria, hypophonia,     VITALS  61y  Vital Signs Last 24 Hrs  T(C): 36.4 (05 Jul 2022 09:04), Max: 36.6 (04 Jul 2022 22:00)  T(F): 97.6 (05 Jul 2022 09:04), Max: 97.8 (04 Jul 2022 22:00)  HR: 84 (05 Jul 2022 09:04) (71 - 89)  BP: 111/76 (05 Jul 2022 09:04) (102/72 - 111/76)  BP(mean): --  RR: 16 (05 Jul 2022 09:04) (16 - 16)  SpO2: 95% (05 Jul 2022 09:04) (94% - 97%)  Daily     Daily         PHYSICAL EXAM:   Gen - NAD, Comfortable,   HEENT - NCAT,  Pulm -breathing comfortably on RA  Cardiovascular - RRR  Abdomen - Soft, NT/ND, +BS, (+) PEG tube site - clean,   Extremities - No calf tenderness  Neuro-     Cognitive - Awake and alert     Communication - Fluent, hypophonia     Cranial Nerves - PERRLA, EOMI, +mild diplopia, Slight impaired R shoulder shrug, . Visual fields intact. +dysphagia, +hypophonia,  Orolingual weakness     Motor -                     LEFT    UE - ShAB 5/5, EF 5/5, EE 5/5, WE 5/5,  5/5                    RIGHT UE - ShAB 4+/5, EF 3/5, EE 3/5, WE 3/5,  3/5                    LEFT    LE - HF 5/5, KE 5/5, DF 5/5, PF 5/5                    RIGHT LE - HF 4/5, KE 4/5, DF 3/5, PF 4/5        Sensory - Slightly diminished sensation to light touch in RUE and RLE     Coordination -- impaired on right     Tone - normal  Psychiatric - Mood stable, Affect WNL    RECENT LABS:                        9.7    3.85  )-----------( 256      ( 05 Jul 2022 06:20 )             30.5     07-05    138  |  98  |  15  ----------------------------<  103<H>  4.1   |  32<H>  |  0.48<L>    Ca    8.8      05 Jul 2022 06:20    TPro  6.5  /  Alb  3.0<L>  /  TBili  0.8  /  DBili  x   /  AST  33  /  ALT  71<H>  /  AlkPhos  89  07-05    LIVER FUNCTIONS - ( 05 Jul 2022 06:20 )  Alb: 3.0 g/dL / Pro: 6.5 g/dL / ALK PHOS: 89 U/L / ALT: 71 U/L / AST: 33 U/L / GGT: x                   CAPILLARY BLOOD GLUCOSE            MEDICATIONS:  MEDICATIONS  (STANDING):  ascorbic acid 500 milliGRAM(s) Oral daily  chlorhexidine 0.12% Liquid 15 milliLiter(s) Swish and Spit two times a day  chlorhexidine 2% Cloths 1 Application(s) Topical <User Schedule>  doxycycline monohydrate Suspension 100 milliGRAM(s) Oral every 12 hours  enoxaparin Injectable 40 milliGRAM(s) SubCutaneous <User Schedule>  levalbuterol Inhalation 0.63 milliGRAM(s) Inhalation every 6 hours  melatonin 6 milliGRAM(s) Oral at bedtime  metoprolol tartrate 12.5 milliGRAM(s) Oral every 12 hours  multivitamin 1 Tablet(s) Oral daily  pantoprazole   Suspension 40 milliGRAM(s) Oral before breakfast  petrolatum white Ointment 1 Application(s) Topical daily  polyethylene glycol 3350 17 Gram(s) Oral daily  senna 2 Tablet(s) Oral at bedtime  sodium chloride 0.65% Nasal 1 Spray(s) Both Nostrils every 6 hours    MEDICATIONS  (PRN):  acetaminophen     Tablet .. 650 milliGRAM(s) Oral every 6 hours PRN Temp greater or equal to 38C (100.4F), Mild Pain (1 - 3)  ondansetron    Tablet 4 milliGRAM(s) Oral every 12 hours PRN Nausea     Patient is a 61y old  Female who presents with a chief complaint of Meningioma (04 Jul 2022 11:08)      HPI:  61 year old Female with PMHx of HTN and C-spine meningioma removal in 2018 in [place]. Patient presented with dizziness, gait instability, and vomiting over the last 3 days prior to admission. MRI showed ethel-medullary, Right CPA, and Right temporal enhancing lesions.  Possible meningioma vs schwannoma.  Patient was monitored in the ICU for hydro watch. Workup included MRI of the Brain, C/T/L spine and CT of the chest, abdomen and pelvis which was negative for malignancies on 5/20.  Patient was seen by ENT for dysphagia and recommended patient be seen by speech and swallow preop and underwent MBS and was able to continue with a regular diet.  Due to location of the lesion, ENT recommended audiology consult.  She was found to have intact hearing but mildly decreased bilaterally. Underwent Left far lateral crani for meningioma resection with subtotal sacrifice of left vertebral artery on 6/1. Attempted extubation the same night after surgery, however she developed stridor and was reintubated.  Patient was successfully extubated on 6/6 to high flow.  Patient scoped by ENT and found  to have left vocal cord paralysis.  Post extubation patient had a lot of secretions and was given Mucomyst and Duoneb. There was a concern for PE given respiratory status - CTA chest 6/7 negative for PE but with bilateral consolidation. Patient was weaned off high flow (6/9), however frequent suctioning was still required. FED via NGT and per speech and swallow recommendations, she was kept NPO. Patient was also found to have MSSA pneumonia and was treated with Augmentin.  Speech and swallow continued to follow and patient was recommended for a PEG.  PEG was placed on 6/16. Patient also had two episodes of desaturation on 6/16, follow up with another CTA chest; which was also negative for PE, but showed b/l lower lobe consolidations and IV antibiotics (Zosyn) were started.     Patient was evaluated by PM&R and therapy for functional deficits and gait/ADL impairments and recommend acute rehabilitation.  Patient was medically optimized for discharge to [facility] on 6/20/22.  (20 Jun 2022 13:21)        SUBJECTIVE: Patient seen and examined using [facility] phone . No acute overnight events, slept well.   Tolerating feeds. No nausea. Has no complaints.  managing secretions well      REVIEW OF SYSTEMS  No fevers  no nausea  Neurological deficits--dysphagia, dysarthria, hypophonia,     VITALS  61y  Vital Signs Last 24 Hrs  T(C): 36.4 (05 Jul 2022 09:04), Max: 36.6 (04 Jul 2022 22:00)  T(F): 97.6 (05 Jul 2022 09:04), Max: 97.8 (04 Jul 2022 22:00)  HR: 84 (05 Jul 2022 09:04) (71 - 89)  BP: 111/76 (05 Jul 2022 09:04) (102/72 - 111/76)  BP(mean): --  RR: 16 (05 Jul 2022 09:04) (16 - 16)  SpO2: 95% (05 Jul 2022 09:04) (94% - 97%)  Daily     Daily         PHYSICAL EXAM:   Gen - NAD, Comfortable,   HEENT - NCAT,  Pulm -breathing comfortably on RA  Cardiovascular - RRR  Abdomen - Soft, NT/ND, +BS, (+) PEG tube site - clean,   Extremities - No calf tenderness  Neuro-     Cognitive - Awake and alert     Communication - Fluent, hypophonia     Cranial Nerves - PERRLA, EOMI, +mild diplopia, Slight impaired R shoulder shrug, . Visual fields intact. +dysphagia, +hypophonia--improving,  Orolingual weakness     Motor -                     LEFT    UE - ShAB 5/5, EF 5/5, EE 5/5, WE 5/5,  5/5                    RIGHT UE - ShAB 4+/5, EF 3/5, EE 3/5, WE 3/5,  3/5                    LEFT    LE - HF 5/5, KE 5/5, DF 5/5, PF 5/5                    RIGHT LE - HF 4/5, KE 4/5, DF 3/5, PF 4/5        Sensory - Slightly diminished sensation to light touch in RUE and RLE     Coordination -- impaired on right     Tone - normal  Psychiatric - Mood stable, Affect WNL    RECENT LABS:                        9.7    3.85  )-----------( 256      ( 05 Jul 2022 06:20 )             30.5     07-05    138  |  98  |  15  ----------------------------<  103<H>  4.1   |  32<H>  |  0.48<L>    Ca    8.8      05 Jul 2022 06:20    TPro  6.5  /  Alb  3.0<L>  /  TBili  0.8  /  DBili  x   /  AST  33  /  ALT  71<H>  /  AlkPhos  89  07-05    LIVER FUNCTIONS - ( 05 Jul 2022 06:20 )  Alb: 3.0 g/dL / Pro: 6.5 g/dL / ALK PHOS: 89 U/L / ALT: 71 U/L / AST: 33 U/L / GGT: x                   CAPILLARY BLOOD GLUCOSE            MEDICATIONS:  MEDICATIONS  (STANDING):  ascorbic acid 500 milliGRAM(s) Oral daily  chlorhexidine 0.12% Liquid 15 milliLiter(s) Swish and Spit two times a day  chlorhexidine 2% Cloths 1 Application(s) Topical <User Schedule>  doxycycline monohydrate Suspension 100 milliGRAM(s) Oral every 12 hours  enoxaparin Injectable 40 milliGRAM(s) SubCutaneous <User Schedule>  levalbuterol Inhalation 0.63 milliGRAM(s) Inhalation every 6 hours  melatonin 6 milliGRAM(s) Oral at bedtime  metoprolol tartrate 12.5 milliGRAM(s) Oral every 12 hours  multivitamin 1 Tablet(s) Oral daily  pantoprazole   Suspension 40 milliGRAM(s) Oral before breakfast  petrolatum white Ointment 1 Application(s) Topical daily  polyethylene glycol 3350 17 Gram(s) Oral daily  senna 2 Tablet(s) Oral at bedtime  sodium chloride 0.65% Nasal 1 Spray(s) Both Nostrils every 6 hours    MEDICATIONS  (PRN):  acetaminophen     Tablet .. 650 milliGRAM(s) Oral every 6 hours PRN Temp greater or equal to 38C (100.4F), Mild Pain (1 - 3)  ondansetron    Tablet 4 milliGRAM(s) Oral every 12 hours PRN Nausea

## 2022-07-06 PROCEDURE — 99232 SBSQ HOSP IP/OBS MODERATE 35: CPT

## 2022-07-06 RX ORDER — SODIUM CHLORIDE 0.65 %
1 AEROSOL, SPRAY (ML) NASAL EVERY 6 HOURS
Refills: 0 | Status: DISCONTINUED | OUTPATIENT
Start: 2022-07-06 | End: 2022-07-15

## 2022-07-06 RX ADMIN — Medication 500 MILLIGRAM(S): at 12:37

## 2022-07-06 RX ADMIN — Medication 6 MILLIGRAM(S): at 21:27

## 2022-07-06 RX ADMIN — CHLORHEXIDINE GLUCONATE 15 MILLILITER(S): 213 SOLUTION TOPICAL at 18:09

## 2022-07-06 RX ADMIN — POLYETHYLENE GLYCOL 3350 17 GRAM(S): 17 POWDER, FOR SOLUTION ORAL at 12:38

## 2022-07-06 RX ADMIN — Medication 12.5 MILLIGRAM(S): at 05:30

## 2022-07-06 RX ADMIN — PANTOPRAZOLE SODIUM 40 MILLIGRAM(S): 20 TABLET, DELAYED RELEASE ORAL at 05:29

## 2022-07-06 RX ADMIN — Medication 12.5 MILLIGRAM(S): at 18:09

## 2022-07-06 RX ADMIN — CHLORHEXIDINE GLUCONATE 1 APPLICATION(S): 213 SOLUTION TOPICAL at 05:30

## 2022-07-06 RX ADMIN — Medication 1 TABLET(S): at 12:37

## 2022-07-06 RX ADMIN — SENNA PLUS 2 TABLET(S): 8.6 TABLET ORAL at 21:27

## 2022-07-06 RX ADMIN — ENOXAPARIN SODIUM 40 MILLIGRAM(S): 100 INJECTION SUBCUTANEOUS at 18:09

## 2022-07-06 RX ADMIN — Medication 1 APPLICATION(S): at 18:09

## 2022-07-06 NOTE — PROGRESS NOTE ADULT - ASSESSMENT
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[place]) presented with dizziness, ataxia, and emesis. MRI with ethel-medullary right CPA, and right temporal enhancing lesions concern for meningioma. s/p Left far lateral crani for meningioma resection with subtotal sacrifice of left vertebral artery (6/1).  Noted with vocal cord paralysis, dysphagia - NPO on TF (PEG placed 6/16). Now admitted to [facility] for initiation of multidisciplinary rehab program.    #Meningioma   s/p L lateral crani for meningioma resection with subtotal sacrifice of L vertebral artery on 6/1  -Continue comprehensive rehab program   -Completed Decadron taper as per neurosurgery  -Continue current pain regimen    #Dysphagia s/p PEG placement  -Continue tube feeds via PEG  -SLP following, recommendations appreciated  -Aspiration precautions  -Plan for MBS in AM    #Left vocal cord paralysis  -Patient declines laryngoscopy  -ENT consulted, recommendations appreciated. MBS tmw and plan to advance diet as per SLP  -Follow up outpatient for further management    #MRSA pneumonia   -Sputum Cx: MRSA  -Completed course of Doxycycline   -Xopenex, Mucomyst neb, 3% saline INH, saline nasal spray, suction for secretion control   -Mupirocin 2% TP, chlorhexidine  -Incentive spirometry  -Chest PT/PD  -Suction PRN   -Pulm following, recommendations appreciated    #PAF  -Continue lopressor for rate control    #Urinary retention  -Resolving  -s/p Blackburn removal   -Patient voiding well, low PVRs     #HTN  -Continue Lopressor  -Off Losartan, Amlodipine   -Monitor vitals  -Check orthostatics    #Transaminitis  Improving  -Avoid hepatotoxic medications  -Follow up routine CMP    #Anemia  -Likely anemia of chronic disease  -No overt signs of bleeding, H/H stable-  -Follow up AM CBC    #Hyponatremia  -Resolving, likely SIADH  -Continue fluid restriction   -Follow up AM BMP    #Prophylactic measure  -DVT ppx: Lovenox  -GI ppx: Pepcid

## 2022-07-06 NOTE — PROGRESS NOTE ADULT - SUBJECTIVE AND OBJECTIVE BOX
Patient is a 61y old  Female who presents with a chief complaint of Meningioma (06 Jul 2022 12:28)      Patient seen and examined at bedside. No overnight events.  Slept well, offers no complaints.     REVIEW OF SYSTEMS:  CONSTITUTIONAL: No fever or chills  CARDIOVASCULAR: No chest pain, palpitations    ALLERGIES:  albuterol (Other)  No Known Allergies    MEDICATIONS  (STANDING):  ascorbic acid 500 milliGRAM(s) Oral daily  chlorhexidine 0.12% Liquid 15 milliLiter(s) Swish and Spit two times a day  chlorhexidine 2% Cloths 1 Application(s) Topical <User Schedule>  enoxaparin Injectable 40 milliGRAM(s) SubCutaneous <User Schedule>  melatonin 6 milliGRAM(s) Oral at bedtime  metoprolol tartrate 12.5 milliGRAM(s) Oral every 12 hours  multivitamin 1 Tablet(s) Oral daily  pantoprazole   Suspension 40 milliGRAM(s) Oral before breakfast  petrolatum white Ointment 1 Application(s) Topical daily  polyethylene glycol 3350 17 Gram(s) Oral daily  senna 2 Tablet(s) Oral at bedtime  sodium chloride 0.65% Nasal 1 Spray(s) Both Nostrils every 6 hours    MEDICATIONS  (PRN):  acetaminophen     Tablet .. 650 milliGRAM(s) Oral every 6 hours PRN Temp greater or equal to 38C (100.4F), Mild Pain (1 - 3)  ondansetron    Tablet 4 milliGRAM(s) Oral every 12 hours PRN Nausea    Vital Signs Last 24 Hrs  T(F): 97.6 (06 Jul 2022 12:00), Max: 97.9 (05 Jul 2022 20:16)  HR: 86 (06 Jul 2022 12:00) (85 - 89)  BP: 95/68 (06 Jul 2022 12:00) (95/68 - 116/76)  RR: 15 (06 Jul 2022 12:00) (15 - 16)  SpO2: 94% (06 Jul 2022 12:00) (93% - 94%)  I&O's Summary    05 Jul 2022 07:01  -  06 Jul 2022 07:00  --------------------------------------------------------  IN: 1500 mL / OUT: 0 mL / NET: 1500 mL          PHYSICAL EXAM:  GENERAL: NAD, hypophonia  HEENT:  AT/NC, anicteric, moist mucous membranes, EOMI, PERRL, no lid-lag, conjunctiva and sclera clear  CHEST/LUNG:  CTA b/l, no rales, wheezes, or rhonchi,  normal respiratory effort, no intercostal retractions  HEART:  RRR, S1, S2, no murmurs; no pitting edema  ABDOMEN:  BS+, soft, nontender, nondistended; no HSM; peg in place  MSK/EXTREMITIES: palpable peripheral pulses, no clubbing or cyanosis  NERVOUS SYSTEM: dysphagia, answers questions and follows commands appropriately A&Ox3 grossly moves all extremities, right sided weakness  PSYCH: flat affect, Alert & Awake; fair judgement    LABS: Personally reviewed                        9.7    3.85  )-----------( 256      ( 05 Jul 2022 06:20 )             30.5       07-05    138  |  98  |  15  ----------------------------<  103  4.1   |  32  |  0.48    Ca    8.8      05 Jul 2022 06:20    TPro  6.5  /  Alb  3.0  /  TBili  0.8  /  DBili  x   /  AST  33  /  ALT  71  /  AlkPhos  89  07-05                05-21 Chol 159 mg/dL LDL -- HDL 50 mg/dL Trig 99 mg/dL                      COVID-19 PCR: NotDetec (07-03-22 @ 06:00)  COVID-19 PCR: NotDetec (06-27-22 @ 06:49)  COVID-19 PCR: NotDetec (06-20-22 @ 23:35)  COVID-19 PCR: NotDetec (06-20-22 @ 10:32)  COVID-19 PCR: NotDetec (06-15-22 @ 16:24)      RADIOLOGY & ADDITIONAL TESTS: Personally reviewed    Medical management discussed with Dr. Bautista (physiatry), plan for MBS in AM to assess diet advancement

## 2022-07-06 NOTE — PROGRESS NOTE ADULT - SUBJECTIVE AND OBJECTIVE BOX
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  Denies SOB/CP.       Allergies :albuterol (Other)  No Known Allergies      PAST MEDICAL & SURGICAL HISTORY:  HTN (hypertension)    HLD (hyperlipidemia)    H/O cervical spine surgery        Medications:  MEDICATIONS  (STANDING):  ascorbic acid 500 milliGRAM(s) Oral daily  chlorhexidine 0.12% Liquid 15 milliLiter(s) Swish and Spit two times a day  chlorhexidine 2% Cloths 1 Application(s) Topical <User Schedule>  enoxaparin Injectable 40 milliGRAM(s) SubCutaneous <User Schedule>  melatonin 6 milliGRAM(s) Oral at bedtime  metoprolol tartrate 12.5 milliGRAM(s) Oral every 12 hours  multivitamin 1 Tablet(s) Oral daily  pantoprazole   Suspension 40 milliGRAM(s) Oral before breakfast  petrolatum white Ointment 1 Application(s) Topical daily  polyethylene glycol 3350 17 Gram(s) Oral daily  senna 2 Tablet(s) Oral at bedtime  sodium chloride 0.65% Nasal 1 Spray(s) Both Nostrils every 6 hours    MEDICATIONS  (PRN):  acetaminophen     Tablet .. 650 milliGRAM(s) Oral every 6 hours PRN Temp greater or equal to 38C (100.4F), Mild Pain (1 - 3)  ondansetron    Tablet 4 milliGRAM(s) Oral every 12 hours PRN Nausea      Antibiotics History  amoxicillin   80 mG/mL/clavulanate Suspension 800 milliGRAM(s) Enteral Tube every 12 hours, 06-25-22 @ 12:12, Stop order after: 10 Days  doxycycline monohydrate Capsule 100 milliGRAM(s) Oral every 12 hours, 06-28-22 @ 12:40  doxycycline monohydrate Suspension 100 milliGRAM(s) Oral every 12 hours, 06-30-22 @ 23:11  piperacillin/tazobactam IVPB.. 3.375 Gram(s) IV Intermittent every 8 hours, 06-20-22 @ 20:00, Stop order after: 4 Days  trimethoprim  160 mG/sulfamethoxazole 800 mG 1 Tablet(s) Oral every 12 hours, 06-28-22 @ 12:38      Heme Medications   enoxaparin Injectable 40 milliGRAM(s) SubCutaneous <User Schedule>, 06-21-22 @ 00:00      GI Medications  pantoprazole   Suspension 40 milliGRAM(s) Oral before breakfast, 06-24-22 @ 13:37, Routine  polyethylene glycol 3350 17 Gram(s) Oral daily, 06-20-22 @ 20:00,   senna 2 Tablet(s) Oral at bedtime, 06-20-22 @ 19:59, Routine        LABS:                        9.7    3.85  )-----------( 256      ( 05 Jul 2022 06:20 )             30.5     07-05    138  |  98  |  15  ----------------------------<  103<H>  4.1   |  32<H>  |  0.48<L>    Ca    8.8      05 Jul 2022 06:20    TPro  6.5  /  Alb  3.0<L>  /  TBili  0.8  /  DBili  x   /  AST  33  /  ALT  71<H>  /  AlkPhos  89  07-05                        CULTURES: (if applicable)    Culture - Sputum (collected 06-26-22 @ 09:59)  Source: .Sputum Sputum  Gram Stain (06-26-22 @ 14:54):    Few polymorphonuclear leukocytes per low power field    Rare Squamous epithelial cells per low power field    Moderate Gram Positive Cocci in Clusters per oil power field    Rare Gram Positive Rods per oil power field  Final Report (06-28-22 @ 10:52):    Numerous Methicillin Resistant Staphylococcus aureus    Normal Respiratory Kim present  Organism: Methicillin resistant Staphylococcus aureus (06-28-22 @ 10:52)  Organism: Methicillin resistant Staphylococcus aureus (06-28-22 @ 10:52)      -  Ampicillin/Sulbactam: R <=8/4      -  Cefazolin: R <=4      -  Clindamycin: S <=0.25      -  Erythromycin: S <=0.25      -  Gentamicin: S <=1 Should not be used as monotherapy      -  Linezolid: S 4      -  Oxacillin: R >2      -  Penicillin: R >8      -  Rifampin: S <=1 Should not be used as monotherapy      -  Tetra/Doxy: S <=1      -  Trimethoprim/Sulfamethoxazole: S <=0.5/9.5      -  Vancomycin: S 1      Method Type: LAURA            CAPILLARY BLOOD GLUCOSE          RADIOLOGY  CXR:      CT:    ECHO:      VITALS:  T(C): 36.6 (07-05-22 @ 20:16), Max: 36.6 (07-05-22 @ 20:16)  T(F): 97.9 (07-05-22 @ 20:16), Max: 97.9 (07-05-22 @ 20:16)  HR: 89 (07-06-22 @ 05:28) (85 - 89)  BP: 116/76 (07-06-22 @ 05:28) (109/77 - 116/76)  BP(mean): --  ABP: --  ABP(mean): --  RR: 16 (07-05-22 @ 20:16) (16 - 16)  SpO2: 93% (07-05-22 @ 20:16) (93% - 93%)  CVP(mm Hg): --  CVP(cm H2O): --    Ins and Outs     07-05-22 @ 07:01  -  07-06-22 @ 07:00  --------------------------------------------------------  IN: 1500 mL / OUT: 0 mL / NET: 1500 mL                I&O's Detail    05 Jul 2022 07:01  -  06 Jul 2022 07:00  --------------------------------------------------------  IN:    Free Water: 700 mL    TwoCal HN: 800 mL  Total IN: 1500 mL    OUT:  Total OUT: 0 mL    Total NET: 1500 mL

## 2022-07-06 NOTE — CHART NOTE - NSCHARTNOTEFT_GEN_A_CORE
Nutrition Follow Up Note  Hospital Course   (Per Electronic Medical Record)    Source:  Patient [X]  Family Member [X]   Nursing Staff [X]   Medical Record [X]      Diet: Diet, NPO with Tube Feed:   Tube Feeding Modality: Gastrostomy  TwoCal HN  Total Volume for 24 Hours (mL): 800  Bolus  Total Volume of Bolus (mL):  200  Total # of Feeds: 4  Tube Feed Frequency: Every 6 hours   Tube Feed Start Time: 08:00  Bolus Feed Rate (mL per Hour): 200   Bolus Feed Duration (in Hours): 0.5  Bolus Feed Instructions:  Provide tube feeding at 8 AM, 12 PM, 4 PM, 8 PM  Free Water Flush  Bolus   Total Volume per Flush (mL): 200   Frequency: Every 6 Hours  Free Water Flush Instructions:  Provide free water flush QID at least 1 hour apart from tube feeding  No Carb Prosource TF     Qty per Day:  1 (06-28-22 @ 10:41) [Active]      Pt tolerating current bolus tube feeding regimen w/ increased H2O flushes via PEG (200 cc QID) (as per documentation). MBS planned for the upcoming days. Current weight is 105.6# (7/5). Pt noted to be at risk for constipation due to medications, neurologic diagnosis, and immobility.    START [id number]    138  |  98  |  15  ----------------------------<  103<H>  4.1   |  32<H>  |  0.48<L>    Ca    8.8      05 Jul 2022 06:20    TPro  6.5  /  Alb  3.0<L>  /  TBili  0.8  /  DBili  x   /  AST  33  /  ALT  71<H>  /  AlkPhos  89  07-05  A1C with Estimated Average Glucose Result: 5.8 % (06-01-22 @ 20:30)  END CHEMFISH  START MEDSACTIVEMEDICATIONS  (STANDING):  ascorbic acid 500 milliGRAM(s) Oral daily  chlorhexidine 0.12% Liquid 15 milliLiter(s) Swish and Spit two times a day  chlorhexidine 2% Cloths 1 Application(s) Topical <User Schedule>  enoxaparin Injectable 40 milliGRAM(s) SubCutaneous <User Schedule>  melatonin 6 milliGRAM(s) Oral at bedtime  metoprolol tartrate 12.5 milliGRAM(s) Oral every 12 hours  multivitamin 1 Tablet(s) Oral daily  pantoprazole   Suspension 40 milliGRAM(s) Oral before breakfast  petrolatum white Ointment 1 Application(s) Topical daily  polyethylene glycol 3350 17 Gram(s) Oral daily  senna 2 Tablet(s) Oral at bedtime  sodium chloride 0.65% Nasal 1 Spray(s) Both Nostrils every 6 hours    MEDICATIONS  (PRN):  acetaminophen     Tablet .. 650 milliGRAM(s) Oral every 6 hours PRN Temp greater or equal to 38C (100.4F), Mild Pain (1 - 3)  ondansetron    Tablet 4 milliGRAM(s) Oral every 12 hours PRN Nausea  END MEDSACTIVE  START DIETORDEREND DIETORDER  START ADMITDXNeoplasm of uncertain behavior of brain    END ADMITDX  START [facility]  07-05-22 @ 07:01  -  07-06-22 @ 07:00  --------------------------------------------------------  OUT:  Total OUT: 0 mL    Total NET: 800 mL      END IOFS  START SKINPUEND SKINPU    Enteral/Parenteral Nutrition: Not Applicable    Current Weight: 105.6 lb (7/5)  Weight Trends: 105.6# (7/4), 106.2# (7/1), 106.2# (6/30)    Pertinent Medications: MEDICATIONS  (STANDING):  ascorbic acid 500 milliGRAM(s) Oral daily  chlorhexidine 0.12% Liquid 15 milliLiter(s) Swish and Spit two times a day  chlorhexidine 2% Cloths 1 Application(s) Topical <User Schedule>  enoxaparin Injectable 40 milliGRAM(s) SubCutaneous <User Schedule>  melatonin 6 milliGRAM(s) Oral at bedtime  metoprolol tartrate 12.5 milliGRAM(s) Oral every 12 hours  multivitamin 1 Tablet(s) Oral daily  pantoprazole   Suspension 40 milliGRAM(s) Oral before breakfast  petrolatum white Ointment 1 Application(s) Topical daily  polyethylene glycol 3350 17 Gram(s) Oral daily  senna 2 Tablet(s) Oral at bedtime  sodium chloride 0.65% Nasal 1 Spray(s) Both Nostrils every 6 hours    MEDICATIONS  (PRN):  acetaminophen     Tablet .. 650 milliGRAM(s) Oral every 6 hours PRN Temp greater or equal to 38C (100.4F), Mild Pain (1 - 3)  ondansetron    Tablet 4 milliGRAM(s) Oral every 12 hours PRN Nausea      Pertinent Labs:  07-05 Na138 mmol/L Glu 103 mg/dL<H> K+ 4.1 mmol/L Cr  0.48 mg/dL<L> BUN 15 mg/dL 07-05 Alb 3.0 g/dL<L>        Skin: Intact    Edema: none noted    Last Bowel Movement: on 7/4    Estimated Needs:   [X] No Change Since Previous Assessment    Previous Nutrition Diagnosis:   Severe Malnutrition, Acute    Nutrition Diagnosis is [X] Ongoing -     Interventions:   1. Continue current tube feeding regimen as tolerated  2. Obtain daily weights for weight trend accuracy    Monitoring & Evaluation:   [X] Weights   [X] PO Intake   [X] Skin Integrity   [X] Follow Up (Per Protocol)  [X] Tolerance to Diet Prescription   [X] Other: Labs & POCT    Registered Dietitian/Nutritionist Remains Available.  Nikko Haji RDN    Phone# [phone]

## 2022-07-06 NOTE — PROGRESS NOTE ADULT - ASSESSMENT
Physical Examination:  GENERAL:               Alert, Oriented, No acute distress.    HEENT:                   No JVD, Moist MM oral airway  PULM:                     Bilateral air entry, No Rales, No Rhonchi, No Wheezing  CVS:                         S1, S2,  No Murmur  ABD:                        Soft, nondistended, nontender, normoactive bowel sounds,   NEURO:                  Alert, oriented, interactive,  follows commands  PSYC:                      Calm, + Insight.      Assessment  1. Upper Airway Secretions in a patient with Dysphagia and Vocal Cord Paralysis   2. Abnormal CXR possible PNA  3. Meningioma s/p craniotomy with Left foramen magnum meningioma with brainstem compression and edema. on 6/1/22  4. Neurofibromatosis type 2  5. H/o HTN      Plan  Diet, NPO with Tube Feed:   Swallow evaluation  Keep HOB elevated  Finish course of doxy  Nasal saline spray  On room air oxygen    ENT Eval noted with evidence of left vocal cord paresis

## 2022-07-06 NOTE — PROGRESS NOTE ADULT - SUBJECTIVE AND OBJECTIVE BOX
HPI:  61 year old Female with PMHx of HTN and C-spine meningioma removal in 2018 in [place]. Patient presented with dizziness, gait instability, and vomiting over the last 3 days prior to admission. MRI showed ethel-medullary, Right CPA, and Right temporal enhancing lesions.  Possible meningioma vs schwannoma.  Patient was monitored in the ICU for hydro watch. Workup included MRI of the Brain, C/T/L spine and CT of the chest, abdomen and pelvis which was negative for malignancies on 5/20.  Patient was seen by ENT for dysphagia and recommended patient be seen by speech and swallow preop and underwent MBS and was able to continue with a regular diet.  Due to location of the lesion, ENT recommended audiology consult.  She was found to have intact hearing but mildly decreased bilaterally. Underwent Left far lateral crani for meningioma resection with subtotal sacrifice of left vertebral artery on 6/1. Attempted extubation the same night after surgery, however she developed stridor and was reintubated.  Patient was successfully extubated on 6/6 to high flow.  Patient scoped by ENT and found  to have left vocal cord paralysis.  Post extubation patient had a lot of secretions and was given Mucomyst and Duoneb. There was a concern for PE given respiratory status - CTA chest 6/7 negative for PE but with bilateral consolidation. Patient was weaned off high flow (6/9), however frequent suctioning was still required. FED via NGT and per speech and swallow recommendations, she was kept NPO. Patient was also found to have MSSA pneumonia and was treated with Augmentin.  Speech and swallow continued to follow and patient was recommended for a PEG.  PEG was placed on 6/16. Patient also had two episodes of desaturation on 6/16, follow up with another CTA chest; which was also negative for PE, but showed b/l lower lobe consolidations and IV antibiotics (Zosyn) were started.     Patient was evaluated by PM&R and therapy for functional deficits and gait/ADL impairments and recommend acute rehabilitation.  Patient was medically optimized for discharge to [facility] on 6/20/22.  (20 Jun 2022 13:21)          Subjective:  Spoke with pt. via Mandarin telephone  # [phone].  [facility]  was not available, but pt. is able to communicate in Mandarin and did not have any issue with communication.  Pt. has no complaints.  Slept during the night, no pain, SOB, coughing/congestion. No nausea or abdominal pain.  Tolerating TFs      VITALS  Vital Signs Last 24 Hrs  T(C): 36.6 (05 Jul 2022 20:16), Max: 36.6 (05 Jul 2022 20:16)  T(F): 97.9 (05 Jul 2022 20:16), Max: 97.9 (05 Jul 2022 20:16)  HR: 89 (06 Jul 2022 05:28) (85 - 89)  BP: 116/76 (06 Jul 2022 05:28) (109/77 - 116/76)  BP(mean): --  RR: 16 (05 Jul 2022 20:16) (16 - 16)  SpO2: 93% (05 Jul 2022 20:16) (93% - 93%)    REVIEW OF SYMPTOMS  No fevers  no nausea  Neurological deficits--dysphagia, dysarthria, hypophonia,         PHYSICAL EXAM:   Gen - NAD, Comfortable,   HEENT - NCAT,  Pulm -breathing comfortably on RA  Cardiovascular - RRR  Abdomen - Soft, NT/ND, +BS, (+) PEG tube site - clean,   Extremities - No calf tenderness  Neuro-     Cognitive - Awake and alert     Communication - Fluent, hypophonia     Cranial Nerves - PERRLA, EOMI, +mild diplopia, Slight impaired R shoulder shrug, . Visual fields intact. +dysphagia, +hypophonia--improving,  Orolingual weakness     Motor -                     LEFT    UE - ShAB 5/5, EF 5/5, EE 5/5, WE 5/5,  5/5                    RIGHT UE - ShAB 4+/5, EF 3/5, EE 3/5, WE 3/5,  3/5                    LEFT    LE - HF 5/5, KE 5/5, DF 5/5, PF 5/5                    RIGHT LE - HF 4/5, KE 4/5, DF 3/5, PF 4/5        Sensory - Slightly diminished sensation to light touch in RUE and RLE     Coordination -- impaired on right     Tone - normal  Psychiatric - Mood stable, Affect WNL    RECENT LABS                        9.7    3.85  )-----------( 256      ( 05 Jul 2022 06:20 )             30.5     07-05    138  |  98  |  15  ----------------------------<  103<H>  4.1   |  32<H>  |  0.48<L>    Ca    8.8      05 Jul 2022 06:20    TPro  6.5  /  Alb  3.0<L>  /  TBili  0.8  /  DBili  x   /  AST  33  /  ALT  71<H>  /  AlkPhos  89  07-05            RADIOLOGY/OTHER RESULTS      MEDICATIONS  (STANDING):  ascorbic acid 500 milliGRAM(s) Oral daily  chlorhexidine 0.12% Liquid 15 milliLiter(s) Swish and Spit two times a day  chlorhexidine 2% Cloths 1 Application(s) Topical <User Schedule>  enoxaparin Injectable 40 milliGRAM(s) SubCutaneous <User Schedule>  melatonin 6 milliGRAM(s) Oral at bedtime  metoprolol tartrate 12.5 milliGRAM(s) Oral every 12 hours  multivitamin 1 Tablet(s) Oral daily  pantoprazole   Suspension 40 milliGRAM(s) Oral before breakfast  petrolatum white Ointment 1 Application(s) Topical daily  polyethylene glycol 3350 17 Gram(s) Oral daily  senna 2 Tablet(s) Oral at bedtime  sodium chloride 0.65% Nasal 1 Spray(s) Both Nostrils every 6 hours    MEDICATIONS  (PRN):  acetaminophen     Tablet .. 650 milliGRAM(s) Oral every 6 hours PRN Temp greater or equal to 38C (100.4F), Mild Pain (1 - 3)  ondansetron    Tablet 4 milliGRAM(s) Oral every 12 hours PRN Nausea

## 2022-07-06 NOTE — PROGRESS NOTE ADULT - ASSESSMENT
62 yo Fuzhounese (and some Mandarin) speaking F with PMH of HTN, C Spine meningioma (removed 2018 in [place]) who presented with dizziness, ataxia, and emesis.  MRI with ethel-medullary. right CPA, and right temporal enhancing lesions concern for meningioma vs schwannoma, with hydro.  Now S/p Left far lateral crani for meningioma resection with subtotal sacrifice of left vertebral artery (6/1).  Noted with vocal cord paralysis, dysphagia - NPO on TF (PEG placed 6/16). Also right Hemiparesis, sensory, gait and ADL impairment.     COMORBIDITES/ACTIVE MEDICAL ISSUES     #Brain tumor - Meningioma   - S/p Left far lateral crani for meningioma resection with subtotal sacrifice of left vertebral artery (6/1)  - Dexamethasone taper -completed (6/25)  - Gait Instability, ADL impairments and Functional impairments: cont Comprehensive Rehab Program of PT/OT & speech    Hypophonia--Vocal cord paralysis  --ENT consult 7/5 reviewed and appreciated-- Paresis of left vocal cord.   ·  Recommendation: - +Right vocal cord compensation  - MBS as scheduled tomorrow & OK for Speech and Swallow therapy - Advance diet as necessary   - Aspiration precautions.  - Patient would need to follow up with ENT/Laryngology - Dr. Daniele Gurrola after discharge at [facility].  Please call to schedule an appointment at [phone] 1-2 weeks after discharge.      #Transient A fib with RVR  - Sinus tach - EKG without evidence of a fib  - Metoprolol 12.5 BID    # Cranial stenosis  - CTA head showed defect in the right M1 suspicious for possible right M1 occlusion though the vessel   - Recommended to "continue ASA 81mg qd and atorvastatin 40mg qhs" on discharge paperwork, however patient not on it in prior hospital  - Lipid panel 5/21 unremarkable  -restarted atorvastatin  --Contacted Dr. Liu to clarify if pt. ok to restart ASA-- stated would hold for now if it was for carotid stenosis .    #HTN  - - cont. metoprolol 12.5 BID     #URI--resolved  - Hospitalist changed augmentin to doxycycline to cover MRSA in sputum culture  -MRSA nares + > completed doxycycline course  - management per hospitalist  - Encourage incentive spirometry       # Urinary retention--resolved  - --Pt. voiding with low PVRs  --toileting program q.3h while awake    #Pain control  - Tylenol PRN, Tramadol    #GI/Bowel Mgmt   - At risk for constipation due to neurologic diagnosis, immobility and/or medication use  - Senna,  Miralax       #DVT  - Lovenox    Dysphagia  - Diet - NPO with TF + free water flush: 200cc  - PEG placed (6/16)  - SLP - evaluation and treatment  --Plan for MBS tomorrow --d/w speech therapy  - cont. vital stimulation in speech therapy  - Self suctioning    Precautions / PROPHYLAXIS:   - Falls  - Lungs: Aspiration,   - Pressure injury/Skin: Turn Q2hrs while in bed, OOB to Chair, PT/OT      IDT 6/30:  SW: lives in  with son, and DIL; 1st floor s/u. son and DIL work, may not be able to provide necessary assistance.  OT: TotA Eating, Sup-- grooming, min UBD, mod LBD, min toileting to commode, modA transfer to tub bench, total bathing  PT: Antoni transfer, Amb. 50ft RW modA, 4 steps modA    Speech: NPO/PEG; Hypophonia, secretions improved, Mod A cognition,  mod cog deficits/PS/memory  ELOS: 7/11/22 BOB likely,  may be extended if going home    Follow ups:  Giovanni Liu)  Neurosurgery  General  [address], [address]  [place] [address]  Phone: [phone]  Fax: [phone]

## 2022-07-07 LAB
ALBUMIN SERPL ELPH-MCNC: 2.8 G/DL — LOW (ref 3.3–5)
ALP SERPL-CCNC: 83 U/L — SIGNIFICANT CHANGE UP (ref 40–120)
ALT FLD-CCNC: 67 U/L — HIGH (ref 10–45)
ANION GAP SERPL CALC-SCNC: 12 MMOL/L — SIGNIFICANT CHANGE UP (ref 5–17)
AST SERPL-CCNC: 44 U/L — HIGH (ref 10–40)
BILIRUB SERPL-MCNC: 0.9 MG/DL — SIGNIFICANT CHANGE UP (ref 0.2–1.2)
BUN SERPL-MCNC: 11 MG/DL — SIGNIFICANT CHANGE UP (ref 7–23)
CALCIUM SERPL-MCNC: 8.9 MG/DL — SIGNIFICANT CHANGE UP (ref 8.4–10.5)
CHLORIDE SERPL-SCNC: 98 MMOL/L — SIGNIFICANT CHANGE UP (ref 96–108)
CO2 SERPL-SCNC: 23 MMOL/L — SIGNIFICANT CHANGE UP (ref 22–31)
CREAT SERPL-MCNC: 0.4 MG/DL — LOW (ref 0.5–1.3)
EGFR: 114 ML/MIN/1.73M2 — SIGNIFICANT CHANGE UP
GLUCOSE SERPL-MCNC: 90 MG/DL — SIGNIFICANT CHANGE UP (ref 70–99)
HCT VFR BLD CALC: 31.7 % — LOW (ref 34.5–45)
HGB BLD-MCNC: 10.1 G/DL — LOW (ref 11.5–15.5)
MCHC RBC-ENTMCNC: 31.9 GM/DL — LOW (ref 32–36)
MCHC RBC-ENTMCNC: 33.7 PG — SIGNIFICANT CHANGE UP (ref 27–34)
MCV RBC AUTO: 105.7 FL — HIGH (ref 80–100)
NRBC # BLD: 0 /100 WBCS — SIGNIFICANT CHANGE UP (ref 0–0)
PLATELET # BLD AUTO: 255 K/UL — SIGNIFICANT CHANGE UP (ref 150–400)
POTASSIUM SERPL-MCNC: 4.5 MMOL/L — SIGNIFICANT CHANGE UP (ref 3.5–5.3)
POTASSIUM SERPL-SCNC: 4.5 MMOL/L — SIGNIFICANT CHANGE UP (ref 3.5–5.3)
PROT SERPL-MCNC: 6.7 G/DL — SIGNIFICANT CHANGE UP (ref 6–8.3)
RBC # BLD: 3 M/UL — LOW (ref 3.8–5.2)
RBC # FLD: 18.9 % — HIGH (ref 10.3–14.5)
SODIUM SERPL-SCNC: 133 MMOL/L — LOW (ref 135–145)
WBC # BLD: 3.55 K/UL — LOW (ref 3.8–10.5)
WBC # FLD AUTO: 3.55 K/UL — LOW (ref 3.8–10.5)

## 2022-07-07 PROCEDURE — 74230 X-RAY XM SWLNG FUNCJ C+: CPT | Mod: 26

## 2022-07-07 PROCEDURE — 99232 SBSQ HOSP IP/OBS MODERATE 35: CPT

## 2022-07-07 RX ADMIN — Medication 1 APPLICATION(S): at 12:47

## 2022-07-07 RX ADMIN — Medication 6 MILLIGRAM(S): at 21:24

## 2022-07-07 RX ADMIN — Medication 1 TABLET(S): at 12:35

## 2022-07-07 RX ADMIN — SENNA PLUS 2 TABLET(S): 8.6 TABLET ORAL at 21:24

## 2022-07-07 RX ADMIN — POLYETHYLENE GLYCOL 3350 17 GRAM(S): 17 POWDER, FOR SOLUTION ORAL at 12:34

## 2022-07-07 RX ADMIN — PANTOPRAZOLE SODIUM 40 MILLIGRAM(S): 20 TABLET, DELAYED RELEASE ORAL at 05:54

## 2022-07-07 RX ADMIN — Medication 12.5 MILLIGRAM(S): at 18:26

## 2022-07-07 RX ADMIN — CHLORHEXIDINE GLUCONATE 15 MILLILITER(S): 213 SOLUTION TOPICAL at 05:55

## 2022-07-07 RX ADMIN — Medication 500 MILLIGRAM(S): at 12:35

## 2022-07-07 RX ADMIN — CHLORHEXIDINE GLUCONATE 1 APPLICATION(S): 213 SOLUTION TOPICAL at 05:54

## 2022-07-07 RX ADMIN — CHLORHEXIDINE GLUCONATE 15 MILLILITER(S): 213 SOLUTION TOPICAL at 18:26

## 2022-07-07 RX ADMIN — ENOXAPARIN SODIUM 40 MILLIGRAM(S): 100 INJECTION SUBCUTANEOUS at 18:52

## 2022-07-07 NOTE — PROGRESS NOTE ADULT - SUBJECTIVE AND OBJECTIVE BOX
INTERVAL HPI/OVERNIGHT EVENTS:  Chart Reviewed and patient seen at bedside.  Spoke Mandarin to patient -- patient understood.  Says she feels well today. Her voice is less congested, but per patient still feels that her voice is low.  She is going for MBSS today.    ROS:  Denies fevers, chills, chest pain, shortness of breath, abdominal pain, headaches, nausea/vomiting    Vital Signs Last 24 Hrs  T(C): 36.3 (07 Jul 2022 08:55), Max: 36.4 (06 Jul 2022 12:00)  T(F): 97.4 (07 Jul 2022 08:55), Max: 97.6 (06 Jul 2022 12:00)  HR: 101 (07 Jul 2022 08:55) (84 - 101)  BP: 118/85 (07 Jul 2022 08:55) (95/68 - 121/81)  BP(mean): --  RR: 16 (07 Jul 2022 08:55) (15 - 16)  SpO2: 93% (07 Jul 2022 08:55) (93% - 96%)    Physical Exam:  Gen - NAD, Comfortable,   HEENT - NCAT,  Pulm -breathing comfortably on RA  Cardiovascular - RRR  Abdomen - Soft, NT/ND, +BS, (+) PEG tube site - clean,   Extremities - No calf tenderness  Neuro-     Cognitive - Awake and alert     Communication - Fluent, hypophonia     Cranial Nerves - PERRLA, EOMI, +mild diplopia, Slight impaired R shoulder shrug, . Visual fields intact. +dysphagia, +hypophonia--improving,  Orolingual weakness     Motor -                     LEFT    UE - ShAB 5/5, EF 5/5, EE 5/5, WE 5/5,  5/5                    RIGHT UE - ShAB 4+/5, EF 3/5, EE 3/5, WE 3/5,  3/5                    LEFT    LE - HF 5/5, KE 5/5, DF 5/5, PF 5/5                    RIGHT LE - HF 4/5, KE 4/5, DF 3/5, PF 4/5        Sensory - Slightly diminished sensation to light touch in RUE and RLE     Coordination -- impaired on right     Tone - normal  Psychiatric - Mood stable, Affect WNL        LABS:  07-07    133<L>  |  98  |  11  ----------------------------<  90  4.5   |  23  |  0.40<L>  07-05    138  |  98  |  15  ----------------------------<  103<H>  4.1   |  32<H>  |  0.48<L>    Ca    8.9      07 Jul 2022 06:40  Ca    8.8      05 Jul 2022 06:20    TPro  6.7  /  Alb  2.8<L>  /  TBili  0.9  /  DBili  x   /  AST  44<H>  /  ALT  67<H>  /  AlkPhos  83  07-07  TPro  6.5  /  Alb  3.0<L>  /  TBili  0.8  /  DBili  x   /  AST  33  /  ALT  71<H>  /  AlkPhos  89  07-05                                              10.1   3.55  )-----------( 255      ( 07 Jul 2022 06:40 )             31.7                         9.7    3.85  )-----------( 256      ( 05 Jul 2022 06:20 )             30.5     CAPILLARY BLOOD GLUCOSE                 INTERVAL HPI/OVERNIGHT EVENTS:  Chart Reviewed and patient seen at bedside.  Spoke Mandarin to patient -- patient understood.  Says she feels well today. Her voice is less congested, but per patient still feels that her voice is low.  S/P MBSS today - failed     ROS:  Denies fevers, chills, chest pain, shortness of breath, abdominal pain, headaches, nausea/vomiting    Vital Signs Last 24 Hrs  T(C): 36.3 (07 Jul 2022 08:55), Max: 36.4 (06 Jul 2022 12:00)  T(F): 97.4 (07 Jul 2022 08:55), Max: 97.6 (06 Jul 2022 12:00)  HR: 101 (07 Jul 2022 08:55) (84 - 101)  BP: 118/85 (07 Jul 2022 08:55) (95/68 - 121/81)  BP(mean): --  RR: 16 (07 Jul 2022 08:55) (15 - 16)  SpO2: 93% (07 Jul 2022 08:55) (93% - 96%)    Physical Exam:  Gen - NAD, Comfortable,   HEENT - NCAT,  Pulm -breathing comfortably on RA  Cardiovascular - RRR  Abdomen - Soft, NT/ND, +BS, (+) PEG tube site - clean,   Extremities - No calf tenderness  Neuro-     Cognitive - Awake and alert     Communication - Fluent, hypophonia     Cranial Nerves - PERRLA, EOMI, +mild diplopia, Slight impaired R shoulder shrug, . Visual fields intact. +dysphagia, +hypophonia--improving,  Orolingual weakness     Motor -                     LEFT    UE - ShAB 5/5, EF 5/5, EE 5/5, WE 5/5,  5/5                    RIGHT UE - ShAB 4+/5, EF 3/5, EE 3/5, WE 3/5,  3/5                    LEFT    LE - HF 5/5, KE 5/5, DF 5/5, PF 5/5                    RIGHT LE - HF 4/5, KE 4/5, DF 3/5, PF 4/5        Sensory - Slightly diminished sensation to light touch in RUE and RLE     Coordination -- impaired on right     Tone - normal  Psychiatric - Mood stable, Affect WNL        LABS:  07-07    133<L>  |  98  |  11  ----------------------------<  90  4.5   |  23  |  0.40<L>  07-05    138  |  98  |  15  ----------------------------<  103<H>  4.1   |  32<H>  |  0.48<L>    Ca    8.9      07 Jul 2022 06:40  Ca    8.8      05 Jul 2022 06:20    TPro  6.7  /  Alb  2.8<L>  /  TBili  0.9  /  DBili  x   /  AST  44<H>  /  ALT  67<H>  /  AlkPhos  83  07-07  TPro  6.5  /  Alb  3.0<L>  /  TBili  0.8  /  DBili  x   /  AST  33  /  ALT  71<H>  /  AlkPhos  89  07-05                                              10.1   3.55  )-----------( 255      ( 07 Jul 2022 06:40 )             31.7                         9.7    3.85  )-----------( 256      ( 05 Jul 2022 06:20 )             30.5     CAPILLARY BLOOD GLUCOSE

## 2022-07-07 NOTE — PROGRESS NOTE ADULT - ASSESSMENT
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[place]) presented with dizziness, ataxia, and emesis, found to have meningioma s/p resection with course complicated by vocal cord paralysis, dysphagia requiring PEG, and now at acute rehab     #Meningioma s/p resection  -PT/OT/SLP  -Pain control as needed     #Dysphagia s/p PEG placement  -MBS performed today - pending results  -Aspiration precautions    #Left vocal cord paralysis  -Patient declined laryngoscopy previously  -MBS today as above    #PAF  -Continue lopressor for rate control    #HTN  -Continue Lopressor  -Off Losartan, Amlodipine     #Anemia  -Likely anemia of chronic disease    #Hyponatremia  -likely SIADH  -Continue fluid restriction as able     #Prophylactic measure  -DVT ppx: Lovenox  -GI ppx: Pepcid

## 2022-07-07 NOTE — PROGRESS NOTE ADULT - ASSESSMENT
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  MRI with ethel-medullary. right CPA, and right temporal enhancing lesions concern for meningioma vs schwannoma, with hydro.  Now S/p Left far lateral crani for meningioma resection with subtotal sacrifice of left vertebral artery (6/1).  Noted with vocal cord paralysis, dysphagia - NPO on TF (PEG placed 6/16). Also right Hemiparesis, sensory, gait and ADL impairment.     COMORBIDITES/ACTIVE MEDICAL ISSUES     #Brain tumor - Meningioma   - S/p Left far lateral crani for meningioma resection with subtotal sacrifice of left vertebral artery (6/1)  - Dexamethasone taper -completed (6/25)  - Gait Instability, ADL impairments and Functional impairments: cont Comprehensive Rehab Program of PT/OT & speech    Hypophonia--Vocal cord paralysis  --ENT consult 7/5 reviewed and appreciated-- Paresis of left vocal cord.   ·  Recommendation: - +Right vocal cord compensation  - MBS as scheduled tomorrow & OK for Speech and Swallow therapy - Advance diet as necessary   - Aspiration precautions.  - Patient would need to follow up with ENT/Laryngology - Dr. Daniele Gurrola after discharge at [facility].  Please call to schedule an appointment at [phone] 1-2 weeks after discharge.      #Transient A fib with RVR  - Sinus tach - EKG without evidence of a fib  - Metoprolol 12.5 BID    # Cranial stenosis  - CTA head showed defect in the right M1 suspicious for possible right M1 occlusion though the vessel   - Recommended to "continue ASA 81mg qd and atorvastatin 40mg qhs" on discharge paperwork, however patient not on it in prior hospital  - Lipid panel 5/21 unremarkable  -restarted atorvastatin  --Contacted Dr. Liu to clarify if pt. ok to restart ASA-- stated would hold for now if it was for carotid stenosis .    #HTN  - - cont. metoprolol 12.5 BID     #URI--resolved  - Hospitalist changed augmentin to doxycycline to cover MRSA in sputum culture  -MRSA nares + > completed doxycycline course  - management per hospitalist  - Encourage incentive spirometry       # Urinary retention--resolved  - --Pt. voiding with low PVRs  --toileting program q.3h while awake    #Pain control  - Tylenol PRN, Tramadol    #GI/Bowel Mgmt   - At risk for constipation due to neurologic diagnosis, immobility and/or medication use  - Senna,  Miralax       #DVT  - Lovenox    Dysphagia  - Diet - NPO with TF + free water flush: 200cc  - PEG placed (6/16)  - SLP - evaluation and treatment  --Plan for MBS today--d/w speech therapy  - cont. vital stimulation in speech therapy  - Self suctioning    Precautions / PROPHYLAXIS:   - Falls  - Lungs: Aspiration,   - Pressure injury/Skin: Turn Q2hrs while in bed, OOB to Chair, PT/OT      IDT 6/30:  SW: lives in  with son, and DIL; 1st floor s/u. son and DIL work, may not be able to provide necessary assistance.  OT: TotA Eating, Sup-- grooming, min UBD, mod LBD, min toileting to commode, modA transfer to tub bench, total bathing  PT: Antoni transfer, Amb. 50ft RW modA, 4 steps modA    Speech: NPO/PEG; Hypophonia, secretions improved, Mod A cognition,  mod cog deficits/PS/memory  ELOS: 7/11/22 BOB likely,  may be extended if going home    Follow ups:  Giovanni Liu (MD)  Neurosurgery  General  [address] [facility], [address]  [place] [address]  Phone: [phone]  Fax: [phone]   60 yo Fuzhounese (and some Mandarin) speaking F with PMH of HTN, C Spine meningioma (removed 2018 in [place]) who presented with dizziness, ataxia, and emesis.  MRI with ethel-medullary. right CPA, and right temporal enhancing lesions concern for meningioma vs schwannoma, with hydro.  Now S/p Left far lateral crani for meningioma resection with subtotal sacrifice of left vertebral artery (6/1).  Noted with vocal cord paralysis, dysphagia - NPO on TF (PEG placed 6/16). Also right Hemiparesis, sensory, gait and ADL impairment.     COMORBIDITES/ACTIVE MEDICAL ISSUES     #Brain tumor - Meningioma   - S/p Left far lateral crani for meningioma resection with subtotal sacrifice of left vertebral artery (6/1)  - Dexamethasone taper -completed (6/25)  - Gait Instability, ADL impairments and Functional impairments: cont Comprehensive Rehab Program of PT/OT & speech    Hypophonia--Vocal cord paralysis  --ENT consult 7/5 reviewed and appreciated-- Paresis of left vocal cord.   ·  Recommendation: - +Right vocal cord compensation  - MBS as scheduled tomorrow & OK for Speech and Swallow therapy - Advance diet as necessary   - Aspiration precautions.  - Patient would need to follow up with ENT/Laryngology - Dr. Daniele Gurrola after discharge at [facility].  Please call to schedule an appointment at [phone] 1-2 weeks after discharge.      #Transient A fib with RVR  - Sinus tach - EKG without evidence of a fib  - Metoprolol 12.5 BID    # Cranial stenosis  - CTA head showed defect in the right M1 suspicious for possible right M1 occlusion though the vessel   - Recommended to "continue ASA 81mg qd and atorvastatin 40mg qhs" on discharge paperwork, however patient not on it in prior hospital  - Lipid panel 5/21 unremarkable  -restarted atorvastatin  --Contacted Dr. Liu to clarify if pt. ok to restart ASA-- stated would hold for now if it was for carotid stenosis .    #HTN  - - cont. metoprolol 12.5 BID     #URI--resolved  - Hospitalist changed augmentin to doxycycline to cover MRSA in sputum culture  -MRSA nares + > completed doxycycline course  - management per hospitalist  - Encourage incentive spirometry       # Urinary retention--resolved  - --Pt. voiding with low PVRs  --toileting program q.3h while awake    #Pain control  - Tylenol PRN, Tramadol    #GI/Bowel Mgmt   - At risk for constipation due to neurologic diagnosis, immobility and/or medication use  - Senna,  Miralax       #DVT  - Lovenox    Dysphagia  - Diet - NPO with TF + free water flush: 200cc  - PEG placed (6/16)  - SLP - evaluation and treatment  --MBSS today- FAILED -d/w speech therapy, remains NPO  - cont. vital stimulation in speech therapy  - Self suctioning    Precautions / PROPHYLAXIS:   - Falls  - Lungs: Aspiration,   - Pressure injury/Skin: Turn Q2hrs while in bed, OOB to Chair, PT/OT      IDT 6/30:  SW: lives in  with son, and DIL; 1st floor s/u. son and DIL work, may not be able to provide necessary assistance.  OT: TotA Eating, Sup-- grooming, min UBD, mod LBD, min toileting to commode, modA transfer to tub bench, total bathing  PT: Antoni transfer, Amb. 50ft RW modA, 4 steps modA    Speech: NPO/PEG; Hypophonia, secretions improved, Mod A cognition,  mod cog deficits/PS/memory  ELOS: 7/11/22 BOB likely,  may be extended if going home    Follow ups:  Giovanni Liu (MD)  Neurosurgery  General  [address] [place] [address], [address]  [place] [address]  Phone: [phone]  Fax: [phone]   62 yo Fuzhounese (and some Mandarin) speaking F with PMH of HTN, C Spine meningioma (removed 2018 in [place]) who presented with dizziness, ataxia, and emesis.  MRI with ethel-medullary. right CPA, and right temporal enhancing lesions concern for meningioma vs schwannoma, with hydro.  Now S/p Left far lateral crani for meningioma resection with subtotal sacrifice of left vertebral artery (6/1).  Noted with vocal cord paralysis, dysphagia - NPO on TF (PEG placed 6/16). Also right Hemiparesis, sensory, gait and ADL impairment.     COMORBIDITES/ACTIVE MEDICAL ISSUES     #Brain tumor - Meningioma   - S/p Left far lateral crani for meningioma resection with subtotal sacrifice of left vertebral artery (6/1)  - Dexamethasone taper -completed (6/25)  - Gait Instability, ADL impairments and Functional impairments: cont Comprehensive Rehab Program of PT/OT & speech    Hypophonia--Vocal cord paralysis  --ENT consult 7/5 reviewed and appreciated-- Paresis of left vocal cord.   ·  Recommendation: - +Right vocal cord compensation  - MBS as scheduled tomorrow & OK for Speech and Swallow therapy - Advance diet as necessary   - Aspiration precautions.  - Patient would need to follow up with ENT/Laryngology - Dr. Daniele Gurrola after discharge at [facility].  Please call to schedule an appointment at [phone] 1-2 weeks after discharge.      #Transient A fib with RVR  - Sinus tach - EKG without evidence of a fib  - Metoprolol 12.5 BID    # Cranial stenosis  - CTA head showed defect in the right M1 suspicious for possible right M1 occlusion though the vessel   - Recommended to "continue ASA 81mg qd and atorvastatin 40mg qhs" on discharge paperwork, however patient not on it in prior hospital  - Lipid panel 5/21 unremarkable  -restarted atorvastatin  --Contacted Dr. Liu to clarify if pt. ok to restart ASA-- stated would hold for now if it was for carotid stenosis .    #HTN  - - cont. metoprolol 12.5 BID     #URI--resolved  - Hospitalist changed augmentin to doxycycline to cover MRSA in sputum culture  -MRSA nares + > completed doxycycline course  - management per hospitalist  - Encourage incentive spirometry       #   - --Pt. voiding with low PVRs  --toileting program q.3h while awake    #Pain control  - Tylenol PRN, Tramadol    #GI/Bowel Mgmt   - At risk for constipation due to neurologic diagnosis, immobility and/or medication use  - Senna,  Miralax       #DVT  - Lovenox    Dysphagia  - Diet - NPO with TF + free water flush: 200cc  - PEG placed (6/16)  - SLP - evaluation and treatment  --MBSS today- FAILED -d/w speech therapy, remains NPO  - cont. vital stimulation in speech therapy  - Self suctioning    Precautions / PROPHYLAXIS:   - Falls  - Lungs: Aspiration,   - Pressure injury/Skin: Turn Q2hrs while in bed, OOB to Chair, PT/OT      IDT 7/7:  SW: lives in  with son, and DIL; 1st floor s/u. son and DIL work, may not be able to provide necessary assistance.  OT: TotA Eating, Sup-- grooming, min A--dressing and bathroom transfers, mod A-- bathing and toileting;    PT: CG-- transfer, & Amb. 100ft RW modA, 8 steps --CG    Speech: NPO/PEG; Focus on Dysphagia- and Hypophonia from VC paralysis, Mild cog deficits/PS/memory--but will be assisted by her family at home  ELOS: 7/20/22--home    Follow ups:  Giovanni Liu)  Neurosurgery  General  [address] [place] [address], [address]  [place] [address]  Phone: [phone]  Fax: [phone]

## 2022-07-07 NOTE — PROGRESS NOTE ADULT - ATTENDING COMMENTS
Pt. seen with resident.  Agree with documentation above as per resident with amendments made as appropriate. Patient medically stable. Making progress towards rehab goals.     Meningioma s/p resection with sacrifice of left vertebral artery  making progress in therapy but still severe Dysphagia-- was not able to pass MBS-- still NPO.   Family will take pt. home--- will benefit from more time.  Stay is extended.

## 2022-07-07 NOTE — PROGRESS NOTE ADULT - SUBJECTIVE AND OBJECTIVE BOX
Patient is a 61y old  Female who presents with a chief complaint of Meningioma (07 Jul 2022 10:16)    Mandarin  ID [id number] utilized     Patient seen and examined at bedside. No overnight events reported.     ALLERGIES:  albuterol (Other)  No Known Allergies    MEDICATIONS  (STANDING):  ascorbic acid 500 milliGRAM(s) Oral daily  chlorhexidine 0.12% Liquid 15 milliLiter(s) Swish and Spit two times a day  chlorhexidine 2% Cloths 1 Application(s) Topical <User Schedule>  enoxaparin Injectable 40 milliGRAM(s) SubCutaneous <User Schedule>  melatonin 6 milliGRAM(s) Oral at bedtime  metoprolol tartrate 12.5 milliGRAM(s) Oral every 12 hours  multivitamin 1 Tablet(s) Oral daily  pantoprazole   Suspension 40 milliGRAM(s) Oral before breakfast  petrolatum white Ointment 1 Application(s) Topical daily  polyethylene glycol 3350 17 Gram(s) Oral daily  senna 2 Tablet(s) Oral at bedtime    MEDICATIONS  (PRN):  acetaminophen     Tablet .. 650 milliGRAM(s) Oral every 6 hours PRN Temp greater or equal to 38C (100.4F), Mild Pain (1 - 3)  ondansetron    Tablet 4 milliGRAM(s) Oral every 12 hours PRN Nausea  sodium chloride 0.65% Nasal 1 Spray(s) Both Nostrils every 6 hours PRN Congestion    Vital Signs Last 24 Hrs  T(F): 97.4 (07 Jul 2022 08:55), Max: 97.6 (06 Jul 2022 19:49)  HR: 101 (07 Jul 2022 08:55) (84 - 101)  BP: 118/85 (07 Jul 2022 08:55) (107/75 - 121/81)  RR: 16 (07 Jul 2022 08:55) (16 - 16)  SpO2: 93% (07 Jul 2022 08:55) (93% - 96%)  I&O's Summary    06 Jul 2022 07:01  -  07 Jul 2022 07:00  --------------------------------------------------------  IN: 1000 mL / OUT: 1 mL / NET: 999 mL      PHYSICAL EXAM:  General: NAD, A/O x 3  ENT: No gross hearing impairment, Moist mucous membranes, no thrush  Neck: Supple, No JVD  Lungs: Clear to auscultation bilaterally, good air entry, non-labored breathing  Cardio: RRR, S1/S2, No murmur  Abdomen: Soft, Nontender, Nondistended; PEG+, Bowel sounds present  Extremities: No calf tenderness, No cyanosis, No pitting edema  Psych: Appropriate mood and affect    LABS:                        10.1   3.55  )-----------( 255      ( 07 Jul 2022 06:40 )             31.7     07-07    133  |  98  |  11  ----------------------------<  90  4.5   |  23  |  0.40    Ca    8.9      07 Jul 2022 06:40    TPro  6.7  /  Alb  2.8  /  TBili  0.9  /  DBili  x   /  AST  44  /  ALT  67  /  AlkPhos  83  07-07    05-21 Chol 159 mg/dL LDL -- HDL 50 mg/dL Trig 99 mg/dL    COVID-19 PCR: NotDetec (07-03-22 @ 06:00)  COVID-19 PCR: NotDetec (06-27-22 @ 06:49)  COVID-19 PCR: NotDetec (06-20-22 @ 23:35)  COVID-19 PCR: NotDetec (06-20-22 @ 10:32)  COVID-19 PCR: NotDetec (06-15-22 @ 16:24)

## 2022-07-08 PROCEDURE — 99232 SBSQ HOSP IP/OBS MODERATE 35: CPT

## 2022-07-08 RX ADMIN — Medication 12.5 MILLIGRAM(S): at 06:18

## 2022-07-08 RX ADMIN — Medication 6 MILLIGRAM(S): at 21:00

## 2022-07-08 RX ADMIN — SENNA PLUS 2 TABLET(S): 8.6 TABLET ORAL at 21:00

## 2022-07-08 RX ADMIN — POLYETHYLENE GLYCOL 3350 17 GRAM(S): 17 POWDER, FOR SOLUTION ORAL at 12:46

## 2022-07-08 RX ADMIN — CHLORHEXIDINE GLUCONATE 1 APPLICATION(S): 213 SOLUTION TOPICAL at 08:00

## 2022-07-08 RX ADMIN — ENOXAPARIN SODIUM 40 MILLIGRAM(S): 100 INJECTION SUBCUTANEOUS at 18:35

## 2022-07-08 RX ADMIN — Medication 1 TABLET(S): at 12:46

## 2022-07-08 RX ADMIN — Medication 1 APPLICATION(S): at 13:00

## 2022-07-08 RX ADMIN — Medication 500 MILLIGRAM(S): at 12:46

## 2022-07-08 RX ADMIN — PANTOPRAZOLE SODIUM 40 MILLIGRAM(S): 20 TABLET, DELAYED RELEASE ORAL at 06:18

## 2022-07-08 NOTE — PROGRESS NOTE ADULT - ATTENDING COMMENTS
Pt. seen with resident.  Agree with documentation above as per resident with amendments made as appropriate. Patient medically stable. Making progress towards rehab goals.       Meningioma s/p resection with sacrifice of left vertebral artery  stable

## 2022-07-08 NOTE — PROGRESS NOTE ADULT - SUBJECTIVE AND OBJECTIVE BOX
INTERVAL HPI/OVERNIGHT EVENTS:  Chart Reviewed and patient seen at bedside.  Patient has no complaints or issues overnight.   She slept well, and reports no feelings of nausea.    ROS:  Denies fevers, chills, chest pain, shortness of breath, abdominal pain, headaches    Vital Signs Last 24 Hrs  T(C): 36.4 (08 Jul 2022 09:38), Max: 36.5 (07 Jul 2022 20:27)  T(F): 97.6 (08 Jul 2022 09:38), Max: 97.7 (07 Jul 2022 20:27)  HR: 90 (08 Jul 2022 09:38) (62 - 93)  BP: 105/73 (08 Jul 2022 09:38) (96/62 - 116/52)  BP(mean): --  RR: 16 (08 Jul 2022 09:38) (14 - 16)  SpO2: 96% (08 Jul 2022 09:38) (95% - 96%)    Parameters below as of 08 Jul 2022 09:38  Patient On (Oxygen Delivery Method): room air        Physical Exam:    Gen - NAD, Comfortable,   HEENT - NCAT,  Pulm -breathing comfortably on RA  Cardiovascular - RRR  Abdomen - Soft, NT/ND, +BS, (+) PEG tube site - clean,   Extremities - No calf tenderness  Neuro-     Cognitive - Awake and alert     Communication - Fluent, hypophonia     Cranial Nerves - PERRLA, EOMI, +mild diplopia, Slight impaired R shoulder shrug, . Visual fields intact. +dysphagia, +hypophonia--improving,  Orolingual weakness     Motor -                     LEFT    UE - ShAB 5/5, EF 5/5, EE 5/5, WE 5/5,  5/5                    RIGHT UE - ShAB 4+/5, EF 3/5, EE 3/5, WE 3/5,  3/5                    LEFT    LE - HF 5/5, KE 5/5, DF 5/5, PF 5/5                    RIGHT LE - HF 4/5, KE 4/5, DF 3/5, PF 4/5        Sensory - Slightly diminished sensation to light touch in RUE and RLE     Coordination -- impaired on right     Tone - normal  Psychiatric - Mood stable, Affect WNL      LABS:  07-07    133<L>  |  98  |  11  ----------------------------<  90  4.5   |  23  |  0.40<L>    Ca    8.9      07 Jul 2022 06:40    TPro  6.7  /  Alb  2.8<L>  /  TBili  0.9  /  DBili  x   /  AST  44<H>  /  ALT  67<H>  /  AlkPhos  83  07-07                                              10.1   3.55  )-----------( 255      ( 07 Jul 2022 06:40 )             31.7     CAPILLARY BLOOD GLUCOSE

## 2022-07-08 NOTE — PROGRESS NOTE ADULT - ASSESSMENT
62 yo Fuzhounese (and some Mandarin) speaking F with PMH of HTN, C Spine meningioma (removed 2018 in [place]) who presented with dizziness, ataxia, and emesis.  MRI with ethel-medullary. right CPA, and right temporal enhancing lesions concern for meningioma vs schwannoma, with hydro.  Now S/p Left far lateral crani for meningioma resection with subtotal sacrifice of left vertebral artery (6/1).  Noted with vocal cord paralysis, dysphagia - NPO on TF (PEG placed 6/16). Also right Hemiparesis, sensory, gait and ADL impairment.     COMORBIDITES/ACTIVE MEDICAL ISSUES     #Brain tumor - Meningioma   - S/p Left far lateral crani for meningioma resection with subtotal sacrifice of left vertebral artery (6/1)  - Dexamethasone taper -completed (6/25)  - Gait Instability, ADL impairments and Functional impairments: cont Comprehensive Rehab Program of PT/OT & speech    Hypophonia--Vocal cord paralysis  --ENT consult 7/5 reviewed and appreciated-- Paresis of left vocal cord.   ·  Recommendation: - +Right vocal cord compensation  - MBS as needed & OK for Speech and Swallow therapy - Advance diet as necessary   - Aspiration precautions.  - Patient would need to follow up with ENT/Laryngology - Dr. Daniele Gurrola after discharge at [facility].  Please call to schedule an appointment at [phone] 1-2 weeks after discharge.      #Transient A fib with RVR  - Sinus tach - EKG without evidence of a fib  - Metoprolol 12.5 BID    # Cranial stenosis  - CTA head showed defect in the right M1 suspicious for possible right M1 occlusion though the vessel   - Recommended to "continue ASA 81mg qd and atorvastatin 40mg qhs" on discharge paperwork, however patient not on it in prior hospital  - Lipid panel 5/21 unremarkable  -restarted atorvastatin  --Contacted Dr. Liu to clarify if pt. ok to restart ASA-- stated would hold for now if it was for carotid stenosis .    #HTN  - - cont. metoprolol 12.5 BID     #URI--resolved  - Hospitalist changed augmentin to doxycycline to cover MRSA in sputum culture  -MRSA nares + > completed doxycycline course  - management per hospitalist  - Encourage incentive spirometry       #   - --Pt. voiding with low PVRs  --toileting program q.3h while awake    #Pain control  - Tylenol PRN, Tramadol    #GI/Bowel Mgmt   - At risk for constipation due to neurologic diagnosis, immobility and/or medication use  - Senna,  Miralax       #DVT  - Lovenox    Dysphagia  - Diet - NPO with TF + free water flush: 200cc  - PEG placed (6/16)  - SLP - evaluation and treatment  --MBSS 7/7- FAILED -d/w speech therapy, remains NPO  - cont. vital stimulation in speech therapy  - Self suctioning    Precautions / PROPHYLAXIS:   - Falls  - Lungs: Aspiration,   - Pressure injury/Skin: Turn Q2hrs while in bed, OOB to Chair, PT/OT      IDT 7/7:  SW: lives in  with son, and DIL; 1st floor s/u. son and DIL work, may not be able to provide necessary assistance.  OT: TotA Eating, Sup-- grooming, min A--dressing and bathroom transfers, mod A-- bathing and toileting;    PT: CG-- transfer, & Amb. 100ft RW modA, 8 steps --CG    Speech: NPO/PEG; Focus on Dysphagia- and Hypophonia from VC paralysis, Mild cog deficits/PS/memory--but will be assisted by her family at home  ELOS: 7/20/22--home    Follow ups:  Giovanni Liu)  Neurosurgery  General  [address] [place] [address], [address]  [place] [address]  Phone: [phone]  Fax: [phone]   62 yo Fuzhounese (and some Mandarin) speaking F with PMH of HTN, C Spine meningioma (removed 2018 in [place]) who presented with dizziness, ataxia, and emesis.  MRI with ethel-medullary. right CPA, and right temporal enhancing lesions concern for meningioma vs schwannoma, with hydro.  Now S/p Left far lateral crani for meningioma resection with subtotal sacrifice of left vertebral artery (6/1).  Noted with vocal cord paralysis, dysphagia - NPO on TF (PEG placed 6/16). Also right Hemiparesis, sensory, gait and ADL impairment.     COMORBIDITES/ACTIVE MEDICAL ISSUES     #Brain tumor - Meningioma   - S/p Left far lateral crani for meningioma resection with subtotal sacrifice of left vertebral artery (6/1)  - Dexamethasone taper -completed (6/25)  - Gait Instability, ADL impairments and Functional impairments: cont Comprehensive Rehab Program of PT/OT & speech    Hypophonia--Vocal cord paralysis  --ENT consult 7/5 reviewed and appreciated-- Paresis of left vocal cord.   ·  Recommendation: - +Right vocal cord compensation  - MBS as needed & OK for Speech and Swallow therapy - Advance diet as necessary   - Aspiration precautions.  - Patient would need to follow up with ENT/Laryngology - Dr. Daniele Gurrola after discharge at [facility].  Please call to schedule an appointment at [phone] 1-2 weeks after discharge.      #Transient A fib with RVR  - Sinus tach - EKG without evidence of a fib  - Metoprolol 12.5 BID    # Cranial stenosis  - CTA head showed defect in the right M1 suspicious for possible right M1 occlusion though the vessel   - Recommended to "continue ASA 81mg qd and atorvastatin 40mg qhs" on discharge paperwork, however patient not on it in prior hospital  - Lipid panel 5/21 unremarkable  -restarted atorvastatin  --Contacted Dr. Liu to clarify if pt. ok to restart ASA-- stated would hold for now if it was for carotid stenosis .    #HTN  - - cont. metoprolol 12.5 BID     #URI--resolved  -MRSA nares + > completed doxycycline course  - management per hospitalist  - Encourage incentive spirometry       #   - --Pt. voiding with low PVRs  --toileting program q.3h while awake    #Pain control  - Tylenol PRN, Tramadol    #GI/Bowel Mgmt   - At risk for constipation due to neurologic diagnosis, immobility and/or medication use  - Senna,  Miralax       #DVT  - Lovenox    Dysphagia  - Diet - NPO with TF + free water flush: 200cc  - PEG placed (6/16)  - SLP - evaluation and treatment  --MBSS 7/7- FAILED -d/w speech therapy, remains NPO  - cont. vital stimulation in speech therapy  - Self suctioning    Precautions / PROPHYLAXIS:   - Falls  - Lungs: Aspiration,   - Pressure injury/Skin: Turn Q2hrs while in bed, OOB to Chair, PT/OT      IDT 7/7:  SW: lives in  with son, and DIL; 1st floor s/u. son and DIL work, may not be able to provide necessary assistance.  OT: TotA Eating, Sup-- grooming, min A--dressing and bathroom transfers, mod A-- bathing and toileting;    PT: CG-- transfer, & Amb. 100ft RW modA, 8 steps --CG    Speech: NPO/PEG; Focus on Dysphagia- and Hypophonia from VC paralysis, Mild cog deficits/PS/memory--but will be assisted by her family at home  ELOS: 7/20/22--home    Follow ups:  Giovanni Liu)  Neurosurgery  General  [address] [place] [address], [address]  [place] [address]  Phone: [phone]  Fax: [phone]

## 2022-07-09 RX ADMIN — POLYETHYLENE GLYCOL 3350 17 GRAM(S): 17 POWDER, FOR SOLUTION ORAL at 12:46

## 2022-07-09 RX ADMIN — Medication 12.5 MILLIGRAM(S): at 18:20

## 2022-07-09 RX ADMIN — Medication 500 MILLIGRAM(S): at 12:46

## 2022-07-09 RX ADMIN — Medication 12.5 MILLIGRAM(S): at 05:19

## 2022-07-09 RX ADMIN — Medication 1 TABLET(S): at 12:46

## 2022-07-09 RX ADMIN — CHLORHEXIDINE GLUCONATE 1 APPLICATION(S): 213 SOLUTION TOPICAL at 05:25

## 2022-07-09 RX ADMIN — Medication 6 MILLIGRAM(S): at 21:25

## 2022-07-09 RX ADMIN — PANTOPRAZOLE SODIUM 40 MILLIGRAM(S): 20 TABLET, DELAYED RELEASE ORAL at 05:19

## 2022-07-09 RX ADMIN — ENOXAPARIN SODIUM 40 MILLIGRAM(S): 100 INJECTION SUBCUTANEOUS at 18:20

## 2022-07-09 RX ADMIN — SENNA PLUS 2 TABLET(S): 8.6 TABLET ORAL at 21:25

## 2022-07-10 DIAGNOSIS — R13.10 DYSPHAGIA, UNSPECIFIED: ICD-10-CM

## 2022-07-10 DIAGNOSIS — R05.9 COUGH, UNSPECIFIED: ICD-10-CM

## 2022-07-10 DIAGNOSIS — E87.1 HYPO-OSMOLALITY AND HYPONATREMIA: ICD-10-CM

## 2022-07-10 DIAGNOSIS — Z98.890 OTHER SPECIFIED POSTPROCEDURAL STATES: ICD-10-CM

## 2022-07-10 DIAGNOSIS — I10 ESSENTIAL (PRIMARY) HYPERTENSION: ICD-10-CM

## 2022-07-10 LAB — SARS-COV-2 RNA SPEC QL NAA+PROBE: SIGNIFICANT CHANGE UP

## 2022-07-10 PROCEDURE — 99232 SBSQ HOSP IP/OBS MODERATE 35: CPT

## 2022-07-10 PROCEDURE — 99233 SBSQ HOSP IP/OBS HIGH 50: CPT

## 2022-07-10 RX ORDER — BUDESONIDE, MICRONIZED 100 %
0.5 POWDER (GRAM) MISCELLANEOUS
Refills: 0 | Status: DISCONTINUED | OUTPATIENT
Start: 2022-07-10 | End: 2022-07-13

## 2022-07-10 RX ADMIN — Medication 500 MILLIGRAM(S): at 11:43

## 2022-07-10 RX ADMIN — Medication 1 TABLET(S): at 11:43

## 2022-07-10 RX ADMIN — Medication 12.5 MILLIGRAM(S): at 17:32

## 2022-07-10 RX ADMIN — PANTOPRAZOLE SODIUM 40 MILLIGRAM(S): 20 TABLET, DELAYED RELEASE ORAL at 05:45

## 2022-07-10 RX ADMIN — Medication 0.5 MILLIGRAM(S): at 21:06

## 2022-07-10 RX ADMIN — Medication 200 MILLIGRAM(S): at 21:01

## 2022-07-10 RX ADMIN — Medication 6 MILLIGRAM(S): at 21:01

## 2022-07-10 RX ADMIN — ENOXAPARIN SODIUM 40 MILLIGRAM(S): 100 INJECTION SUBCUTANEOUS at 17:33

## 2022-07-10 RX ADMIN — CHLORHEXIDINE GLUCONATE 15 MILLILITER(S): 213 SOLUTION TOPICAL at 17:32

## 2022-07-10 RX ADMIN — Medication 1 APPLICATION(S): at 11:43

## 2022-07-10 RX ADMIN — Medication 200 MILLIGRAM(S): at 13:29

## 2022-07-10 RX ADMIN — CHLORHEXIDINE GLUCONATE 1 APPLICATION(S): 213 SOLUTION TOPICAL at 08:00

## 2022-07-10 RX ADMIN — Medication 12.5 MILLIGRAM(S): at 05:46

## 2022-07-10 NOTE — PROGRESS NOTE ADULT - ASSESSMENT
62 yo Fuzhounese (and some Mandarin) speaking F with PMH of HTN, C Spine meningioma (removed 2018 in [place]) who presented with dizziness, ataxia, and emesis.  MRI with ethel-medullary. right CPA, and right temporal enhancing lesions concern for meningioma vs schwannoma, with hydro.  Now S/p Left far lateral crani for meningioma resection with subtotal sacrifice of left vertebral artery (6/1).  Noted with vocal cord paralysis, dysphagia - NPO on TF (PEG placed 6/16). Also right Hemiparesis, sensory, gait and ADL impairment.     COMORBIDITES/ACTIVE MEDICAL ISSUES     #Brain tumor - Meningioma   - S/p Left far lateral crani for meningioma resection with subtotal sacrifice of left vertebral artery (6/1)  - Dexamethasone taper -completed (6/25)  - Gait Instability, ADL impairments and Functional impairments: cont Comprehensive Rehab Program of PT/OT & speech    Hypophonia--Vocal cord paralysis  --ENT consult 7/5 reviewed and appreciated-- Paresis of left vocal cord.   ·  Recommendation: - +Right vocal cord compensation  - MBS as needed & OK for Speech and Swallow therapy - Advance diet as necessary   - Aspiration precautions.  - Patient would need to follow up with ENT/Laryngology - Dr. Daniele Gurrola after [facility].  Please call to schedule an appointment at [phone] 1-2 weeks after discharge.      #Transient A fib with RVR  - Sinus tach - EKG without evidence of a fib  - Metoprolol 12.5 BID    # Cranial stenosis  - CTA head showed defect in the right M1 suspicious for possible right M1 occlusion though the vessel   - Recommended to "continue ASA 81mg qd and atorvastatin 40mg qhs" on discharge paperwork, however patient not on it in prior hospital  - Lipid panel 5/21 unremarkable  -restarted atorvastatin  --Contacted Dr. Liu to clarify if pt. ok to restart ASA-- stated would hold for now if it was for carotid stenosis .    #HTN  - - cont. metoprolol 12.5 BID     #Respiratory-- increased UR secretions  --d/w hospitalist--- started budesonide inh bid and guafenesin q.8h  -MRSA nares + > completed doxycycline course  - management per hospitalist  - Encourage incentive spirometry       #   - --Pt. voiding with low PVRs  --toileting program q.3h while awake    #Pain control  - Tylenol PRN, Tramadol    #GI/Bowel Mgmt   - At risk for constipation due to neurologic diagnosis, immobility and/or medication use  - Senna,  Miralax       #DVT  - Lovenox    Dysphagia  - Diet - NPO with TF + free water flush: 200cc  - PEG placed (6/16)  - SLP - evaluation and treatment  --MBSS 7/7- FAILED -d/w speech therapy, remains NPO  - cont. vital stimulation in speech therapy  - Self suctioning    Precautions / PROPHYLAXIS:   - Falls  - Lungs: Aspiration,   - Pressure injury/Skin: Turn Q2hrs while in bed, OOB to Chair, PT/OT      IDT 7/7:  SW: lives in  with son, and DIL; 1st floor s/u. son and DIL work, may not be able to provide necessary assistance.  OT: TotA Eating, Sup-- grooming, min A--dressing and bathroom transfers, mod A-- bathing and toileting;    PT: CG-- transfer, & Amb. 100ft RW modA, 8 steps --CG    Speech: NPO/PEG; Focus on Dysphagia- and Hypophonia from VC paralysis, Mild cog deficits/PS/memory--but will be assisted by her family at home  ELOS: 7/20/22--home    Follow ups:  Giovanni Liu)  Neurosurgery  General  [address] [place] [address], [address]  [place] [address]  Phone: [phone]  Fax: [phone]

## 2022-07-10 NOTE — PROGRESS NOTE ADULT - SUBJECTIVE AND OBJECTIVE BOX
Patient is a 61y old  Female who presents with a chief complaint of Meningioma (08 Jul 2022 11:55)      History, interim events and clinically pertinent issues reviewed; patient interviewed and examined.  Cough reported pt denies SOB     ALLERGIES:  albuterol (Other)  No Known Allergies    MEDICATIONS  (STANDING):  ascorbic acid 500 milliGRAM(s) Oral daily  buDESOnide    Inhalation Suspension 0.5 milliGRAM(s) Inhalation two times a day  chlorhexidine 0.12% Liquid 15 milliLiter(s) Swish and Spit two times a day  chlorhexidine 2% Cloths 1 Application(s) Topical <User Schedule>  enoxaparin Injectable 40 milliGRAM(s) SubCutaneous <User Schedule>  guaiFENesin Oral Liquid (Sugar-Free) 200 milliGRAM(s) Oral every 8 hours  melatonin 6 milliGRAM(s) Oral at bedtime  metoprolol tartrate 12.5 milliGRAM(s) Oral every 12 hours  multivitamin 1 Tablet(s) Oral daily  pantoprazole   Suspension 40 milliGRAM(s) Oral before breakfast  petrolatum white Ointment 1 Application(s) Topical daily  polyethylene glycol 3350 17 Gram(s) Oral daily  senna 2 Tablet(s) Oral at bedtime    MEDICATIONS  (PRN):  acetaminophen     Tablet .. 650 milliGRAM(s) Oral every 6 hours PRN Temp greater or equal to 38C (100.4F), Mild Pain (1 - 3)  ondansetron    Tablet 4 milliGRAM(s) Oral every 12 hours PRN Nausea  sodium chloride 0.65% Nasal 1 Spray(s) Both Nostrils every 6 hours PRN Congestion    Vital Signs Last 24 Hrs  T(F): 97.6 (09 Jul 2022 20:40), Max: 97.6 (09 Jul 2022 20:40)  HR: 84 (10 Jul 2022 05:40) (78 - 84)  BP: 130/80 (10 Jul 2022 05:40) (104/68 - 130/80)  RR: 16 (09 Jul 2022 20:40) (16 - 16)  SpO2: 95% (09 Jul 2022 20:40) (95% - 95%)  I&O's Summary    09 Jul 2022 07:01  -  10 Jul 2022 07:00  --------------------------------------------------------  IN: 1200 mL / OUT: 0 mL / NET: 1200 mL    PHYSICAL EXAM:  General: NAD, A/O x 3  ENT: MMM  Neck: Supple, No JVD  Lungs: Clear to auscultation bilaterally  Cardio: RRR, S1/S2, No murmurs  Abdomen: Soft, Nontender, Nondistended; Bowel sounds present GT C/D/I  Extremities: No calf tenderness, No pitting edema  Neuro:  hypophonia, rt UE weakness    LABS: There are no new laboratory or radiologic studies resulted at the time this progress note was authored    05-21 Chol 159 mg/dL LDL -- HDL 50 mg/dL Trig 99 mg/dL    COVID-19 PCR: NotDetec (07-03-22 @ 06:00)  COVID-19 PCR: NotDetec (06-27-22 @ 06:49)  COVID-19 PCR: NotDetec (06-20-22 @ 23:35)  COVID-19 PCR: NotDetec (06-20-22 @ 10:32)  COVID-19 PCR: NotDetec (06-15-22 @ 16:24)

## 2022-07-10 NOTE — PROGRESS NOTE ADULT - NSPROGADDITIONALINFOA_GEN_ALL_CORE
I have personally interviewed and examined this patient, reviewed pertinent clinical information, and performed the evaluation and management services provided at today's visit for inpatient medical follow up    I am available to discuss any issues related to the medical care of this patient on the unit, or by phone at [phone]

## 2022-07-10 NOTE — PROGRESS NOTE ADULT - SUBJECTIVE AND OBJECTIVE BOX
HPI:  61 year old Female with PMHx of HTN and C-spine meningioma removal in 2018 in [place]. Patient presented with dizziness, gait instability, and vomiting over the last 3 days prior to admission. MRI showed ethel-medullary, Right CPA, and Right temporal enhancing lesions.  Possible meningioma vs schwannoma.  Patient was monitored in the ICU for hydro watch. Workup included MRI of the Brain, C/T/L spine and CT of the chest, abdomen and pelvis which was negative for malignancies on 5/20.  Patient was seen by ENT for dysphagia and recommended patient be seen by speech and swallow preop and underwent MBS and was able to continue with a regular diet.  Due to location of the lesion, ENT recommended audiology consult.  She was found to have intact hearing but mildly decreased bilaterally. Underwent Left far lateral crani for meningioma resection with subtotal sacrifice of left vertebral artery on 6/1. Attempted extubation the same night after surgery, however she developed stridor and was reintubated.  Patient was successfully extubated on 6/6 to high flow.  Patient scoped by ENT and found  to have left vocal cord paralysis.  Post extubation patient had a lot of secretions and was given Mucomyst and Duoneb. There was a concern for PE given respiratory status - CTA chest 6/7 negative for PE but with bilateral consolidation. Patient was weaned off high flow (6/9), however frequent suctioning was still required. FED via NGT and per speech and swallow recommendations, she was kept NPO. Patient was also found to have MSSA pneumonia and was treated with Augmentin.  Speech and swallow continued to follow and patient was recommended for a PEG.  PEG was placed on 6/16. Patient also had two episodes of desaturation on 6/16, follow up with another CTA chest; which was also negative for PE, but showed b/l lower lobe consolidations and IV antibiotics (Zosyn) were started.     Patient was evaluated by PM&R and therapy for functional deficits and gait/ADL impairments and recommend acute rehabilitation.  Patient was medically optimized for discharge to [facility] on 6/20/22.  (20 Jun 2022 13:21)          Subjective:  No overnight issues.  Pt. with upper respiratory secretions -- able to cough up.  no SOB      VITALS  Vital Signs Last 24 Hrs  T(C): 36.6 (10 Jul 2022 08:33), Max: 36.6 (10 Jul 2022 08:33)  T(F): 97.8 (10 Jul 2022 08:33), Max: 97.8 (10 Jul 2022 08:33)  HR: 72 (10 Jul 2022 08:33) (72 - 84)  BP: 117/82 (10 Jul 2022 08:33) (104/68 - 130/80)  BP(mean): --  RR: 15 (10 Jul 2022 08:33) (15 - 16)  SpO2: 96% (10 Jul 2022 08:33) (95% - 96%)    Parameters below as of 10 Jul 2022 08:33  Patient On (Oxygen Delivery Method): room air        REVIEW OF SYMPTOMS  Neurological deficits      PHYSICAL EXAM  Gen - NAD, Comfortable,   HEENT - NCAT,  Pulm -breathing comfortably on RA  Cardiovascular - RRR  Abdomen - Soft, NT/ND, +BS, (+) PEG tube site - clean,   Extremities - No calf tenderness  Neuro-     Cognitive - Awake and alert     Communication - Fluent, hypophonia     Cranial Nerves - PERRLA, EOMI, +mild diplopia, Slight impaired R shoulder shrug, . Visual fields intact. +dysphagia, +hypophonia--improving,  Orolingual weakness     Motor -                     LEFT    UE - ShAB 5/5, EF 5/5, EE 5/5, WE 5/5,  5/5                    RIGHT UE - ShAB 4+/5, EF 3/5, EE 3/5, WE 3/5,  3/5                    LEFT    LE - HF 5/5, KE 5/5, DF 5/5, PF 5/5                    RIGHT LE - HF 4/5, KE 4/5, DF 3/5, PF 4/5        Sensory - Slightly diminished sensation to light touch in RUE and RLE     Coordination -- impaired on right     Tone - normal  Psychiatric - Mood stable, Affect WNL    RECENT LABS                  RADIOLOGY/OTHER RESULTS      MEDICATIONS  (STANDING):  ascorbic acid 500 milliGRAM(s) Oral daily  buDESOnide    Inhalation Suspension 0.5 milliGRAM(s) Inhalation two times a day  chlorhexidine 0.12% Liquid 15 milliLiter(s) Swish and Spit two times a day  chlorhexidine 2% Cloths 1 Application(s) Topical <User Schedule>  enoxaparin Injectable 40 milliGRAM(s) SubCutaneous <User Schedule>  guaiFENesin Oral Liquid (Sugar-Free) 200 milliGRAM(s) Oral every 8 hours  melatonin 6 milliGRAM(s) Oral at bedtime  metoprolol tartrate 12.5 milliGRAM(s) Oral every 12 hours  multivitamin 1 Tablet(s) Oral daily  pantoprazole   Suspension 40 milliGRAM(s) Oral before breakfast  petrolatum white Ointment 1 Application(s) Topical daily  polyethylene glycol 3350 17 Gram(s) Oral daily  senna 2 Tablet(s) Oral at bedtime    MEDICATIONS  (PRN):  acetaminophen     Tablet .. 650 milliGRAM(s) Oral every 6 hours PRN Temp greater or equal to 38C (100.4F), Mild Pain (1 - 3)  ondansetron    Tablet 4 milliGRAM(s) Oral every 12 hours PRN Nausea  sodium chloride 0.65% Nasal 1 Spray(s) Both Nostrils every 6 hours PRN Congestion

## 2022-07-10 NOTE — PROGRESS NOTE ADULT - ASSESSMENT
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[place]) presented with dizziness, ataxia, and emesis, found to have meningioma s/p resection with course complicated by vocal cord paralysis, dysphagia requiring PEG, and now at acute rehab     #Meningioma s/p resection  -PT/OT/SLP  -Pain control as needed     #Dysphagia s/p PEG placement  #cough  -Aspiration precautions  -add budesonide nebs, robitussin via PEG    #Left vocal cord paralysis  -Patient declined laryngoscopy previously  -MBS today as above    #PAF  -Continue lopressor for rate control    #HTN  -Continue Lopressor  -Off Losartan, Amlodipine     #Anemia  -Likely anemia of chronic disease    #Hyponatremia  -likely SIADH  -Continue fluid restriction   - repeat BMP this week    #Prophylactic measure  -DVT ppx: Lovenox  -GI ppx: Pepcid

## 2022-07-11 LAB
ALBUMIN SERPL ELPH-MCNC: 3.1 G/DL — LOW (ref 3.3–5)
ALP SERPL-CCNC: 76 U/L — SIGNIFICANT CHANGE UP (ref 40–120)
ALT FLD-CCNC: 56 U/L — HIGH (ref 10–45)
ANION GAP SERPL CALC-SCNC: 7 MMOL/L — SIGNIFICANT CHANGE UP (ref 5–17)
AST SERPL-CCNC: 28 U/L — SIGNIFICANT CHANGE UP (ref 10–40)
BILIRUB SERPL-MCNC: 0.7 MG/DL — SIGNIFICANT CHANGE UP (ref 0.2–1.2)
BUN SERPL-MCNC: 10 MG/DL — SIGNIFICANT CHANGE UP (ref 7–23)
CALCIUM SERPL-MCNC: 8.2 MG/DL — LOW (ref 8.4–10.5)
CHLORIDE SERPL-SCNC: 101 MMOL/L — SIGNIFICANT CHANGE UP (ref 96–108)
CO2 SERPL-SCNC: 31 MMOL/L — SIGNIFICANT CHANGE UP (ref 22–31)
CREAT SERPL-MCNC: 0.5 MG/DL — SIGNIFICANT CHANGE UP (ref 0.5–1.3)
EGFR: 108 ML/MIN/1.73M2 — SIGNIFICANT CHANGE UP
GLUCOSE SERPL-MCNC: 103 MG/DL — HIGH (ref 70–99)
HCT VFR BLD CALC: 31 % — LOW (ref 34.5–45)
HGB BLD-MCNC: 10.1 G/DL — LOW (ref 11.5–15.5)
MCHC RBC-ENTMCNC: 32.6 GM/DL — SIGNIFICANT CHANGE UP (ref 32–36)
MCHC RBC-ENTMCNC: 34.2 PG — HIGH (ref 27–34)
MCV RBC AUTO: 105.1 FL — HIGH (ref 80–100)
NRBC # BLD: 0 /100 WBCS — SIGNIFICANT CHANGE UP (ref 0–0)
PLATELET # BLD AUTO: 270 K/UL — SIGNIFICANT CHANGE UP (ref 150–400)
POTASSIUM SERPL-MCNC: 4.1 MMOL/L — SIGNIFICANT CHANGE UP (ref 3.5–5.3)
POTASSIUM SERPL-SCNC: 4.1 MMOL/L — SIGNIFICANT CHANGE UP (ref 3.5–5.3)
PROT SERPL-MCNC: 6.6 G/DL — SIGNIFICANT CHANGE UP (ref 6–8.3)
RBC # BLD: 2.95 M/UL — LOW (ref 3.8–5.2)
RBC # FLD: 17.2 % — HIGH (ref 10.3–14.5)
SODIUM SERPL-SCNC: 139 MMOL/L — SIGNIFICANT CHANGE UP (ref 135–145)
WBC # BLD: 2.99 K/UL — LOW (ref 3.8–10.5)
WBC # FLD AUTO: 2.99 K/UL — LOW (ref 3.8–10.5)

## 2022-07-11 PROCEDURE — 99233 SBSQ HOSP IP/OBS HIGH 50: CPT

## 2022-07-11 PROCEDURE — 99232 SBSQ HOSP IP/OBS MODERATE 35: CPT

## 2022-07-11 RX ORDER — ACETAMINOPHEN 500 MG
2 TABLET ORAL
Qty: 0 | Refills: 0 | DISCHARGE
Start: 2022-07-11

## 2022-07-11 RX ORDER — ASPIRIN/CALCIUM CARB/MAGNESIUM 324 MG
1 TABLET ORAL
Qty: 0 | Refills: 0 | DISCHARGE

## 2022-07-11 RX ORDER — METOPROLOL TARTRATE 50 MG
0.5 TABLET ORAL
Qty: 30 | Refills: 0
Start: 2022-07-11 | End: 2022-08-09

## 2022-07-11 RX ORDER — ATORVASTATIN CALCIUM 80 MG/1
1 TABLET, FILM COATED ORAL
Qty: 0 | Refills: 0 | DISCHARGE

## 2022-07-11 RX ADMIN — CHLORHEXIDINE GLUCONATE 15 MILLILITER(S): 213 SOLUTION TOPICAL at 18:49

## 2022-07-11 RX ADMIN — Medication 12.5 MILLIGRAM(S): at 18:49

## 2022-07-11 RX ADMIN — ENOXAPARIN SODIUM 40 MILLIGRAM(S): 100 INJECTION SUBCUTANEOUS at 18:49

## 2022-07-11 RX ADMIN — Medication 0.5 MILLIGRAM(S): at 21:16

## 2022-07-11 RX ADMIN — Medication 200 MILLIGRAM(S): at 14:00

## 2022-07-11 RX ADMIN — Medication 500 MILLIGRAM(S): at 12:17

## 2022-07-11 RX ADMIN — Medication 1 APPLICATION(S): at 12:16

## 2022-07-11 RX ADMIN — Medication 1 TABLET(S): at 12:17

## 2022-07-11 RX ADMIN — Medication 200 MILLIGRAM(S): at 06:03

## 2022-07-11 RX ADMIN — CHLORHEXIDINE GLUCONATE 1 APPLICATION(S): 213 SOLUTION TOPICAL at 06:19

## 2022-07-11 RX ADMIN — PANTOPRAZOLE SODIUM 40 MILLIGRAM(S): 20 TABLET, DELAYED RELEASE ORAL at 06:03

## 2022-07-11 RX ADMIN — Medication 0.5 MILLIGRAM(S): at 08:33

## 2022-07-11 NOTE — PROGRESS NOTE ADULT - ASSESSMENT
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[place]) presented with dizziness, ataxia, and emesis, found to have meningioma s/p resection with course complicated by vocal cord paralysis, dysphagia requiring PEG, and now at acute rehab     #Meningioma s/p resection  -PT/OT as per primary  -Pain control PRN    #Dysphagia s/p PEG placement  #cough  -Aspiration precautions  -add budesonide nebs, robitussin via PEG    #Left vocal cord paralysis  -Patient declined laryngoscopy previously  -Outpatient ENT F/U    #PAF  -Continue lopressor for rate control    #HTN  -Continue Lopressor  -Off Losartan, Amlodipine   -Monitor vital signs     #Anemia  -stable     #Neutropenia  -No signs or symptoms of infection, afebrile  -repeat CBC    #Hyponatremia - resolved  -likely SIADH  -Continue fluid restriction     #DVT ppx: Lovenox  #GI ppx: Pepcid

## 2022-07-11 NOTE — PROGRESS NOTE ADULT - TIME BILLING
** Spoke with pt's  son and daughter in law at bedside__, to provide update on pt's status,  medical update, and rehab plan of care.  All questions answered.
discussion with daughter in law, SW, nursing and coordination of care.
discussion with family

## 2022-07-11 NOTE — PROGRESS NOTE ADULT - ATTENDING COMMENTS
Pt. seen with resident.  Agree with documentation above as per resident with amendments made as appropriate. Patient medically stable. Making progress towards rehab goals.       Meningioma resection with sacrifice of vertebral artery  Secretions improved.  Stable. Pt. seen with resident.  Agree with documentation above as per resident with amendments made as appropriate. Patient medically stable. Making progress towards rehab goals.       Meningioma resection with sacrifice of vertebral artery  Secretions improved. cont. budesonide  will require hospital bed--  Rxs provided for Tube feeding--   M11Q completed    ** Spoke with pt's  _daughter in law, Shaina ___, to provide update on pt's status,  medical update, medications, rehab plan of care, progress in therapy, discharge planning for 7/15 and discharge needs/expectations.  Pt. will have  assistance at home from family. Family will be trained by nursing tomorrow on TF administration.  All questions answered.

## 2022-07-11 NOTE — PROGRESS NOTE ADULT - ASSESSMENT
62 yo Fuzhounese (and some Mandarin) speaking F with PMH of HTN, C Spine meningioma (removed 2018 in [place]) who presented with dizziness, ataxia, and emesis.  MRI with ethel-medullary. right CPA, and right temporal enhancing lesions concern for meningioma vs schwannoma, with hydro.  Now S/p Left far lateral crani for meningioma resection with subtotal sacrifice of left vertebral artery (6/1).  Noted with vocal cord paralysis, dysphagia - NPO on TF (PEG placed 6/16). Also right Hemiparesis, sensory, gait and ADL impairment.     COMORBIDITES/ACTIVE MEDICAL ISSUES     #Brain tumor - Meningioma   - S/p Left far lateral crani for meningioma resection with subtotal sacrifice of left vertebral artery (6/1)  - Dexamethasone taper -completed (6/25)  - Gait Instability, ADL impairments and Functional impairments: cont Comprehensive Rehab Program of PT/OT & speech    Hypophonia--Vocal cord paralysis  --ENT consult 7/5 reviewed and appreciated-- Paresis of left vocal cord.   ·  Recommendation: - +Right vocal cord compensation  - MBS as needed & OK for Speech and Swallow therapy - Advance diet as necessary   - Aspiration precautions.  - Patient would need to follow up with ENT/Laryngology - Dr. Daniele Gurrola after discharge at [facility].  Please call to schedule an appointment at [phone] 1-2 weeks after discharge.      #Transient A fib with RVR  - Sinus tach - EKG without evidence of a fib  - Metoprolol 12.5 BID    # Cranial stenosis  - CTA head showed defect in the right M1 suspicious for possible right M1 occlusion though the vessel   - Recommended to "continue ASA 81mg qd and atorvastatin 40mg qhs" on discharge paperwork, however patient not on it in prior hospital  - Lipid panel 5/21 unremarkable  -restarted atorvastatin  --Contacted Dr. Liu to clarify if pt. ok to restart ASA-- stated would hold for now if it was for carotid stenosis .    #HTN  - - cont. metoprolol 12.5 BID     #Respiratory-- increased UR secretions  --d/w hospitalist--- started budesonide inh bid and guafenesin q.8h  -MRSA nares + > completed doxycycline course  - management per hospitalist  - Encourage incentive spirometry       #   - --Pt. voiding with low PVRs  --toileting program q.3h while awake    #Pain control  - Tylenol PRN, Tramadol    #GI/Bowel Mgmt   - At risk for constipation due to neurologic diagnosis, immobility and/or medication use  - Senna,  Miralax       #DVT  - Lovenox    Dysphagia  - Diet - NPO with TF + free water flush: 200cc  - PEG placed (6/16)  - SLP - evaluation and treatment  --MBSS 7/7- FAILED -d/w speech therapy, remains NPO  - cont. vital stimulation in speech therapy  - Self suctioning    Precautions / PROPHYLAXIS:   - Falls  - Lungs: Aspiration,   - Pressure injury/Skin: Turn Q2hrs while in bed, OOB to Chair, PT/OT      IDT 7/7:  SW: lives in  with son, and DIL; 1st floor s/u. son and DIL work, may not be able to provide necessary assistance.  OT: TotA Eating, Sup-- grooming, min A--dressing and bathroom transfers, mod A-- bathing and toileting;    PT: CG-- transfer, & Amb. 100ft RW modA, 8 steps --CG    Speech: NPO/PEG; Focus on Dysphagia- and Hypophonia from VC paralysis, Mild cog deficits/PS/memory--but will be assisted by her family at home  ELOS: 7/20/22--home    Follow ups:  Giovanni Liu)  Neurosurgery  General  [address] [place] [address], [address]  [place] [address]  Phone: [phone]  Fax: [phone]   62 yo Fuzhounese (and some Mandarin) speaking F with PMH of HTN, C Spine meningioma (removed 2018 in [place]) who presented with dizziness, ataxia, and emesis.  MRI with ethel-medullary. right CPA, and right temporal enhancing lesions concern for meningioma vs schwannoma, with hydro.  Now S/p Left far lateral crani for meningioma resection with subtotal sacrifice of left vertebral artery (6/1).  Noted with vocal cord paralysis, dysphagia - NPO on TF (PEG placed 6/16). Also right Hemiparesis, sensory, gait and ADL impairment.     COMORBIDITES/ACTIVE MEDICAL ISSUES     #Brain tumor - Meningioma   - S/p Left far lateral crani for meningioma resection with subtotal sacrifice of left vertebral artery (6/1)  - Dexamethasone taper -completed (6/25)  - Gait Instability, ADL impairments and Functional impairments: cont Comprehensive Rehab Program of PT/OT & speech    Hypophonia--Vocal cord paralysis  --ENT consult 7/5 reviewed and appreciated-- Paresis of left vocal cord.   ·  Recommendation: - +Right vocal cord compensation  - MBS as needed & OK for Speech and Swallow therapy - Advance diet as necessary   - Aspiration precautions.  - Patient would need to follow up with ENT/Laryngology - Dr. Daniele Gurrola after discharge at [facility].  Please call to schedule an appointment at [phone] 1-2 weeks after discharge.      #Transient A fib with RVR  - Sinus tach - EKG without evidence of a fib  - Metoprolol 12.5 BID    # Cranial stenosis  - CTA head showed defect in the right M1 suspicious for possible right M1 occlusion though the vessel   - Recommended to "continue ASA 81mg qd and atorvastatin 40mg qhs" on discharge paperwork, however patient not on it in prior hospital  - Lipid panel 5/21 unremarkable  -restarted atorvastatin  --Contacted Dr. Liu to clarify if pt. ok to restart ASA-- stated would hold for now if it was for carotid stenosis .    #HTN  - - cont. metoprolol 12.5 BID     #Respiratory-- increased UR secretions  --d/w hospitalist--- started budesonide inh bid and guafenesin q.8h--improved symptoms  -MRSA nares + > completed doxycycline course  - management per hospitalist  - Encourage incentive spirometry       #   - --Pt. voiding with low PVRs  --toileting program q.3h while awake    #Pain control  - Tylenol PRN, Tramadol    #GI/Bowel Mgmt   - At risk for constipation due to neurologic diagnosis, immobility and/or medication use  - Senna,  Miralax       #DVT  - Lovenox    Dysphagia  - Diet - NPO with TF + free water flush: 200cc  - PEG placed (6/16)  - SLP - evaluation and treatment  --MBSS 7/7- FAILED -d/w speech therapy, remains NPO  - cont. vital stimulation in speech therapy  - Self suctioning    Precautions / PROPHYLAXIS:   - Falls  - Lungs: Aspiration,   - Pressure injury/Skin: Turn Q2hrs while in bed, OOB to Chair, PT/OT      IDT 7/7:  SW: lives in  with son, and DIL; 1st floor s/u. son and DIL work, may not be able to provide necessary assistance.  OT: TotA Eating, Sup-- grooming, min A--dressing and bathroom transfers, mod A-- bathing and toileting;    PT: CG-- transfer, & Amb. 100ft RW modA, 8 steps --CG    Speech: NPO/PEG; Focus on Dysphagia- and Hypophonia from VC paralysis, Mild cog deficits/PS/memory--but will be assisted by her family at home  ELOS: 7/20/22--home    Follow ups:  Giovanni Liu)  Neurosurgery  General  [address] [facility], [address]  [place] [address]  Phone: [phone]  Fax: [phone]   60 yo Fuzhounese (and some Mandarin) speaking F with PMH of HTN, C Spine meningioma (removed 2018 in [place]) who presented with dizziness, ataxia, and emesis.  MRI with ethel-medullary. right CPA, and right temporal enhancing lesions concern for meningioma vs schwannoma, with hydro.  Now S/p Left far lateral crani for meningioma resection with subtotal sacrifice of left vertebral artery (6/1).  Noted with vocal cord paralysis, dysphagia - NPO on TF (PEG placed 6/16). Also right Hemiparesis, sensory, gait and ADL impairment.     COMORBIDITES/ACTIVE MEDICAL ISSUES     #Brain tumor - Meningioma   - S/p Left far lateral crani for meningioma resection with subtotal sacrifice of left vertebral artery (6/1)  - Dexamethasone taper -completed (6/25)  - Gait Instability, ADL impairments and Functional impairments: cont Comprehensive Rehab Program of PT/OT & speech    Hypophonia--Vocal cord paralysis  --ENT consult 7/5 reviewed and appreciated-- Paresis of left vocal cord.   ·  Recommendation: - +Right vocal cord compensation  - MBS as needed & OK for Speech and Swallow therapy - Advance diet as necessary   - Aspiration precautions.  - Patient would need to follow up with ENT/Laryngology - Dr. Daniele Gurrola after discharge at [facility].  Please call to schedule an appointment at [phone] 1-2 weeks after discharge.      #Transient A fib with RVR  - Sinus tach - EKG without evidence of a fib  - Metoprolol 12.5 BID    # Cranial stenosis  - CTA head showed defect in the right M1 suspicious for possible right M1 occlusion though the vessel   - Recommended to "continue ASA 81mg qd and atorvastatin 40mg qhs" on discharge paperwork, however patient not on it in prior hospital  - Lipid panel 5/21 unremarkable  -restarted atorvastatin  --Contacted Dr. Liu to clarify if pt. ok to restart ASA-- stated would hold for now if it was for carotid stenosis .    #HTN  - - cont. metoprolol 12.5 BID     #Respiratory-- increased UR secretions  --d/w hospitalist--- started budesonide inh bid and guafenesin q.8h--improved symptoms  -MRSA nares + > completed doxycycline course  - management per hospitalist  - Encourage incentive spirometry       #   - --Pt. voiding with low PVRs  --toileting program q.3h while awake    #Pain control  - Tylenol PRN, Tramadol    #GI/Bowel Mgmt   - At risk for constipation due to neurologic diagnosis, immobility and/or medication use  - Senna,  Miralax       #DVT  - Lovenox    Dysphagia  - Diet - NPO with TF + free water flush: 200cc  - PEG placed (6/16)  - SLP - evaluation and treatment  --MBSS 7/7- FAILED -d/w speech therapy, remains NPO  - cont. vital stimulation in speech therapy  - Self suctioning    Precautions / PROPHYLAXIS:   - Falls  - Lungs: Aspiration,   - Pressure injury/Skin: Turn Q2hrs while in bed, OOB to Chair, PT/OT      IDT 7/7:  SW: lives in  with son, and DIL; 1st floor s/u. son and DIL work, may not be able to provide necessary assistance.  OT: TotA Eating, Sup-- grooming, min A--dressing and bathroom transfers, mod A-- bathing and toileting;    PT: CG-- transfer, & Amb. 100ft RW modA, 8 steps --CG    Speech: NPO/PEG; Focus on Dysphagia- and Hypophonia from VC paralysis, Mild cog deficits/PS/memory--but will be assisted by her family at home  ELOS: 7/15/22--home    Ms. Ramirez requires a Semi-electric hospital bed as she needs the head of the bed to be elevated more than 30 degrees at all times due to aspiration precautions as she is NPO on Tube feeding.  Pillows or wedges are not effective to achieve this.     Follow ups:  Giovanni Liu)  Neurosurgery  General  [address] [place] [address], [address]  [place] [address]  Phone: [phone]  Fax: [phone]

## 2022-07-11 NOTE — PROGRESS NOTE ADULT - SUBJECTIVE AND OBJECTIVE BOX
Patient is a 61y old  Female who presents with a chief complaint of Meningioma (10 Jul 2022 12:56)      Patient seen and examined at bedside.  No overnight events  No complaints this morning    ALLERGIES:  albuterol (Other)  No Known Allergies    MEDICATIONS  (STANDING):  ascorbic acid 500 milliGRAM(s) Oral daily  buDESOnide    Inhalation Suspension 0.5 milliGRAM(s) Inhalation two times a day  chlorhexidine 0.12% Liquid 15 milliLiter(s) Swish and Spit two times a day  chlorhexidine 2% Cloths 1 Application(s) Topical <User Schedule>  enoxaparin Injectable 40 milliGRAM(s) SubCutaneous <User Schedule>  guaiFENesin Oral Liquid (Sugar-Free) 200 milliGRAM(s) Oral every 8 hours  melatonin 6 milliGRAM(s) Oral at bedtime  metoprolol tartrate 12.5 milliGRAM(s) Oral every 12 hours  multivitamin 1 Tablet(s) Oral daily  pantoprazole   Suspension 40 milliGRAM(s) Oral before breakfast  petrolatum white Ointment 1 Application(s) Topical daily  polyethylene glycol 3350 17 Gram(s) Oral daily  senna 2 Tablet(s) Oral at bedtime    MEDICATIONS  (PRN):  acetaminophen     Tablet .. 650 milliGRAM(s) Oral every 6 hours PRN Temp greater or equal to 38C (100.4F), Mild Pain (1 - 3)  ondansetron    Tablet 4 milliGRAM(s) Oral every 12 hours PRN Nausea  sodium chloride 0.65% Nasal 1 Spray(s) Both Nostrils every 6 hours PRN Congestion    Vital Signs Last 24 Hrs  T(F): 97.9 (11 Jul 2022 08:00), Max: 97.9 (11 Jul 2022 08:00)  HR: 82 (11 Jul 2022 08:34) (78 - 90)  BP: 112/75 (11 Jul 2022 08:00) (108/77 - 118/57)  RR: 14 (11 Jul 2022 08:00) (14 - 16)  SpO2: 95% (11 Jul 2022 08:34) (94% - 96%)  I&O's Summary    10 Jul 2022 07:01  -  11 Jul 2022 07:00  --------------------------------------------------------  IN: 900 mL / OUT: 0 mL / NET: 900 mL    11 Jul 2022 07:01  -  11 Jul 2022 09:20  --------------------------------------------------------  IN: 260 mL / OUT: 0 mL / NET: 260 mL    PHYSICAL EXAM:  GENERAL: NAD  HENT:  Atraumatic, Normocephalic; No tonsillar erythema, exudates, or enlargement; Moist mucous membranes;   EYES: EOMI, PERRLA, conjunctiva and sclera clear, no lid-lag  NECK: Supple, No JVD, Normal thyroid  CHEST/LUNG: Clear to percussion bilaterally; No rales, rhonchi, wheezing, or rubs; normal respiratory effort, no intercostal retractions  HEART: Regular rate and rhythm; No murmurs, rubs, or gallops; No pitting edema  ABDOMEN: Soft, Nontender, Nondistended; Bowel sounds present; No HSM: +PEG  MUSCULOSKELETAL/EXTREMITIES:  2+ Peripheral Pulses, No clubbing or digital cyanosis  PSYCH: Appropriate affect, Alert & Awake    LABS:                        10.1   2.99  )-----------( 270      ( 11 Jul 2022 06:06 )             31.0       07-11    139  |  101  |  10  ----------------------------<  103  4.1   |  31  |  0.50    Ca    8.2      11 Jul 2022 06:06    TPro  6.6  /  Alb  3.1  /  TBili  0.7  /  DBili  x   /  AST  28  /  ALT  56  /  AlkPhos  76  07-11     05-21 Chol 159 mg/dL LDL -- HDL 50 mg/dL Trig 99 mg/dL        COVID-19 PCR: NotDetec (07-10-22 @ 20:40)  COVID-19 PCR: NotDetec (07-03-22 @ 06:00)  COVID-19 PCR: NotDetec (06-27-22 @ 06:49)  COVID-19 PCR: NotDetec (06-20-22 @ 23:35)  COVID-19 PCR: NotDetec (06-20-22 @ 10:32)      Care Discussed with Rehab Attending and Other Providers

## 2022-07-11 NOTE — PROGRESS NOTE ADULT - SUBJECTIVE AND OBJECTIVE BOX
INTERVAL HPI/OVERNIGHT EVENTS:  Chart Reviewed and patient seen at bedside.  No acute events reported overnight,  Patient with improved coughing -- less frequent compared to yesterday when she had a lot of congestion in her throat.    ROS:  Denies fevers, chills, chest pain, shortness of breath, abdominal pain, headaches, nausea/vomiting    Vital Signs Last 24 Hrs  T(C): 36.6 (11 Jul 2022 08:00), Max: 36.6 (11 Jul 2022 08:00)  T(F): 97.9 (11 Jul 2022 08:00), Max: 97.9 (11 Jul 2022 08:00)  HR: 82 (11 Jul 2022 08:34) (78 - 90)  BP: 112/75 (11 Jul 2022 08:00) (108/77 - 118/57)  BP(mean): --  RR: 14 (11 Jul 2022 08:00) (14 - 16)  SpO2: 95% (11 Jul 2022 08:34) (94% - 96%)    Parameters below as of 11 Jul 2022 08:34  Patient On (Oxygen Delivery Method): room air        Physical Exam:    Gen - NAD, Comfortable,   HEENT - NCAT,  Pulm -breathing comfortably on RA  Cardiovascular - RRR  Abdomen - Soft, NT/ND, +BS, (+) PEG tube site - clean,   Extremities - No calf tenderness  Neuro-     Cognitive - Awake and alert     Communication - Fluent, hypophonia     Cranial Nerves - PERRLA, EOMI, +mild diplopia, Slight impaired R shoulder shrug, . Visual fields intact. +dysphagia, +hypophonia--improving,  Orolingual weakness     Motor -                     LEFT    UE - ShAB 5/5, EF 5/5, EE 5/5, WE 5/5,  5/5                    RIGHT UE - ShAB 4+/5, EF 3/5, EE 3/5, WE 3/5,  3/5                    LEFT    LE - HF 5/5, KE 5/5, DF 5/5, PF 5/5                    RIGHT LE - HF 4/5, KE 4/5, DF 3/5, PF 4/5        Sensory - Slightly diminished sensation to light touch in RUE and RLE     Coordination -- impaired on right     Tone - normal  Psychiatric - Mood stable, Affect WNL    LABS:  07-11    139  |  101  |  10  ----------------------------<  103<H>  4.1   |  31  |  0.50    Ca    8.2<L>      11 Jul 2022 06:06    TPro  6.6  /  Alb  3.1<L>  /  TBili  0.7  /  DBili  x   /  AST  28  /  ALT  56<H>  /  AlkPhos  76  07-11                                              10.1   2.99  )-----------( 270      ( 11 Jul 2022 06:06 )             31.0     CAPILLARY BLOOD GLUCOSE                 INTERVAL HPI/OVERNIGHT EVENTS:  Mandarin  #[id number], Loan  Chart Reviewed and patient seen at bedside.  No acute events reported overnight,  Patient with improved coughing -- less frequent compared to yesterday when she had a lot of congestion in her throat.    ROS:  Denies fevers, chills, chest pain, shortness of breath, abdominal pain, headaches, nausea/vomiting    Vital Signs Last 24 Hrs  T(C): 36.6 (11 Jul 2022 08:00), Max: 36.6 (11 Jul 2022 08:00)  T(F): 97.9 (11 Jul 2022 08:00), Max: 97.9 (11 Jul 2022 08:00)  HR: 82 (11 Jul 2022 08:34) (78 - 90)  BP: 112/75 (11 Jul 2022 08:00) (108/77 - 118/57)  BP(mean): --  RR: 14 (11 Jul 2022 08:00) (14 - 16)  SpO2: 95% (11 Jul 2022 08:34) (94% - 96%)    Parameters below as of 11 Jul 2022 08:34  Patient On (Oxygen Delivery Method): room air        Physical Exam:    Gen - NAD, Comfortable,   HEENT - NCAT,  Pulm -breathing comfortably on RA  Cardiovascular - RRR  Abdomen - Soft, NT/ND, +BS, (+) PEG tube site - clean,   Extremities - No calf tenderness  Neuro-     Cognitive - Awake and alert     Communication - Fluent, hypophonia     Cranial Nerves - PERRLA, EOMI, +mild diplopia, Slight impaired R shoulder shrug, . Visual fields intact. +dysphagia, +hypophonia--improving,  Orolingual weakness     Motor -                     LEFT    UE - ShAB 5/5, EF 5/5, EE 5/5, WE 5/5,  5/5                    RIGHT UE - ShAB 4+/5, EF 3/5, EE 3/5, WE 3/5,  3/5                    LEFT    LE - HF 5/5, KE 5/5, DF 5/5, PF 5/5                    RIGHT LE - HF 4/5, KE 4/5, DF 3/5, PF 4/5        Sensory - Slightly diminished sensation to light touch in RUE and RLE     Coordination -- impaired on right     Tone - normal  Psychiatric - Mood stable, Affect WNL    LABS:  07-11    139  |  101  |  10  ----------------------------<  103<H>  4.1   |  31  |  0.50    Ca    8.2<L>      11 Jul 2022 06:06    TPro  6.6  /  Alb  3.1<L>  /  TBili  0.7  /  DBili  x   /  AST  28  /  ALT  56<H>  /  AlkPhos  76  07-11                                              10.1   2.99  )-----------( 270      ( 11 Jul 2022 06:06 )             31.0     CAPILLARY BLOOD GLUCOSE

## 2022-07-12 LAB
BASOPHILS # BLD AUTO: 0.03 K/UL — SIGNIFICANT CHANGE UP (ref 0–0.2)
BASOPHILS NFR BLD AUTO: 1 % — SIGNIFICANT CHANGE UP (ref 0–2)
EOSINOPHIL # BLD AUTO: 0.07 K/UL — SIGNIFICANT CHANGE UP (ref 0–0.5)
EOSINOPHIL NFR BLD AUTO: 2.3 % — SIGNIFICANT CHANGE UP (ref 0–6)
FERRITIN SERPL-MCNC: 167 NG/ML — HIGH (ref 15–150)
FOLATE SERPL-MCNC: >20 NG/ML — SIGNIFICANT CHANGE UP
HCT VFR BLD CALC: 30.9 % — LOW (ref 34.5–45)
HGB BLD-MCNC: 9.8 G/DL — LOW (ref 11.5–15.5)
IMM GRANULOCYTES NFR BLD AUTO: 0.7 % — SIGNIFICANT CHANGE UP (ref 0–1.5)
IRON SATN MFR SERPL: 21 % — SIGNIFICANT CHANGE UP (ref 14–50)
IRON SATN MFR SERPL: 62 UG/DL — SIGNIFICANT CHANGE UP (ref 30–160)
LYMPHOCYTES # BLD AUTO: 1.11 K/UL — SIGNIFICANT CHANGE UP (ref 1–3.3)
LYMPHOCYTES # BLD AUTO: 36.9 % — SIGNIFICANT CHANGE UP (ref 13–44)
MCHC RBC-ENTMCNC: 31.7 GM/DL — LOW (ref 32–36)
MCHC RBC-ENTMCNC: 33.7 PG — SIGNIFICANT CHANGE UP (ref 27–34)
MCV RBC AUTO: 106.2 FL — HIGH (ref 80–100)
MONOCYTES # BLD AUTO: 0.3 K/UL — SIGNIFICANT CHANGE UP (ref 0–0.9)
MONOCYTES NFR BLD AUTO: 10 % — SIGNIFICANT CHANGE UP (ref 2–14)
NEUTROPHILS # BLD AUTO: 1.48 K/UL — LOW (ref 1.8–7.4)
NEUTROPHILS NFR BLD AUTO: 49.1 % — SIGNIFICANT CHANGE UP (ref 43–77)
NRBC # BLD: 0 /100 WBCS — SIGNIFICANT CHANGE UP (ref 0–0)
PLATELET # BLD AUTO: 306 K/UL — SIGNIFICANT CHANGE UP (ref 150–400)
RBC # BLD: 2.91 M/UL — LOW (ref 3.8–5.2)
RBC # FLD: 16.9 % — HIGH (ref 10.3–14.5)
TIBC SERPL-MCNC: 297 UG/DL — SIGNIFICANT CHANGE UP (ref 220–430)
UIBC SERPL-MCNC: 235 UG/DL — SIGNIFICANT CHANGE UP (ref 110–370)
VIT B12 SERPL-MCNC: 901 PG/ML — SIGNIFICANT CHANGE UP (ref 232–1245)
WBC # BLD: 3.01 K/UL — LOW (ref 3.8–10.5)
WBC # FLD AUTO: 3.01 K/UL — LOW (ref 3.8–10.5)

## 2022-07-12 PROCEDURE — 99232 SBSQ HOSP IP/OBS MODERATE 35: CPT

## 2022-07-12 RX ORDER — POLYETHYLENE GLYCOL 3350 17 G/17G
17 POWDER, FOR SOLUTION ORAL
Qty: 510 | Refills: 0
Start: 2022-07-12 | End: 2022-08-10

## 2022-07-12 RX ORDER — SENNA PLUS 8.6 MG/1
2 TABLET ORAL
Qty: 60 | Refills: 0
Start: 2022-07-12 | End: 2022-08-10

## 2022-07-12 RX ADMIN — POLYETHYLENE GLYCOL 3350 17 GRAM(S): 17 POWDER, FOR SOLUTION ORAL at 12:51

## 2022-07-12 RX ADMIN — PANTOPRAZOLE SODIUM 40 MILLIGRAM(S): 20 TABLET, DELAYED RELEASE ORAL at 05:44

## 2022-07-12 RX ADMIN — Medication 0.5 MILLIGRAM(S): at 08:29

## 2022-07-12 RX ADMIN — ENOXAPARIN SODIUM 40 MILLIGRAM(S): 100 INJECTION SUBCUTANEOUS at 18:39

## 2022-07-12 RX ADMIN — Medication 1 TABLET(S): at 12:51

## 2022-07-12 RX ADMIN — CHLORHEXIDINE GLUCONATE 1 APPLICATION(S): 213 SOLUTION TOPICAL at 05:44

## 2022-07-12 RX ADMIN — Medication 500 MILLIGRAM(S): at 12:51

## 2022-07-12 RX ADMIN — SENNA PLUS 2 TABLET(S): 8.6 TABLET ORAL at 21:40

## 2022-07-12 RX ADMIN — Medication 12.5 MILLIGRAM(S): at 05:43

## 2022-07-12 RX ADMIN — Medication 200 MILLIGRAM(S): at 05:44

## 2022-07-12 RX ADMIN — Medication 200 MILLIGRAM(S): at 21:40

## 2022-07-12 RX ADMIN — Medication 6 MILLIGRAM(S): at 21:40

## 2022-07-12 NOTE — PROGRESS NOTE ADULT - ATTENDING COMMENTS
Pt. seen with resident.  Agree with documentation above as per resident with amendments made as appropriate. Patient medically stable. Making progress towards rehab goals.     meningioma s/p resection  Seen during family training.   Pt. Pt. seen with resident.  Agree with documentation above as per resident with amendments made as appropriate. Patient medically stable. Making progress towards rehab goals.     meningioma s/p resection  Seen during family training.   Pt. c/o left posterior neck and trapezius and upper back pain--hypertonicity of cervical paraspinals, and trapezius on left and right rhomboid. Heat ordered PRN.  Family assisted with translation.  FAmily training today-- trained on TF administration.

## 2022-07-12 NOTE — PROGRESS NOTE ADULT - ASSESSMENT
62 yo Fuzhounese (and some Mandarin) speaking F with PMH of HTN, C Spine meningioma (removed 2018 in [place]) who presented with dizziness, ataxia, and emesis.  MRI with ethel-medullary. right CPA, and right temporal enhancing lesions concern for meningioma vs schwannoma, with hydro.  Now S/p Left far lateral crani for meningioma resection with subtotal sacrifice of left vertebral artery (6/1).  Noted with vocal cord paralysis, dysphagia - NPO on TF (PEG placed 6/16). Also right Hemiparesis, sensory, gait and ADL impairment.     COMORBIDITES/ACTIVE MEDICAL ISSUES     #Brain tumor - Meningioma   - S/p Left far lateral crani for meningioma resection with subtotal sacrifice of left vertebral artery (6/1)  - Dexamethasone taper -completed (6/25)  - Gait Instability, ADL impairments and Functional impairments: cont Comprehensive Rehab Program of PT/OT & speech    Hypophonia--Vocal cord paralysis  --ENT consult 7/5 reviewed and appreciated-- Paresis of left vocal cord.   ·  Recommendation: - +Right vocal cord compensation  - MBS as needed & OK for Speech and Swallow therapy - Advance diet as necessary   - Aspiration precautions.  - Patient would need to follow up with ENT/Laryngology - Dr. Daniele Gurrola after discharge at [facility].  Please call to schedule an appointment at [phone] 1-2 weeks after discharge.    #Neck and back pain  -likely due to muscle tightness,  -continue ROM in therapy, warm compress PRN, tylenol PRN.     #Transient A fib with RVR  - Sinus tach - EKG without evidence of a fib  - Metoprolol 12.5 BID    # Cranial stenosis  - CTA head showed defect in the right M1 suspicious for possible right M1 occlusion though the vessel   - Recommended to "continue ASA 81mg qd and atorvastatin 40mg qhs" on discharge paperwork, however patient not on it in prior hospital  - Lipid panel 5/21 unremarkable  -restarted atorvastatin  --Contacted Dr. Liu to clarify if pt. ok to restart ASA-- stated would hold for now if it was for carotid stenosis .    #HTN  - - cont. metoprolol 12.5 BID     #Respiratory-- increased UR secretions  --d/w hospitalist--- started budesonide inh bid and guafenesin q.8h--improved symptoms  -MRSA nares + > completed doxycycline course  - management per hospitalist  - Encourage incentive spirometry       #   - --Pt. voiding with low PVRs  --toileting program q.3h while awake    #Pain control  - Tylenol PRN, Tramadol      #GI/Bowel Mgmt   - At risk for constipation due to neurologic diagnosis, immobility and/or medication use  - Senna,  Miralax   - monitor for BM       #DVT  - Lovenox    Dysphagia  - Diet - NPO with TF + free water flush: 200cc  - PEG placed (6/16)  - SLP - evaluation and treatment  --MBSS 7/7- FAILED -d/w speech therapy, remains NPO  - cont. vital stimulation in speech therapy  - Self suctioning    Precautions / PROPHYLAXIS:   - Falls  - Lungs: Aspiration,   - Pressure injury/Skin: Turn Q2hrs while in bed, OOB to Chair, PT/OT      IDT 7/7:  SW: lives in  with son, and DIL; 1st floor s/u. son and DIL work, may not be able to provide necessary assistance.  OT: TotA Eating, Sup-- grooming, min A--dressing and bathroom transfers, mod A-- bathing and toileting;    PT: CG-- transfer, & Amb. 100ft RW modA, 8 steps --CG    Speech: NPO/PEG; Focus on Dysphagia- and Hypophonia from VC paralysis, Mild cog deficits/PS/memory--but will be assisted by her family at home  ELOS: 7/15/22--home    Ms. Ramirez requires a Semi-electric hospital bed as she needs the head of the bed to be elevated more than 30 degrees at all times due to aspiration precautions as she is NPO on Tube feeding.  Pillows or wedges are not effective to achieve this.     Follow ups:  Giovanni Liu)  Neurosurgery  General  [address] [facility], [address]  [place] [address]  Phone: [phone]  Fax: [phone]

## 2022-07-12 NOTE — PROGRESS NOTE ADULT - SUBJECTIVE AND OBJECTIVE BOX
INTERVAL HPI/OVERNIGHT EVENTS:  Chart Reviewed and patient seen and examined w/ speech therapist and family. She notes left neck pain and right upper back pain. Her coughing and secretions have improved significantly. Notes last BM on Sunday. No other complaints.     ROS:  Denies fevers, chills, chest pain, shortness of breath, abdominal pain, headaches, nausea/vomiting    Vital Signs Last 24 Hrs  T(C): 36.4 (12 Jul 2022 08:50), Max: 36.4 (11 Jul 2022 20:41)  T(F): 97.6 (12 Jul 2022 08:50), Max: 97.6 (11 Jul 2022 20:41)  HR: 74 (12 Jul 2022 08:50) (72 - 90)  BP: 115/79 (12 Jul 2022 08:50) (110/72 - 124/85)  BP(mean): --  RR: 16 (12 Jul 2022 08:50) (16 - 16)  SpO2: 96% (12 Jul 2022 08:50) (94% - 98%)    Parameters below as of 12 Jul 2022 08:50  Patient On (Oxygen Delivery Method): room air        Physical Exam:    Gen - NAD, Comfortable,   HEENT - NCAT, tender to palpation to left trapezius and b/l rhomboids and upper thoracic paraspinals, associated w/ muscle tightness.   Pulm -breathing comfortably on RA  Cardiovascular - RRR  Abdomen - Soft, NT/ND, +BS, (+) PEG tube site - clean,   Extremities - No calf tenderness  Neuro-     Cognitive - Awake and alert     Communication - Fluent, hypophonia     Cranial Nerves - PERRLA, EOMI, +mild diplopia, Slight impaired R shoulder shrug, . Visual fields intact. +dysphagia, +hypophonia--improving,  Orolingual weakness     Motor -                     LEFT    UE - ShAB 5/5, EF 5/5, EE 5/5, WE 5/5,  5/5                    RIGHT UE - ShAB 4+/5, EF 3/5, EE 3/5, WE 3/5,  3/5                    LEFT    LE - HF 5/5, KE 5/5, DF 5/5, PF 5/5                    RIGHT LE - HF 4/5, KE 4/5, DF 3/5, PF 4/5        Sensory - Slightly diminished sensation to light touch in RUE and RLE     Coordination -- impaired on right     Tone - normal  Psychiatric - Mood stable, Affect WNL      LABS:  07-11    139  |  101  |  10  ----------------------------<  103<H>  4.1   |  31  |  0.50    Ca    8.2<L>      11 Jul 2022 06:06    TPro  6.6  /  Alb  3.1<L>  /  TBili  0.7  /  DBili  x   /  AST  28  /  ALT  56<H>  /  AlkPhos  76  07-11                                              9.8    3.01  )-----------( 306      ( 12 Jul 2022 06:36 )             30.9                         10.1   2.99  )-----------( 270      ( 11 Jul 2022 06:06 )             31.0     CAPILLARY BLOOD GLUCOSE

## 2022-07-13 ENCOUNTER — TRANSCRIPTION ENCOUNTER (OUTPATIENT)
Age: 62
End: 2022-07-13

## 2022-07-13 PROCEDURE — 99232 SBSQ HOSP IP/OBS MODERATE 35: CPT

## 2022-07-13 RX ADMIN — ENOXAPARIN SODIUM 40 MILLIGRAM(S): 100 INJECTION SUBCUTANEOUS at 18:07

## 2022-07-13 RX ADMIN — Medication 12.5 MILLIGRAM(S): at 05:26

## 2022-07-13 RX ADMIN — CHLORHEXIDINE GLUCONATE 1 APPLICATION(S): 213 SOLUTION TOPICAL at 05:26

## 2022-07-13 RX ADMIN — Medication 1 TABLET(S): at 12:12

## 2022-07-13 RX ADMIN — POLYETHYLENE GLYCOL 3350 17 GRAM(S): 17 POWDER, FOR SOLUTION ORAL at 12:12

## 2022-07-13 RX ADMIN — PANTOPRAZOLE SODIUM 40 MILLIGRAM(S): 20 TABLET, DELAYED RELEASE ORAL at 05:26

## 2022-07-13 RX ADMIN — Medication 500 MILLIGRAM(S): at 12:12

## 2022-07-13 RX ADMIN — Medication 6 MILLIGRAM(S): at 22:39

## 2022-07-13 RX ADMIN — SENNA PLUS 2 TABLET(S): 8.6 TABLET ORAL at 22:39

## 2022-07-13 RX ADMIN — Medication 200 MILLIGRAM(S): at 05:26

## 2022-07-13 RX ADMIN — Medication 0.5 MILLIGRAM(S): at 08:34

## 2022-07-13 RX ADMIN — Medication 200 MILLIGRAM(S): at 22:38

## 2022-07-13 RX ADMIN — Medication 200 MILLIGRAM(S): at 08:14

## 2022-07-13 NOTE — PROGRESS NOTE ADULT - ATTENDING COMMENTS
Pt. seen with resident.  Agree with documentation above as per resident with amendments made as appropriate. Patient medically stable. Making progress towards rehab goals.     Left meningioma s/p resection  increased UR secretions  - d/w hospitalist-  --Hold budesonide and observe--d/w pulmonology

## 2022-07-13 NOTE — PROGRESS NOTE ADULT - SUBJECTIVE AND OBJECTIVE BOX
Patient is a 61y old  Female who presents with a chief complaint of Meningioma (12 Jul 2022 11:00)      Patient seen and examined at bedside.  No overnight events  No complaints this morning    ALLERGIES:  albuterol (Other)  No Known Allergies    MEDICATIONS  (STANDING):  ascorbic acid 500 milliGRAM(s) Oral daily  buDESOnide    Inhalation Suspension 0.5 milliGRAM(s) Inhalation two times a day  chlorhexidine 0.12% Liquid 15 milliLiter(s) Swish and Spit two times a day  chlorhexidine 2% Cloths 1 Application(s) Topical <User Schedule>  enoxaparin Injectable 40 milliGRAM(s) SubCutaneous <User Schedule>  guaiFENesin Oral Liquid (Sugar-Free) 200 milliGRAM(s) Oral every 8 hours  melatonin 6 milliGRAM(s) Oral at bedtime  metoprolol tartrate 12.5 milliGRAM(s) Oral every 12 hours  multivitamin 1 Tablet(s) Oral daily  pantoprazole   Suspension 40 milliGRAM(s) Oral before breakfast  petrolatum white Ointment 1 Application(s) Topical daily  polyethylene glycol 3350 17 Gram(s) Oral daily  senna 2 Tablet(s) Oral at bedtime    MEDICATIONS  (PRN):  acetaminophen     Tablet .. 650 milliGRAM(s) Oral every 6 hours PRN Temp greater or equal to 38C (100.4F), Mild Pain (1 - 3)  ondansetron    Tablet 4 milliGRAM(s) Oral every 12 hours PRN Nausea  sodium chloride 0.65% Nasal 1 Spray(s) Both Nostrils every 6 hours PRN Congestion    Vital Signs Last 24 Hrs  T(F): 97.6 (13 Jul 2022 08:24), Max: 97.6 (12 Jul 2022 08:50)  HR: 78 (13 Jul 2022 08:24) (74 - 92)  BP: 110/79 (13 Jul 2022 08:24) (99/63 - 116/76)  RR: 16 (13 Jul 2022 08:24) (16 - 16)  SpO2: 93% (13 Jul 2022 08:24) (93% - 96%)  I&O's Summary    12 Jul 2022 07:01  -  13 Jul 2022 07:00  --------------------------------------------------------  IN: 1300 mL / OUT: 0 mL / NET: 1300 mL    13 Jul 2022 07:01  -  13 Jul 2022 08:38  --------------------------------------------------------  IN: 200 mL / OUT: 0 mL / NET: 200 mL    PHYSICAL EXAM:  GENERAL: NAD  HENT:  Atraumatic, Normocephalic; No tonsillar erythema, exudates, or enlargement; Moist mucous membranes;   EYES: EOMI, PERRLA, conjunctiva and sclera clear, no lid-lag  NECK: Supple, No JVD, Normal thyroid  CHEST/LUNG: Clear to percussion bilaterally; No rales, rhonchi, wheezing, or rubs; normal respiratory effort, no intercostal retractions  HEART: Regular rate and rhythm; No murmurs, rubs, or gallops; No pitting edema  ABDOMEN: Soft, Nontender, Nondistended; Bowel sounds present; No HSM  MUSCULOSKELETAL/EXTREMITIES:  2+ Peripheral Pulses, No clubbing or digital cyanosis  PSYCH: Appropriate affect, Alert & Awake; Good judgement    LABS:                        9.8    3.01  )-----------( 306      ( 12 Jul 2022 06:36 )             30.9       07-11    139  |  101  |  10  ----------------------------<  103  4.1   |  31  |  0.50    Ca    8.2      11 Jul 2022 06:06    TPro  6.6  /  Alb  3.1  /  TBili  0.7  /  DBili  x   /  AST  28  /  ALT  56  /  AlkPhos  76  07-11       05-21 Chol 159 mg/dL LDL -- HDL 50 mg/dL Trig 99 mg/dL    COVID-19 PCR: NotDetec (07-10-22 @ 20:40)  COVID-19 PCR: NotDetec (07-03-22 @ 06:00)  COVID-19 PCR: NotDetec (06-27-22 @ 06:49)  COVID-19 PCR: NotDetec (06-20-22 @ 23:35)  COVID-19 PCR: NotDetec (06-20-22 @ 10:32)      Care Discussed with Rehab Attending and Other Providers

## 2022-07-13 NOTE — PROGRESS NOTE ADULT - ASSESSMENT
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[place]) presented with dizziness, ataxia, and emesis, found to have meningioma s/p resection with course complicated by vocal cord paralysis, dysphagia requiring PEG, and now at acute rehab     #Meningioma s/p resection  -PT/OT as per primary  -Pain control PRN    #Dysphagia s/p PEG placement  #cough  -Aspiration precautions  -budesonide nebs to be changed to inhaler, robitussin via PEG  -xopenex PRN    #Left vocal cord paralysis  -Patient declined laryngoscopy previously  -Outpatient ENT F/U    #PAF  -Continue lopressor for rate control    #HTN  -Continue Lopressor  -Off Losartan, Amlodipine   -Monitor vital signs     #Anemia  -stable     #Neutropenia  -No signs or symptoms of infection, afebrile  -repeat CBC    #Hyponatremia - resolved  -likely SIADH  -Continue fluid restriction     #DVT ppx: Lovenox  #GI ppx: Pepcid

## 2022-07-13 NOTE — PROGRESS NOTE ADULT - ASSESSMENT
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  MRI with ethel-medullary. right CPA, and right temporal enhancing lesions concern for meningioma vs schwannoma, with hydro.  Now S/p Left far lateral crani for meningioma resection with subtotal sacrifice of left vertebral artery (6/1).  Noted with vocal cord paralysis, dysphagia - NPO on TF (PEG placed 6/16). Also right Hemiparesis, sensory, gait and ADL impairment.     COMORBIDITES/ACTIVE MEDICAL ISSUES     #Brain tumor - Meningioma   - S/p Left far lateral crani for meningioma resection with subtotal sacrifice of left vertebral artery (6/1)  - Dexamethasone taper -completed (6/25)  - Gait Instability, ADL impairments and Functional impairments: cont Comprehensive Rehab Program of PT/OT & speech    #Respiratory-- increased UR secretions  - d/w hospitalist- per recs, budesonide nebs to be changed to inhaler, robitussin via PEG, xopenex PRN  - MRSA nares + > completed doxycycline course  - management per hospitalist  - Encourage incentive spirometry     Hypophonia--Vocal cord paralysis  --ENT consult 7/5 reviewed and appreciated-- Paresis of left vocal cord.   ·  Recommendation: - +Right vocal cord compensation  - MBS as needed & OK for Speech and Swallow therapy - Advance diet as necessary   - Aspiration precautions.  - Patient would need to follow up with ENT/Laryngology - Dr. Daniele Gurrola after discharge at [facility].  Please call to schedule an appointment at [phone] 1-2 weeks after discharge.    #Neck and back pain  -likely due to muscle tightness  -continue ROM in therapy, warm compress PRN, tylenol PRN.     #Transient A fib with RVR  - Sinus tach - EKG without evidence of a fib  - Metoprolol 12.5 BID    # Cranial stenosis  - CTA head showed defect in the right M1 suspicious for possible right M1 occlusion though the vessel   - Recommended to "continue ASA 81mg qd and atorvastatin 40mg qhs" on discharge paperwork, however patient not on it in prior hospital  - Lipid panel 5/21 unremarkable  -restarted atorvastatin  --Contacted Dr. Liu to clarify if pt. ok to restart ASA-- stated would hold for now if it was for carotid stenosis .    #HTN  - cont. metoprolol 12.5 BID     #   - --Pt. voiding with low PVRs  --toileting program q.3h while awake    #Pain control  - Tylenol PRN, Tramadol      #GI/Bowel Mgmt   - At risk for constipation due to neurologic diagnosis, immobility and/or medication use  - Senna,  Miralax   - monitor for BM     #DVT  - Lovenox    Dysphagia  - Diet - NPO with TF + free water flush: 200cc  - PEG placed (6/16)  - SLP - evaluation and treatment  --MBSS 7/7- FAILED -d/w speech therapy, remains NPO  - cont. vital stimulation in speech therapy  - Self suctioning    Precautions / PROPHYLAXIS:   - Falls  - Lungs: Aspiration,   - Pressure injury/Skin: Turn Q2hrs while in bed, OOB to Chair, PT/OT      IDT 7/7:  SW: lives in  with son, and DIL; 1st floor s/u. son and DIL work, may not be able to provide necessary assistance.  OT: TotA Eating, Sup-- grooming, min A--dressing and bathroom transfers, mod A-- bathing and toileting;    PT: CG-- transfer, & Amb. 100ft RW modA, 8 steps --CG    Speech: NPO/PEG; Focus on Dysphagia- and Hypophonia from VC paralysis, Mild cog deficits/PS/memory--but will be assisted by her family at home  ELOS: 7/15/22--home    Ms. Ramirez requires a Semi-electric hospital bed as she needs the head of the bed to be elevated more than 30 degrees at all times due to aspiration precautions as she is NPO on Tube feeding.  Pillows or wedges are not effective to achieve this.     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  MRI with ethel-medullary. right CPA, and right temporal enhancing lesions concern for meningioma vs schwannoma, with hydro.  Now S/p Left far lateral crani for meningioma resection with subtotal sacrifice of left vertebral artery (6/1).  Noted with vocal cord paralysis, dysphagia - NPO on TF (PEG placed 6/16). Also right Hemiparesis, sensory, gait and ADL impairment.     COMORBIDITES/ACTIVE MEDICAL ISSUES     #Brain tumor - Meningioma   - S/p Left far lateral crani for meningioma resection with subtotal sacrifice of left vertebral artery (6/1)  - Dexamethasone taper -completed (6/25)  - Gait Instability, ADL impairments and Functional impairments: cont Comprehensive Rehab Program of PT/OT & speech    #Respiratory-- increased UR secretions  - d/w hospitalist- per recs, budesonide nebs to be changed to inhaler, robitussin via PEG, xopenex PRN  --Hold budesonide and observe--d/w pulmonology  - MRSA nares + > completed doxycycline course  - management per hospitalist  - Encourage incentive spirometry     Hypophonia--Vocal cord paralysis  --ENT consult 7/5 reviewed and appreciated-- Paresis of left vocal cord.   ·  Recommendation: - +Right vocal cord compensation  - MBS as needed & OK for Speech and Swallow therapy - Advance diet as necessary   - Aspiration precautions.  - Patient would need to follow up with ENT/Laryngology - Dr. Daniele Gurrola [facility].  Please call to schedule an appointment at [phone] 1-2 weeks after discharge.    #Neck and back pain  -likely due to muscle tightness  -continue ROM in therapy, warm compress PRN, tylenol PRN.     #Transient A fib with RVR  - Sinus tach - EKG without evidence of a fib  - Metoprolol 12.5 BID    # Cranial stenosis  - CTA head showed defect in the right M1 suspicious for possible right M1 occlusion though the vessel   - Recommended to "continue ASA 81mg qd and atorvastatin 40mg qhs" on discharge paperwork, however patient not on it in prior hospital  - Lipid panel 5/21 unremarkable  -restarted atorvastatin  --Contacted Dr. Liu to clarify if pt. ok to restart ASA-- stated would hold for now if it was for carotid stenosis .    #HTN  - cont. metoprolol 12.5 BID     #   - --Pt. voiding with low PVRs  --toileting program q.3h while awake    #Pain control  - Tylenol PRN, Tramadol      #GI/Bowel Mgmt   - At risk for constipation due to neurologic diagnosis, immobility and/or medication use  - Senna,  Miralax   - monitor for BM     #DVT  - Lovenox    Dysphagia  - Diet - NPO with TF + free water flush: 200cc  - PEG placed (6/16)  - SLP - evaluation and treatment  --MBSS 7/7- FAILED -d/w speech therapy, remains NPO  - cont. vital stimulation in speech therapy  - Self suctioning    Precautions / PROPHYLAXIS:   - Falls  - Lungs: Aspiration,   - Pressure injury/Skin: Turn Q2hrs while in bed, OOB to Chair, PT/OT      IDT 7/7:  SW: lives in  with son, and DIL; 1st floor s/u. son and DIL work, may not be able to provide necessary assistance.  OT: TotA Eating, Sup-- grooming, min A--dressing and bathroom transfers, mod A-- bathing and toileting;    PT: CG-- transfer, & Amb. 100ft RW modA, 8 steps --CG    Speech: NPO/PEG; Focus on Dysphagia- and Hypophonia from VC paralysis, Mild cog deficits/PS/memory--but will be assisted by her family at home  ELOS: 7/15/22--home    Ms. Ramirez requires a Semi-electric hospital bed as she needs the head of the bed to be elevated more than 30 degrees at all times due to aspiration precautions as she is NPO on Tube feeding.  Pillows or wedges are not effective to achieve this.     Follow ups:  Giovanni Liu)  Neurosurgery  General  [address] [place] [address], [address]  [place] [address]  Phone: [phone]  Fax: [phone]

## 2022-07-13 NOTE — PROGRESS NOTE ADULT - SUBJECTIVE AND OBJECTIVE BOX
INTERVAL HPI/OVERNIGHT EVENTS:  Patient seen and evaluated at bedside. Endorses worsening cough, with yellow sputum.      ROS:  Denies fevers, chills, chest pain, shortness of breath, abdominal pain, headaches, nausea/vomiting    REVIEW OF SYSTEMS  Constitutional - No fever,  No fatigue  HEENT - No vertigo, No neck pain  Neurological - No headaches, No memory loss, No loss of strength, No numbness, No tremors  Musculoskeletal - No joint pain, No joint swelling, No muscle pain  Psychiatric - No depression, No anxiety      T(C): 36.4 (07-13-22 @ 08:24)  T(F): 97.6 (07-13-22 @ 08:24), Max: 97.6 (07-12-22 @ 20:20)  HR: 78 (07-13-22 @ 08:24) (78 - 92)  BP: 110/79 (07-13-22 @ 08:24) (99/63 - 116/76)  RR:  (16 - 16)  SpO2:  (93% - 96%)  Wt(kg): --    MEDICATIONS   acetaminophen     Tablet .. 650 milliGRAM(s) every 6 hours PRN  ascorbic acid 500 milliGRAM(s) daily  chlorhexidine 0.12% Liquid 15 milliLiter(s) two times a day  chlorhexidine 2% Cloths 1 Application(s) <User Schedule>  enoxaparin Injectable 40 milliGRAM(s) <User Schedule>  guaiFENesin Oral Liquid (Sugar-Free) 200 milliGRAM(s) every 8 hours  melatonin 6 milliGRAM(s) at bedtime  metoprolol tartrate 12.5 milliGRAM(s) every 12 hours  multivitamin 1 Tablet(s) daily  ondansetron    Tablet 4 milliGRAM(s) every 12 hours PRN  pantoprazole   Suspension 40 milliGRAM(s) before breakfast  petrolatum white Ointment 1 Application(s) daily  polyethylene glycol 3350 17 Gram(s) daily  senna 2 Tablet(s) at bedtime  sodium chloride 0.65% Nasal 1 Spray(s) every 6 hours PRN      RECENT LABS/IMAGING                          9.8    3.01  )-----------( 306      ( 12 Jul 2022 06:36 )             30.9     Physical Exam:    Gen - NAD, Comfortable,   HEENT - NCAT, tender to palpation to left trapezius and b/l rhomboids and upper thoracic paraspinals, associated w/ muscle tightness.   Pulm -breathing comfortably on RA, occasional cough  Cardiovascular - RRR  Abdomen - Soft, NT/ND, +BS, (+) PEG tube site - clean,   Extremities - No calf tenderness  Neuro-     Cognitive - Awake and alert     Communication - Fluent, hypophonia     Cranial Nerves - PERRLA, EOMI, +mild diplopia, Slight impaired R shoulder shrug, . Visual fields intact. +dysphagia, +hypophonia--improving,  Orolingual weakness     Motor -                     LEFT    UE - ShAB 5/5, EF 5/5, EE 5/5, WE 5/5,  5/5                    RIGHT UE - ShAB 4+/5, EF 3/5, EE 3/5, WE 3/5,  3/5                    LEFT    LE - HF 5/5, KE 5/5, DF 5/5, PF 5/5                    RIGHT LE - HF 4/5, KE 4/5, DF 3/5, PF 4/5        Sensory - Slightly diminished sensation to light touch in RUE and RLE     Coordination -- impaired on right     Tone - normal  Psychiatric - Mood stable, Affect WNL             INTERVAL HPI/OVERNIGHT EVENTS:  Patient seen and evaluated at bedside. Endorses worsening cough, with yellow sputum. She denies difficulty breathing, abdominal pain, nausea and vomiting.     ROS:  Denies fevers, chills, chest pain    T(C): 36.4 (07-13-22 @ 08:24)  T(F): 97.6 (07-13-22 @ 08:24), Max: 97.6 (07-12-22 @ 20:20)  HR: 78 (07-13-22 @ 08:24) (78 - 92)  BP: 110/79 (07-13-22 @ 08:24) (99/63 - 116/76)  RR:  (16 - 16)  SpO2:  (93% - 96%)    MEDICATIONS   acetaminophen     Tablet .. 650 milliGRAM(s) every 6 hours PRN  ascorbic acid 500 milliGRAM(s) daily  chlorhexidine 0.12% Liquid 15 milliLiter(s) two times a day  chlorhexidine 2% Cloths 1 Application(s) <User Schedule>  enoxaparin Injectable 40 milliGRAM(s) <User Schedule>  guaiFENesin Oral Liquid (Sugar-Free) 200 milliGRAM(s) every 8 hours  melatonin 6 milliGRAM(s) at bedtime  metoprolol tartrate 12.5 milliGRAM(s) every 12 hours  multivitamin 1 Tablet(s) daily  ondansetron    Tablet 4 milliGRAM(s) every 12 hours PRN  pantoprazole   Suspension 40 milliGRAM(s) before breakfast  petrolatum white Ointment 1 Application(s) daily  polyethylene glycol 3350 17 Gram(s) daily  senna 2 Tablet(s) at bedtime  sodium chloride 0.65% Nasal 1 Spray(s) every 6 hours PRN      RECENT LABS/IMAGING                          9.8    3.01  )-----------( 306      ( 12 Jul 2022 06:36 )             30.9     Physical Exam:    Gen - NAD, Comfortable,   HEENT - NCAT, tender to palpation to left trapezius and b/l rhomboids and upper thoracic paraspinals, associated w/ muscle tightness.   Pulm -breathing comfortably on RA, occasional cough  Cardiovascular - RRR  Abdomen - Soft, NT/ND, +BS, (+) PEG tube site - clean,   Extremities - No calf tenderness  Neuro-     Cognitive - Awake and alert     Communication - Fluent, hypophonia     Cranial Nerves - PERRLA, EOMI, +mild diplopia, Slight impaired R shoulder shrug, . Visual fields intact. +dysphagia, +hypophonia--improving,  Orolingual weakness     Motor -                     LEFT    UE - ShAB 5/5, EF 5/5, EE 5/5, WE 5/5,  5/5                    RIGHT UE - ShAB 4+/5, EF 3/5, EE 3/5, WE 3/5,  3/5                    LEFT    LE - HF 5/5, KE 5/5, DF 5/5, PF 5/5                    RIGHT LE - HF 4/5, KE 4/5, DF 3/5, PF 4/5        Sensory - Slightly diminished sensation to light touch in RUE and RLE     Coordination -- impaired on right     Tone - normal  Psychiatric - Mood stable, Affect WNL             INTERVAL HPI/OVERNIGHT EVENTS:  Patient seen and evaluated at bedside. Endorses worsening cough, with yellow sputum. She denies difficulty breathing, abdominal pain, nausea and vomiting.  Spoke with pt. via Mandarin  #[id number]     ROS:  Denies fevers, chills, chest pain    T(C): 36.4 (07-13-22 @ 08:24)  T(F): 97.6 (07-13-22 @ 08:24), Max: 97.6 (07-12-22 @ 20:20)  HR: 78 (07-13-22 @ 08:24) (78 - 92)  BP: 110/79 (07-13-22 @ 08:24) (99/63 - 116/76)  RR:  (16 - 16)  SpO2:  (93% - 96%)    MEDICATIONS   acetaminophen     Tablet .. 650 milliGRAM(s) every 6 hours PRN  ascorbic acid 500 milliGRAM(s) daily  chlorhexidine 0.12% Liquid 15 milliLiter(s) two times a day  chlorhexidine 2% Cloths 1 Application(s) <User Schedule>  enoxaparin Injectable 40 milliGRAM(s) <User Schedule>  guaiFENesin Oral Liquid (Sugar-Free) 200 milliGRAM(s) every 8 hours  melatonin 6 milliGRAM(s) at bedtime  metoprolol tartrate 12.5 milliGRAM(s) every 12 hours  multivitamin 1 Tablet(s) daily  ondansetron    Tablet 4 milliGRAM(s) every 12 hours PRN  pantoprazole   Suspension 40 milliGRAM(s) before breakfast  petrolatum white Ointment 1 Application(s) daily  polyethylene glycol 3350 17 Gram(s) daily  senna 2 Tablet(s) at bedtime  sodium chloride 0.65% Nasal 1 Spray(s) every 6 hours PRN      RECENT LABS/IMAGING                          9.8    3.01  )-----------( 306      ( 12 Jul 2022 06:36 )             30.9     Physical Exam:    Gen - NAD, Comfortable,   HEENT - NCAT, tender to palpation to left trapezius and b/l rhomboids and upper thoracic paraspinals, associated w/ muscle tightness.   Pulm -breathing comfortably on RA, occasional cough  Cardiovascular - RRR  Abdomen - Soft, NT/ND, +BS, (+) PEG tube site - clean,   Extremities - No calf tenderness  Neuro-     Cognitive - Awake and alert     Communication - Fluent, hypophonia     Cranial Nerves - PERRLA, EOMI, +mild diplopia, Slight impaired R shoulder shrug, . Visual fields intact. +dysphagia, +hypophonia--improving,  Orolingual weakness     Motor -                     LEFT    UE - ShAB 5/5, EF 5/5, EE 5/5, WE 5/5,  5/5                    RIGHT UE - ShAB 4+/5, EF 3/5, EE 3/5, WE 3/5,  3/5                    LEFT    LE - HF 5/5, KE 5/5, DF 5/5, PF 5/5                    RIGHT LE - HF 4/5, KE 4/5, DF 3/5, PF 4/5        Sensory - Slightly diminished sensation to light touch in RUE and RLE     Coordination -- impaired on right     Tone - normal  Psychiatric - Mood stable, Affect WNL

## 2022-07-13 NOTE — DISCHARGE NOTE NURSING/CASE MANAGEMENT/SOCIAL WORK - PATIENT PORTAL LINK FT
You can access the FollowMyHealth Patient Portal offered by [facility] by registering at the following website: [email or web address]. By joining [employer]’s FollowMyHealth portal, you will also be able to view your health information using other applications (apps) compatible with our system.

## 2022-07-13 NOTE — DISCHARGE NOTE NURSING/CASE MANAGEMENT/SOCIAL WORK - NSDCPEFALRISK_GEN_ALL_CORE
For information on Fall & Injury Prevention, visit: [email or web address] OR  [email or web address] OR  https://www.cdc.gov/steadi/patient.html

## 2022-07-13 NOTE — DISCHARGE NOTE NURSING/CASE MANAGEMENT/SOCIAL WORK - NSDCFUADDAPPT_GEN_ALL_CORE_FT
[facility]   Address: [address] [place] [address]  Phone:  [phone] [facility] [place]   Physical Therapy is scheduled on 7/20/22 at 11:50am  Speech Therapy is scheduled on 7/25/22 at 1:20pm  Occupational Therapy is scheduled on 7/25/22 at 3:10pm  Address: [address] [place] [id number]  Phone:  [phone]    Please follow up with PCP in 2 weeks.

## 2022-07-13 NOTE — CHART NOTE - NSCHARTNOTEFT_GEN_A_CORE
Nutrition Follow Up Note  Source: Medical Record [X] Patient [X] Family [ ]         Diet: NPO with tube feeding    Enteral/Parenteral Nutrition: TwoCal  ml QID, (provides 1600 kcal, 66 g protein, 560 ml water) + NoCarb ProSource 1x/day (provides additional 60 kcal, 15 g protein)  Pt tolerating tube feeding without issue. Pt had MBS on 7/7, failed, so continues on tube feeding. No digestive issues reported.    Current Weight: 106.2 lbs (7/12)  106.2 lbs (7/11)  106.2 lbs (7/9)  106.2 lbs (7/8)  105.6 lbs (7/5)  106.2 lbs (6/28)  Weight stable    Pertinent Medications: MEDICATIONS  (STANDING):  ascorbic acid 500 milliGRAM(s) Oral daily  buDESOnide    Inhalation Suspension 0.5 milliGRAM(s) Inhalation two times a day  chlorhexidine 0.12% Liquid 15 milliLiter(s) Swish and Spit two times a day  chlorhexidine 2% Cloths 1 Application(s) Topical <User Schedule>  enoxaparin Injectable 40 milliGRAM(s) SubCutaneous <User Schedule>  guaiFENesin Oral Liquid (Sugar-Free) 200 milliGRAM(s) Oral every 8 hours  melatonin 6 milliGRAM(s) Oral at bedtime  metoprolol tartrate 12.5 milliGRAM(s) Oral every 12 hours  multivitamin 1 Tablet(s) Oral daily  pantoprazole   Suspension 40 milliGRAM(s) Oral before breakfast  petrolatum white Ointment 1 Application(s) Topical daily  polyethylene glycol 3350 17 Gram(s) Oral daily  senna 2 Tablet(s) Oral at bedtime    MEDICATIONS  (PRN):  acetaminophen     Tablet .. 650 milliGRAM(s) Oral every 6 hours PRN Temp greater or equal to 38C (100.4F), Mild Pain (1 - 3)  ondansetron    Tablet 4 milliGRAM(s) Oral every 12 hours PRN Nausea  sodium chloride 0.65% Nasal 1 Spray(s) Both Nostrils every 6 hours PRN Congestion      Pertinent Labs:  07-11 Na139 mmol/L Glu 103 mg/dL<H> K+ 4.1 mmol/L Cr  0.50 mg/dL BUN 10 mg/dL 07-11 Alb 3.1 g/dL<L>        Skin: surgical incision per nursing flow sheets     Edema: No edema per nursing flow sheets     Last BM: on 7/10 Per nursing flowsheets     Estimated Needs:   [X] No Change since Previous Assessment  [ ] Recalculated:     Previous Nutrition Diagnosis:   Severe malnutrition, acute    Nutrition Diagnosis is [X] Ongoing - addressed with enteral nutrition + ProSource 1x/day      New Nutrition Diagnosis: [X] Not Applicable  [ ] Inadequate Protein Energy Intake   [ ] Inadequate Oral Intake   [ ] Excessive Energy Intake   [ ] Increased Nutrient Needs   [ ] Obesity   [ ] Altered GI Function   [ ] Unintended Weight Loss   [ ] Food & Nutrition Related Knowledge Deficit  [ ] Limited Adherence to nutrition related recommendations   [ ] Malnutrition      Interventions:   1. Recommend continuing with current TF order  2. Follow SLP recommendations    Monitoring & Evaluation:   [X] Weights   [X] Follow Up (Per Protocol)  [X] Tolerance to Diet Prescription   [X] Other: Labs     RD Remains Available.  Sara Potts RD

## 2022-07-14 LAB
ALBUMIN SERPL ELPH-MCNC: 3 G/DL — LOW (ref 3.3–5)
ALP SERPL-CCNC: 69 U/L — SIGNIFICANT CHANGE UP (ref 40–120)
ALT FLD-CCNC: 48 U/L — HIGH (ref 10–45)
ANION GAP SERPL CALC-SCNC: 10 MMOL/L — SIGNIFICANT CHANGE UP (ref 5–17)
AST SERPL-CCNC: 25 U/L — SIGNIFICANT CHANGE UP (ref 10–40)
BILIRUB SERPL-MCNC: 0.6 MG/DL — SIGNIFICANT CHANGE UP (ref 0.2–1.2)
BUN SERPL-MCNC: 12 MG/DL — SIGNIFICANT CHANGE UP (ref 7–23)
CALCIUM SERPL-MCNC: 8.6 MG/DL — SIGNIFICANT CHANGE UP (ref 8.4–10.5)
CHLORIDE SERPL-SCNC: 100 MMOL/L — SIGNIFICANT CHANGE UP (ref 96–108)
CO2 SERPL-SCNC: 28 MMOL/L — SIGNIFICANT CHANGE UP (ref 22–31)
CREAT SERPL-MCNC: 0.5 MG/DL — SIGNIFICANT CHANGE UP (ref 0.5–1.3)
EGFR: 108 ML/MIN/1.73M2 — SIGNIFICANT CHANGE UP
GLUCOSE SERPL-MCNC: 104 MG/DL — HIGH (ref 70–99)
HCT VFR BLD CALC: 30.5 % — LOW (ref 34.5–45)
HGB BLD-MCNC: 10 G/DL — LOW (ref 11.5–15.5)
MCHC RBC-ENTMCNC: 32.8 GM/DL — SIGNIFICANT CHANGE UP (ref 32–36)
MCHC RBC-ENTMCNC: 34.2 PG — HIGH (ref 27–34)
MCV RBC AUTO: 104.5 FL — HIGH (ref 80–100)
NRBC # BLD: 0 /100 WBCS — SIGNIFICANT CHANGE UP (ref 0–0)
PLATELET # BLD AUTO: 298 K/UL — SIGNIFICANT CHANGE UP (ref 150–400)
POTASSIUM SERPL-MCNC: 4 MMOL/L — SIGNIFICANT CHANGE UP (ref 3.5–5.3)
POTASSIUM SERPL-SCNC: 4 MMOL/L — SIGNIFICANT CHANGE UP (ref 3.5–5.3)
PROT SERPL-MCNC: 6.3 G/DL — SIGNIFICANT CHANGE UP (ref 6–8.3)
RBC # BLD: 2.92 M/UL — LOW (ref 3.8–5.2)
RBC # FLD: 15.7 % — HIGH (ref 10.3–14.5)
SODIUM SERPL-SCNC: 138 MMOL/L — SIGNIFICANT CHANGE UP (ref 135–145)
WBC # BLD: 2.93 K/UL — LOW (ref 3.8–10.5)
WBC # FLD AUTO: 2.93 K/UL — LOW (ref 3.8–10.5)

## 2022-07-14 PROCEDURE — 99232 SBSQ HOSP IP/OBS MODERATE 35: CPT

## 2022-07-14 RX ORDER — OMEPRAZOLE 10 MG/1
1 CAPSULE, DELAYED RELEASE ORAL
Qty: 0 | Refills: 0 | DISCHARGE

## 2022-07-14 RX ORDER — PANTOPRAZOLE SODIUM 20 MG/1
40 TABLET, DELAYED RELEASE ORAL
Qty: 30 | Refills: 0
Start: 2022-07-14 | End: 2022-08-12

## 2022-07-14 RX ORDER — OMEGA-3 ACID ETHYL ESTERS 1 G
1 CAPSULE ORAL
Qty: 0 | Refills: 0 | DISCHARGE

## 2022-07-14 RX ORDER — LEVALBUTEROL 1.25 MG/.5ML
0.63 SOLUTION, CONCENTRATE RESPIRATORY (INHALATION) EVERY 6 HOURS
Refills: 0 | Status: DISCONTINUED | OUTPATIENT
Start: 2022-07-14 | End: 2022-07-15

## 2022-07-14 RX ORDER — ASCORBIC ACID 60 MG
1 TABLET,CHEWABLE ORAL
Qty: 30 | Refills: 0
Start: 2022-07-14 | End: 2022-08-12

## 2022-07-14 RX ORDER — ERGOCALCIFEROL 1.25 MG/1
1 CAPSULE ORAL
Qty: 0 | Refills: 0 | DISCHARGE

## 2022-07-14 RX ORDER — LEVALBUTEROL 1.25 MG/.5ML
3 SOLUTION, CONCENTRATE RESPIRATORY (INHALATION)
Qty: 360 | Refills: 0
Start: 2022-07-14 | End: 2022-08-12

## 2022-07-14 RX ORDER — OMEPRAZOLE 10 MG/1
1 CAPSULE, DELAYED RELEASE ORAL
Qty: 30 | Refills: 0
Start: 2022-07-14 | End: 2022-08-12

## 2022-07-14 RX ORDER — ACETAMINOPHEN 500 MG
2 TABLET ORAL
Qty: 240 | Refills: 0
Start: 2022-07-14 | End: 2022-08-12

## 2022-07-14 RX ADMIN — CHLORHEXIDINE GLUCONATE 1 APPLICATION(S): 213 SOLUTION TOPICAL at 05:33

## 2022-07-14 RX ADMIN — Medication 500 MILLIGRAM(S): at 12:29

## 2022-07-14 RX ADMIN — ENOXAPARIN SODIUM 40 MILLIGRAM(S): 100 INJECTION SUBCUTANEOUS at 19:06

## 2022-07-14 RX ADMIN — PANTOPRAZOLE SODIUM 40 MILLIGRAM(S): 20 TABLET, DELAYED RELEASE ORAL at 05:32

## 2022-07-14 RX ADMIN — Medication 200 MILLIGRAM(S): at 05:33

## 2022-07-14 RX ADMIN — Medication 12.5 MILLIGRAM(S): at 19:07

## 2022-07-14 RX ADMIN — SENNA PLUS 2 TABLET(S): 8.6 TABLET ORAL at 21:31

## 2022-07-14 RX ADMIN — Medication 6 MILLIGRAM(S): at 21:31

## 2022-07-14 RX ADMIN — POLYETHYLENE GLYCOL 3350 17 GRAM(S): 17 POWDER, FOR SOLUTION ORAL at 12:29

## 2022-07-14 RX ADMIN — Medication 12.5 MILLIGRAM(S): at 05:32

## 2022-07-14 RX ADMIN — Medication 1 TABLET(S): at 12:29

## 2022-07-14 NOTE — PROGRESS NOTE ADULT - ATTENDING COMMENTS
Patient:   ARTHUR BRIGHT            MRN: [id number]            FIN: [id number]              Pt. seen with resident.  Agree with documentation above as per resident with amendments made as appropriate. Patient medically stable. Making progress towards rehab goals.     Meningioma s/p resection  coughing all night and this AM-- Mucus-- d/w hospitalist-- stop robitussin, trial mucinex.    d/c planning home tomorrow

## 2022-07-14 NOTE — PROGRESS NOTE ADULT - ASSESSMENT
62 yo Fuzhounese (and Mandarin) speaking F with PMH of HTN, C Spine meningioma (removed 2018 in [place]) who presented with dizziness, ataxia, and emesis.  MRI with ethel-medullary. right CPA, and right temporal enhancing lesions concern for meningioma vs schwannoma, with hydro.  Now S/p Left far lateral crani for meningioma resection with subtotal sacrifice of left vertebral artery (6/1).  Noted with vocal cord paralysis, dysphagia - NPO on TF (PEG placed 6/16). Also right Hemiparesis, sensory, gait and ADL impairment.     COMORBIDITES/ACTIVE MEDICAL ISSUES     #Brain tumor - Meningioma   - S/p Left far lateral crani for meningioma resection with subtotal sacrifice of left vertebral artery (6/1)  - Dexamethasone taper -completed (6/25)  - Gait Instability, ADL impairments and Functional impairments: cont Comprehensive Rehab Program of PT/OT & speech    #Respiratory-- increased UR secretions  - d/w hospitalist- per recs, budesonide nebs to be changed to inhaler, robitussin via PEG -- now changed to Mucinex via PEG  - MRSA nares + > completed doxycycline course  - management per hospitalist  - Encourage incentive spirometry     Hypophonia--Vocal cord paralysis  --ENT consult 7/5 reviewed and appreciated-- Paresis of left vocal cord.   ·  Recommendation: - +Right vocal cord compensation  - MBS as needed & OK for Speech and Swallow therapy - Advance diet as necessary   - Aspiration precautions.  - Patient would need to follow up with ENT/Laryngology - Dr. Daniele Gurrola after discharge at [facility].  Please call to schedule an appointment at [phone] 1-2 weeks after discharge.    #Neck and back pain  -likely due to muscle tightness  -continue ROM in therapy, warm compress PRN, tylenol PRN.     #Transient A fib with RVR  - Sinus tach - EKG without evidence of a fib  - Metoprolol 12.5 BID    # Cranial stenosis  - CTA head showed defect in the right M1 suspicious for possible right M1 occlusion though the vessel   - Recommended to "continue ASA 81mg qd and atorvastatin 40mg qhs" on discharge paperwork, however patient not on it in prior hospital  - Lipid panel 5/21 unremarkable  -restarted atorvastatin  --Contacted Dr. Liu to clarify if pt. ok to restart ASA-- stated would hold for now if it was for carotid stenosis .    #HTN  - cont. metoprolol 12.5 BID     #   - --Pt. voiding with low PVRs  --toileting program q.3h while awake    #Pain control  - Tylenol PRN, Tramadol      #GI/Bowel Mgmt   - At risk for constipation due to neurologic diagnosis, immobility and/or medication use  - Senna,  Miralax   - monitor for BM     #DVT  - Lovenox    Dysphagia  - Diet - NPO with TF + free water flush: 200cc  - PEG placed (6/16)  - SLP - evaluation and treatment  --MBSS 7/7- FAILED -d/w speech therapy, remains NPO  - cont. vital stimulation in speech therapy  - Self suctioning    Precautions / PROPHYLAXIS:   - Falls  - Lungs: Aspiration,   - Pressure injury/Skin: Turn Q2hrs while in bed, OOB to Chair, PT/OT      IDT 7/7:  SW: lives in  with son, and DIL; 1st floor s/u. son and DIL work, may not be able to provide necessary assistance.  OT: TotA Eating, Sup-- grooming, min A--dressing and bathroom transfers, mod A-- bathing and toileting;    PT: CG-- transfer, & Amb. 100ft RW modA, 8 steps --CG    Speech: NPO/PEG; Focus on Dysphagia- and Hypophonia from VC paralysis, Mild cog deficits/PS/memory--but will be assisted by her family at home  ELOS: 7/15/22--home    Ms. Ramirez requires a Semi-electric hospital bed as she needs the head of the bed to be elevated more than 30 degrees at all times due to aspiration precautions as she is NPO on Tube feeding.  Pillows or wedges are not effective to achieve this.     Follow ups:  Giovanni Liu)  Neurosurgery  General  [address] [place] [address], [address]  [place] [address]  Phone: [phone]  Fax: [phone]   62 yo Fuzhounese (and Mandarin) speaking F with PMH of HTN, C Spine meningioma (removed 2018 in [place]) who presented with dizziness, ataxia, and emesis.  MRI with ethel-medullary. right CPA, and right temporal enhancing lesions concern for meningioma vs schwannoma, with hydro.  Now S/p Left far lateral crani for meningioma resection with subtotal sacrifice of left vertebral artery (6/1).  Noted with vocal cord paralysis, dysphagia - NPO on TF (PEG placed 6/16). Also right Hemiparesis, sensory, gait and ADL impairment.     COMORBIDITES/ACTIVE MEDICAL ISSUES     #Brain tumor - Meningioma   - S/p Left far lateral crani for meningioma resection with subtotal sacrifice of left vertebral artery (6/1)  - Dexamethasone taper -completed (6/25)  - Gait Instability, ADL impairments and Functional impairments: cont Comprehensive Rehab Program of PT/OT & speech    #Respiratory-- increased UR secretions  - d/w hospitalist-  robitussin via PEG -- now changed to Mucinex via PEG  - MRSA nares + > completed doxycycline course  - management per hospitalist      Hypophonia--Vocal cord paralysis  --ENT consult 7/5 reviewed and appreciated-- Paresis of left vocal cord.   ·  Recommendation: - +Right vocal cord compensation  - MBS as needed & OK for Speech and Swallow therapy - Advance diet as necessary   - Aspiration precautions.  - Patient would need to follow up with ENT/Laryngology - Dr. Daniele Gurrola after discharge at [facility].  Please call to schedule an appointment at [phone] 1-2 weeks after discharge.    #Neck and back pain  -likely due to muscle tightness  -continue ROM in therapy, warm compress PRN, tylenol PRN.     #Transient A fib with RVR  - Sinus tach - EKG without evidence of a fib  - Metoprolol 12.5 BID    # Cranial stenosis  - CTA head showed defect in the right M1 suspicious for possible right M1 occlusion though the vessel   - Recommended to "continue ASA 81mg qd and atorvastatin 40mg qhs" on discharge paperwork, however patient not on it in prior hospital  - Lipid panel 5/21 unremarkable  -restarted atorvastatin  --Contacted Dr. Liu to clarify if pt. ok to restart ASA-- stated would hold for now if it was for carotid stenosis .    #HTN  - cont. metoprolol 12.5 BID     #   - --Pt. voiding with low PVRs  --toileting program q.3h while awake    #Pain control  - Tylenol PRN, Tramadol      #GI/Bowel Mgmt   - At risk for constipation due to neurologic diagnosis, immobility and/or medication use  - Senna,  Miralax   - monitor for BM     #DVT  - Lovenox    Dysphagia  - Diet - NPO with TF + free water flush: 200cc  - PEG placed (6/16)  - SLP - evaluation and treatment  --MBSS 7/7- FAILED -d/w speech therapy, remains NPO  - cont. vital stimulation in speech therapy  - Self suctioning    Precautions / PROPHYLAXIS:   - Falls  - Lungs: Aspiration,   - Pressure injury/Skin: Turn Q2hrs while in bed, OOB to Chair, PT/OT      IDT 7/14:  SW: lives in  with son, and DIL; 1st floor s/u. son and DIL work, may not be able to provide necessary assistance.  OT: TotA Eating, Sup-- grooming & UBD; CG--LB dressing and bathroom transfers, min A-- bathing and toileting;    PT: CG/CS-- transfer, & Amb. 150ft RW, 8 steps --Min A/CG  with no HR  Speech: NPO/PEG; Severe Dysphagia- and Hypophonia from VC paralysis, Mild cog deficits/PS/memory-, Impulsive  ELOS: 7/15/22--home    Follow ups:  Giovanni Liu)  Neurosurgery  General  [address] [place] [address], [address]  [place] [address]  Phone: [phone]  Fax: [phone]

## 2022-07-14 NOTE — PROGRESS NOTE ADULT - SUBJECTIVE AND OBJECTIVE BOX
INTERVAL HPI/OVERNIGHT EVENTS:  Chart Reviewed and patient seen at bedside.  Patient says she kept coughing last night into the morning, and as a result, could not get quality sleep.  Says she feels the phlegm coming up and is only coughing up clear sputum.    ROS:  Denies fevers, chills, chest pain, shortness of breath, abdominal pain, headaches, nausea/vomiting    Vital Signs Last 24 Hrs  T(C): 36.6 (14 Jul 2022 09:49), Max: 36.6 (14 Jul 2022 09:49)  T(F): 97.8 (14 Jul 2022 09:49), Max: 97.8 (14 Jul 2022 09:49)  HR: 80 (14 Jul 2022 09:49) (68 - 88)  BP: 107/73 (14 Jul 2022 09:49) (105/66 - 113/77)  BP(mean): --  RR: 16 (14 Jul 2022 09:49) (16 - 16)  SpO2: 96% (14 Jul 2022 09:49) (93% - 96%)    Parameters below as of 14 Jul 2022 09:49  Patient On (Oxygen Delivery Method): room air        Physical Exam:      LABS:  07-14    138  |  100  |  12  ----------------------------<  104<H>  4.0   |  28  |  0.50    Ca    8.6      14 Jul 2022 06:45    TPro  6.3  /  Alb  3.0<L>  /  TBili  0.6  /  DBili  x   /  AST  25  /  ALT  48<H>  /  AlkPhos  69  07-14                                              10.0   2.93  )-----------( 298      ( 14 Jul 2022 06:45 )             30.5                         9.8    3.01  )-----------( 306      ( 12 Jul 2022 06:36 )             30.9     CAPILLARY BLOOD GLUCOSE                 INTERVAL HPI/OVERNIGHT EVENTS:  Chart Reviewed and patient seen at bedside.  Patient says she kept coughing last night into the morning, and as a result, could not get quality sleep.  Says she feels the phlegm coming up and is only coughing up clear sputum.    ROS:  Denies fevers, chills, chest pain, shortness of breath, abdominal pain, headaches, nausea/vomiting    Vital Signs Last 24 Hrs  T(C): 36.6 (14 Jul 2022 09:49), Max: 36.6 (14 Jul 2022 09:49)  T(F): 97.8 (14 Jul 2022 09:49), Max: 97.8 (14 Jul 2022 09:49)  HR: 80 (14 Jul 2022 09:49) (68 - 88)  BP: 107/73 (14 Jul 2022 09:49) (105/66 - 113/77)  BP(mean): --  RR: 16 (14 Jul 2022 09:49) (16 - 16)  SpO2: 96% (14 Jul 2022 09:49) (93% - 96%)    Parameters below as of 14 Jul 2022 09:49  Patient On (Oxygen Delivery Method): room air    Neurological deficits-- dysphagia, cognitive deficits  Cough    Physical Exam:  Gen - NAD, Comfortable,   Pulm -CTA  Cardiovascular - RRR  Abdomen - Soft, NT/ND, +BS, (+) PEG tube site - clean,   Extremities - No calf tenderness  Neuro-     Cognitive - Awake and alert     Communication - Fluent, hypophonia     Cranial Nerves - PERRLA, EOMI, +mild diplopia, Slight impaired R shoulder shrug, . Visual fields intact. +dysphagia, +hypophonia--improving,  Orolingual weakness     Motor -                     LEFT    UE - ShAB 5/5, EF 5/5, EE 5/5, WE 5/5,  5/5                    RIGHT UE - ShAB 4+/5, EF 3/5, EE 3/5, WE 3/5,  3/5                    LEFT    LE - HF 5/5, KE 5/5, DF 5/5, PF 5/5                    RIGHT LE - HF 4/5, KE 4/5, DF 3/5, PF 4/5        Sensory - Slightly diminished sensation to light touch in RUE and RLE     Coordination -- impaired on right     Tone - normal  Psychiatric - Mood stable, Affect WNL    LABS:  07-14    138  |  100  |  12  ----------------------------<  104<H>  4.0   |  28  |  0.50    Ca    8.6      14 Jul 2022 06:45    TPro  6.3  /  Alb  3.0<L>  /  TBili  0.6  /  DBili  x   /  AST  25  /  ALT  48<H>  /  AlkPhos  69  07-14                                              10.0   2.93  )-----------( 298      ( 14 Jul 2022 06:45 )             30.5                         9.8    3.01  )-----------( 306      ( 12 Jul 2022 06:36 )             30.9     CAPILLARY BLOOD GLUCOSE

## 2022-07-14 NOTE — PROGRESS NOTE ADULT - ASSESSMENT
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[place]) presented with dizziness, ataxia, and emesis, found to have meningioma s/p resection with course complicated by vocal cord paralysis, dysphagia requiring PEG, and now at acute rehab     #Meningioma s/p resection  -PT/OT as per primary  -Pain control PRN    #Dysphagia s/p PEG placement  #cough  -Aspiration precautions  -budesonide nebs D/C'd  -robitussin changed to mucinex via PEG  -xopenex PRN    #Left vocal cord paralysis  -Patient declined laryngoscopy previously  -Outpatient ENT F/U    #PAF  -Continue lopressor for rate control    #HTN  -Continue Lopressor  -Off Losartan, Amlodipine   -Monitor vital signs     #Anemia  -stable     #Neutropenia  -No signs or symptoms of infection, afebrile    #Hyponatremia - resolved  -likely SIADH  -Continue fluid restriction     #DVT ppx: Lovenox  #GI ppx: Pepcid

## 2022-07-14 NOTE — PROGRESS NOTE ADULT - SUBJECTIVE AND OBJECTIVE BOX
Patient is a 61y old  Female who presents with a chief complaint of Meningioma (14 Jul 2022 10:57)      Patient seen and examined at bedside.  No overnight events  No complaints this morning    ALLERGIES:  albuterol (Other)  No Known Allergies    MEDICATIONS  (STANDING):  ascorbic acid 500 milliGRAM(s) Oral daily  chlorhexidine 0.12% Liquid 15 milliLiter(s) Swish and Spit two times a day  chlorhexidine 2% Cloths 1 Application(s) Topical <User Schedule>  enoxaparin Injectable 40 milliGRAM(s) SubCutaneous <User Schedule>  guaiFENesin  milliGRAM(s) Oral every 12 hours  melatonin 6 milliGRAM(s) Oral at bedtime  metoprolol tartrate 12.5 milliGRAM(s) Oral every 12 hours  multivitamin 1 Tablet(s) Oral daily  pantoprazole   Suspension 40 milliGRAM(s) Oral before breakfast  petrolatum white Ointment 1 Application(s) Topical daily  polyethylene glycol 3350 17 Gram(s) Oral daily  senna 2 Tablet(s) Oral at bedtime    MEDICATIONS  (PRN):  acetaminophen     Tablet .. 650 milliGRAM(s) Oral every 6 hours PRN Temp greater or equal to 38C (100.4F), Mild Pain (1 - 3)  ondansetron    Tablet 4 milliGRAM(s) Oral every 12 hours PRN Nausea  sodium chloride 0.65% Nasal 1 Spray(s) Both Nostrils every 6 hours PRN Congestion    Vital Signs Last 24 Hrs  T(F): 97.8 (14 Jul 2022 09:49), Max: 97.8 (14 Jul 2022 09:49)  HR: 80 (14 Jul 2022 09:49) (68 - 88)  BP: 107/73 (14 Jul 2022 09:49) (105/66 - 113/77)  RR: 16 (14 Jul 2022 09:49) (16 - 16)  SpO2: 96% (14 Jul 2022 09:49) (93% - 96%)  I&O's Summary    13 Jul 2022 07:01  -  14 Jul 2022 07:00  --------------------------------------------------------  IN: 700 mL / OUT: 0 mL / NET: 700 mL    14 Jul 2022 07:01  -  14 Jul 2022 12:47  --------------------------------------------------------  IN: 200 mL / OUT: 0 mL / NET: 200 mL      PHYSICAL EXAM:  GENERAL: NAD  HENT:  Atraumatic, Normocephalic; No tonsillar erythema, exudates, or enlargement; Moist mucous membranes;   EYES: EOMI, PERRLA, conjunctiva and sclera clear, no lid-lag  NECK: Supple, No JVD, Normal thyroid  CHEST/LUNG: Clear to percussion bilaterally; No rales, rhonchi, wheezing, or rubs; normal respiratory effort, no intercostal retractions  HEART: Regular rate and rhythm; No murmurs, rubs, or gallops; No pitting edema  ABDOMEN: Soft, Nontender, Nondistended; Bowel sounds present; No HSM  MUSCULOSKELETAL/EXTREMITIES:  2+ Peripheral Pulses, No clubbing or digital cyanosis  PSYCH: Appropriate affect, Alert & Awake; Good judgement    LABS:                        10.0   2.93  )-----------( 298      ( 14 Jul 2022 06:45 )             30.5       07-14    138  |  100  |  12  ----------------------------<  104  4.0   |  28  |  0.50    Ca    8.6      14 Jul 2022 06:45    TPro  6.3  /  Alb  3.0  /  TBili  0.6  /  DBili  x   /  AST  25  /  ALT  48  /  AlkPhos  69  07-14       05-21 Chol 159 mg/dL LDL -- HDL 50 mg/dL Trig 99 mg/dL      COVID-19 PCR: NotDetec (07-10-22 @ 20:40)  COVID-19 PCR: NotDetec (07-03-22 @ 06:00)  COVID-19 PCR: NotDetec (06-27-22 @ 06:49)  COVID-19 PCR: NotDetec (06-20-22 @ 23:35)  COVID-19 PCR: NotDetec (06-20-22 @ 10:32)      Care Discussed with Rehab Attending and Other Providers

## 2022-07-15 VITALS
RESPIRATION RATE: 16 BRPM | SYSTOLIC BLOOD PRESSURE: 118 MMHG | HEART RATE: 68 BPM | TEMPERATURE: 98 F | OXYGEN SATURATION: 95 % | DIASTOLIC BLOOD PRESSURE: 77 MMHG

## 2022-07-15 LAB — SARS-COV-2 RNA SPEC QL NAA+PROBE: SIGNIFICANT CHANGE UP

## 2022-07-15 PROCEDURE — 99238 HOSP IP/OBS DSCHRG MGMT 30/<: CPT | Mod: GC

## 2022-07-15 RX ADMIN — PANTOPRAZOLE SODIUM 40 MILLIGRAM(S): 20 TABLET, DELAYED RELEASE ORAL at 06:08

## 2022-07-15 RX ADMIN — CHLORHEXIDINE GLUCONATE 15 MILLILITER(S): 213 SOLUTION TOPICAL at 05:52

## 2022-07-15 RX ADMIN — CHLORHEXIDINE GLUCONATE 1 APPLICATION(S): 213 SOLUTION TOPICAL at 05:52

## 2022-07-15 RX ADMIN — Medication 1 APPLICATION(S): at 11:32

## 2022-07-15 RX ADMIN — Medication 12.5 MILLIGRAM(S): at 05:52

## 2022-07-15 NOTE — PROGRESS NOTE ADULT - ASSESSMENT
60 yo Fuzhounese (and Mandarin) speaking F with PMH of HTN, C Spine meningioma (removed 2018 in [place]) who presented with dizziness, ataxia, and emesis.  MRI with ethel-medullary. right CPA, and right temporal enhancing lesions concern for meningioma vs schwannoma, with hydro.  Now S/p Left far lateral crani for meningioma resection with subtotal sacrifice of left vertebral artery (6/1).  Noted with vocal cord paralysis, dysphagia - NPO on TF (PEG placed 6/16). Also right Hemiparesis, sensory, gait and ADL impairment.     COMORBIDITES/ACTIVE MEDICAL ISSUES     #Brain tumor - Meningioma   - S/p Left far lateral crani for meningioma resection with subtotal sacrifice of left vertebral artery (6/1)  - Dexamethasone taper -completed (6/25)  - Gait Instability, ADL impairments and Functional impairments: cont Comprehensive Rehab Program of PT/OT & speech    #Respiratory-- increased UR secretions  - d/w hospitalist-  robitussin via PEG -- now changed to Mucinex via PEG  - MRSA nares + > completed doxycycline course  - management per hospitalist      Hypophonia--Vocal cord paralysis  --ENT consult 7/5 reviewed and appreciated-- Paresis of left vocal cord.   ·  Recommendation: - +Right vocal cord compensation  - MBS as needed & OK for Speech and Swallow therapy - Advance diet as necessary   - Aspiration precautions.  - Patient would need to follow up with ENT/Laryngology - Dr. Daniele Gurrola after discharge at [facility].  Please call to schedule an appointment at [phone] 1-2 weeks after discharge.    #Neck and back pain  -likely due to muscle tightness  -continue ROM in therapy, warm compress PRN, tylenol PRN.     #Transient A fib with RVR  - Sinus tach - EKG without evidence of a fib  - Metoprolol 12.5 BID    # Cranial stenosis  - CTA head showed defect in the right M1 suspicious for possible right M1 occlusion though the vessel   - Recommended to "continue ASA 81mg qd and atorvastatin 40mg qhs" on discharge paperwork, however patient not on it in prior hospital  - Lipid panel 5/21 unremarkable  -restarted atorvastatin  --Contacted Dr. Liu to clarify if pt. ok to restart ASA-- stated would hold for now if it was for carotid stenosis .    #HTN  - cont. metoprolol 12.5 BID     #   - --Pt. voiding with low PVRs  --toileting program q.3h while awake    #Pain control  - Tylenol PRN, Tramadol      #GI/Bowel Mgmt   - At risk for constipation due to neurologic diagnosis, immobility and/or medication use  - Senna,  Miralax   - monitor for BM     #DVT  - Lovenox    Dysphagia  - Diet - NPO with TF + free water flush: 200cc  - PEG placed (6/16)  - SLP - evaluation and treatment  --MBSS 7/7- FAILED -d/w speech therapy, remains NPO  - cont. vital stimulation in speech therapy  - Self suctioning    Precautions / PROPHYLAXIS:   - Falls  - Lungs: Aspiration,   - Pressure injury/Skin: Turn Q2hrs while in bed, OOB to Chair, PT/OT      IDT 7/14:  SW: lives in  with son, and DIL; 1st floor s/u. son and DIL work, may not be able to provide necessary assistance.  OT: TotA Eating, Sup-- grooming & UBD; CG--LB dressing and bathroom transfers, min A-- bathing and toileting;    PT: CG/CS-- transfer, & Amb. 150ft RW, 8 steps --Min A/CG  with no HR  Speech: NPO/PEG; Severe Dysphagia- and Hypophonia from VC paralysis, Mild cog deficits/PS/memory-, Impulsive  ELOS: 7/15/22--home    Follow ups:  Giovanni Liu)  Neurosurgery  General  [address] [place] [address], [address]  [place] [address]  Phone: [phone]  Fax: [phone]

## 2022-07-15 NOTE — PROGRESS NOTE ADULT - NUTRITIONAL ASSESSMENT
This patient has been assessed with a concern for Malnutrition and has been determined to have a diagnosis/diagnoses of Severe protein-calorie malnutrition.    This patient is being managed with:   Diet NPO with Tube Feed-  Tube Feeding Modality: Gastrostomy  TwoCal HN  Total Volume for 24 Hours (mL): 800  Bolus  Total Volume of Bolus (mL):  200  Total # of Feeds: 4  Tube Feed Frequency: Every 6 hours   Tube Feed Start Time: 08:00  Bolus Feed Rate (mL per Hour): 200   Bolus Feed Duration (in Hours): 0.5  Bolus Feed Instructions:  Provide tube feeding at 8 AM 12 PM 4 PM 8 PM  Free Water Flush  Bolus   Total Volume per Flush (mL): 150   Frequency: Every 6 Hours  Free Water Flush Instructions:  Provide free water flush QID at least 1 hour apart from tube feeding  No Carb Prosource TF     Qty per Day:  1  Entered: Jun 24 2022  3:41PM    
This patient has been assessed with a concern for Malnutrition and has been determined to have a diagnosis/diagnoses of Severe protein-calorie malnutrition.    This patient is being managed with:   Diet NPO with Tube Feed-  Tube Feeding Modality: Gastrostomy  TwoCal HN  Total Volume for 24 Hours (mL): 800  Bolus  Total Volume of Bolus (mL):  200  Total # of Feeds: 4  Tube Feed Frequency: Every 6 hours   Tube Feed Start Time: 08:00  Bolus Feed Rate (mL per Hour): 200   Bolus Feed Duration (in Hours): 0.5  Bolus Feed Instructions:  Provide tube feeding at 8 AM 12 PM 4 PM 8 PM  Free Water Flush  Bolus   Total Volume per Flush (mL): 200   Frequency: Every 6 Hours  Free Water Flush Instructions:  Provide free water flush QID at least 1 hour apart from tube feeding  No Carb Prosource TF     Qty per Day:  1  Entered: Jun 28 2022 10:42AM    
This patient has been assessed with a concern for Malnutrition and has been determined to have a diagnosis/diagnoses of Severe protein-calorie malnutrition.    This patient is being managed with:   Diet NPO with Tube Feed-  Tube Feeding Modality: Gastrostomy  TwoCal HN  Total Volume for 24 Hours (mL): 800  Bolus  Total Volume of Bolus (mL):  200  Total # of Feeds: 4  Tube Feed Frequency: Every 6 hours   Tube Feed Start Time: 08:00  Bolus Feed Rate (mL per Hour): 200   Bolus Feed Duration (in Hours): 0.5  Bolus Feed Instructions:  Provide tube feeding at 8 AM 12 PM 4 PM 8 PM  Free Water Flush  Bolus   Total Volume per Flush (mL): 150   Frequency: Every 6 Hours  Free Water Flush Instructions:  Provide free water flush QID at least 1 hour apart from tube feeding  No Carb Prosource TF     Qty per Day:  1  Entered: Jun 24 2022  3:41PM    
This patient has been assessed with a concern for Malnutrition and has been determined to have a diagnosis/diagnoses of Severe protein-calorie malnutrition.    This patient is being managed with:   Diet NPO with Tube Feed-  Tube Feeding Modality: Gastrostomy  TwoCal HN  Total Volume for 24 Hours (mL): 800  Bolus  Total Volume of Bolus (mL):  200  Total # of Feeds: 4  Tube Feed Frequency: Every 6 hours   Tube Feed Start Time: 08:00  Bolus Feed Rate (mL per Hour): 200   Bolus Feed Duration (in Hours): 0.5  Bolus Feed Instructions:  Provide tube feeding at 8 AM 12 PM 4 PM 8 PM  Free Water Flush  Bolus   Total Volume per Flush (mL): 200   Frequency: Every 6 Hours  Free Water Flush Instructions:  Provide free water flush QID at least 1 hour apart from tube feeding  No Carb Prosource TF     Qty per Day:  1  Entered: Jun 28 2022 10:42AM    
This patient has been assessed with a concern for Malnutrition and has been determined to have a diagnosis/diagnoses of Severe protein-calorie malnutrition.    This patient is being managed with:   Diet NPO with Tube Feed-  Tube Feeding Modality: Gastrostomy  TwoCal HN  Total Volume for 24 Hours (mL): 960  Bolus  Total Volume of Bolus (mL):  240  Total # of Feeds: 4  Tube Feed Frequency: Every 6 hours   Tube Feed Start Time: 08:00  Bolus Feed Rate (mL per Hour): 240   Bolus Feed Duration (in Hours): 0.5  Bolus Feed Instructions:  Provide tube feeding at 8 AM 12 PM 4 PM 8 PM  Free Water Flush  Bolus   Total Volume per Flush (mL): 150   Frequency: Every 6 Hours  Free Water Flush Instructions:  Provide free water flush QID at least 1 hour apart from tube feeding  Entered: Jun 21 2022 12:35PM    
This patient has been assessed with a concern for Malnutrition and has been determined to have a diagnosis/diagnoses of Severe protein-calorie malnutrition.    This patient is being managed with:   Diet NPO with Tube Feed-  Tube Feeding Modality: Gastrostomy  TwoCal HN  Total Volume for 24 Hours (mL): 960  Bolus  Total Volume of Bolus (mL):  240  Total # of Feeds: 4  Tube Feed Frequency: Every 6 hours   Tube Feed Start Time: 08:00  Bolus Feed Rate (mL per Hour): 240   Bolus Feed Duration (in Hours): 0.5  Bolus Feed Instructions:  Provide tube feeding at 8 AM 12 PM 4 PM 8 PM  Free Water Flush  Bolus   Total Volume per Flush (mL): 150   Frequency: Every 6 Hours  Free Water Flush Instructions:  Provide free water flush QID at least 1 hour apart from tube feeding  Entered: Jun 21 2022 12:35PM    
This patient has been assessed with a concern for Malnutrition and has been determined to have a diagnosis/diagnoses of Severe protein-calorie malnutrition.    This patient is being managed with:   Diet NPO with Tube Feed-  Tube Feeding Modality: Gastrostomy  TwoCal HN  Total Volume for 24 Hours (mL): 800  Bolus  Total Volume of Bolus (mL):  200  Total # of Feeds: 4  Tube Feed Frequency: Every 6 hours   Tube Feed Start Time: 08:00  Bolus Feed Rate (mL per Hour): 200   Bolus Feed Duration (in Hours): 0.5  Bolus Feed Instructions:  Provide tube feeding at 8 AM 12 PM 4 PM 8 PM  Free Water Flush  Bolus   Total Volume per Flush (mL): 150   Frequency: Every 6 Hours  Free Water Flush Instructions:  Provide free water flush QID at least 1 hour apart from tube feeding  No Carb Prosource TF     Qty per Day:  1  Entered: Jun 24 2022  3:41PM    
This patient has been assessed with a concern for Malnutrition and has been determined to have a diagnosis/diagnoses of Severe protein-calorie malnutrition.    This patient is being managed with:   Diet NPO with Tube Feed-  Tube Feeding Modality: Gastrostomy  TwoCal HN  Total Volume for 24 Hours (mL): 800  Bolus  Total Volume of Bolus (mL):  200  Total # of Feeds: 4  Tube Feed Frequency: Every 6 hours   Tube Feed Start Time: 08:00  Bolus Feed Rate (mL per Hour): 200   Bolus Feed Duration (in Hours): 0.5  Bolus Feed Instructions:  Provide tube feeding at 8 AM 12 PM 4 PM 8 PM  Free Water Flush  Bolus   Total Volume per Flush (mL): 200   Frequency: Every 6 Hours  Free Water Flush Instructions:  Provide free water flush QID at least 1 hour apart from tube feeding  No Carb Prosource TF     Qty per Day:  1  Entered: Jun 28 2022 10:42AM    
This patient has been assessed with a concern for Malnutrition and has been determined to have a diagnosis/diagnoses of Severe protein-calorie malnutrition.    This patient is being managed with:   Diet NPO with Tube Feed-  Tube Feeding Modality: Gastrostomy  TwoCal HN  Total Volume for 24 Hours (mL): 800  Bolus  Total Volume of Bolus (mL):  200  Total # of Feeds: 4  Tube Feed Frequency: Every 6 hours   Tube Feed Start Time: 08:00  Bolus Feed Rate (mL per Hour): 200   Bolus Feed Duration (in Hours): 0.5  Bolus Feed Instructions:  Provide tube feeding at 8 AM 12 PM 4 PM 8 PM  Free Water Flush  Bolus   Total Volume per Flush (mL): 200   Frequency: Every 6 Hours  Free Water Flush Instructions:  Provide free water flush QID at least 1 hour apart from tube feeding  No Carb Prosource TF     Qty per Day:  1  Entered: Jun 28 2022 10:42AM    
This patient has been assessed with a concern for Malnutrition and has been determined to have a diagnosis/diagnoses of Severe protein-calorie malnutrition.    This patient is being managed with:   Diet NPO with Tube Feed-  Tube Feeding Modality: Gastrostomy  TwoCal HN  Total Volume for 24 Hours (mL): 960  Bolus  Total Volume of Bolus (mL):  240  Total # of Feeds: 4  Tube Feed Frequency: Every 6 hours   Tube Feed Start Time: 08:00  Bolus Feed Rate (mL per Hour): 240   Bolus Feed Duration (in Hours): 0.5  Bolus Feed Instructions:  Provide tube feeding at 8 AM 12 PM 4 PM 8 PM  Free Water Flush  Bolus   Total Volume per Flush (mL): 150   Frequency: Every 6 Hours  Free Water Flush Instructions:  Provide free water flush QID at least 1 hour apart from tube feeding  Entered: Jun 21 2022 12:35PM    
This patient has been assessed with a concern for Malnutrition and has been determined to have a diagnosis/diagnoses of Severe protein-calorie malnutrition.    This patient is being managed with:   Diet NPO with Tube Feed-  Tube Feeding Modality: Gastrostomy  TwoCal HN  Total Volume for 24 Hours (mL): 800  Bolus  Total Volume of Bolus (mL):  200  Total # of Feeds: 4  Tube Feed Frequency: Every 6 hours   Tube Feed Start Time: 08:00  Bolus Feed Rate (mL per Hour): 200   Bolus Feed Duration (in Hours): 0.5  Bolus Feed Instructions:  Provide tube feeding at 8 AM 12 PM 4 PM 8 PM  Free Water Flush  Bolus   Total Volume per Flush (mL): 200   Frequency: Every 6 Hours  Free Water Flush Instructions:  Provide free water flush QID at least 1 hour apart from tube feeding  No Carb Prosource TF     Qty per Day:  1  Entered: Jun 28 2022 10:42AM    

## 2022-07-15 NOTE — PROGRESS NOTE ADULT - SUBJECTIVE AND OBJECTIVE BOX
INTERVAL HPI/OVERNIGHT EVENTS:  Chart Reviewed and patient seen at bedside.  Patient still w/ persistent cough, but able to sleep better.  Looking forward to going home today.      ROS:  Denies fevers, chills, chest pain, abdominal pain, headaches, nausea/vomiting    Vital Signs Last 24 Hrs  T(C): 36.7 (15 Jul 2022 09:04), Max: 36.7 (15 Jul 2022 09:04)  T(F): 98 (15 Jul 2022 09:04), Max: 98 (15 Jul 2022 09:04)  HR: 68 (15 Jul 2022 09:04) (63 - 77)  BP: 118/77 (15 Jul 2022 09:04) (112/76 - 118/77)  BP(mean): --  RR: 16 (15 Jul 2022 09:04) (16 - 16)  SpO2: 95% (15 Jul 2022 09:04) (95% - 97%)    Parameters below as of 15 Jul 2022 09:04  Patient On (Oxygen Delivery Method): room air        Physical Exam:  Gen - NAD, Comfortable,   Pulm -CTA  Cardiovascular - RRR  Abdomen - Soft, NT/ND, +BS, (+) PEG tube site - clean,   Extremities - No calf tenderness  Neuro-     Cognitive - Awake and alert     Communication - Fluent, hypophonia     Cranial Nerves - PERRLA, EOMI, +mild diplopia, Slight impaired R shoulder shrug, . Visual fields intact. +dysphagia, +hypophonia--improving,  Orolingual weakness     Motor -                     LEFT    UE - ShAB 5/5, EF 5/5, EE 5/5, WE 5/5,  5/5                    RIGHT UE - ShAB 4+/5, EF 3/5, EE 3/5, WE 3/5,  3/5                    LEFT    LE - HF 5/5, KE 5/5, DF 5/5, PF 5/5                    RIGHT LE - HF 4/5, KE 4/5, DF 3/5, PF 4/5        Sensory - Slightly diminished sensation to light touch in RUE and RLE     Coordination -- impaired on right     Tone - normal  Psychiatric - Mood stable, Affect WNL      LABS:  07-14    138  |  100  |  12  ----------------------------<  104<H>  4.0   |  28  |  0.50    Ca    8.6      14 Jul 2022 06:45    TPro  6.3  /  Alb  3.0<L>  /  TBili  0.6  /  DBili  x   /  AST  25  /  ALT  48<H>  /  AlkPhos  69  07-14                                              10.0   2.93  )-----------( 298      ( 14 Jul 2022 06:45 )             30.5     CAPILLARY BLOOD GLUCOSE

## 2022-07-15 NOTE — PROGRESS NOTE ADULT - REASON FOR ADMISSION
Meningioma

## 2022-07-15 NOTE — PROGRESS NOTE ADULT - PROVIDER SPECIALTY LIST ADULT
Rehab Medicine
Hospitalist
Neurology
Rehab Medicine
Hospitalist
Neurology
Pulmonology
Pulmonology
Rehab Medicine
Brain Injury Medicine
Hospitalist
Pulmonology
Rehab Medicine

## 2022-07-18 ENCOUNTER — NON-APPOINTMENT (OUTPATIENT)
Age: 62
End: 2022-07-18

## 2022-07-22 ENCOUNTER — NON-APPOINTMENT (OUTPATIENT)
Age: 62
End: 2022-07-22

## 2022-07-27 ENCOUNTER — APPOINTMENT (OUTPATIENT)
Dept: NEUROSURGERY | Facility: CLINIC | Age: 62
End: 2022-07-27

## 2022-07-27 VITALS
SYSTOLIC BLOOD PRESSURE: 116 MMHG | TEMPERATURE: 97.7 F | WEIGHT: 102 LBS | HEART RATE: 70 BPM | DIASTOLIC BLOOD PRESSURE: 79 MMHG | HEIGHT: 63 IN | OXYGEN SATURATION: 98 % | BODY MASS INDEX: 18.07 KG/M2

## 2022-07-27 DIAGNOSIS — D49.6 NEOPLASM OF UNSPECIFIED BEHAVIOR OF BRAIN: ICD-10-CM

## 2022-07-27 PROCEDURE — 99024 POSTOP FOLLOW-UP VISIT: CPT

## 2022-07-28 ENCOUNTER — APPOINTMENT (OUTPATIENT)
Dept: OTOLARYNGOLOGY | Facility: CLINIC | Age: 62
End: 2022-07-28

## 2022-07-28 VITALS
HEIGHT: 63 IN | BODY MASS INDEX: 18.07 KG/M2 | SYSTOLIC BLOOD PRESSURE: 102 MMHG | WEIGHT: 102 LBS | DIASTOLIC BLOOD PRESSURE: 69 MMHG | HEART RATE: 76 BPM

## 2022-07-28 DIAGNOSIS — Z86.79 PERSONAL HISTORY OF OTHER DISEASES OF THE CIRCULATORY SYSTEM: ICD-10-CM

## 2022-07-28 DIAGNOSIS — J38.7 OTHER DISEASES OF LARYNX: ICD-10-CM

## 2022-07-28 DIAGNOSIS — Z86.69 PERSONAL HISTORY OF OTHER DISEASES OF THE NERVOUS SYSTEM AND SENSE ORGANS: ICD-10-CM

## 2022-07-28 DIAGNOSIS — Z86.39 PERSONAL HISTORY OF OTHER ENDOCRINE, NUTRITIONAL AND METABOLIC DISEASE: ICD-10-CM

## 2022-07-28 DIAGNOSIS — J38.01 PARALYSIS OF VOCAL CORDS AND LARYNX, UNILATERAL: ICD-10-CM

## 2022-07-28 DIAGNOSIS — R05.9 COUGH, UNSPECIFIED: ICD-10-CM

## 2022-07-28 DIAGNOSIS — J38.00 PARALYSIS OF VOCAL CORDS AND LARYNX, UNSPECIFIED: ICD-10-CM

## 2022-07-28 PROCEDURE — 31575 DIAGNOSTIC LARYNGOSCOPY: CPT

## 2022-07-28 PROCEDURE — 99214 OFFICE O/P EST MOD 30 MIN: CPT | Mod: 25

## 2022-07-28 RX ORDER — OMEPRAZOLE 40 MG/1
40 CAPSULE, DELAYED RELEASE ORAL
Qty: 90 | Refills: 1 | Status: ACTIVE | COMMUNITY
Start: 2022-07-28 | End: 1900-01-01

## 2022-07-28 NOTE — ASSESSMENT
[FreeTextEntry1] : 62Y F with left vocal fold immobility s/p excision of meningioma 5/23/22. Laryngoscopy shows moderate postcricoid edema, moderate arytenoids edema, Moderate pooling secretion in pyriform sinus, and Left Vocal fold immobility. Currently NPO with complete nutritions through PEG\par \par Recommend\par Left Vocal Fold Immobility\par -Patient has already scheduled with Speech and Swallow Fatuma Pitt to be further evaluated for swallow function\par -Continue with LPR precautions\par - Avoid large meals.Try to have an empty stomach 2 hours before going to bed. \par - Head of bed elevation when you lying down\par - Send to Pharmacy Omeprazole 40mg daily\par - Referral to Dr. Gurrola for further evaluation of left VF mobility may benefit from VF injections \par \par \par -Return to clinic as needed or sooner if new/worsen symptoms present

## 2022-07-28 NOTE — REASON FOR VISIT
[Initial Evaluation] : an initial evaluation for [Family Member] : family member [FreeTextEntry2] : dysphagia

## 2022-07-28 NOTE — HISTORY OF PRESENT ILLNESS
[de-identified] : 62 year old woman, initial visit for dysphagia and hoarseness after s/p excision of meningioma\par History of axial brain tumor, s/p excision of meningioma 5/23/22. Same surgery done a few years ago in [place].\par Currently in wheelchair but ambulates with walker.\par G-tube dependent, tolerating feedings, recent Swallow study showing aspiration, remains NPO\par Only able to have ice chips\par Consult today to check vocal cords, possible vocal cord paralysis\par Daughter in law states voice is better but still only 40% of her baseline. Patient was barely able to speak when she came out of surgery\par Reports intermittent throat pain, dysphonia, loses voice very often\par States constant coughing, sputum produced\par Denies dyspnea, hemoptysis, recent fevers, throat/oral infections.

## 2022-08-09 PROBLEM — D49.6 BRAIN TUMOR: Status: ACTIVE | Noted: 2022-07-27

## 2022-08-09 NOTE — REVIEW OF SYSTEMS
[Feeling Tired] : feeling tired [As Noted in HPI] : as noted in HPI [Difficulty Walking] : difficulty walking [Negative] : Heme/Lymph [de-identified] : right side weakness; difficulty swallowing

## 2022-08-09 NOTE — ASSESSMENT
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 Allen \par Repeat MRI head w/wo contrast in Septmeber 2022

## 2022-08-09 NOTE — HISTORY OF PRESENT ILLNESS
[FreeTextEntry1] : This 61-year-old woman with prior history of cervical meningioma surgery presents with gait disturbance, headache and unsteady walk and dizziness.  Imaging reveals multiple intracranial meningioma or schwannomas, in  particular a left foramen magnum meningioma with significant compression of the brainstem and medullary edema, a small right tentorial meningioma and a right-sided vestibular schwannoma.  Hearing test shows intact hearing.  Lower cranial nerve testing  shows normal vocal cord function.  MRI showed that the foramen magnum meningioma is wrapped around the left vertebral artery and lower cranial nerves are wrapped around it.  In particular, the lower cranial nerves 9-11 and 12th nerves.  She has a codominant vertebral artery and she also has a very robust PICA based on MRA evaluation.\par \par Considering the patient's significant symptoms and edema in the brainstem, we decided with surgical resection of foramen magnum meningioma.  Risks of surgery including stroke, hematoma, infection, cranial nerve deficit, brainstem stroke and remote risk  of major neurological problem or death were explained with rationale for treatment.  \par \par Today she presents for her first post op visit. Surgical incision clean dry well healed no signs or symptoms of infection. Family present at visit today they state chief complaint of right side weakness and difficulty swallowing.

## 2022-08-09 NOTE — REASON FOR VISIT
[Family Member] : family member [de-identified] :  Left far lateral transcondylar approach and resection of left foramen magnum meningioma.\par Left far lateral transcondylar approach and resection of left foramen magnum meningioma. [de-identified] : 6/1/2022

## 2022-08-09 NOTE — PHYSICAL EXAM
[General Appearance - Alert] : alert [General Appearance - In No Acute Distress] : in no acute distress [Clean] : clean [Dry] : dry [Healing Well] : healing well [No Drainage] : without drainage [Person] : oriented to person [Place] : oriented to place [Time] : oriented to time [Erythema] : not erythematous [Warm] : not warm [FreeTextEntry1] : craniotomy

## 2022-08-10 NOTE — PROGRESS NOTE ADULT - ATTENDING COMMENTS
Pt. seen with resident.  Agree with documentation above as per resident with amendments made as appropriate. Patient medically stable. Making progress towards rehab goals.       Meningioma resection with sacrifice of left vertebral artery  Pt's respiratory status and secretions are much better.  d/w hospitalist-- completed 7 day course of doxycycline -- stopped  Also stopped xopenex as pt. clinically better-- d/w pulmonology Plastic Surgeon (A): Dr. LIZZIE Quintanilla MD

## 2022-08-11 PROCEDURE — 80053 COMPREHEN METABOLIC PANEL: CPT

## 2022-08-11 PROCEDURE — 82746 ASSAY OF FOLIC ACID SERUM: CPT

## 2022-08-11 PROCEDURE — 74230 X-RAY XM SWLNG FUNCJ C+: CPT

## 2022-08-11 PROCEDURE — 97110 THERAPEUTIC EXERCISES: CPT

## 2022-08-11 PROCEDURE — 83550 IRON BINDING TEST: CPT

## 2022-08-11 PROCEDURE — 85027 COMPLETE CBC AUTOMATED: CPT

## 2022-08-11 PROCEDURE — 97116 GAIT TRAINING THERAPY: CPT

## 2022-08-11 PROCEDURE — 82607 VITAMIN B-12: CPT

## 2022-08-11 PROCEDURE — 87635 SARS-COV-2 COVID-19 AMP PRB: CPT

## 2022-08-11 PROCEDURE — U0005: CPT

## 2022-08-11 PROCEDURE — 97112 NEUROMUSCULAR REEDUCATION: CPT

## 2022-08-11 PROCEDURE — 97167 OT EVAL HIGH COMPLEX 60 MIN: CPT

## 2022-08-11 PROCEDURE — 74019 RADEX ABDOMEN 2 VIEWS: CPT

## 2022-08-11 PROCEDURE — 92523 SPEECH SOUND LANG COMPREHEN: CPT

## 2022-08-11 PROCEDURE — 92507 TX SP LANG VOICE COMM INDIV: CPT

## 2022-08-11 PROCEDURE — 85025 COMPLETE CBC W/AUTO DIFF WBC: CPT

## 2022-08-11 PROCEDURE — 87641 MR-STAPH DNA AMP PROBE: CPT

## 2022-08-11 PROCEDURE — 80048 BASIC METABOLIC PNL TOTAL CA: CPT

## 2022-08-11 PROCEDURE — 97535 SELF CARE MNGMENT TRAINING: CPT

## 2022-08-11 PROCEDURE — 87070 CULTURE OTHR SPECIMN AEROBIC: CPT

## 2022-08-11 PROCEDURE — 36415 COLL VENOUS BLD VENIPUNCTURE: CPT

## 2022-08-11 PROCEDURE — 87640 STAPH A DNA AMP PROBE: CPT

## 2022-08-11 PROCEDURE — 94668 MNPJ CHEST WALL SBSQ: CPT

## 2022-08-11 PROCEDURE — U0003: CPT

## 2022-08-11 PROCEDURE — 82728 ASSAY OF FERRITIN: CPT

## 2022-08-11 PROCEDURE — 97163 PT EVAL HIGH COMPLEX 45 MIN: CPT

## 2022-08-11 PROCEDURE — 97530 THERAPEUTIC ACTIVITIES: CPT

## 2022-08-11 PROCEDURE — 84145 PROCALCITONIN (PCT): CPT

## 2022-08-11 PROCEDURE — 92611 MOTION FLUOROSCOPY/SWALLOW: CPT

## 2022-08-11 PROCEDURE — 71045 X-RAY EXAM CHEST 1 VIEW: CPT

## 2022-08-11 PROCEDURE — 94640 AIRWAY INHALATION TREATMENT: CPT

## 2022-08-11 PROCEDURE — 92610 EVALUATE SWALLOWING FUNCTION: CPT

## 2022-08-11 PROCEDURE — 87186 SC STD MICRODIL/AGAR DIL: CPT

## 2022-08-11 PROCEDURE — 92526 ORAL FUNCTION THERAPY: CPT

## 2022-08-11 PROCEDURE — 83540 ASSAY OF IRON: CPT

## 2022-08-16 ENCOUNTER — APPOINTMENT (OUTPATIENT)
Dept: PHYSICAL MEDICINE AND REHAB | Facility: CLINIC | Age: 62
End: 2022-08-16

## 2022-08-16 DIAGNOSIS — Z98.890 OTHER SPECIFIED POSTPROCEDURAL STATES: ICD-10-CM

## 2022-08-16 DIAGNOSIS — R20.0 ANESTHESIA OF SKIN: ICD-10-CM

## 2022-08-16 DIAGNOSIS — R20.2 ANESTHESIA OF SKIN: ICD-10-CM

## 2022-08-16 PROCEDURE — 99214 OFFICE O/P EST MOD 30 MIN: CPT

## 2022-08-16 RX ORDER — ACETAMINOPHEN EXTRA STRENGTH 500 MG/1
500 TABLET ORAL
Qty: 30 | Refills: 0 | Status: DISCONTINUED | COMMUNITY
Start: 2022-05-13

## 2022-08-16 RX ORDER — GABAPENTIN 100 MG/1
100 CAPSULE ORAL
Qty: 90 | Refills: 0 | Status: ACTIVE | COMMUNITY
Start: 2022-08-16 | End: 1900-01-01

## 2022-08-16 RX ORDER — BLOOD-GLUCOSE METER
EACH MISCELLANEOUS
Qty: 1 | Refills: 0 | Status: DISCONTINUED | COMMUNITY
Start: 2022-07-26

## 2022-08-16 RX ORDER — SENNOSIDES 8.6 MG TABLETS 8.6 MG/1
8.6 TABLET ORAL
Qty: 60 | Refills: 0 | Status: ACTIVE | COMMUNITY
Start: 2022-08-10

## 2022-08-16 RX ORDER — METOPROLOL TARTRATE 25 MG/1
25 TABLET, FILM COATED ORAL
Qty: 30 | Refills: 0 | Status: ACTIVE | COMMUNITY
Start: 2022-08-10

## 2022-08-16 RX ORDER — NORTRIPTYLINE HYDROCHLORIDE 10 MG/1
10 CAPSULE ORAL
Qty: 30 | Refills: 0 | Status: DISCONTINUED | COMMUNITY
Start: 2022-05-14

## 2022-08-16 RX ORDER — ATORVASTATIN CALCIUM 40 MG/1
40 TABLET, FILM COATED ORAL
Qty: 30 | Refills: 0 | Status: DISCONTINUED | COMMUNITY
Start: 2022-07-26

## 2022-08-16 RX ORDER — COVID-19 ANTIGEN TEST
KIT MISCELLANEOUS
Qty: 2 | Refills: 0 | Status: DISCONTINUED | COMMUNITY
Start: 2022-08-10

## 2022-08-16 RX ORDER — ASPIRIN 81 MG/1
81 TABLET, COATED ORAL
Qty: 30 | Refills: 0 | Status: DISCONTINUED | COMMUNITY
Start: 2022-07-26

## 2022-08-16 RX ORDER — DICLOFENAC SODIUM 1% 10 MG/G
1 GEL TOPICAL
Qty: 200 | Refills: 0 | Status: DISCONTINUED | COMMUNITY
Start: 2022-04-02

## 2022-08-16 RX ORDER — CELECOXIB 100 MG/1
100 CAPSULE ORAL
Qty: 60 | Refills: 0 | Status: DISCONTINUED | COMMUNITY
Start: 2022-04-02

## 2022-08-16 RX ORDER — CHLORHEXIDINE GLUCONATE, 0.12% ORAL RINSE 1.2 MG/ML
0.12 SOLUTION DENTAL
Qty: 473 | Refills: 0 | Status: DISCONTINUED | COMMUNITY
Start: 2022-04-30

## 2022-08-16 RX ORDER — GUAIFENESIN 400 MG/1
400 TABLET ORAL
Qty: 60 | Refills: 0 | Status: ACTIVE | COMMUNITY
Start: 2022-07-14

## 2022-08-16 RX ORDER — ERGOCALCIFEROL 1.25 MG/1
1.25 MG CAPSULE, LIQUID FILLED ORAL
Qty: 4 | Refills: 0 | Status: DISCONTINUED | COMMUNITY
Start: 2022-04-02

## 2022-08-16 RX ORDER — CALCIUM CARBONATE 500 MG/1
500 TABLET, CHEWABLE ORAL
Qty: 120 | Refills: 0 | Status: DISCONTINUED | COMMUNITY
Start: 2022-05-13

## 2022-08-16 RX ORDER — SIMETHICONE 125 MG/1
125 TABLET, CHEWABLE ORAL
Qty: 120 | Refills: 0 | Status: DISCONTINUED | COMMUNITY
Start: 2022-04-02

## 2022-08-16 RX ORDER — GABAPENTIN 100 MG/1
100 CAPSULE ORAL
Qty: 60 | Refills: 0 | Status: DISCONTINUED | COMMUNITY
Start: 2022-05-14

## 2022-08-16 RX ORDER — ASCORBIC ACID 500 MG
500 TABLET ORAL
Qty: 30 | Refills: 0 | Status: ACTIVE | COMMUNITY
Start: 2022-08-10

## 2022-08-16 RX ORDER — AMLODIPINE BESYLATE 10 MG/1
10 TABLET ORAL
Qty: 30 | Refills: 0 | Status: DISCONTINUED | COMMUNITY
Start: 2022-02-26

## 2022-08-16 RX ORDER — VITAMIN B COMPLEX
CAPSULE ORAL
Qty: 30 | Refills: 0 | Status: DISCONTINUED | COMMUNITY
Start: 2022-07-26

## 2022-08-16 RX ORDER — POLYETHYLENE GLYCOL 3350 17 G/17G
17 POWDER, FOR SOLUTION ORAL
Qty: 30 | Refills: 0 | Status: ACTIVE | COMMUNITY
Start: 2022-08-10

## 2022-08-16 RX ORDER — LOSARTAN POTASSIUM AND HYDROCHLOROTHIAZIDE 12.5; 1 MG/1; MG/1
100-12.5 TABLET ORAL
Qty: 30 | Refills: 0 | Status: DISCONTINUED | COMMUNITY
Start: 2022-04-02

## 2022-08-16 RX ORDER — DEXTROMETHORPHAN HBR, GUAIFENESIN 20; 200 MG/10ML; MG/10ML
100-10 SOLUTION ORAL
Qty: 237 | Refills: 0 | Status: DISCONTINUED | COMMUNITY
Start: 2022-08-10

## 2022-08-16 RX ORDER — ACETAMINOPHEN 325 MG/1
325 TABLET ORAL
Qty: 240 | Refills: 0 | Status: ACTIVE | COMMUNITY
Start: 2022-07-14

## 2022-08-16 RX ORDER — LOSARTAN POTASSIUM 100 MG/1
100 TABLET, FILM COATED ORAL
Qty: 30 | Refills: 0 | Status: DISCONTINUED | COMMUNITY
Start: 2022-02-26

## 2022-08-16 RX ORDER — ROSUVASTATIN CALCIUM 40 MG/1
40 TABLET, FILM COATED ORAL
Qty: 30 | Refills: 0 | Status: DISCONTINUED | COMMUNITY
Start: 2022-04-02

## 2022-08-16 NOTE — ASSESSMENT
[FreeTextEntry1] : 2-week history of numbness and tingling of the right lower limb in a 62-year-old female whose had recent resection of brain meningioma.  The distribution of her symptoms would suggest a right L5 nerve root impingement, however the lack of any back or leg pain is atypical for this.  She does have some very significant ankle dorsiflexion and eversion weakness out of proportion to her EHL strength which would be unusual for a L5 radiculopathy. Historically the weakness has been present since her recent brain surgery.\par \par We discussed a trial of gabapentin 100 mg, starting with 1 capsule at night and increasing every 3 days to a total dose of 100 mg 3 times a day.  Also briefly discussed an MRI of her lumbar spine however I do not think it would  at this time so we will hold on this for the present.  if she does not improve with gabapentin or her symptoms were to worsen then will get an MRI at that time.

## 2022-08-16 NOTE — REASON FOR VISIT
[Initial Evaluation] : an initial evaluation [FreeTextEntry1] : Numbness and tingling in right lower limb

## 2022-08-16 NOTE — PHYSICAL EXAM
[FreeTextEntry1] : Physical examination:\par alert, in no acute distress although appears a little agitated at times.\par \par Gait:\par regular gait: Unable to walk without assistive device\par heel walk not tested\par toe walk: Not tested\par \par \par Reflexes: \par patellar 2+/2+\par ankle 0/2+\par Babinski: downgoing/downgoing\par \par Strength:\par hip flexion: 3-/5\par Knee extension 4/5\par ankle dorsiflexion: 2/5\par great toe extension: 4+/5\par ankle plantarflexion: 3/5\par Strength is diffusely 4 -/5 in right upper limb in the major muscle groups\par \par Sensation to soft touch subjectively decreased on the lateral aspect of right lower limb and on the dorsum of the right lateral 3 toes compared to the remainder of her right lower limb although sensation is decreased diffusely in the right lower limb comparison to the left lower limb.\par

## 2022-08-16 NOTE — HISTORY OF PRESENT ILLNESS
[FreeTextEntry1] : \par \par The patient is an 62 year woman  seen at the request of self,  for evaluation and management of numbness and tingling of the right lower limb.  She speaks Mandarin so her son and daughter-in-law who accompanied her to the consultation today acted as interpreters.  She has a history of a left far lateral transcondylar approach and resection of left foramen magnum meningioma on 6/1/2022.  She was inpatient rehab for 3 weeks following her surgery.  She is a patient of my colleague, Dr Allen.  She has been attending outpatient PT and OT twice a week and speech pathology 3 times a week.  She has been making gains in her strength with her therapies.\par \par  The reason she is being seen in the spine clinic today is that approximately 2 weeks ago she developed new numbness and tingling in her right lower limb.  This has been stable over the last 10 days.  It is on her right lateral hip thigh and leg and into the lateral 3 toes of her right foot.  It is constant and worse when she lies down and eases when she walks.  She finds bothersome and uncomfortable.  She denies any new weakness in her right lower limb or any pain in her back or in her right lower limb associated with it.  There has been no change in bowel or bladder function.  She does not have a history of sciatica.\par \par \par She does not have a history of malignancy.\par

## 2022-08-25 NOTE — PROGRESS NOTE ADULT - ATTENDING COMMENTS
Consider taking placebo (sugar pills) for 3-5 days instead of 7.    OBGYN referral for more menopause discussion/heavy bleeding evaluation.     Meds refilled.      Pt. seen with resident.  Agree with documentation above as per resident with amendments made as appropriate. Patient medically stable. Making progress towards rehab goals.     Left meningioma s/p resection with sacrifice of left vertebral artery  ENT attempted laryngoscope yesterday but pt. declined,  will try before pt ready for MBS  secretions improved from monday.

## 2022-08-30 NOTE — ED CDU PROVIDER SUBSEQUENT DAY NOTE - WET READ LAUNCH FT
There are no Wet Read(s) to document.

## 2022-09-08 ENCOUNTER — APPOINTMENT (OUTPATIENT)
Dept: PHYSICAL MEDICINE AND REHAB | Facility: CLINIC | Age: 62
End: 2022-09-08

## 2022-09-08 VITALS
HEART RATE: 84 BPM | HEIGHT: 63 IN | SYSTOLIC BLOOD PRESSURE: 105 MMHG | OXYGEN SATURATION: 95 % | DIASTOLIC BLOOD PRESSURE: 71 MMHG | WEIGHT: 110 LBS | BODY MASS INDEX: 19.49 KG/M2 | TEMPERATURE: 98.2 F

## 2022-09-08 DIAGNOSIS — R49.0 DYSPHONIA: ICD-10-CM

## 2022-09-08 DIAGNOSIS — R27.8 OTHER LACK OF COORDINATION: ICD-10-CM

## 2022-09-08 DIAGNOSIS — R26.9 UNSPECIFIED ABNORMALITIES OF GAIT AND MOBILITY: ICD-10-CM

## 2022-09-08 DIAGNOSIS — R13.10 DYSPHAGIA, UNSPECIFIED: ICD-10-CM

## 2022-09-08 PROCEDURE — 99213 OFFICE O/P EST LOW 20 MIN: CPT

## 2022-09-21 NOTE — REASON FOR VISIT
[Follow-Up] : a follow-up visit [Family Member] : family member [FreeTextEntry1] : right meningioma s/p resection

## 2022-09-21 NOTE — HISTORY OF PRESENT ILLNESS
[FreeTextEntry1] : \par BI[facility] Admission    20-Jun-2022 to 15-Jul-2022 \par Reason for Admission	Meningioma \par Hospital Course	 \par 61 year old Female with PMHx of HTN and C-spine meningioma removal in 2018 in [place]. Patient presented with dizziness, gait instability, and vomiting over the last 3 days prior to admission. MRI showed ethel-medullary, Right CPA, and Right temporal enhancing lesions.  Possible meningioma vs schwannoma.  Patient was monitored in the ICU for hydro watch. Workup included MRI of the Brain, C/T/L spine and CT of the chest, abdomen and pelvis which was negative for malignancies on 5/20.  Patient was seen by ENT for dysphagia and recommended \par patient be seen by speech and swallow preop and underwent MBS and was able to continue with a regular diet.  \par \par Due to location of the lesion, ENT recommended audiology consult.  She was found to have intact hearing but mildly decreased bilaterally. Underwent Left far lateral crani for meningioma resection with subtotal sacrifice of left vertebral artery on 6/1. Attempted extubation the same night after surgery, however she developed stridor and was reintubated. Patient was successfully extubated on 6/6 to high flow. \par \par  Patient scoped by ENT and found  to have left vocal cord paralysis.  Post extubation patient had a lot of secretions and was given Mucomyst and Duoneb. There was a concern for PE given respiratory status - CTA chest 6/7 negative for PE but with bilateral consolidation. Patient was weaned off high flow (6/9), however frequent suctioning was still required. FED via NGT and per speech and swallow recommendations, she was kept NPO. Patient was also found to have MSSA pneumonia and was treated with Augmentin.  \par \par Speech and swallow continued to follow and patient was recommended for a PEG.  PEG was placed on 6/16. Patient also had two episodes of desaturation on 6/16, follow up with another CTA chest; which was also negative for PE, but showed b/l lower lobe consolidations and IV antibiotics (Zosyn) were started. \par \par Patient was evaluated by PM&R and therapy for functional deficits and gait/ADL impairments and recommend acute rehabilitation.  Patient was medically optimized for discharge to [facility] on 6/20/22.  (20 Jun 2022 13:21) \par \par Pt was stable upon rehab admission to  [facility]. Admitted with gait instabilty, ADL, and functional impairments. \par \par Rehab Course significant for dysphagia, increased secretions, aspiration pneumonia suspected (MRSA on sputum culture) treated w/ doxycycline. Reflux w/ TF, volumes adjusted and started on PPI. Patient with persistent cough switched from Robitussin to Mucinex. \par All other medical co-morbidities were stable. \par \par Discharge function: \par OT: TotA Eating, Sup-- grooming & UBD; CG--LB dressing and bathroom transfers, min A-- bathing and toileting; \par PT: CG/CS-- transfer, & Amb. 150ft RW, 8 steps --Min A/CG  with no HR \par Speech: NPO/PEG; Severe Dysphagia- and Hypophonia from VC paralysis, Mild cog deficits/PS/memory-, Impulsive \par \par Pt was medically cleared on 7/15 for discharged to home. Referred to outpatient PT, OT and speech at [facility].\par \par Pt. is attending OT &PT  [facility] and Speech therapy [facility]-- Notes reviewed\par \par Speech-- 9/6--working on PO trials with ice chips, teaspoons of applesauce, bites, chewing gum,  in conjunction with NMES. Swallow fairly timely and audible.  throat clearing noted.  Vocal quality clear post 3-4 swallows per each apple sauce and egg.  Pt. reporting increased ease of swallowing with head in midline position.  \par Vocal voice exercises, straw phonation voice exercise, and EMST-- 18/20x with min-mod A. \par \par PT--9/6--Ambulating on even ground w/o AD CG, working on static & dynamic balance, transfers, ambulation, strength and mobility, --Responds well to stim for ankle DF activation and gradually able to ramp on threshold of micro volts to trigger stim. \par \par OT--9/6--working on RUE FMC and visuomotor skills, Tolerated PNF well with 30-60 sec. rest breaks. \par \par Spoke with pt. via [employer] ID #[id number] [place]\par \par Pt's son states she is making progress.  starting to eat some puree food at home.--recreational feeds-- main nutrition is PEG feeds\par \par Has appt. with orthotist to  right LE AFO 9/14.  Was measured for in Acute Rehabilitation\par \par \par she is ambulating  with supervision   with RW inside  & outside the home.  a\par \par she is independent in ADLs except  bathing. Needs   Assistance to wash hair.\par \par Lives with son and DIL    in house with  0  SONALI and no stairs inside\par \par Saw Dr. Corona, PM&R on 8/16 for right LE neuropathic pain due to right L5 nerve root impingement.  --started on gabapentin and states her pain is now well controlled on this medication.  Taking 100mg TID\par \par \par

## 2022-09-21 NOTE — ASSESSMENT
[FreeTextEntry1] : Making progress-- cont.  Outpt. PT and OT Twice weekly and speech therapy three times weekly\par \par All questions answered.\par

## 2022-09-21 NOTE — REVIEW OF SYSTEMS
[Hoarseness] : hoarseness [Muscle Weakness] : muscle weakness [Difficulty Walking] : difficulty walking [Negative] : Heme/Lymph [FreeTextEntry7] : dysphagia [de-identified] : dysphagia,

## 2022-09-21 NOTE — PHYSICAL EXAM
[FreeTextEntry1] : Constitutional: Alert, awake, no acute distress\par HEENT: EOMI, NC/AT, neck supple\par Cardiovascular: All extremities warm and well perfused\par Pulmonary: No respiratory distress, normal chest wall expansion\par Gastrointestinal: No abdominal distention\par \par Neuro:\par AAOx3, \par Recall 3/3 spontaneously. \par Attention--good\par \par CN: + nystagmus when looking right, visual fields intact, PERRLA, EOM--left eye medial gaze palsy, left facial weakness , no sensory deficit, shoulder kelsey intact, tongue midline, +dysphagia, +dysphonia\par Motor :  right UE 4+/5,  LE hip 4/5, knee ext 5/5, knee flexion 4+/5, DF 3/5, PF 4/5;  5/5 left UE and LE\par Sens --decreased to LT on right\par Coordination intact--FNF and HTS impaired on right\par \par Gait: ambulates with RW,  right LE externally rotated, decreased eccentric control of right quad, good foot clearance.  Supervision\par

## 2022-09-28 ENCOUNTER — OUTPATIENT (OUTPATIENT)
Dept: OUTPATIENT SERVICES | Facility: HOSPITAL | Age: 62
LOS: 1 days | End: 2022-09-28
Payer: MEDICAID

## 2022-09-28 ENCOUNTER — APPOINTMENT (OUTPATIENT)
Dept: MRI IMAGING | Facility: CLINIC | Age: 62
End: 2022-09-28

## 2022-09-28 DIAGNOSIS — Z98.890 OTHER SPECIFIED POSTPROCEDURAL STATES: Chronic | ICD-10-CM

## 2022-09-28 DIAGNOSIS — D49.6 NEOPLASM OF UNSPECIFIED BEHAVIOR OF BRAIN: ICD-10-CM

## 2022-09-28 PROCEDURE — 70553 MRI BRAIN STEM W/O & W/DYE: CPT

## 2022-09-28 PROCEDURE — 70553 MRI BRAIN STEM W/O & W/DYE: CPT | Mod: 26

## 2022-09-28 PROCEDURE — A9585: CPT

## 2022-10-07 ENCOUNTER — APPOINTMENT (OUTPATIENT)
Dept: NEUROSURGERY | Facility: CLINIC | Age: 62
End: 2022-10-07

## 2022-10-07 DIAGNOSIS — D32.9 BENIGN NEOPLASM OF MENINGES, UNSPECIFIED: ICD-10-CM

## 2022-10-07 PROCEDURE — 99214 OFFICE O/P EST MOD 30 MIN: CPT

## 2022-10-07 RX ORDER — DEXAMETHASONE 2 MG/1
2 TABLET ORAL TWICE DAILY
Qty: 28 | Refills: 0 | Status: ACTIVE | COMMUNITY
Start: 2022-10-07 | End: 1900-01-01

## 2022-10-10 PROBLEM — D32.9 MENINGIOMA: Status: ACTIVE | Noted: 2022-09-08

## 2022-10-10 NOTE — REVIEW OF SYSTEMS
[Feeling Poorly] : feeling poorly [Leg Weakness] : leg weakness [Poor Coordination] : poor coordination [Negative] : Respiratory

## 2022-10-10 NOTE — PHYSICAL EXAM
[General Appearance - Alert] : alert [Person] : oriented to person [Place] : oriented to place [] : no respiratory distress [Respiration, Rhythm And Depth] : normal respiratory rhythm and effort [Exaggerated Use Of Accessory Muscles For Inspiration] : no accessory muscle use [Heart Rate And Rhythm] : heart rate was normal and rhythm regular

## 2022-10-12 NOTE — ASSESSMENT
[FreeTextEntry1] : IMPRESSION MRI BRAIN 9/28/22\par There is increased enhancing soft tissue within the left pre-pontomedullary cistern and surgical bed. Enhancing soft tissue within the cistern measures approximately 1.2 x 0.9 x 1.3 cm. Enhancing soft tissue within the surgical bed measures 2.8 x 1.5 x 2.4 cm. These 2 regions of soft tissue appear contiguous and/ or continuous and/or suspicious for tumor recurrence..\par \par PT underwent a left far lateral transcondylar approach and resection of left foramen magnum meningioma on 6/1/22.\par \par \par PLAN:\par 1. Considering the increased size of meningioma will discuss at  on 10/12/22 and plan for surgical debulking.\par 2. Start Dexamethason 2 mg 2 x day.\par \par \par

## 2022-10-12 NOTE — HISTORY OF PRESENT ILLNESS
[FreeTextEntry1] : This 62-year-old woman with prior history of cervical meningioma surgery presented with gait disturbance in May 2022, headache and unsteady walk and dizziness.  Imaging revealed multiple intracranial meningioma or schwannomas, in  particular a left foramen magnum meningioma with significant compression of the brainstem and medullary edema, a small right tentorial meningioma and a right-sided vestibular schwannoma.  Hearing test shows intact hearing.  Lower cranial nerve testing  shows normal vocal cord function.  MRI showed that the foramen magnum meningioma is wrapped around the left vertebral artery and lower cranial nerves are wrapped around it.  In particular, the lower cranial nerves 9-11 and 12th nerves.  She has a codominant vertebral artery and she also has a very robust PICA based on MRA evaluation.\par \par On 6/1/22 she underwent a left far lateral transcondylar approach and resection of left foramen magnum meningioma.\par \par Pt presents today via wheelchair accompanied by her son and daughter.  Pt has right eye ptosis that daughter states is new.  Pt requires assistance with all ADLs.\par

## 2022-10-14 ENCOUNTER — NON-APPOINTMENT (OUTPATIENT)
Age: 62
End: 2022-10-14

## 2022-10-14 ENCOUNTER — APPOINTMENT (OUTPATIENT)
Dept: RADIOLOGY | Facility: HOSPITAL | Age: 62
End: 2022-10-14

## 2022-10-14 ENCOUNTER — APPOINTMENT (OUTPATIENT)
Dept: SPEECH THERAPY | Facility: HOSPITAL | Age: 62
End: 2022-10-14

## 2022-10-18 ENCOUNTER — TRANSCRIPTION ENCOUNTER (OUTPATIENT)
Age: 62
End: 2022-10-18

## 2022-10-24 ENCOUNTER — OUTPATIENT (OUTPATIENT)
Dept: OUTPATIENT SERVICES | Facility: HOSPITAL | Age: 62
LOS: 1 days | End: 2022-10-24
Payer: MEDICAID

## 2022-10-24 VITALS
DIASTOLIC BLOOD PRESSURE: 90 MMHG | HEART RATE: 86 BPM | WEIGHT: 224.87 LBS | OXYGEN SATURATION: 98 % | SYSTOLIC BLOOD PRESSURE: 129 MMHG | TEMPERATURE: 98 F | HEIGHT: 60 IN | RESPIRATION RATE: 14 BRPM

## 2022-10-24 DIAGNOSIS — Z98.890 OTHER SPECIFIED POSTPROCEDURAL STATES: Chronic | ICD-10-CM

## 2022-10-24 DIAGNOSIS — D32.0 BENIGN NEOPLASM OF CEREBRAL MENINGES: ICD-10-CM

## 2022-10-24 DIAGNOSIS — Z78.9 OTHER SPECIFIED HEALTH STATUS: ICD-10-CM

## 2022-10-24 DIAGNOSIS — D35.2 BENIGN NEOPLASM OF PITUITARY GLAND: ICD-10-CM

## 2022-10-24 DIAGNOSIS — Z29.9 ENCOUNTER FOR PROPHYLACTIC MEASURES, UNSPECIFIED: ICD-10-CM

## 2022-10-24 DIAGNOSIS — R13.10 DYSPHAGIA, UNSPECIFIED: ICD-10-CM

## 2022-10-24 DIAGNOSIS — Z01.818 ENCOUNTER FOR OTHER PREPROCEDURAL EXAMINATION: ICD-10-CM

## 2022-10-24 LAB
ANION GAP SERPL CALC-SCNC: 11 MMOL/L — SIGNIFICANT CHANGE UP (ref 5–17)
BLD GP AB SCN SERPL QL: NEGATIVE — SIGNIFICANT CHANGE UP
BUN SERPL-MCNC: 28 MG/DL — HIGH (ref 7–23)
CALCIUM SERPL-MCNC: 9.4 MG/DL — SIGNIFICANT CHANGE UP (ref 8.4–10.5)
CHLORIDE SERPL-SCNC: 94 MMOL/L — LOW (ref 96–108)
CO2 SERPL-SCNC: 30 MMOL/L — SIGNIFICANT CHANGE UP (ref 22–31)
CREAT SERPL-MCNC: 0.49 MG/DL — LOW (ref 0.5–1.3)
EGFR: 108 ML/MIN/1.73M2 — SIGNIFICANT CHANGE UP
GLUCOSE SERPL-MCNC: 105 MG/DL — HIGH (ref 70–99)
HCT VFR BLD CALC: 45 % — SIGNIFICANT CHANGE UP (ref 34.5–45)
HGB BLD-MCNC: 15.1 G/DL — SIGNIFICANT CHANGE UP (ref 11.5–15.5)
MCHC RBC-ENTMCNC: 30.4 PG — SIGNIFICANT CHANGE UP (ref 27–34)
MCHC RBC-ENTMCNC: 33.6 GM/DL — SIGNIFICANT CHANGE UP (ref 32–36)
MCV RBC AUTO: 90.5 FL — SIGNIFICANT CHANGE UP (ref 80–100)
MRSA PCR RESULT.: DETECTED
NRBC # BLD: 0 /100 WBCS — SIGNIFICANT CHANGE UP (ref 0–0)
PLATELET # BLD AUTO: 282 K/UL — SIGNIFICANT CHANGE UP (ref 150–400)
POTASSIUM SERPL-MCNC: 4.1 MMOL/L — SIGNIFICANT CHANGE UP (ref 3.5–5.3)
POTASSIUM SERPL-SCNC: 4.1 MMOL/L — SIGNIFICANT CHANGE UP (ref 3.5–5.3)
RBC # BLD: 4.97 M/UL — SIGNIFICANT CHANGE UP (ref 3.8–5.2)
RBC # FLD: 12.5 % — SIGNIFICANT CHANGE UP (ref 10.3–14.5)
RH IG SCN BLD-IMP: POSITIVE — SIGNIFICANT CHANGE UP
S AUREUS DNA NOSE QL NAA+PROBE: DETECTED
SODIUM SERPL-SCNC: 135 MMOL/L — SIGNIFICANT CHANGE UP (ref 135–145)
WBC # BLD: 9.41 K/UL — SIGNIFICANT CHANGE UP (ref 3.8–10.5)
WBC # FLD AUTO: 9.41 K/UL — SIGNIFICANT CHANGE UP (ref 3.8–10.5)

## 2022-10-24 PROCEDURE — 87640 STAPH A DNA AMP PROBE: CPT

## 2022-10-24 PROCEDURE — 87641 MR-STAPH DNA AMP PROBE: CPT

## 2022-10-24 PROCEDURE — 85027 COMPLETE CBC AUTOMATED: CPT

## 2022-10-24 PROCEDURE — 86850 RBC ANTIBODY SCREEN: CPT

## 2022-10-24 PROCEDURE — G0463: CPT

## 2022-10-24 PROCEDURE — 80048 BASIC METABOLIC PNL TOTAL CA: CPT

## 2022-10-24 PROCEDURE — 86900 BLOOD TYPING SEROLOGIC ABO: CPT

## 2022-10-24 PROCEDURE — 86901 BLOOD TYPING SEROLOGIC RH(D): CPT

## 2022-10-24 RX ORDER — DEXAMETHASONE 0.5 MG/5ML
1 ELIXIR ORAL
Qty: 0 | Refills: 0 | DISCHARGE

## 2022-10-24 RX ORDER — ONDANSETRON 8 MG/1
1 TABLET, FILM COATED ORAL
Qty: 0 | Refills: 0 | DISCHARGE

## 2022-10-24 NOTE — DATA REVIEWED
[de-identified] : I have reviewed the most recent MRI 9/28/22 which shows progression of left pre-pontomeduallry cistern enhancement and increase of right CP angle enhancement when compared to prior imaging 6/3/22

## 2022-10-24 NOTE — H&P PST ADULT - PROBLEM SELECTOR PLAN 1
Scheduled for left far lateral foramen magnum meningioma removal on 11/7/22  Chlorhexidine instructions given to patient and daughter in law  Medical evaluation TBS  ABO upon admission

## 2022-10-24 NOTE — H&P PST ADULT - FALL HARM RISK - HARM RISK INTERVENTIONS

## 2022-10-24 NOTE — ASSESSMENT
[FreeTextEntry1] : My impression is that the patient suffers from 2  progressing brain masses.   The patient’s established problem of  is.  The differential diagnosis is meningioma.   I had a long discussion with the patient regarding the role of.  The patient was extensively educated about the nature of her disease process. Therapeutic and diagnostic tests include.  The patient should see.  I will see the patient back in. I have explained the alternatives, risks and benefits to the patient and she understands and agrees to proceed.  This risk of the procedure including but not limited to pain, infection, seizure, stroke, recurrence, residual disease, neurovascular injury, heart attack, pulmonary embolism, blindness, weakness, paralysis and death have been carefully explained to the patient who clearly understands and agrees to proceed.\par

## 2022-10-24 NOTE — H&P PST ADULT - PROBLEM SELECTOR PLAN 2
PEG in place, patient instructed not to take medications DOS due to excess fluid intake required\  Requested family to contact GI re: recent wt. loss over the last 3 weeks

## 2022-10-24 NOTE — H&P PST ADULT - TEMPERATURE IN CELSIUS (DEGREES C)
--------------  ADMISSION REVIEW     Payor: [facility] Jonathan Villalpando [place] #:  [id number]  Authorization Number: [id number]    Admit date: 3/21/19  Admit time: 56       Admitting Physician: Idalmis Lobo MD  Attending Physician:  No att. provider Current:/[id number]   Pulse 79   Temp 97 °F (36.1 °C) (Temporal)   Resp 18   Ht 185.4 cm (6' 1\")   Wt 93 kg   SpO2 97%   BMI 27.05 kg/m²          Physical Exam    Vital signs reviewed  General appearance: Patient is alert and in no acute distress the result                 Please view results for these tests on the individual orders.    URINALYSIS WITH CULTURE REFLEX   RAINBOW DRAW BLUE   RAINBOW DRAW LAVENDER   RAINBOW DRAW LIGHT GREEN   RAINBOW DRAW GOLD          Concerning patient does have painless LFTs         H&P - H&P Note          Patient presents with:  Jaundice (gastrointestinal, hematologic)  Nausea/Vomiting/Diarrhea (gastrointestinal)       PCP: Shanae Aaron MD      History of Present Illness: Patient is a 82year old male with PMH sig for Abdomen:   Soft, non-tender. Bowel sounds normal. No masses,  No organomegaly. Non distended   Extremities: Extremities normal, atraumatic, no cyanosis or edema. Skin: Skin color, texture, turgor normal. No rashes or lesions.     Neurologic: Normal stre clinically directed. There is also moderate porcelain calcification of the gallbladder wall with cholelithiasis also noted. SPLEEN:  Calcified granulomas. Accessory spleen noted. PANCREAS:  Unremarkable. ADRENALS:  Unremarkable.  KIDNEYS:  Subcentimeter h Simi Robert MD               Assessment/Plan:     Dilated intrahepatic bile ducts, porcelain calcifications GB w cholelithiasis  - plan EUS w ERCP tomorrow  - ca 19-9    Diarrhea  - r/o cdif              3/22 OP REPORT   PREOPERATIVE DIAGNOSIS/INDICATI visualized in descending duodenum.      IMPRESSION:                 1. Normal EUS of the CBD indicated likely passed stone. No ERCP required. 2. Cholelithiasis.    RECOMMENDATIONS:                  1. Consider cholecystectomy per general arevalo 36.7

## 2022-10-24 NOTE — H&P PST ADULT - COMMENTS
Son and daughter in law assist with all activities or supervision, patient is able to get out of wheelchair and get on toilet, or move to a chair, will use a walker around the house and uses wheelchair for long distanced

## 2022-10-24 NOTE — H&P PST ADULT - GEN GEN HX ROS MEA POS PC
Ernesto Fagan is a 46 y.o. female    Used  #[id number]    Chief Complaint   Patient presents with    Routine Prenatal Visit     32w4d today       LOF: no  Vaginal bleeding: no  Fetal movement (after 20 weeks): yes, a lot  Contractions: yes   Dysuria: yes - sometimes  Headaches, blurred vision, RUQ pain: no  Taking prenatal vitamins: yes    1. Have you been to the ER, urgent care clinic since your last visit? Hospitalized since your last visit? No    2. Have you seen or consulted any other health care providers outside of the [address] [place] [address] since your last visit? Include any pap smears or colon screening. No      Visit Vitals  /67 (BP 1 Location: Left upper arm, BP Patient Position: Sitting, BP Cuff Size: Adult)   Pulse 91   Temp 97.5 °F (36.4 °C) (Temporal)   Resp 16   Ht 5' 2\" (1.575 m)   Wt 209 lb 3.2 oz (94.9 kg)   SpO2 99%   BMI 38.26 kg/m²           Health Maintenance Due   Topic Date Due    Depression Screen  Never done    COVID-19 Vaccine (1) Never done    Cervical cancer screen  Never done         Medication Reconciliation completed, changes noted.   Please Update medication list. at least 15lbs in last 3 weeks, family advised to call GI preop about this wt loss/weight loss at least 15lbs in last 3 weeks, found note from Speech therapy, note is in chart./weight loss

## 2022-10-24 NOTE — H&P PST ADULT - HISTORY OF PRESENT ILLNESS
Ms. Ramirez is a 62 year old woman with PMH HTN and recent admission with c/o impaired gait admitted 6/2022 and now s/p intracranial meningioma surgery/resection 6/2022 leaving her with new onset ptosis of left eye, paralysis of left vocal fold, dysphonia, dysphagia with PEG in place for meds and feedings, and hemiplegia on her right side of her body.  She was sent to rehab for 3 weeks and was doing well but her symptoms have worsened, she is losing weight and MRI of brain revealed increased enhancing soft tissue within the left pre-pontomedullary cistern and surgical bed, suspicious for tumor recurrence now scheduled for left far lateral foramen magnum meningioma debulking on 11/7/22.  She was started on Dexamethasone by surgeon.    Denies s/s of COVID, no recent travel, had been in rehab and is now living with her son and daughter in law in [place].

## 2022-10-24 NOTE — H&P PST ADULT - PROBLEM SELECTOR PLAN 4
Recommend Mandarin speaking for instructions/questions if family not present.  She understands some English but does better with Mandarin.

## 2022-10-24 NOTE — H&P PST ADULT - GASTROINTESTINAL COMMENTS
has PEG in place for medications and tube feedings PEG LLQ of abdomen, no redness/inflammation of site

## 2022-10-24 NOTE — HISTORY OF PRESENT ILLNESS
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 Liu 6/11/22. She also has additional masses consistent with a right tentorial meningioma and right vestibular schwannoma. \par She has a remote history of cervical meningioma resection. \par \par Pathology was consistent with a meningioma but was unable to be graded due to poor tissue preservation.

## 2022-10-24 NOTE — H&P PST ADULT - ENT GEN HX ROS MEA POS PC
dysphonia;, rt side dimished hearing/hearing difficulty/dysphagia dysphonia;, rt side diminished hearing/hearing difficulty/dysphagia

## 2022-10-24 NOTE — H&P PST ADULT - MOTOR
Right side of body weaker than left, 4/5 upper extremity, 3/5 lower extremity proximally, follows commands without difficulty

## 2022-10-24 NOTE — H&P PST ADULT - NSICDXPASTMEDICALHX_GEN_ALL_CORE_FT
PAST MEDICAL HISTORY:  Dysphagia     Dysphonia     Hemiplegia affecting right dominant side     HLD (hyperlipidemia)     HTN (hypertension)     Impaired gait     Malignant neoplasm of brain, unspecified     Type 2 neurofibromatosis      PAST MEDICAL HISTORY:  Dysphagia     Dysphonia     Hemiplegia affecting right dominant side     HLD (hyperlipidemia)     HTN (hypertension)     Impaired gait     Paralysis of left vocal fold     Type 2 neurofibromatosis

## 2022-10-24 NOTE — H&P PST ADULT - ASSESSMENT
CAPRINI SCORE [CLOT]    AGE RELATED RISK FACTORS                                                       MOBILITY RELATED FACTORS  [x ] Age 41-60 years                                            (1 Point)                  [ ] Bed rest                                                        (1 Point)  [ ] Age: 61-74 years                                           (2 Points)                 [ ] Plaster cast                                                   (2 Points)  [ ] Age= 75 years                                              (3 Points)                 [ ] Bed bound for more than 72 hours                 (2 Points)    DISEASE RELATED RISK FACTORS                                               GENDER SPECIFIC FACTORS  [ ] Edema in the lower extremities                       (1 Point)                  [ ] Pregnancy                                                     (1 Point)  [ ] Varicose veins                                               (1 Point)                  [ ] Post-partum < 6 weeks                                   (1 Point)             [ ] BMI > 25 Kg/m2                                            (1 Point)                  [ ] Hormonal therapy  or oral contraception          (1 Point)                 [ ] Sepsis (in the previous month)                        (1 Point)                  [ ] History of pregnancy complications                 (1 point)  [ ] Pneumonia or serious lung disease                                               [ ] Unexplained or recurrent                     (1 Point)           (in the previous month)                               (1 Point)  [ ] Abnormal pulmonary function test                     (1 Point)                 SURGERY RELATED RISK FACTORS  [ ] Acute myocardial infarction                              (1 Point)                 [ ]  Section                                             (1 Point)  [ ] Congestive heart failure (in the previous month)  (1 Point)               [ ] Minor surgery                                                  (1 Point)   [ ] Inflammatory bowel disease                             (1 Point)                 [ ] Arthroscopic surgery                                        (2 Points)  [ ] Central venous access                                      (2 Points)                [ x] General surgery lasting more than 45 minutes   (2 Points)       [ ] Stroke (in the previous month)                          (5 Points)               [ ] Elective arthroplasty                                         (5 Points)                                                                                                                                               HEMATOLOGY RELATED FACTORS                                                 TRAUMA RELATED RISK FACTORS  [ ] Prior episodes of VTE                                     (3 Points)                 [ ] Fracture of the hip, pelvis, or leg                       (5 Points)  [ ] Positive family history for VTE                         (3 Points)                 [ ] Acute spinal cord injury (in the previous month)  (5 Points)  [ ] Prothrombin 86817 A                                     (3 Points)                 [ ] Paralysis  (less than 1 month)                             (5 Points)  [ ] Factor V Leiden                                             (3 Points)                  [ ] Multiple Trauma within 1 month                        (5 Points)  [ ] Lupus anticoagulants                                     (3 Points)                                                           [ ] Anticardiolipin antibodies                               (3 Points)                                                       [ ] High homocysteine in the blood                      (3 Points)                                             [ ] Other congenital or acquired thrombophilia      (3 Points)                                                [ ] Heparin induced thrombocytopenia                  (3 Points)                                          Total Score [     3     ]

## 2022-10-24 NOTE — H&P PST ADULT - NSICDXPASTSURGICALHX_GEN_ALL_CORE_FT
PAST SURGICAL HISTORY:  H/O brain surgery s/p removal of meningioma June 2022    H/O cervical spine surgery

## 2022-10-26 RX ORDER — MUPIROCIN 20 MG/G
2 OINTMENT TOPICAL
Qty: 1 | Refills: 0 | Status: ACTIVE | COMMUNITY
Start: 2022-10-26 | End: 1900-01-01

## 2022-10-28 ENCOUNTER — APPOINTMENT (OUTPATIENT)
Dept: NEUROSURGERY | Facility: CLINIC | Age: 62
End: 2022-10-28

## 2022-11-07 ENCOUNTER — APPOINTMENT (OUTPATIENT)
Dept: NEUROSURGERY | Facility: HOSPITAL | Age: 62
End: 2022-11-07

## 2022-11-08 ENCOUNTER — APPOINTMENT (OUTPATIENT)
Dept: OTOLARYNGOLOGY | Facility: CLINIC | Age: 62
End: 2022-11-08

## 2022-11-17 ENCOUNTER — APPOINTMENT (OUTPATIENT)
Dept: PHYSICAL MEDICINE AND REHAB | Facility: CLINIC | Age: 62
End: 2022-11-17

## 2023-07-11 NOTE — DIETITIAN INITIAL EVALUATION ADULT - CALCULATED FROM (G/KG)
64.8 Drysol Pregnancy And Lactation Text: This medication is considered safe during pregnancy and breast feeding.

## 2023-10-09 NOTE — ED PROVIDER NOTE - ATTENDING WITH...
Last appointment with Dr. Mcgarry 9/26/23  Due for recheck Return in about 1 year (around 9/26/2024) for Medicare Wellness Visit.     Eprescribed to pharmacy per protocol.   Medication: See note  Last office visit date: See note  Next appointment scheduled?: Yes   Number of refills given: See chart     Resident

## 2024-02-23 NOTE — PROGRESS NOTE ADULT - THIS PATIENT HAS THE FOLLOWING CONDITION(S)/DIAGNOSES ON THIS ADMISSION:
Pt went to doctors office for thyroid check up, and blood sugar found to be 537. Pt sent to ER via EMS. Encephalopathy

## 2024-11-06 NOTE — PROGRESS NOTE ADULT - ASSESSMENT
Please go to the ER for more workup and care   Summary:   61F, PMH HTN and C-spine meningioma removal in 2018 in [place]. P/w dizziness, gait instability, and emesis x3d. MRI shows ethel-medullary, Right CPA, and Right temporal enhancing lesions c/f meningioma vs schwannoma, with some hydro. Exam: Mandarin-speaking, AOx3, EOMI no nystagmus, PERRL, no facial/drift, CONRAD 5/5, SILT. Slightly wide-based antalgic gait, positive Romberg’s    NEURO:    q4h neuro checks  Hydrocephalus watch   Tylenol  Keppra 500 mg BID  Meclizine 25 mg Q6 hrs    CARDS:    -160    PULM:    RA  sat > 92%    RENAL:    NS at 60 ml/hr  HCT 12.5 mg daily    GASTRO:    Regular diet  Stress ulcer prophylaxis:  PPI  Ondansetron     HEME:    monitor H/H    DVT prophylaxis: SCDs  Lovenox 40 mg daily     ENDO:    euglycemia    ID:   Monitor for fever    Code status:  Full code  Disposition:  Floor      61F, PMH HTN and C-spine meningioma removal in 2018 in [place]. P/w dizziness, gait instability, and emesis x3d. MRI shows ethel-medullary, Right CPA, and Right temporal enhancing lesions c/f meningioma vs schwannoma, with some hydro. Exam: Mandarin-speaking, AOx3, EOMI no nystagmus, PERRL, no facial/drift, CONRAD 5/5, SILT. Slightly wide-based antalgic gait, positive Romberg’s    Plan    NEURO:    q4h neuro checks  Hydrocephalus watch   Tylenol  Keppra 500 mg BID  Meclizine 25 mg Q6 hrs  MRI C/T/L spine completed official report pend    CARDS:    -160  C/w Losartan    PULM:    RA  sat > 92%    RENAL:    IVL  Voiding    GASTRO:    Regular diet  Stress ulcer prophylaxis:  PPI  Ondansetron     HEME:    monitor H/H    DVT prophylaxis: SCDs  Lovenox 40 mg daily     ENDO:    euglycemia    ID:   Monitor for fever    Will discuss laura Liu  Spectra link: [id number]

## (undated) DEVICE — LAP PAD 18 X 18"

## (undated) DEVICE — TUBING SUCTION 20FT

## (undated) DEVICE — DRAPE INSTRUMENT POUCH 6.75" X 11"

## (undated) DEVICE — PACK IV START WITH CHG

## (undated) DEVICE — DRAPE 3/4 SHEET W REINFORCEMENT 56X77"

## (undated) DEVICE — ELCTR SUBDERMAL NDL CLASSIC 1.5M X 59" (6 COLOR)

## (undated) DEVICE — FOLEY TRAY 16FR 5CC LTX UMETER CLOSED

## (undated) DEVICE — ELCTR SUBDERMAL NDL 27G X 1/2" WITH TWISTED PAIR

## (undated) DEVICE — MIDAS REX LEGEND LUBRICANT DIFFUSER CARTRIDGE

## (undated) DEVICE — DRAPE MICROSCOPE OPMI VISIONGUARD 48X82"

## (undated) DEVICE — GLV 7.5 PROTEXIS (WHITE)

## (undated) DEVICE — ELCTR 4-DISC 20MM 49" (RED, BLUE, GREEN, BLACK)

## (undated) DEVICE — MIDAS REX LEGEND BALL DIAMOND SM BORE 3.0MM X 10CM

## (undated) DEVICE — Device

## (undated) DEVICE — CATH IV SAFE BC 22G X 1" (BLUE)

## (undated) DEVICE — CUSA TIP ASPIRATOR PACK 1523211-1517079

## (undated) DEVICE — BITE BLOCK ADULT 20 X 27MM (GREEN)

## (undated) DEVICE — SOL IRR POUR NS 0.9% 500ML

## (undated) DEVICE — SPECIMEN CONTAINER 100ML

## (undated) DEVICE — GLV 8 PROTEXIS (WHITE)

## (undated) DEVICE — DRSG CURITY GAUZE SPONGE 4 X 4" 12-PLY

## (undated) DEVICE — CODMAN PERFORATOR 14MM (BLUE)

## (undated) DEVICE — BIPOLAR FORCEP SYMMETRY BAYONET 7" X 1.5MM SMOOTH (SILVER)

## (undated) DEVICE — GLV 6.5 PROTEXIS (WHITE)

## (undated) DEVICE — WARMING BLANKET LOWER ADULT

## (undated) DEVICE — POSITIONER FOAM EGG CRATE ULNAR 2PCS (PINK)

## (undated) DEVICE — ELCTR BIPOLAR CORD J&J 12FT DISP

## (undated) DEVICE — SOL INJ NS 0.9% 500ML 2 PORT

## (undated) DEVICE — DRAPE TOWEL BLUE 17" X 24"

## (undated) DEVICE — MIDAS REX LEGEND BALL FLUTED MEDNEXT SM BORE 3.0MM X 10CM

## (undated) DEVICE — GOWN TRIMAX LG

## (undated) DEVICE — ABDOMINAL BINDER MED/LG 9" X 36"-64"

## (undated) DEVICE — MEDICATION LABELS W MARKER

## (undated) DEVICE — SUT SOFSILK 0 30" V-20

## (undated) DEVICE — ELCTR BIPOLAR PROBE

## (undated) DEVICE — STAPLER SKIN VISI-STAT 35 WIDE

## (undated) DEVICE — MARKING PEN W RULER

## (undated) DEVICE — SOL IRR POUR H2O 250ML

## (undated) DEVICE — ELCTR BOVIE PENCIL HANDPIECE

## (undated) DEVICE — PREP CHLORAPREP HI-LITE ORANGE 10.5ML

## (undated) DEVICE — TUBING IV SET GRAVITY 3Y 100" MACRO

## (undated) DEVICE — AESCULAP SCALPFIX 10 CLIPS

## (undated) DEVICE — TUBING SUCTION CONN 6FT STERILE

## (undated) DEVICE — MIDAS REX LEGEND TAPERED SM BORE 2.3MM X 8CM

## (undated) DEVICE — SUT VICRYL 3-0 18" X-1 (POP-OFF)

## (undated) DEVICE — MIDAS REX LEGEND TAPERED SM BORE 1.1MM X 8CM

## (undated) DEVICE — DRSG TAPE HYPAFIX 4"

## (undated) DEVICE — STRYKER SONOPET IQ TUBING SET

## (undated) DEVICE — SENSOR O2 FINGER ADULT

## (undated) DEVICE — BALLOON US ENDO

## (undated) DEVICE — GLV 8.5 PROTEXIS (WHITE)

## (undated) DEVICE — DRAIN RESERVOIR FOR JACKSON PRATT 100CC CARDINAL

## (undated) DEVICE — SUT NUROLON 4-0 8-18" TF (POP-OFF)

## (undated) DEVICE — RUBBER BANDS STERILE

## (undated) DEVICE — DRAPE MAYO STAND 30"

## (undated) DEVICE — DRSG TELFA .5 X 3

## (undated) DEVICE — SYR ALLIANCE II INFLATION 60ML

## (undated) DEVICE — DRSG XEROFORM 1 X 8"

## (undated) DEVICE — FOLEY HOLDER STATLOCK 2 WAY ADULT

## (undated) DEVICE — GLV 7 PROTEXIS (WHITE)

## (undated) DEVICE — DRSG TELFA 3 X 8

## (undated) DEVICE — ELCTR BOVIE TIP BLADE INSULATED 2.75" EDGE

## (undated) DEVICE — SUCTION YANKAUER NO CONTROL VENT

## (undated) DEVICE — ELCTR SUBDERMAL CORKSCREW NDL 1.2MM

## (undated) DEVICE — ELCTR PEDICLE SCREW PROBE 3MM BALL 1.8MM X 100MM

## (undated) DEVICE — CATH IV SAFE BC 20G X 1.16" (PINK)

## (undated) DEVICE — VENODYNE/SCD SLEEVE CALF LARGE

## (undated) DEVICE — ELCTR MONOPOLAR STIMULATOR PROBE FLUSH-TIP

## (undated) DEVICE — PREP CHLORAPREP HI-LITE ORANGE 26ML

## (undated) DEVICE — DRAPE 1/2 SHEET 40X57"

## (undated) DEVICE — DRAIN JACKSON PRATT 7MM FLAT FULL NO TROCAR

## (undated) DEVICE — DRAPE LIGHT HANDLE COVER (BLUE)

## (undated) DEVICE — SUT VICRYL 2-0 18" CP-2 UNDYED (POP-OFF)

## (undated) DEVICE — DRAPE MINOR PROCEDURE

## (undated) DEVICE — ELCTR BIPOLAR CORD AESCULAP 12FT DISP

## (undated) DEVICE — VISITEC 4X4

## (undated) DEVICE — SNAP ON SPHERZ 5 PACK

## (undated) DEVICE — STRYKER SONOPET IQ TIP 12CM MICRO